# Patient Record
Sex: MALE | Race: BLACK OR AFRICAN AMERICAN | Employment: UNEMPLOYED | URBAN - METROPOLITAN AREA
[De-identification: names, ages, dates, MRNs, and addresses within clinical notes are randomized per-mention and may not be internally consistent; named-entity substitution may affect disease eponyms.]

---

## 2018-07-08 ENCOUNTER — HOSPITAL ENCOUNTER (EMERGENCY)
Age: 38
Discharge: HOME | End: 2018-07-08
Attending: INTERNAL MEDICINE
Payer: SUBSIDIZED

## 2018-07-08 ENCOUNTER — APPOINTMENT (OUTPATIENT)
Dept: CT IMAGING | Age: 38
End: 2018-07-08
Attending: INTERNAL MEDICINE
Payer: SUBSIDIZED

## 2018-07-08 VITALS
HEART RATE: 90 BPM | OXYGEN SATURATION: 97 % | HEIGHT: 75 IN | WEIGHT: 200 LBS | RESPIRATION RATE: 18 BRPM | SYSTOLIC BLOOD PRESSURE: 105 MMHG | DIASTOLIC BLOOD PRESSURE: 86 MMHG | TEMPERATURE: 97.5 F | BODY MASS INDEX: 24.87 KG/M2

## 2018-07-08 DIAGNOSIS — N12 PYELONEPHRITIS: Primary | ICD-10-CM

## 2018-07-08 LAB
ALBUMIN SERPL-MCNC: 3.1 G/DL (ref 3.5–5)
ALBUMIN/GLOB SERPL: 0.7 {RATIO} (ref 1.1–2.2)
ALP SERPL-CCNC: 112 U/L (ref 45–117)
ALT SERPL-CCNC: 34 U/L (ref 12–78)
AMPHET UR QL SCN: NEGATIVE
ANION GAP SERPL CALC-SCNC: 11 MMOL/L (ref 5–15)
APPEARANCE UR: CLEAR
AST SERPL-CCNC: 25 U/L (ref 15–37)
BACTERIA URNS QL MICRO: NEGATIVE /HPF
BARBITURATES UR QL SCN: NEGATIVE
BASOPHILS # BLD: 0 K/UL (ref 0–0.1)
BASOPHILS NFR BLD: 0 % (ref 0–1)
BENZODIAZ UR QL: NEGATIVE
BILIRUB SERPL-MCNC: 2.2 MG/DL (ref 0.2–1)
BILIRUB UR QL: NEGATIVE
BUN SERPL-MCNC: 10 MG/DL (ref 6–20)
BUN/CREAT SERPL: 8 (ref 12–20)
CALCIUM SERPL-MCNC: 8.8 MG/DL (ref 8.5–10.1)
CANNABINOIDS UR QL SCN: NEGATIVE
CHLORIDE SERPL-SCNC: 110 MMOL/L (ref 97–108)
CO2 SERPL-SCNC: 26 MMOL/L (ref 21–32)
COCAINE UR QL SCN: NEGATIVE
COLOR UR: ABNORMAL
CREAT SERPL-MCNC: 1.27 MG/DL (ref 0.7–1.3)
DIFFERENTIAL METHOD BLD: ABNORMAL
DRUG SCRN COMMENT,DRGCM: NORMAL
EOSINOPHIL # BLD: 0 K/UL (ref 0–0.4)
EOSINOPHIL NFR BLD: 0 % (ref 0–7)
EPITH CASTS URNS QL MICRO: NORMAL /LPF
ERYTHROCYTE [DISTWIDTH] IN BLOOD BY AUTOMATED COUNT: 14.8 % (ref 11.5–14.5)
GLOBULIN SER CALC-MCNC: 4.7 G/DL (ref 2–4)
GLUCOSE SERPL-MCNC: 112 MG/DL (ref 65–100)
GLUCOSE UR STRIP.AUTO-MCNC: NEGATIVE MG/DL
HCT VFR BLD AUTO: 49.8 % (ref 36.6–50.3)
HGB BLD-MCNC: 16 G/DL (ref 12.1–17)
HGB UR QL STRIP: ABNORMAL
IMM GRANULOCYTES # BLD: 0.1 K/UL (ref 0–0.04)
IMM GRANULOCYTES NFR BLD AUTO: 0 % (ref 0–0.5)
KETONES UR QL STRIP.AUTO: NEGATIVE MG/DL
LACTATE SERPL-SCNC: 2.1 MMOL/L (ref 0.4–2)
LEUKOCYTE ESTERASE UR QL STRIP.AUTO: ABNORMAL
LIPASE SERPL-CCNC: 58 U/L (ref 73–393)
LYMPHOCYTES # BLD: 3.4 K/UL (ref 0.8–3.5)
LYMPHOCYTES NFR BLD: 27 % (ref 12–49)
MCH RBC QN AUTO: 30.7 PG (ref 26–34)
MCHC RBC AUTO-ENTMCNC: 32.1 G/DL (ref 30–36.5)
MCV RBC AUTO: 95.4 FL (ref 80–99)
METHADONE UR QL: NEGATIVE
MONOCYTES # BLD: 1.3 K/UL (ref 0–1)
MONOCYTES NFR BLD: 10 % (ref 5–13)
NEUTS SEG # BLD: 7.9 K/UL (ref 1.8–8)
NEUTS SEG NFR BLD: 62 % (ref 32–75)
NITRITE UR QL STRIP.AUTO: NEGATIVE
NRBC # BLD: 0 K/UL (ref 0–0.01)
NRBC BLD-RTO: 0 PER 100 WBC
OPIATES UR QL: NEGATIVE
PCP UR QL: NEGATIVE
PH UR STRIP: 6.5 [PH] (ref 5–8)
PLATELET # BLD AUTO: 266 K/UL (ref 150–400)
PMV BLD AUTO: 10.5 FL (ref 8.9–12.9)
POTASSIUM SERPL-SCNC: 3.5 MMOL/L (ref 3.5–5.1)
PROT SERPL-MCNC: 7.8 G/DL (ref 6.4–8.2)
PROT UR STRIP-MCNC: NEGATIVE MG/DL
RBC # BLD AUTO: 5.22 M/UL (ref 4.1–5.7)
RBC #/AREA URNS HPF: NORMAL /HPF (ref 0–5)
SODIUM SERPL-SCNC: 147 MMOL/L (ref 136–145)
SP GR UR REFRACTOMETRY: 1.01 (ref 1–1.03)
UROBILINOGEN UR QL STRIP.AUTO: 2 EU/DL (ref 0.2–1)
WBC # BLD AUTO: 12.7 K/UL (ref 4.1–11.1)
WBC URNS QL MICRO: NORMAL /HPF (ref 0–4)

## 2018-07-08 PROCEDURE — 83690 ASSAY OF LIPASE: CPT | Performed by: INTERNAL MEDICINE

## 2018-07-08 PROCEDURE — 80307 DRUG TEST PRSMV CHEM ANLYZR: CPT | Performed by: INTERNAL MEDICINE

## 2018-07-08 PROCEDURE — 36415 COLL VENOUS BLD VENIPUNCTURE: CPT | Performed by: INTERNAL MEDICINE

## 2018-07-08 PROCEDURE — 83605 ASSAY OF LACTIC ACID: CPT | Performed by: INTERNAL MEDICINE

## 2018-07-08 PROCEDURE — 81001 URINALYSIS AUTO W/SCOPE: CPT | Performed by: INTERNAL MEDICINE

## 2018-07-08 PROCEDURE — 74011250637 HC RX REV CODE- 250/637: Performed by: INTERNAL MEDICINE

## 2018-07-08 PROCEDURE — 99284 EMERGENCY DEPT VISIT MOD MDM: CPT

## 2018-07-08 PROCEDURE — 74176 CT ABD & PELVIS W/O CONTRAST: CPT

## 2018-07-08 PROCEDURE — 80053 COMPREHEN METABOLIC PANEL: CPT | Performed by: INTERNAL MEDICINE

## 2018-07-08 PROCEDURE — 85025 COMPLETE CBC W/AUTO DIFF WBC: CPT | Performed by: INTERNAL MEDICINE

## 2018-07-08 RX ORDER — ACETAMINOPHEN 500 MG
1000 TABLET ORAL ONCE
Status: COMPLETED | OUTPATIENT
Start: 2018-07-08 | End: 2018-07-08

## 2018-07-08 RX ORDER — ONDANSETRON 4 MG/1
4 TABLET, ORALLY DISINTEGRATING ORAL
Status: COMPLETED | OUTPATIENT
Start: 2018-07-08 | End: 2018-07-08

## 2018-07-08 RX ORDER — CIPROFLOXACIN 500 MG/1
500 TABLET ORAL
Qty: 14 TAB | Refills: 0 | Status: SHIPPED | OUTPATIENT
Start: 2018-07-08 | End: 2018-07-08

## 2018-07-08 RX ORDER — FAMOTIDINE 20 MG/1
20 TABLET, FILM COATED ORAL
Status: COMPLETED | OUTPATIENT
Start: 2018-07-08 | End: 2018-07-08

## 2018-07-08 RX ORDER — CIPROFLOXACIN 500 MG/1
500 TABLET ORAL
Status: COMPLETED | OUTPATIENT
Start: 2018-07-08 | End: 2018-07-08

## 2018-07-08 RX ORDER — TRAMADOL HYDROCHLORIDE 50 MG/1
50 TABLET ORAL
Status: DISCONTINUED | OUTPATIENT
Start: 2018-07-08 | End: 2018-07-08 | Stop reason: HOSPADM

## 2018-07-08 RX ORDER — TRAMADOL HYDROCHLORIDE 50 MG/1
50 TABLET ORAL
Qty: 10 TAB | Refills: 0 | Status: SHIPPED | OUTPATIENT
Start: 2018-07-08 | End: 2018-07-19

## 2018-07-08 RX ADMIN — CIPROFLOXACIN HYDROCHLORIDE 500 MG: 500 TABLET, FILM COATED ORAL at 18:36

## 2018-07-08 RX ADMIN — ACETAMINOPHEN 1000 MG: 500 TABLET, FILM COATED ORAL at 17:54

## 2018-07-08 RX ADMIN — TRAMADOL HYDROCHLORIDE 50 MG: 50 TABLET, FILM COATED ORAL at 18:36

## 2018-07-08 RX ADMIN — FAMOTIDINE 20 MG: 20 TABLET ORAL at 17:54

## 2018-07-08 RX ADMIN — ONDANSETRON 4 MG: 4 TABLET, ORALLY DISINTEGRATING ORAL at 17:54

## 2018-07-08 NOTE — ED NOTES
Discharge instructions were given to the patient by Abby Mendez RN. The patient left the Emergency Department ambulatory, alert and oriented and in no acute distress with 2 prescription(s). The patient was encouraged to call or return to the ED for worsening symptoms or problems and was encouraged to schedule a follow up appointment for continuing care. Patient leaving ED accompanied by self. The patient verbalized understanding of discharge instructions and prescriptions, all questions were answered. The patient has no further concerns at this time. Patient declined wheelchair transfer upon ED discharge.

## 2018-07-08 NOTE — ED PROVIDER NOTES
EMERGENCY DEPARTMENT HISTORY AND PHYSICAL EXAM      Date: 7/8/2018  Patient Name: Ben Arceo    History of Presenting Illness     Chief Complaint   Patient presents with    Abdominal Pain     x 1 week       History Provided By: Patient    HPI: Ben Arceo, 44 y.o. male with PMHx significant for s/p unspecified abdominal surgery 3-4 years ago, presents ambulatory to the ED with cc of intermittent 8/10 mid abdominal pain for two weeks. Pt states he had surgery to his abdomen, but doesn't know what for. He has not taken anything for the pain. There are no other complaints, changes, or physical findings at this time. PCP: None    Past History     Past Medical History:  History reviewed. No pertinent past medical history. Past Surgical History:  History reviewed. No pertinent surgical history. Family History:  History reviewed. No pertinent family history. Social History:  Social History   Substance Use Topics    Smoking status: Current Every Day Smoker     Packs/day: 0.50    Smokeless tobacco: None    Alcohol use Yes       Allergies:  No Known Allergies      Review of Systems   Review of Systems   Constitutional: Negative for chills and fever. HENT: Negative for congestion, rhinorrhea, sneezing and sore throat. Eyes: Negative for redness and visual disturbance. Respiratory: Negative for shortness of breath. Cardiovascular: Negative for chest pain and leg swelling. Gastrointestinal: Positive for abdominal pain. Negative for nausea and vomiting. Genitourinary: Negative for difficulty urinating and frequency. Musculoskeletal: Negative for back pain, myalgias and neck stiffness. Skin: Negative for rash. Neurological: Negative for dizziness, syncope, weakness and headaches. Hematological: Negative for adenopathy. All other systems reviewed and are negative. Physical Exam   Physical Exam   Constitutional: He is oriented to person, place, and time.  He appears well-developed and well-nourished. HENT:   Head: Normocephalic and atraumatic. Mouth/Throat: Oropharynx is clear and moist and mucous membranes are normal.   Eyes: EOM are normal.   Neck: Normal range of motion and full passive range of motion without pain. Neck supple. Cardiovascular: Normal rate, regular rhythm, normal heart sounds, intact distal pulses and normal pulses. No murmur heard. Pulmonary/Chest: Effort normal and breath sounds normal. No respiratory distress. He exhibits no tenderness. Abdominal: Soft. Normal appearance and bowel sounds are normal. There is tenderness in the epigastric area. There is guarding (mild). There is no rebound. Neurological: He is alert and oriented to person, place, and time. He has normal strength. Skin: Skin is warm, dry and intact. No rash noted. No erythema. Psychiatric: He has a normal mood and affect. His speech is normal and behavior is normal. Judgment and thought content normal.   Nursing note and vitals reviewed. Diagnostic Study Results     Labs -     Recent Results (from the past 12 hour(s))   METABOLIC PANEL, COMPREHENSIVE    Collection Time: 07/08/18  5:35 PM   Result Value Ref Range    Sodium 147 (H) 136 - 145 mmol/L    Potassium 3.5 3.5 - 5.1 mmol/L    Chloride 110 (H) 97 - 108 mmol/L    CO2 26 21 - 32 mmol/L    Anion gap 11 5 - 15 mmol/L    Glucose 112 (H) 65 - 100 mg/dL    BUN 10 6 - 20 MG/DL    Creatinine 1.27 0.70 - 1.30 MG/DL    BUN/Creatinine ratio 8 (L) 12 - 20      GFR est AA >60 >60 ml/min/1.73m2    GFR est non-AA >60 >60 ml/min/1.73m2    Calcium 8.8 8.5 - 10.1 MG/DL    Bilirubin, total 2.2 (H) 0.2 - 1.0 MG/DL    ALT (SGPT) 34 12 - 78 U/L    AST (SGOT) 25 15 - 37 U/L    Alk.  phosphatase 112 45 - 117 U/L    Protein, total 7.8 6.4 - 8.2 g/dL    Albumin 3.1 (L) 3.5 - 5.0 g/dL    Globulin 4.7 (H) 2.0 - 4.0 g/dL    A-G Ratio 0.7 (L) 1.1 - 2.2     LIPASE    Collection Time: 07/08/18  5:35 PM   Result Value Ref Range    Lipase 58 (L) 73 - 393 U/L   LACTIC ACID    Collection Time: 07/08/18  5:35 PM   Result Value Ref Range    Lactic acid 2.1 (HH) 0.4 - 2.0 MMOL/L   CBC WITH AUTOMATED DIFF    Collection Time: 07/08/18  5:35 PM   Result Value Ref Range    WBC 12.7 (H) 4.1 - 11.1 K/uL    RBC 5.22 4.10 - 5.70 M/uL    HGB 16.0 12.1 - 17.0 g/dL    HCT 49.8 36.6 - 50.3 %    MCV 95.4 80.0 - 99.0 FL    MCH 30.7 26.0 - 34.0 PG    MCHC 32.1 30.0 - 36.5 g/dL    RDW 14.8 (H) 11.5 - 14.5 %    PLATELET 090 040 - 137 K/uL    MPV 10.5 8.9 - 12.9 FL    NRBC 0.0 0  WBC    ABSOLUTE NRBC 0.00 0.00 - 0.01 K/uL    NEUTROPHILS 62 32 - 75 %    LYMPHOCYTES 27 12 - 49 %    MONOCYTES 10 5 - 13 %    EOSINOPHILS 0 0 - 7 %    BASOPHILS 0 0 - 1 %    IMMATURE GRANULOCYTES 0 0.0 - 0.5 %    ABS. NEUTROPHILS 7.9 1.8 - 8.0 K/UL    ABS. LYMPHOCYTES 3.4 0.8 - 3.5 K/UL    ABS. MONOCYTES 1.3 (H) 0.0 - 1.0 K/UL    ABS. EOSINOPHILS 0.0 0.0 - 0.4 K/UL    ABS. BASOPHILS 0.0 0.0 - 0.1 K/UL    ABS. IMM.  GRANS. 0.1 (H) 0.00 - 0.04 K/UL    DF AUTOMATED     URINALYSIS W/ RFLX MICROSCOPIC    Collection Time: 07/08/18  5:56 PM   Result Value Ref Range    Color YELLOW/STRAW      Appearance CLEAR CLEAR      Specific gravity 1.010 1.003 - 1.030      pH (UA) 6.5 5.0 - 8.0      Protein NEGATIVE  NEG mg/dL    Glucose NEGATIVE  NEG mg/dL    Ketone NEGATIVE  NEG mg/dL    Bilirubin NEGATIVE  NEG      Blood TRACE (A) NEG      Urobilinogen 2.0 (H) 0.2 - 1.0 EU/dL    Nitrites NEGATIVE  NEG      Leukocyte Esterase SMALL (A) NEG     DRUG SCREEN, URINE    Collection Time: 07/08/18  5:56 PM   Result Value Ref Range    AMPHETAMINES NEGATIVE  NEG      BARBITURATES NEGATIVE  NEG      BENZODIAZEPINES NEGATIVE  NEG      COCAINE NEGATIVE  NEG      METHADONE NEGATIVE  NEG      OPIATES NEGATIVE  NEG      PCP(PHENCYCLIDINE) NEGATIVE  NEG      THC (TH-CANNABINOL) NEGATIVE  NEG      Drug screen comment (NOTE)    URINE MICROSCOPIC ONLY    Collection Time: 07/08/18  5:56 PM   Result Value Ref Range    WBC 0-4 0 - 4 /hpf RBC 0-5 0 - 5 /hpf    Epithelial cells FEW FEW /lpf    Bacteria NEGATIVE  NEG /hpf       Radiologic Studies -     CT Results  (Last 48 hours)               07/08/18 1748  CT ABD PELV WO CONT Final result    Impression:  IMPRESSION:   Right perirenal stranding, etiology unclear. This could be secondary to   infection or obstruction. Simple right pleural effusion with compressive atelectasis   Nonspecific shotty retroperitoneal nodes   No evidence for small bowel obstruction       Narrative:  EXAM:  CT ABD PELV WO CONT       INDICATION: Abdominal pain; r/o SBO       COMPARISON: None       CONTRAST:  None. TECHNIQUE:    Thin axial images were obtained through the abdomen and pelvis. Coronal and   sagittal reconstructions were generated. Oral contrast was not administered. CT   dose reduction was achieved through use of a standardized protocol tailored for   this examination and automatic exposure control for dose modulation. The absence of intravenous contrast material reduces the sensitivity for   evaluation of the solid parenchymal organs of the abdomen. FINDINGS:    LUNG BASES: Simple right pleural effusion with density measurement of 4.6 HU. Minimal right basilar compressive atelectasis. INCIDENTALLY IMAGED HEART AND MEDIASTINUM: Unremarkable. LIVER: No mass or biliary dilatation. GALLBLADDER: Unremarkable. SPLEEN: No mass. Normal size. PANCREAS: No mass or ductal dilatation. ADRENALS: Unremarkable. KIDNEYS/URETERS: No mass, calculus, or hydronephrosis. Right perirenal   stranding. STOMACH: Unremarkable. SMALL BOWEL: No dilatation or wall thickening. COLON: No dilatation or wall thickening. APPENDIX: Unremarkable. Axial image 74   PERITONEUM: No ascites or pneumoperitoneum. RETROPERITONEUM: No lymphadenopathy or aortic aneurysm. Extensive shotty   retroperitoneal adenopathy (coronal image 59).    REPRODUCTIVE ORGANS: Small prostate   URINARY BLADDER: No mass or calculus. BONES: No destructive bone lesion. ADDITIONAL COMMENTS: N/A                 Medical Decision Making   I am the first provider for this patient. I reviewed the vital signs, available nursing notes, past medical history, past surgical history, family history and social history. Vital Signs-Reviewed the patient's vital signs. Patient Vitals for the past 12 hrs:   Temp Pulse Resp BP SpO2   07/08/18 1737 - 90 - 105/86 97 %   07/08/18 1714 97.5 °F (36.4 °C) 92 18 (!) 122/102 98 %       Pulse Oximetry Analysis - 98% on room air    Cardiac Monitor:   Rate: 92 bpm  Rhythm: Normal Sinus Rhythm 122/102 (109)     Records Reviewed: Nursing Notes and Old Medical Records    Provider Notes (Medical Decision Making):   DDx: gastritis, SBO, pancreatitis, colitis    ED Course:   Initial assessment performed. The patients presenting problems have been discussed, and they are in agreement with the care plan formulated and outlined with them. I have encouraged them to ask questions as they arise throughout their visit. Disposition:  DISCHARGE NOTE  6:43 PM  The patient has been re-evaluated and is ready for discharge. Reviewed available results with patient. Counseled patient on diagnosis and care plan. Patient has expressed understanding, and all questions have been answered. Patient agrees with plan and agrees to follow up as recommended, or return to the ED if their symptoms worsen. Discharge instructions have been provided and explained to the patient, along with reasons to return to the ED. PLAN:  1. Current Discharge Medication List      START taking these medications    Details   ciprofloxacin HCl (CIPRO) 500 mg tablet Take 1 Tab by mouth now for 1 dose. Qty: 14 Tab, Refills: 0      traMADol (ULTRAM) 50 mg tablet Take 1 Tab by mouth every eight (8) hours as needed. Max Daily Amount: 150 mg.  Qty: 10 Tab, Refills: 0    Associated Diagnoses: Pyelonephritis           2.    Follow-up Information Follow up With Details Comments Contact Info    None In 2 days If symptoms worsen None (395) Patient stated that they have no PCP          Return to ED if worse     Diagnosis     Clinical Impression:   1. Pyelonephritis        Attestations: This note is prepared by Russell Rahman, acting as Scribe for Kaitlin Jenkins MD.    Kaitlin Jenkins MD: The scribe's documentation has been prepared under my direction and personally reviewed by me in its entirety. I confirm that the note above accurately reflects all work, treatment, procedures, and medical decision making performed by me.

## 2018-07-08 NOTE — ED NOTES
Patient complains of intermittent throbbing abdominal pain, nausea x 2 weeks. Patient denies vomiting, diarrhea, body aches. Patient states he had abdominal surgery 2015. Patient unsure of what kind of surgery was done. Patient has not taken any medications for his pain today. Emergency Department Nursing Plan of Care       The Nursing Plan of Care is developed from the Nursing assessment and Emergency Department Attending provider initial evaluation. The plan of care may be reviewed in the ED Provider note.     The Plan of Care was developed with the following considerations:   Patient / Family readiness to learn indicated by:verbalized understanding  Persons(s) to be included in education: patient  Barriers to Learning/Limitations:No    Signed     Lisbet Degroot    7/8/2018   5:32 PM

## 2018-07-08 NOTE — DISCHARGE INSTRUCTIONS
Kidney Infection: Care Instructions  Your Care Instructions    A kidney infection (pyelonephritis) is a type of urinary tract infection, or UTI. Most UTIs are bladder infections. Kidney infections tend to make people much sicker than bladder infections do. A kidney infection is also more serious because it can cause lasting damage if it is not treated quickly. Follow-up care is a key part of your treatment and safety. Be sure to make and go to all appointments, and call your doctor if you are having problems. It's also a good idea to know your test results and keep a list of the medicines you take. How can you care for yourself at home? · Take your antibiotics as directed. Do not stop taking them just because you feel better. You need to take the full course of antibiotics. · Drink plenty of water, enough so that your urine is light yellow or clear like water. This may help wash out bacteria that are causing the infection. If you have kidney, heart, or liver disease and have to limit fluids, talk with your doctor before you increase the amount of fluids you drink. · Urinate often. Try to empty your bladder each time. · To relieve pain, take a hot shower or lay a heating pad (set on low) over your lower belly. Never go to sleep with a heating pad in place. Put a thin cloth between the heating pad and your skin. To help prevent kidney infections  · Drink plenty of water each day. This helps you urinate often, which clears bacteria from your system. If you have kidney, heart, or liver disease and have to limit fluids, talk with your doctor before you increase the amount of fluids you drink. · Urinate when you have the urge. Do not hold your urine for a long time. Urinate before you go to sleep. · If you have symptoms of a bladder infection, such as burning when you urinate or having to urinate often, call your doctor so you can treat the problem before it gets worse.  If you do not treat a bladder infection quickly, it can spread to the kidney. · Men should keep the tip of the penis clean. If you are a woman, keep these ideas in mind:  · Urinate right after you have sex. · Change sanitary pads often. Avoid douches, feminine hygiene sprays, and other feminine hygiene products that have deodorants. · After going to the bathroom, wipe from front to back. When should you call for help? Call your doctor now or seek immediate medical care if:  ? · You have symptoms that a kidney infection is getting worse. These may include:  ¨ Pain or burning when you urinate. ¨ A frequent need to urinate without being able to pass much urine. ¨ Pain in the flank, which is just below the rib cage and above the waist on either side of the back. ¨ Blood in the urine. ¨ A fever. ? · You are vomiting or nauseated. ? Watch closely for changes in your health, and be sure to contact your doctor if:  ? · You do not get better as expected. Where can you learn more? Go to http://eleanor-walter.info/. Enter N897 in the search box to learn more about \"Kidney Infection: Care Instructions. \"  Current as of: May 12, 2017  Content Version: 11.4  © 3449-7597 [employer]. Care instructions adapted under license by [employer] (which disclaims liability or warranty for this information). If you have questions about a medical condition or this instruction, always ask your healthcare professional. [place] [address] any warranty or liability for your use of this information.

## 2018-07-17 ENCOUNTER — APPOINTMENT (OUTPATIENT)
Dept: GENERAL RADIOLOGY | Age: 38
DRG: 308 | End: 2018-07-17
Attending: NURSE PRACTITIONER
Payer: SUBSIDIZED

## 2018-07-17 ENCOUNTER — HOSPITAL ENCOUNTER (INPATIENT)
Age: 38
LOS: 2 days | Discharge: ANOTHER HOSPITAL | DRG: 308 | End: 2018-07-19
Attending: EMERGENCY MEDICINE | Admitting: HOSPITALIST
Payer: SUBSIDIZED

## 2018-07-17 ENCOUNTER — APPOINTMENT (OUTPATIENT)
Dept: NUCLEAR MEDICINE | Age: 38
DRG: 308 | End: 2018-07-17
Attending: INTERNAL MEDICINE
Payer: SUBSIDIZED

## 2018-07-17 ENCOUNTER — APPOINTMENT (OUTPATIENT)
Dept: CT IMAGING | Age: 38
DRG: 308 | End: 2018-07-17
Attending: NURSE PRACTITIONER
Payer: SUBSIDIZED

## 2018-07-17 DIAGNOSIS — R77.8 ELEVATED TROPONIN I LEVEL: ICD-10-CM

## 2018-07-17 DIAGNOSIS — I48.91 ATRIAL FIBRILLATION WITH RVR (HCC): Primary | ICD-10-CM

## 2018-07-17 DIAGNOSIS — I51.7 CARDIOMEGALY: ICD-10-CM

## 2018-07-17 DIAGNOSIS — J90 PLEURAL EFFUSION: ICD-10-CM

## 2018-07-17 LAB
ALBUMIN SERPL-MCNC: 2.9 G/DL (ref 3.5–5)
ALBUMIN/GLOB SERPL: 0.6 {RATIO} (ref 1.1–2.2)
ALP SERPL-CCNC: 168 U/L (ref 45–117)
ALT SERPL-CCNC: 35 U/L (ref 12–78)
AMPHET UR QL SCN: NEGATIVE
ANION GAP SERPL CALC-SCNC: 8 MMOL/L (ref 5–15)
APPEARANCE UR: CLEAR
AST SERPL-CCNC: 33 U/L (ref 15–37)
BACTERIA URNS QL MICRO: NEGATIVE /HPF
BARBITURATES UR QL SCN: NEGATIVE
BASOPHILS # BLD: 0 K/UL (ref 0–0.1)
BASOPHILS NFR BLD: 0 % (ref 0–1)
BENZODIAZ UR QL: NEGATIVE
BILIRUB SERPL-MCNC: 1.6 MG/DL (ref 0.2–1)
BILIRUB UR QL: NEGATIVE
BNP SERPL-MCNC: 2393 PG/ML (ref 0–125)
BUN SERPL-MCNC: 11 MG/DL (ref 6–20)
BUN/CREAT SERPL: 10 (ref 12–20)
CALCIUM SERPL-MCNC: 8.8 MG/DL (ref 8.5–10.1)
CANNABINOIDS UR QL SCN: NEGATIVE
CHLORIDE SERPL-SCNC: 106 MMOL/L (ref 97–108)
CK MB CFR SERPL CALC: 1.8 % (ref 0–2.5)
CK MB SERPL-MCNC: 1.6 NG/ML (ref 5–25)
CK SERPL-CCNC: 91 U/L (ref 39–308)
CO2 SERPL-SCNC: 21 MMOL/L (ref 21–32)
COCAINE UR QL SCN: NEGATIVE
COLOR UR: NORMAL
CREAT SERPL-MCNC: 1.14 MG/DL (ref 0.7–1.3)
CRP SERPL-MCNC: 0.96 MG/DL (ref 0–0.6)
D DIMER PPP FEU-MCNC: 4.4 MG/L FEU (ref 0–0.65)
DIFFERENTIAL METHOD BLD: ABNORMAL
DRUG SCRN COMMENT,DRGCM: NORMAL
EOSINOPHIL # BLD: 0.1 K/UL (ref 0–0.4)
EOSINOPHIL NFR BLD: 1 % (ref 0–7)
EPITH CASTS URNS QL MICRO: NORMAL /LPF
ERYTHROCYTE [DISTWIDTH] IN BLOOD BY AUTOMATED COUNT: 15.3 % (ref 11.5–14.5)
ERYTHROCYTE [SEDIMENTATION RATE] IN BLOOD: 14 MM/HR (ref 0–15)
GLOBULIN SER CALC-MCNC: 5.2 G/DL (ref 2–4)
GLUCOSE SERPL-MCNC: 98 MG/DL (ref 65–100)
GLUCOSE UR STRIP.AUTO-MCNC: NEGATIVE MG/DL
HCT VFR BLD AUTO: 48.4 % (ref 36.6–50.3)
HGB BLD-MCNC: 16 G/DL (ref 12.1–17)
HGB UR QL STRIP: NEGATIVE
HIV 1+2 AB+HIV1 P24 AG SERPL QL IA: NONREACTIVE
HIV12 RESULT COMMENT, HHIVC: NORMAL
IMM GRANULOCYTES # BLD: 0 K/UL (ref 0–0.04)
IMM GRANULOCYTES NFR BLD AUTO: 0 % (ref 0–0.5)
KETONES UR QL STRIP.AUTO: NEGATIVE MG/DL
LEUKOCYTE ESTERASE UR QL STRIP.AUTO: NEGATIVE
LYMPHOCYTES # BLD: 4.8 K/UL (ref 0.8–3.5)
LYMPHOCYTES NFR BLD: 54 % (ref 12–49)
MAGNESIUM SERPL-MCNC: 1.9 MG/DL (ref 1.6–2.4)
MCH RBC QN AUTO: 30.8 PG (ref 26–34)
MCHC RBC AUTO-ENTMCNC: 33.1 G/DL (ref 30–36.5)
MCV RBC AUTO: 93.3 FL (ref 80–99)
METHADONE UR QL: NEGATIVE
MONOCYTES # BLD: 0.7 K/UL (ref 0–1)
MONOCYTES NFR BLD: 8 % (ref 5–13)
NEUTS SEG # BLD: 3.3 K/UL (ref 1.8–8)
NEUTS SEG NFR BLD: 37 % (ref 32–75)
NITRITE UR QL STRIP.AUTO: NEGATIVE
NRBC # BLD: 0 K/UL (ref 0–0.01)
NRBC BLD-RTO: 0 PER 100 WBC
OPIATES UR QL: NEGATIVE
PCP UR QL: NEGATIVE
PH UR STRIP: 6 [PH] (ref 5–8)
PLATELET # BLD AUTO: 301 K/UL (ref 150–400)
PMV BLD AUTO: 10.3 FL (ref 8.9–12.9)
POTASSIUM SERPL-SCNC: 4.1 MMOL/L (ref 3.5–5.1)
PROT SERPL-MCNC: 8.1 G/DL (ref 6.4–8.2)
PROT UR STRIP-MCNC: NEGATIVE MG/DL
RBC # BLD AUTO: 5.19 M/UL (ref 4.1–5.7)
RBC #/AREA URNS HPF: NORMAL /HPF (ref 0–5)
SODIUM SERPL-SCNC: 135 MMOL/L (ref 136–145)
SP GR UR REFRACTOMETRY: 1 (ref 1–1.03)
TROPONIN I BLD-MCNC: 0.13 NG/ML (ref 0–0.08)
UA: UC IF INDICATED,UAUC: NORMAL
UROBILINOGEN UR QL STRIP.AUTO: 0.2 EU/DL (ref 0.2–1)
WBC # BLD AUTO: 8.9 K/UL (ref 4.1–11.1)
WBC URNS QL MICRO: NORMAL /HPF (ref 0–4)

## 2018-07-17 PROCEDURE — 99285 EMERGENCY DEPT VISIT HI MDM: CPT

## 2018-07-17 PROCEDURE — 74011250637 HC RX REV CODE- 250/637: Performed by: INTERNAL MEDICINE

## 2018-07-17 PROCEDURE — 96361 HYDRATE IV INFUSION ADD-ON: CPT

## 2018-07-17 PROCEDURE — 74011000258 HC RX REV CODE- 258: Performed by: HOSPITALIST

## 2018-07-17 PROCEDURE — 83880 ASSAY OF NATRIURETIC PEPTIDE: CPT | Performed by: NURSE PRACTITIONER

## 2018-07-17 PROCEDURE — 93005 ELECTROCARDIOGRAM TRACING: CPT

## 2018-07-17 PROCEDURE — 85652 RBC SED RATE AUTOMATED: CPT | Performed by: HOSPITALIST

## 2018-07-17 PROCEDURE — 65660000001 HC RM ICU INTERMED STEPDOWN

## 2018-07-17 PROCEDURE — 71045 X-RAY EXAM CHEST 1 VIEW: CPT

## 2018-07-17 PROCEDURE — 83655 ASSAY OF LEAD: CPT | Performed by: INTERNAL MEDICINE

## 2018-07-17 PROCEDURE — 86658 ENTEROVIRUS ANTIBODY: CPT | Performed by: INTERNAL MEDICINE

## 2018-07-17 PROCEDURE — 83735 ASSAY OF MAGNESIUM: CPT | Performed by: NURSE PRACTITIONER

## 2018-07-17 PROCEDURE — 36415 COLL VENOUS BLD VENIPUNCTURE: CPT | Performed by: HOSPITALIST

## 2018-07-17 PROCEDURE — 93306 TTE W/DOPPLER COMPLETE: CPT

## 2018-07-17 PROCEDURE — 85379 FIBRIN DEGRADATION QUANT: CPT | Performed by: NURSE PRACTITIONER

## 2018-07-17 PROCEDURE — 81001 URINALYSIS AUTO W/SCOPE: CPT | Performed by: HOSPITALIST

## 2018-07-17 PROCEDURE — 87040 BLOOD CULTURE FOR BACTERIA: CPT | Performed by: HOSPITALIST

## 2018-07-17 PROCEDURE — 87389 HIV-1 AG W/HIV-1&-2 AB AG IA: CPT | Performed by: HOSPITALIST

## 2018-07-17 PROCEDURE — 85025 COMPLETE CBC W/AUTO DIFF WBC: CPT | Performed by: NURSE PRACTITIONER

## 2018-07-17 PROCEDURE — 96374 THER/PROPH/DIAG INJ IV PUSH: CPT

## 2018-07-17 PROCEDURE — 84145 PROCALCITONIN (PCT): CPT | Performed by: HOSPITALIST

## 2018-07-17 PROCEDURE — 84484 ASSAY OF TROPONIN QUANT: CPT

## 2018-07-17 PROCEDURE — 74011250636 HC RX REV CODE- 250/636: Performed by: HOSPITALIST

## 2018-07-17 PROCEDURE — 74011250636 HC RX REV CODE- 250/636: Performed by: EMERGENCY MEDICINE

## 2018-07-17 PROCEDURE — 74011000250 HC RX REV CODE- 250: Performed by: EMERGENCY MEDICINE

## 2018-07-17 PROCEDURE — 87449 NOS EACH ORGANISM AG IA: CPT | Performed by: HOSPITALIST

## 2018-07-17 PROCEDURE — 74011000250 HC RX REV CODE- 250: Performed by: INTERNAL MEDICINE

## 2018-07-17 PROCEDURE — 71275 CT ANGIOGRAPHY CHEST: CPT

## 2018-07-17 PROCEDURE — 74011636320 HC RX REV CODE- 636/320: Performed by: EMERGENCY MEDICINE

## 2018-07-17 PROCEDURE — 77010033678 HC OXYGEN DAILY

## 2018-07-17 PROCEDURE — 80053 COMPREHEN METABOLIC PANEL: CPT | Performed by: NURSE PRACTITIONER

## 2018-07-17 PROCEDURE — 80307 DRUG TEST PRSMV CHEM ANLYZR: CPT | Performed by: HOSPITALIST

## 2018-07-17 PROCEDURE — 86140 C-REACTIVE PROTEIN: CPT | Performed by: HOSPITALIST

## 2018-07-17 PROCEDURE — 82550 ASSAY OF CK (CPK): CPT | Performed by: INTERNAL MEDICINE

## 2018-07-17 PROCEDURE — 74011000258 HC RX REV CODE- 258: Performed by: INTERNAL MEDICINE

## 2018-07-17 PROCEDURE — 74011250636 HC RX REV CODE- 250/636: Performed by: INTERNAL MEDICINE

## 2018-07-17 RX ORDER — MAGNESIUM SULFATE 1 G/100ML
1 INJECTION INTRAVENOUS ONCE
Status: ACTIVE | OUTPATIENT
Start: 2018-07-17 | End: 2018-07-18

## 2018-07-17 RX ORDER — SODIUM CHLORIDE 0.9 % (FLUSH) 0.9 %
5-10 SYRINGE (ML) INJECTION AS NEEDED
Status: DISCONTINUED | OUTPATIENT
Start: 2018-07-17 | End: 2018-07-17 | Stop reason: SDUPTHER

## 2018-07-17 RX ORDER — LORAZEPAM 2 MG/ML
1 INJECTION INTRAMUSCULAR
Status: DISCONTINUED | OUTPATIENT
Start: 2018-07-17 | End: 2018-07-19 | Stop reason: HOSPADM

## 2018-07-17 RX ORDER — CARVEDILOL 3.12 MG/1
6.25 TABLET ORAL 2 TIMES DAILY
Status: DISCONTINUED | OUTPATIENT
Start: 2018-07-18 | End: 2018-07-19 | Stop reason: HOSPADM

## 2018-07-17 RX ORDER — SODIUM CHLORIDE 0.9 % (FLUSH) 0.9 %
5-10 SYRINGE (ML) INJECTION EVERY 8 HOURS
Status: DISCONTINUED | OUTPATIENT
Start: 2018-07-17 | End: 2018-07-19 | Stop reason: HOSPADM

## 2018-07-17 RX ORDER — SODIUM CHLORIDE 0.9 % (FLUSH) 0.9 %
5-10 SYRINGE (ML) INJECTION
Status: COMPLETED | OUTPATIENT
Start: 2018-07-17 | End: 2018-07-17

## 2018-07-17 RX ORDER — SODIUM CHLORIDE 0.9 % (FLUSH) 0.9 %
5-10 SYRINGE (ML) INJECTION EVERY 8 HOURS
Status: DISCONTINUED | OUTPATIENT
Start: 2018-07-17 | End: 2018-07-17 | Stop reason: SDUPTHER

## 2018-07-17 RX ORDER — ENOXAPARIN SODIUM 100 MG/ML
40 INJECTION SUBCUTANEOUS EVERY 24 HOURS
Status: DISCONTINUED | OUTPATIENT
Start: 2018-07-17 | End: 2018-07-19 | Stop reason: HOSPADM

## 2018-07-17 RX ORDER — DILTIAZEM HYDROCHLORIDE 5 MG/ML
10 INJECTION INTRAVENOUS
Status: COMPLETED | OUTPATIENT
Start: 2018-07-17 | End: 2018-07-17

## 2018-07-17 RX ORDER — SODIUM CHLORIDE 0.9 % (FLUSH) 0.9 %
5-10 SYRINGE (ML) INJECTION AS NEEDED
Status: DISCONTINUED | OUTPATIENT
Start: 2018-07-17 | End: 2018-07-19 | Stop reason: HOSPADM

## 2018-07-17 RX ORDER — CARVEDILOL 3.12 MG/1
3.12 TABLET ORAL 2 TIMES DAILY
Status: DISCONTINUED | OUTPATIENT
Start: 2018-07-17 | End: 2018-07-17

## 2018-07-17 RX ORDER — CIPROFLOXACIN 500 MG/1
500 TABLET ORAL 2 TIMES DAILY
COMMUNITY
Start: 2018-07-09 | End: 2018-07-19

## 2018-07-17 RX ADMIN — Medication 10 ML: at 23:39

## 2018-07-17 RX ADMIN — Medication 10 ML: at 19:13

## 2018-07-17 RX ADMIN — AZITHROMYCIN 500 MG: 500 INJECTION, POWDER, LYOPHILIZED, FOR SOLUTION INTRAVENOUS at 20:12

## 2018-07-17 RX ADMIN — Medication 10 ML: at 10:33

## 2018-07-17 RX ADMIN — Medication 10 ML: at 19:14

## 2018-07-17 RX ADMIN — CARVEDILOL 3.12 MG: 3.12 TABLET, FILM COATED ORAL at 19:10

## 2018-07-17 RX ADMIN — SODIUM CHLORIDE 250 ML: 900 INJECTION, SOLUTION INTRAVENOUS at 22:10

## 2018-07-17 RX ADMIN — DILTIAZEM HYDROCHLORIDE 15 MG/HR: 5 INJECTION INTRAVENOUS at 15:50

## 2018-07-17 RX ADMIN — SODIUM CHLORIDE 1000 ML: 900 INJECTION, SOLUTION INTRAVENOUS at 09:33

## 2018-07-17 RX ADMIN — DILTIAZEM HYDROCHLORIDE 10 MG: 5 INJECTION INTRAVENOUS at 09:33

## 2018-07-17 RX ADMIN — ENOXAPARIN SODIUM 40 MG: 40 INJECTION, SOLUTION INTRAVENOUS; SUBCUTANEOUS at 19:15

## 2018-07-17 RX ADMIN — IOPAMIDOL 100 ML: 755 INJECTION, SOLUTION INTRAVENOUS at 10:31

## 2018-07-17 RX ADMIN — Medication 10 ML: at 19:15

## 2018-07-17 RX ADMIN — CEFTRIAXONE SODIUM 2 G: 2 INJECTION, POWDER, FOR SOLUTION INTRAMUSCULAR; INTRAVENOUS at 19:33

## 2018-07-17 RX ADMIN — LORAZEPAM 1 MG: 2 INJECTION INTRAMUSCULAR; INTRAVENOUS at 23:39

## 2018-07-17 NOTE — H&P
Hospitalist Admission Note    NAME: Tamela Cruz   :  1980   MRN:  [id number]     Date/Time:  2018 4:33 PM    Patient PCP: None  ______________________________________________________________________  Given the patient's current clinical presentation, I have a high level of concern for decompensation if discharged from the emergency department. Complex decision making was performed, which includes reviewing the patient's available past medical records, laboratory results, and x-ray films. My assessment of this patient's clinical condition and my plan of care is as follows. Assessment / Plan:    Rapid a fib with RVR  New diagnosis of systolic heart failure  -PCU amdit  -initiated on cardizem gtt  -[id number] score is 1, will defer anticoagulation to cardiology  -echo, stress test    Right sided pleural effusion with infiltrates  No s/s of sepsis, denies cough  -will do infx work up, will check ESR, CRP, HIV, procalcitonin  -blood cx, urine legionella and strep ag  -empiric ceftriaxone and azithro  -case disccused with Dr. Calhoun  on phone  -viral panel ordered by cardiology    Tobacco dependence  -counseled to quit  -nicotine patch offered      Code Status: full  Surrogate Decision Maker: sister    DVT Prophylaxis: lovenox  GI Prophylaxis: not indicated    Baseline: independent      Subjective:   CHIEF COMPLAINT: shortness of breath    HISTORY OF PRESENT ILLNESS:     Tamela Cruz is a 43 y.o. male with no known medical history presented to ER with c/o shortness of breath that woke him up last night from sleep. In ER he was found to be in rapid a fib with RVR. Was given him one dose of IV cardizem with no improvement. Pt was seen a week ago for possible pyelo In ER and discharged home on ciprofloxacin. At that time he was c/o abd pain and UA was not impressive, CT bad/pelvis showed renal stranding and Lymphadenopathy. He claims completing abx course.  Admits to poor appetite and unintentional weight loss. Today in ER CTA chest neg for PE but shows right sided pleural effusion and middle, lower lobe infiltrates. Bedside echo in ER shows cardiomyopathy and low EF. He is afebrile, no leukocytosis. Denies sick contacts, recent travels, denies illicit drug abuse and admits to safe sexual practices. We were asked to admit for work up and evaluation of the above problems. History reviewed. No pertinent past medical history. History reviewed. No pertinent surgical history. Social History   Substance Use Topics    Smoking status: Current Every Day Smoker     Packs/day: 0.50    Smokeless tobacco: Not on file    Alcohol use Yes      Comment: \"on weekends\"      family history + HTN, DM and CAD  No Known Allergies     Prior to Admission medications    Medication Sig Start Date End Date Taking? Authorizing Provider   ciprofloxacin HCl (CIPRO) 500 mg tablet Take 500 mg by mouth two (2) times a day. 7/9/18  Yes Bert Rodriguez MD   traMADol (ULTRAM) 50 mg tablet Take 1 Tab by mouth every eight (8) hours as needed. Max Daily Amount: 150 mg. 7/8/18  Yes Jarett Ledbetter MD       REVIEW OF SYSTEMS:     I am not able to complete the review of systems because:    The patient is intubated and sedated    The patient has altered mental status due to his acute medical problems    The patient has baseline aphasia from prior stroke(s)    The patient has baseline dementia and is not reliable historian    The patient is in acute medical distress and unable to provide information           Total of 12 systems reviewed as follows:       POSITIVE= underlined text  Negative = text not underlined  General:  fever, chills, sweats, generalized weakness, weight loss/gain,      loss of appetite   Eyes:    blurred vision, eye pain, loss of vision, double vision  ENT:    rhinorrhea, pharyngitis   Respiratory:   cough, sputum production, SOB, FLOWERS, wheezing, pleuritic pain   Cardiology:   chest pain, palpitations, orthopnea, PND, edema, syncope   Gastrointestinal:  abdominal pain , N/V, diarrhea, dysphagia, constipation, bleeding   Genitourinary:  frequency, urgency, dysuria, hematuria, incontinence   Muskuloskeletal :  arthralgia, myalgia, back pain  Hematology:  easy bruising, nose or gum bleeding, lymphadenopathy   Dermatological: rash, ulceration, pruritis, color change / jaundice  Endocrine:   hot flashes or polydipsia   Neurological:  headache, dizziness, confusion, focal weakness, paresthesia,     Speech difficulties, memory loss, gait difficulty  Psychological: Feelings of anxiety, depression, agitation    Objective:   VITALS:    Visit Vitals    /79    Pulse (!) 130    Temp 97.5 °F (36.4 °C)    Resp 23    Ht 6' 3\" (1.905 m)    Wt 99 kg (218 lb 3.2 oz)    SpO2 95%    BMI 27.27 kg/m2       PHYSICAL EXAM:    General:    Alert, cooperative, no distress, appears stated age. HEENT: Atraumatic, anicteric sclerae, pink conjunctivae     No oral ulcers, mucosa moist, throat clear, dentition fair  Neck:  Supple, symmetrical,  thyroid: non tender  Lungs:   Right sided crackles  Chest wall:  No tenderness  No Accessory muscle use. Heart:   Regular  rhythm,  No  murmur   No edema  Abdomen:   Soft, non-tender. Not distended. Bowel sounds normal  Extremities: No cyanosis. No clubbing,      Skin turgor normal, Capillary refill normal, Radial dial pulse 2+  Skin:     Not pale. Not Jaundiced  No rashes   Psych:  Good insight. Not depressed. Not anxious or agitated. Neurologic: EOMs intact. No facial asymmetry. No aphasia or slurred speech. Symmetrical strength, Sensation grossly intact.  Alert and oriented X 4.     _______________________________________________________________________  Care Plan discussed with:    Comments   Patient x    Family      RN x    Care Manager                    Consultant:  dede Torres and Tanya Gonzalez _______________________________________________________________________  Expected  Disposition:   Home with Family x   HH/PT/OT/RN    SNF/LTC    ALIZE    ________________________________________________________________________  TOTAL TIME:  72 Minutes    Critical Care Provided     Minutes non procedure based      Comments    x Reviewed previous records   >50% of visit spent in counseling and coordination of care x Discussion with patient and/or family and questions answered       ________________________________________________________________________  Signed: Nola Rivera MD    Procedures: see electronic medical records for all procedures/Xrays and details which were not copied into this note but were reviewed prior to creation of Plan. LAB DATA REVIEWED:    Recent Results (from the past 24 hour(s))   EKG, 12 LEAD, INITIAL    Collection Time: 07/17/18  9:21 AM   Result Value Ref Range    Ventricular Rate 173 BPM    Atrial Rate 192 BPM    QRS Duration 90 ms    Q-T Interval 256 ms    QTC Calculation (Bezet) 434 ms    Calculated R Axis -70 degrees    Calculated T Axis 17 degrees    Diagnosis       Atrial fibrillation with rapid ventricular response with premature   ventricular or aberrantly conducted complexes  Left anterior fascicular block  Possible Anterior infarct , age undetermined  Abnormal ECG  No previous ECGs available     CBC WITH AUTOMATED DIFF    Collection Time: 07/17/18  9:30 AM   Result Value Ref Range    WBC 8.9 4.1 - 11.1 K/uL    RBC 5.19 4. 10 - 5.70 M/uL    HGB 16.0 12.1 - 17.0 g/dL    HCT 48.4 36.6 - 50.3 %    MCV 93.3 80.0 - 99.0 FL    MCH 30.8 26.0 - 34.0 PG    MCHC 33.1 30.0 - 36.5 g/dL    RDW 15.3 (H) 11.5 - 14.5 %    PLATELET 582 378 - 596 K/uL    MPV 10.3 8.9 - 12.9 FL    NRBC 0.0 0  WBC    ABSOLUTE NRBC 0.00 0.00 - 0.01 K/uL    NEUTROPHILS 37 32 - 75 %    LYMPHOCYTES 54 (H) 12 - 49 %    MONOCYTES 8 5 - 13 %    EOSINOPHILS 1 0 - 7 %    BASOPHILS 0 0 - 1 %    IMMATURE GRANULOCYTES 0 0.0 - 0.5 %    ABS. NEUTROPHILS 3.3 1.8 - 8.0 K/UL    ABS. LYMPHOCYTES 4.8 (H) 0.8 - 3.5 K/UL    ABS. MONOCYTES 0.7 0.0 - 1.0 K/UL    ABS. EOSINOPHILS 0.1 0.0 - 0.4 K/UL    ABS. BASOPHILS 0.0 0.0 - 0.1 K/UL    ABS. IMM. GRANS. 0.0 0.00 - 0.04 K/UL    DF AUTOMATED     METABOLIC PANEL, COMPREHENSIVE    Collection Time: 07/17/18  9:30 AM   Result Value Ref Range    Sodium 135 (L) 136 - 145 mmol/L    Potassium 4.1 3.5 - 5.1 mmol/L    Chloride 106 97 - 108 mmol/L    CO2 21 21 - 32 mmol/L    Anion gap 8 5 - 15 mmol/L    Glucose 98 65 - 100 mg/dL    BUN 11 6 - 20 MG/DL    Creatinine 1.14 0.70 - 1.30 MG/DL    BUN/Creatinine ratio 10 (L) 12 - 20      GFR est AA >60 >60 ml/min/1.73m2    GFR est non-AA >60 >60 ml/min/1.73m2    Calcium 8.8 8.5 - 10.1 MG/DL    Bilirubin, total 1.6 (H) 0.2 - 1.0 MG/DL    ALT (SGPT) 35 12 - 78 U/L    AST (SGOT) 33 15 - 37 U/L    Alk.  phosphatase 168 (H) 45 - 117 U/L    Protein, total 8.1 6.4 - 8.2 g/dL    Albumin 2.9 (L) 3.5 - 5.0 g/dL    Globulin 5.2 (H) 2.0 - 4.0 g/dL    A-G Ratio 0.6 (L) 1.1 - 2.2     D DIMER    Collection Time: 07/17/18  9:30 AM   Result Value Ref Range    D-dimer 4.40 (H) 0.00 - 0.65 mg/L FEU   MAGNESIUM    Collection Time: 07/17/18  9:30 AM   Result Value Ref Range    Magnesium 1.9 1.6 - 2.4 mg/dL   NT-PRO BNP    Collection Time: 07/17/18  9:31 AM   Result Value Ref Range    NT pro-BNP 2393 (H) 0 - 125 PG/ML   POC TROPONIN-I    Collection Time: 07/17/18  9:33 AM   Result Value Ref Range    Troponin-I (POC) 0.13 (H) 0.00 - 0.08 ng/mL   URINALYSIS W/ REFLEX CULTURE    Collection Time: 07/17/18  2:14 PM   Result Value Ref Range    Color YELLOW/STRAW      Appearance CLEAR CLEAR      Specific gravity 1.005 1.003 - 1.030      pH (UA) 6.0 5.0 - 8.0      Protein NEGATIVE  NEG mg/dL    Glucose NEGATIVE  NEG mg/dL    Ketone NEGATIVE  NEG mg/dL    Bilirubin NEGATIVE  NEG      Blood NEGATIVE  NEG      Urobilinogen 0.2 0.2 - 1.0 EU/dL    Nitrites NEGATIVE  NEG      Leukocyte Esterase NEGATIVE  NEG      WBC 0-4 0 - 4 /hpf    RBC 0-5 0 - 5 /hpf    Epithelial cells FEW FEW /lpf    Bacteria NEGATIVE  NEG /hpf    UA:UC IF INDICATED CULTURE NOT INDICATED BY UA RESULT CNI     DRUG SCREEN, URINE    Collection Time: 07/17/18  2:14 PM   Result Value Ref Range    AMPHETAMINES NEGATIVE  NEG      BARBITURATES NEGATIVE  NEG      BENZODIAZEPINES NEGATIVE  NEG      COCAINE NEGATIVE  NEG      METHADONE NEGATIVE  NEG      OPIATES NEGATIVE  NEG      PCP(PHENCYCLIDINE) NEGATIVE  NEG      THC (TH-CANNABINOL) NEGATIVE  NEG      Drug screen comment (NOTE)

## 2018-07-17 NOTE — PROGRESS NOTES
Pharmacy Medication Reconciliation     Recommendations/Findings:       -Clarified PTA med list with Rx query and patient interview. PTA medication list was corrected to the following:     Prior to Admission Medications   Prescriptions Last Dose Informant Patient Reported? Taking?   ciprofloxacin HCl (CIPRO) 500 mg tablet 7/17/2018 at Unknown time  Yes Yes   Sig: Take 500 mg by mouth two (2) times a day. Prescribed on 7/7/18 x 7 days  Completed 7/17/18   traMADol (ULTRAM) 50 mg tablet 7/17/2018 at Unknown time  No Yes   Sig: Take 1 Tab by mouth every eight (8) hours as needed. Max Daily Amount: 150 mg.       Facility-Administered Medications: None          Thank you,  Bette Chang, [facility]

## 2018-07-17 NOTE — IP AVS SNAPSHOT
Summary of Care Report The Summary of Care report has been created to help improve care coordination. Users with access to [employer] or [address] (Web-based application) may access additional patient information including the Discharge Summary. If you are not currently a [address] user and need more information, please call the number listed below in the [place] [address] section and ask to be connected with Medical Records. Facility Information Name Address Phone [facility] [address] [place] [id number] [phone] Patient Information Patient Name Sex  Ton Dickinson ([id number]) Male 1980 Discharge Information Admitting Provider Service Area Unit Arabella Leija MD / [address] / [phone] Discharge Provider Discharge Date/Time Discharge Disposition Destination (none) 2018 (Pending) [facility] (none) Patient Language Language ENGLISH [13] Hospital Problems as of 2018  Never Reviewed Class Noted - Resolved Last Modified POA Active Problems * (Principal)Rapid atrial fibrillation ([address]  2018 - Present 2018 by Arabella Leija MD Unknown Entered by Arabella Leija MD  
  
You are allergic to the following No active allergies Current Discharge Medication List  
  
START taking these medications Dose & Instructions Dispensing Information Comments  
 azithromycin 500 mg 500 mg, ADDaptor 1 Device IVPB Dose:  500 mg  
500 mg by IntraVENous route every twenty-four (24) hours. Quantity:  1 Dose Refills:  0  
   
 carvedilol 6.25 mg tablet Commonly known as:  Chau Liter Dose:  6.25 mg Take 1 Tab by mouth two (2) times a day. Quantity:  60 Tab Refills:  0  
   
 cefTRIAXone 2 gram 2 g, ADDaptor 1 Device IVPB  Dose:  2 g  
 2 g by IntraVENous route every twenty-four (24) hours. Quantity:  1 Dose Refills:  0  
   
 dilTIAZem (CARDIZEM) infusion Dose:  0-15 mg/hr 0-15 mg/hr by IntraVENous route TITRATE. Quantity:  1 Each Refills:  0 STOP taking these medications Comments CIPRO 500 mg tablet Generic drug:  ciprofloxacin HCl  
   
   
 traMADol 50 mg tablet Commonly known as:  ULTRAM  
   
   
  
  
  
  
Follow-up Information Follow up With Details Comments Contact Info None   None (395) Patient stated that they have no PCP Discharge Instructions Atrial Fibrillation: Care Instructions Your Care Instructions Atrial fibrillation is an irregular and often fast heartbeat. Treating this condition is important for several reasons. It can cause blood clots, which can travel from your heart to your brain and cause a stroke. If you have a fast heartbeat, you may feel lightheaded, dizzy, and weak. An irregular heartbeat can also increase your risk for heart failure. Atrial fibrillation is often the result of another heart condition, such as high blood pressure or coronary artery disease. Making changes to improve your heart condition will help you stay healthy and active. Follow-up care is a key part of your treatment and safety. Be sure to make and go to all appointments, and call your doctor if you are having problems. It's also a good idea to know your test results and keep a list of the medicines you take. How can you care for yourself at home? Medicines 
  · Take your medicines exactly as prescribed. Call your doctor if you think you are having a problem with your medicine. You will get more details on the specific medicines your doctor prescribes.  
  · If your doctor has given you a blood thinner to prevent a stroke, be sure you get instructions about how to take your medicine safely. Blood thinners can cause serious bleeding problems.   · Do not take any vitamins, over-the-counter drugs, or herbal products without talking to your doctor first.  
 Lifestyle changes 
  · Do not smoke. Smoking can increase your chance of a stroke and heart attack. If you need help quitting, talk to your doctor about stop-smoking programs and medicines. These can increase your chances of quitting for good.  
  · Eat a heart-healthy diet.  
  · Stay at a healthy weight. Lose weight if you need to.  
  · Limit alcohol to 2 drinks a day for men and 1 drink a day for women. Too much alcohol can cause health problems.  
  · Avoid colds and flu. Get a pneumococcal vaccine shot. If you have had one before, ask your doctor whether you need another dose. Get a flu shot every year. If you must be around people with colds or flu, wash your hands often. Activity 
  · If your doctor recommends it, get more exercise. Walking is a good choice. Bit by bit, increase the amount you walk every day. Try for at least 30 minutes on most days of the week. You also may want to swim, bike, or do other activities. Your doctor may suggest that you join a cardiac rehabilitation program so that you can have help increasing your physical activity safely.  
  · Start light exercise if your doctor says it is okay. Even a small amount will help you get stronger, have more energy, and manage stress. Walking is an easy way to get exercise. Start out by walking a little more than you did in the hospital. Gradually increase the amount you walk.  
  · When you exercise, watch for signs that your heart is working too hard. You are pushing too hard if you cannot talk while you are exercising. If you become short of breath or dizzy or have chest pain, sit down and rest immediately.  
  · Check your pulse regularly. Place two fingers on the artery at the palm side of your wrist, in line with your thumb. If your heartbeat seems uneven or fast, talk to your doctor. When should you call for help? Call 911 anytime you think you may need emergency care. For example, call if: 
  · You have symptoms of a heart attack. These may include: ¨ Chest pain or pressure, or a strange feeling in the chest. 
¨ Sweating. ¨ Shortness of breath. ¨ Nausea or vomiting. ¨ Pain, pressure, or a strange feeling in the back, neck, jaw, or upper belly or in one or both shoulders or arms. ¨ Lightheadedness or sudden weakness. ¨ A fast or irregular heartbeat. After you call 911, the  may tell you to chew 1 adult-strength or 2 to 4 low-dose aspirin. Wait for an ambulance. Do not try to drive yourself.  
  · You have symptoms of a stroke. These may include: 
¨ Sudden numbness, tingling, weakness, or loss of movement in your face, arm, or leg, especially on only one side of your body. ¨ Sudden vision changes. ¨ Sudden trouble speaking. ¨ Sudden confusion or trouble understanding simple statements. ¨ Sudden problems with walking or balance. ¨ A sudden, severe headache that is different from past headaches.  
  · You passed out (lost consciousness).  
 Call your doctor now or seek immediate medical care if: 
  · You have new or increased shortness of breath.  
  · You feel dizzy or lightheaded, or you feel like you may faint.  
  · Your heart rate becomes irregular.  
  · You can feel your heart flutter in your chest or skip heartbeats. Tell your doctor if these symptoms are new or worse.  
 Watch closely for changes in your health, and be sure to contact your doctor if you have any problems. Where can you learn more? Go to http://eleanor-walter.info/. Enter U020 in the search box to learn more about \"Atrial Fibrillation: Care Instructions. \" Current as of: December 6, 2017 Content Version: 11.7 © 9965-7443 [employer], [employer].  Care instructions adapted under license by [employer] (which disclaims liability or warranty for this information). If you have questions about a medical condition or this instruction, always ask your healthcare professional. [place] [address] any warranty or liability for your use of this information. Chart Review Routing History No Routing History on File

## 2018-07-17 NOTE — ED NOTES
Emergency Department Nursing Plan of Care       The Nursing Plan of Care is developed from the Nursing assessment and Emergency Department Attending provider initial evaluation. The plan of care may be reviewed in the ED Provider note. The Plan of Care was developed with the following considerations:   Patient / Family readiness to learn indicated by:verbalized understanding  Persons(s) to be included in education: patient  Barriers to Learning/Limitations:No    Signed     Patsy Lopez    7/17/2018   9:18 AM    See nursing assessment    Patient is alert and oriented x 4 and in no acute distress at this time. Respirations are at a regular rate, depth, and pattern. Patient updated on plan of care and has no questions or concerns at this time.

## 2018-07-17 NOTE — PROGRESS NOTES
46: CM went to speak with patient for initial assessment. Patient on the way for stress test. CM to follow-up after test.    Kelton Landaverde [facility] CM.    1624: Patient back from stress test.  Pharmacy in room with patient. CM to follow-up. Odette Dunn [facility]    Late entry 7/17/2018:    Reason for Admission: Rapid Atrial fibrilation  RRAT Score: 4  Plan for utilizing home health: Not at this time   Likelihood of Readmission: High  Transition of Care Plan:  CM reviewed patient chart.  42 y/o male. Self pay, not employed. Patient admitted 7/17/2018 [facility]. Patient verified demographics. Patient state he is currently living with his sister and also emergency contact. Mirna Buck [phone]. Patient uses same phone number. Patient state he works part-time [employer]. Patient does not have a PCP. Permission given to schedule follow-up for him. Patient state [email or web address] on [address] [facility] works best for him. Patient will need a work note on discharged. Odette Dunn [facility] SHALINI.

## 2018-07-17 NOTE — PROGRESS NOTES
1500 TRANSFER - IN REPORT:    Verbal report received from Jordyn Champion RN (name) on Carolina Alejandre  being received from ER(unit) for routine progression of care      Report consisted of patients Situation, Background, Assessment and   Recommendations(SBAR). Information from the following report(s) SBAR, ED Summary, MAR, Recent Results and Cardiac Rhythm Atrial fib with RVR rate in 160s was reviewed with the receiving nurse. Opportunity for questions and clarification was provided. Assessment completed upon patients arrival to unit and care assumed. Immediately started on Diltiazem infusion at 15 mg/hr. 1555 Transported to [place] for scan. This nurse accompanied patient per order to scan    1640 Returned from nuclear. Consumed cardiac diet without issues  Labs drawn and second PIV inserted and saline locked. Seen by Dr. Edna Ruiz. Noted to have some ventricular ectopy. Bedside and Verbal shift change report given to Raghavendra Panchal RN (oncoming nurse) by me (offgoing nurse). Report included the following information SBAR, Kardex, Intake/Output, MAR, Recent Results and Cardiac Rhythm atrial fibrillation with rapid ventricular response.

## 2018-07-17 NOTE — PROGRESS NOTES
Verbal Bedside shift change report given to me (oncoming nurse) by Elida Barrow (offgoing nurse). Report included the following information SBAR, Kardex, ED Summary, Intake/Output, MAR, Recent Results, Med Rec Status and Cardiac Rhythm A fib.pt is on Cardizem drip infusing @15 mg/hr. Continue to monitor closely. 2030 assisted up to [facility] had medium soft BM, back to bed. 2130 assisted up to [facility] per request, had small loose BM . Pt stated he feels hot,abd pain described like nauseous . Pt is tachypnenic R/R 40's . Assisted back to bed. BP noted 89/64 ,cardiac rhythm remain A Fib with 's . decreased Cardizem drip to 5 mg/hr. Dr Howard Underwood notified. order received for  ML over 4 HRS also  advised to contact tele hospitalist for anxiety and abd discomfort. 2215 BP 85/66 decreased Cardizem drip to 2.5 mg/hr  2218 Dr Tang [place] notified via tiger text ,new order received. Continue to monitor closely. 2339 ativan 1 mg IV given for anxiety . 0000 pt is resting quietly. no further abdominal discomfort voiced. Continue to monitor closely. 0715  Verbal Bedside\"} shift change report given to Elida Barrow (oncoming nurse) by [employer] nurse). Report included the following information SBAR, Kardex, ED Summary, Intake/Output, MAR, Recent Results, Med Rec Status and Cardiac Rhythm A Fib.

## 2018-07-17 NOTE — PROGRESS NOTES
Cardiology:    I will report the perfusion imaging based on today's images alone. If negative, at this heart rate they can be entertained as stress images. Based on EKG and clinical presentation this is most likely a nonischemic cardiomyopathy. Therefore if he shows uniform perfusion we can stop at that and not perform LexiScan challenge later.     Dorian Anderson MD

## 2018-07-17 NOTE — IP AVS SNAPSHOT
Inder Felipe 
 
 
 [address] A[address] Kwong Patient: Ney Kaiser MRN: [id number] DYR:[id number] About your hospitalization You were admitted on:  July 17, 2018 You last received care in the:  [address] You were discharged on:  July 19, 2018 Why you were hospitalized Your primary diagnosis was:  Rapid Atrial Fibrillation (Hcc) Follow-up Information Follow up With Details Comments Contact Info None   None ([phone]) Patient stated that they have no PCP Discharge Orders None A check mariano indicates which time of day the medication should be taken. My Medications START taking these medications Instructions Each Dose to Equal  
 Morning Noon Evening Bedtime  
 azithromycin 500 mg 500 mg, ADDaptor 1 Device IVPB Your last dose was: Your next dose is:    
   
   
 500 mg by IntraVENous route every twenty-four (24) hours. 500 mg  
    
   
   
   
  
 carvedilol 6.25 mg tablet Commonly known as:  Carolyn Kendrick Your last dose was: Your next dose is: Take 1 Tab by mouth two (2) times a day. 6.25 mg  
    
   
   
   
  
 cefTRIAXone 2 gram 2 g, ADDaptor 1 Device IVPB Your last dose was: Your next dose is:    
   
   
 2 g by IntraVENous route every twenty-four (24) hours. 2 g  
    
   
   
   
  
 dilTIAZem (CARDIZEM) infusion Your last dose was: Your next dose is:    
   
   
 0-15 mg/hr by IntraVENous route TITRATE. 0-15 mg/hr STOP taking these medications CIPRO 500 mg tablet Generic drug:  ciprofloxacin HCl  
   
  
 traMADol 50 mg tablet Commonly known as:  ULTRAM  
   
  
  
  
Where to Get Your Medications Information on where to get these meds will be given to you by the nurse or doctor. ! Ask your nurse or doctor about these medications azithromycin 500 mg 500 mg, ADDaptor 1 Device IVPB  
 carvedilol 6.25 mg tablet  
 cefTRIAXone 2 gram 2 g, ADDaptor 1 Device IVPB  
 dilTIAZem (CARDIZEM) infusion Discharge Instructions Atrial Fibrillation: Care Instructions Your Care Instructions Atrial fibrillation is an irregular and often fast heartbeat. Treating this condition is important for several reasons. It can cause blood clots, which can travel from your heart to your brain and cause a stroke. If you have a fast heartbeat, you may feel lightheaded, dizzy, and weak. An irregular heartbeat can also increase your risk for heart failure. Atrial fibrillation is often the result of another heart condition, such as high blood pressure or coronary artery disease. Making changes to improve your heart condition will help you stay healthy and active. Follow-up care is a key part of your treatment and safety. Be sure to make and go to all appointments, and call your doctor if you are having problems. It's also a good idea to know your test results and keep a list of the medicines you take. How can you care for yourself at home? Medicines 
  · Take your medicines exactly as prescribed. Call your doctor if you think you are having a problem with your medicine. You will get more details on the specific medicines your doctor prescribes.  
  · If your doctor has given you a blood thinner to prevent a stroke, be sure you get instructions about how to take your medicine safely. Blood thinners can cause serious bleeding problems.  
  · Do not take any vitamins, over-the-counter drugs, or herbal products without talking to your doctor first.  
 Lifestyle changes 
  · Do not smoke. Smoking can increase your chance of a stroke and heart attack. If you need help quitting, talk to your doctor about stop-smoking programs and medicines. These can increase your chances of quitting for good.  
  · Eat a heart-healthy diet.   · Stay at a healthy weight. Lose weight if you need to.  
  · Limit alcohol to 2 drinks a day for men and 1 drink a day for women. Too much alcohol can cause health problems.  
  · Avoid colds and flu. Get a pneumococcal vaccine shot. If you have had one before, ask your doctor whether you need another dose. Get a flu shot every year. If you must be around people with colds or flu, wash your hands often. Activity 
  · If your doctor recommends it, get more exercise. Walking is a good choice. Bit by bit, increase the amount you walk every day. Try for at least 30 minutes on most days of the week. You also may want to swim, bike, or do other activities. Your doctor may suggest that you join a cardiac rehabilitation program so that you can have help increasing your physical activity safely.  
  · Start light exercise if your doctor says it is okay. Even a small amount will help you get stronger, have more energy, and manage stress. Walking is an easy way to get exercise. Start out by walking a little more than you did in the hospital. Gradually increase the amount you walk.  
  · When you exercise, watch for signs that your heart is working too hard. You are pushing too hard if you cannot talk while you are exercising. If you become short of breath or dizzy or have chest pain, sit down and rest immediately.  
  · Check your pulse regularly. Place two fingers on the artery at the palm side of your wrist, in line with your thumb. If your heartbeat seems uneven or fast, talk to your doctor. When should you call for help? Call 911 anytime you think you may need emergency care. For example, call if: 
  · You have symptoms of a heart attack. These may include: ¨ Chest pain or pressure, or a strange feeling in the chest. 
¨ Sweating. ¨ Shortness of breath. ¨ Nausea or vomiting. ¨ Pain, pressure, or a strange feeling in the back, neck, jaw, or upper belly or in one or both shoulders or arms. ¨ Lightheadedness or sudden weakness. ¨ A fast or irregular heartbeat. After you call 911, the  may tell you to chew 1 adult-strength or 2 to 4 low-dose aspirin. Wait for an ambulance. Do not try to drive yourself.  
  · You have symptoms of a stroke. These may include: 
¨ Sudden numbness, tingling, weakness, or loss of movement in your face, arm, or leg, especially on only one side of your body. ¨ Sudden vision changes. ¨ Sudden trouble speaking. ¨ Sudden confusion or trouble understanding simple statements. ¨ Sudden problems with walking or balance. ¨ A sudden, severe headache that is different from past headaches.  
  · You passed out (lost consciousness).  
 Call your doctor now or seek immediate medical care if: 
  · You have new or increased shortness of breath.  
  · You feel dizzy or lightheaded, or you feel like you may faint.  
  · Your heart rate becomes irregular.  
  · You can feel your heart flutter in your chest or skip heartbeats. Tell your doctor if these symptoms are new or worse.  
 Watch closely for changes in your health, and be sure to contact your doctor if you have any problems. Where can you learn more? Go to http://eleanor-walter.info/. Enter U020 in the search box to learn more about \"Atrial Fibrillation: Care Instructions. \" Current as of: December 6, 2017 Content Version: 11.7 © 8544-1919 [employer]. Care instructions adapted under license by [employer] (which disclaims liability or warranty for this information). If you have questions about a medical condition or this instruction, always ask your healthcare professional. [place] [address] any warranty or liability for your use of this information. [employer] Announcement We are excited to announce that we are making your provider's discharge notes available to you in [employer].   You will see these notes when they are completed and signed by the physician that discharged you from your recent hospital stay. If you have any questions or concerns about any information you see in OpenZine, please call the Health Information Department where you were seen or reach out to your Primary Care Provider for more information about your plan of care. Introducing [facility]! [facility] introduces OpenZine patient portal. Now you can access parts of your medical record, email your doctor's office, and request medication refills online. 1. In your internet browser, go to [email or web address] 2. Click on the First Time User? Click Here link in the Sign In box. You will see the New Member Sign Up page. 3. Enter your OpenZine Access Code exactly as it appears below. You will not need to use this code after youve completed the sign-up process. If you do not sign up before the expiration date, you must request a new code. · OpenZine Access Code: [email or web address] Expires: 10/6/2018  5:16 PM 
 
4. Enter the last four digits of your Social Security Number (xxxx) and Date of Birth (mm/dd/yyyy) as indicated and click Submit. You will be taken to the next sign-up page. 5. Create a OpenZine ID. This will be your OpenZine login ID and cannot be changed, so think of one that is secure and easy to remember. 6. Create a OpenZine password. You can change your password at any time. 7. Enter your Password Reset Question and Answer. This can be used at a later time if you forget your password. 8. Enter your e-mail address. You will receive e-mail notification when new information is available in [address] 9. Click Sign Up. You can now view and download portions of your medical record. 10. Click the Download Summary menu link to download a portable copy of your medical information.  
 
If you have questions, please visit the Frequently Asked Questions section of the [employer]. Remember, MyChart is NOT to be used for urgent needs. For medical emergencies, dial 911. Now available from your iPhone and Android! Introducing Julio Hamlin As a [facility] patient, I wanted to make you aware of our electronic visit tool called Julio Hamlin. [employer] 24/7 allows you to connect within minutes with a medical provider 24 hours a day, seven days a week via a mobile device or tablet or logging into a secure website from your computer. You can access Julio Carlosfin from anywhere in the [place]. A virtual visit might be right for you when you have a simple condition and feel like you just dont want to get out of bed, or cant get away from work for an appointment, when your regular [facility] provider is not available (evenings, weekends or holidays), or when youre out of town and need minor care. Electronic visits cost only $49 and if the [employer] 24/7 provider determines a prescription is needed to treat your condition, one can be electronically transmitted to a nearby pharmacy*. Please take a moment to enroll today if you have not already done so. The enrollment process is free and takes just a few minutes. To enroll, please download the [employer] 24/Affibody kamini to your tablet or phone, or visit [email or web address] to enroll on your computer. And, as an [address] [place] [address] patient with a [employer] account, the results of your visits will be scanned into your electronic medical record and your primary care provider will be able to view the scanned results. We urge you to continue to see your regular [facility] provider for your ongoing medical care. And while your primary care provider may not be the one available when you seek a Julio Hamlin virtual visit, the peace of mind you get from getting a real diagnosis real time can be priceless. For more information on Julio Hamlin, view our Frequently Asked Questions (FAQs) at [email or web address]. Sincerely, 
 
Mackenzie Brannon MD 
Chief Medical Officer Ling Jaffe *:  certain medications cannot be prescribed via Julio Hamlin Unresulted Labs-Please follow up with your PCP about these lab tests Order Current Status COXSACKIE A AB In process COXSACKIE GROUP B VIRUS AB In process HEAVY METALS PROFILE, UR In process LEGIONELLA PNEUMOPHILA AG, URINE In process NM CARDIAC SPECT W STRS/REST SNGL In process CULTURE, BLOOD Preliminary result Providers Seen During Your Hospitalization Provider Specialty Primary office phone Josey Joyce MD Emergency Medicine [phone] Jhonny Crawford MD Internal Medicine [phone] Manuela Higgins MD Hospitalist [phone] Your Primary Care Physician (PCP) Primary Care Physician Office Phone Office Fax NONE ** None ** ** None ** You are allergic to the following No active allergies Recent Documentation Height Weight BMI Smoking Status 1.905 m 99 kg 27.27 kg/m2 Current Every Day Smoker Emergency Contacts Name Discharge Info Relation Home Work Mobile Kathryn Deleon DISCHARGE CAREGIVER [3] Other Relative [6] [phone] Patient Belongings The following personal items are in your possession at time of discharge: 
     Visual Aid: None                  Other Valuables: Other (comment) (Dental work and a ring ) Please provide this summary of care documentation to your next provider. Signatures-by signing, you are acknowledging that this After Visit Summary has been reviewed with you and you have received a copy. Patient Signature:  ____________________________________________________________  Date:  ____________________________________________________________  
  
Ian Moreno    
    
 Provider Signature:  ____________________________________________________________ Date:  ____________________________________________________________

## 2018-07-17 NOTE — PROGRESS NOTES
Spoke with Dr Den Albright, resting stress images only today will eval and order further second portion of stress when pt more stable.

## 2018-07-17 NOTE — ED NOTES
TRANSFER - OUT REPORT:    Verbal report given to [facility] (name) on Anyi Mcconnell  being transferred to PCU (unit) for routine progression of care       Report consisted of patients Situation, Background, Assessment and   Recommendations(SBAR). Information from the following report(s) SBAR, ED Summary, Procedure Summary, MAR, Recent Results and Cardiac Rhythm Afib was reviewed with the receiving nurse. Lines:   Peripheral IV 07/17/18 Right Antecubital (Active)   Site Assessment Clean, dry, & intact 7/17/2018  9:36 AM   Phlebitis Assessment 0 7/17/2018  9:36 AM   Infiltration Assessment 0 7/17/2018  9:36 AM   Dressing Status Clean, dry, & intact 7/17/2018  9:36 AM   Dressing Type Transparent 7/17/2018  9:36 AM   Hub Color/Line Status Green;Flushed;Patent 7/17/2018  9:36 AM   Action Taken Blood drawn 7/17/2018  9:36 AM        Opportunity for questions and clarification was provided.       Patient transported with:   Monitor  Registered Nurse

## 2018-07-17 NOTE — CONSULTS
Cardiology consultation:    I was asked by Maxine BATISTA to assess Mr. Lou Prior in the [facility] ED. He is a 43year old male. He came in because of 2 weeks of progressive dyspnea, first with effort and now at rest for about 48 hours. He has sharp peristernal discomfort which may be pleuritic in nature. He dose not have any prior history of serious illness, other than initiation of antibiotic treatment for pyelonephritis about a week ago. He does have a family history of heart failure. Other than recent treatment for upper UTI, he has no records in our system. History shows any form for several years. 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 He will attempt to give us if possible he was not noticing significant dyspnea prior to a few days ago. He admits to alcohol consumption in a spree pattern. He is a little bit vague about maximum amounts. I did not specifically question about intimate  preference or practice. It is reported in his last emergency room note that he had abdominal surgery approximately 4 years ago but he is uncertain what it was for. Evaluation on July 8 for abdominal pain showed epigastric tenderness but CT suggested a perirenal abnormality with a right pleural effusion. He was ultimately treated for pyelonephritis. No cultures were obtained. On examination, Pulse is rapid and irregular at 130-140 after IV diltiazem. BP is 107/79. He is mildly hypothermic. Oxygen sat is 97%. He is comfortable supine and doesn't report extra pillows. Abdomen is nontender today. Bowel sounds are normal.  There is no edema. Peripheral pulses are intact. There is no cyanosis or clubbing. He has fine rales at both lung bases without audible wheezing. Heart sounds are rapid and irregular with an intermittent pronounced S3 gallop mostly audible above the left breast.  The heart is clearly enlarged.   Jugular veins fill in the supine position without pulsation. At the time of examination despite ongoing rapid pulse he does not seem to be in visible distress. His answers to many questions are decidedly vague, although he is very articulate and expresses himself well. During discussion of potential etiologies for congestive cardiomyopathy I mentioned viral disorders. He quickly developed a panicked look and when I told him the viruses I was referring to were coxsackie or adenovirus with possible respiratory transmission he actually expressed relief. Chest X ray shows bilateral lung congestion, massive cardiomegaly, and a probable right pleural effusion:          12 lead EKG shows atrial fibrillation with very rapid ventricular response, low voltage QRS throughout, and no ischemic ST changes at rates up to 180/min. Initial labs show normal hemogram with possible plasma volume contraction, D dimer of 4.40, NT pro BNP of 2393, Cr of 1.14, and normal electrolytes with borderline low sodium. Albumin is 2.9, globulins are elevated, and alk phios is elevated. Troponin is 0.13. Toxicology/drug screen is negative    CTA shows dilated RV and probably LV, very congested pulmonary vessels, infiltrate ion the right, atelectasis on both sides, and possible small pleural effusion. Bedside echo shows severe LV dilatation and hypokinesis, with estimated EF about 10%. PA pressure is estimated at 70-75mm. There is no sign of intracavitary thrombus. It is uncertain what made him feel bad enough to seek help now, but this cannot be a rapid onset problem.     Impression: Congestive cardiomyopathy presenting as NYHA class IV heart failure    Atrial fibrillation with rapid ventricular response    Complex ventricular ectopy is apparent on the monitor    Spree drinking might or might not be an etiology    The right pleural effusion may be too small to sample    We may need to rule out underlying chronic infection    We will consider heavy metal exposure    Plan:  Heavy metal screen    [place] Coreg at low-dose    Replete magnesium to high normal    IV diltiazem drip to control heart rate if possible    I would be reluctant to introduce digoxin as an initial gambit if avoidable    He needs admission to PCU    He will most likely eventually need an ICD    A more exact history about potential exposures of risk should be developed    Consider a CD4 count    Thank you for this consultation, I will follow closely with you    Kelby Felty MD [facility]

## 2018-07-17 NOTE — ED PROVIDER NOTES
EMERGENCY DEPARTMENT HISTORY AND PHYSICAL EXAM      Date: 7/17/2018  Patient Name: Norman Monzon    History of Presenting Illness     Chief Complaint   Patient presents with    Irregular Heart Beat         History Provided By: Patient    Additional History (Context): Norman Monzon is a 43 y.o. male with No significant past medical history who presents with SOB x 2 weeks. Sx worsened 2 days ago. Initially occurring intermittent  with exertion but overnight became constant occurring with rest. He feels like he cannot take a deep breath. Reports sharp chest discomfort in sternal region. Denies diaphoresis, N/V, fatigue, dizziness. No cough, wheezes, fever, chills. Treated 1 week ago for pyelonephritis, states sx are improving. Otherwise PMH unremarkable. Reports family hx of CHF. PCP: None    Current Facility-Administered Medications   Medication Dose Route Frequency Provider Last Rate Last Dose    sodium chloride (NS) flush 5-10 mL  5-10 mL IntraVENous Q8H Maxine Hernandez, MARY        sodium chloride (NS) flush 5-10 mL  5-10 mL IntraVENous PRN Maxine Hernandez, MARY         Current Outpatient Prescriptions   Medication Sig Dispense Refill    ciprofloxacin HCl (CIPRO) 500 mg tablet Take 500 mg by mouth two (2) times a day.  traMADol (ULTRAM) 50 mg tablet Take 1 Tab by mouth every eight (8) hours as needed. Max Daily Amount: 150 mg. 10 Tab 0       Past History     Past Medical History:  History reviewed. No pertinent past medical history. Past Surgical History:  History reviewed. No pertinent surgical history. Family History:  History reviewed. No pertinent family history.     Social History:  Social History   Substance Use Topics    Smoking status: Current Every Day Smoker     Packs/day: 0.50    Smokeless tobacco: None    Alcohol use Yes      Comment: \"on weekends\"       Allergies:  No Known Allergies      Review of Systems   Review of Systems   Constitutional: Negative for chills, diaphoresis, fatigue, fever and unexpected weight change. HENT: Negative for ear pain and rhinorrhea. Respiratory: Positive for chest tightness and shortness of breath. Negative for cough and wheezing. Gastrointestinal: Negative for abdominal pain, constipation, diarrhea, nausea and vomiting. Genitourinary: Negative for dysuria, frequency and hematuria. Musculoskeletal: Negative for arthralgias and joint swelling. Skin: Negative for color change and pallor. Neurological: Negative for dizziness, weakness, numbness and headaches. All other systems reviewed and are negative. Physical Exam     Vitals:    07/17/18 1100 07/17/18 1200 07/17/18 1230 07/17/18 1300   BP: 107/78 107/79 108/80 104/83   Pulse: (!) 133 (!) 142 (!) 134 (!) 141   Resp: 30 29 (!) 32 22   Temp:       SpO2: 94% 98% 96% 99%   Weight:       Height:         Physical Exam   Constitutional: He is oriented to person, place, and time. He appears well-developed and well-nourished. No distress. HENT:   Head: Normocephalic and atraumatic. Mouth/Throat: Oropharynx is clear and moist.   Eyes: Conjunctivae and EOM are normal. Pupils are equal, round, and reactive to light. Neck: Normal range of motion. Neck supple. Cardiovascular: Normal heart sounds and normal pulses. An irregularly irregular rhythm present. Tachycardia present. Pulmonary/Chest: Effort normal and breath sounds normal. No accessory muscle usage. Tachypnea noted. He has no wheezes. Abdominal: Soft. Bowel sounds are normal. He exhibits no distension. There is no tenderness. Musculoskeletal: Normal range of motion. Lymphadenopathy:     He has no cervical adenopathy. Neurological: He is alert and oriented to person, place, and time. Skin: Skin is warm and dry. He is not diaphoretic. Vitals reviewed.         Diagnostic Study Results     Labs -     Recent Results (from the past 12 hour(s))   EKG, 12 LEAD, INITIAL    Collection Time: 07/17/18  9:21 AM   Result Value Ref Range    Ventricular Rate 173 BPM    Atrial Rate 192 BPM    QRS Duration 90 ms    Q-T Interval 256 ms    QTC Calculation (Bezet) 434 ms    Calculated R Axis -70 degrees    Calculated T Axis 17 degrees    Diagnosis       Atrial fibrillation with rapid ventricular response with premature   ventricular or aberrantly conducted complexes  Left anterior fascicular block  Possible Anterior infarct , age undetermined  Abnormal ECG  No previous ECGs available     CBC WITH AUTOMATED DIFF    Collection Time: 07/17/18  9:30 AM   Result Value Ref Range    WBC 8.9 4.1 - 11.1 K/uL    RBC 5.19 4. 10 - 5.70 M/uL    HGB 16.0 12.1 - 17.0 g/dL    HCT 48.4 36.6 - 50.3 %    MCV 93.3 80.0 - 99.0 FL    MCH 30.8 26.0 - 34.0 PG    MCHC 33.1 30.0 - 36.5 g/dL    RDW 15.3 (H) 11.5 - 14.5 %    PLATELET 141 437 - 105 K/uL    MPV 10.3 8.9 - 12.9 FL    NRBC 0.0 0  WBC    ABSOLUTE NRBC 0.00 0.00 - 0.01 K/uL    NEUTROPHILS 37 32 - 75 %    LYMPHOCYTES 54 (H) 12 - 49 %    MONOCYTES 8 5 - 13 %    EOSINOPHILS 1 0 - 7 %    BASOPHILS 0 0 - 1 %    IMMATURE GRANULOCYTES 0 0.0 - 0.5 %    ABS. NEUTROPHILS 3.3 1.8 - 8.0 K/UL    ABS. LYMPHOCYTES 4.8 (H) 0.8 - 3.5 K/UL    ABS. MONOCYTES 0.7 0.0 - 1.0 K/UL    ABS. EOSINOPHILS 0.1 0.0 - 0.4 K/UL    ABS. BASOPHILS 0.0 0.0 - 0.1 K/UL    ABS. IMM. GRANS. 0.0 0.00 - 0.04 K/UL    DF AUTOMATED     METABOLIC PANEL, COMPREHENSIVE    Collection Time: 07/17/18  9:30 AM   Result Value Ref Range    Sodium 135 (L) 136 - 145 mmol/L    Potassium 4.1 3.5 - 5.1 mmol/L    Chloride 106 97 - 108 mmol/L    CO2 21 21 - 32 mmol/L    Anion gap 8 5 - 15 mmol/L    Glucose 98 65 - 100 mg/dL    BUN 11 6 - 20 MG/DL    Creatinine 1.14 0.70 - 1.30 MG/DL    BUN/Creatinine ratio 10 (L) 12 - 20      GFR est AA >60 >60 ml/min/1.73m2    GFR est non-AA >60 >60 ml/min/1.73m2    Calcium 8.8 8.5 - 10.1 MG/DL    Bilirubin, total 1.6 (H) 0.2 - 1.0 MG/DL    ALT (SGPT) 35 12 - 78 U/L    AST (SGOT) 33 15 - 37 U/L    Alk.  phosphatase 168 (H) 45 - 117 U/L Protein, total 8.1 6.4 - 8.2 g/dL    Albumin 2.9 (L) 3.5 - 5.0 g/dL    Globulin 5.2 (H) 2.0 - 4.0 g/dL    A-G Ratio 0.6 (L) 1.1 - 2.2     D DIMER    Collection Time: 07/17/18  9:30 AM   Result Value Ref Range    D-dimer 4.40 (H) 0.00 - 0.65 mg/L FEU   MAGNESIUM    Collection Time: 07/17/18  9:30 AM   Result Value Ref Range    Magnesium 1.9 1.6 - 2.4 mg/dL   NT-PRO BNP    Collection Time: 07/17/18  9:31 AM   Result Value Ref Range    NT pro-BNP 2393 (H) 0 - 125 PG/ML   POC TROPONIN-I    Collection Time: 07/17/18  9:33 AM   Result Value Ref Range    Troponin-I (POC) 0.13 (H) 0.00 - 0.08 ng/mL       Radiologic Studies -   CTA CHEST W OR W WO CONT   Final Result      XR CHEST PORT   Final Result        CT Results  (Last 48 hours)               07/17/18 1031  CTA CHEST W OR W WO CONT Final result    Impression:  IMPRESSION: Moderate right pleural effusion with right middle and right lower   lobe infiltrates. Left lower lobe atelectasis. No evidence of pulmonary   embolism. Narrative:  EXAM:  CTA CHEST W OR W WO CONT       INDICATION:   Irregular heartbeat, pleural effusion seen on chest radiograph       COMPARISON: Chest radiograph performed earlier the same day. TECHNIQUE:    Precontrast  images were obtained to localize the volume for acquisition. Multislice helical CT arteriography was performed from the diaphragm to the   thoracic inlet during uneventful rapid bolus of 100 cc Isovue-370. Lung and soft   tissue windows were generated. Coronal and sagittal images were generated and   3D post processing consisting of coronal maximum intensity images was performed. CT dose reduction was achieved through use of a standardized protocol tailored   for this examination and automatic exposure control for dose modulation. FINDINGS:   CHEST:   THYROID: No nodule. MEDIASTINUM: No mass or lymphadenopathy. GRAYSON: No mass or lymphadenopathy. THORACIC AORTA: No aneurysm. Not well opacified. MAIN PULMONARY ARTERY: There is no evidence of pulmonary embolism. TRACHEA/BRONCHI: Patent. ESOPHAGUS: No wall thickening or dilatation. HEART: Dilatation of the left atrium and left ventricle is noted. PLEURA: There is a moderate right pleural effusion   LUNGS: Right middle and right lower lobe infiltrate is noted. Atelectasis is   seen in the left lower lobe. INCIDENTALLY IMAGED UPPER ABDOMEN: No focal abnormality. BONES: No destructive bone lesion. CXR Results  (Last 48 hours)               07/17/18 0949  XR CHEST PORT Final result    Impression:  Impression: Moderate right pleural effusion with underlying consolidation. Cardiomegaly. Narrative: Indication: Shortness of breath and chest pain       Comparison: None       Portable exam of the chest obtained at 938 demonstrates cardiomegaly. There is a   moderate right pleural effusion with underlying consolidation. The left lung is   clear. The osseous structures are unremarkable. Medical Decision Making   I am the first provider for this patient. I reviewed the vital signs, available nursing notes, past medical history, past surgical history, family history and social history. Vital Signs-Reviewed the patient's vital signs. Pulse Oximetry Analysis - 100 % on RA    Cardiac Monitor:  Rate: 173  Rhythm:Afib     EKG: Interpreted by the EP Dr. Maria Del Rosario Moses    Time Interpreted: 9:21   Rate: 173   Rhythm: Afib RVR, LAFB   Interpretation: Non ischemic   Comparison: none for comparison    Records Reviewed: Nursing Notes and Old Medical Records    ED Course:   9:32 AM  EKG show Afib RVR. Discussed EKG with Dr. Maria Del Rosario Moses and pt. Diltiazem 10 mg ordered. Obtain labs, xray. NS fluids. Pt denies hx of caffeine or drug use. Cardiology consulted     10:00 AM  , remains in Afib. No chest discomfort, still feels SOB but better. D Dimer 4.41, CXR shows cardiomegaly and R pleural effusion.  CTA of chest ordered; r/o PE. Still awaiting cardiology consult. Discussed findings with pt and advised he will be admitted pending tests. 11:55  Discussed case with Dr. Nitin Landaverde. He will order ECHO and evaluate pt.     13:18  Discussed case with Dr. Maria D Snyder and she will admit. Spoke with Dr. Nitin Landaverde, will start on PO coreg and recommends admission. Disposition:  Admit to PCU       Provider Notes (Medical Decision Making):   DDX: PE, PNA, MI, Afib, SVT, Electrolyte Imbalance, CHF    Procedures:  Procedures      Critical Care Time  CRITICAL CARE NOTE :    1:18 PM    IMPENDING DETERIORATION -Cardiovascular    ASSOCIATED RISK FACTORS - Hypotension, Hypoxia, Dysrhythmia, Metabolic changes and Vascular Compromise    MANAGEMENT- Bedside Assessment and Transfer    INTERPRETATION -  Xrays, CT Scan, ECG and Blood Pressure    INTERVENTIONS - diltiazem    CASE REVIEW - Hospitalist and cardiology    TREATMENT RESPONSE -Unchanged     PERFORMED BY - Mid-Level Maxine Hernandez NP and Garo Sauceda MD    NOTES :   I have spent 80 minutes of critical care time involved in lab review, consultations with specialist, family decision- making, bedside attention and documentation. During this entire length of time I was immediately available to the patient . Maxine Hernandez NP    For Hospitalized Patients:    1. Hospitalization Decision Time:  The decision to hospitalize the patient was made by Dr. Maria D Snyder at 13:18 on 7/17/2018\      Diagnosis     Clinical Impression:   1. Atrial fibrillation with RVR ([address]    2. Pleural effusion    3. Cardiomegaly    4. Elevated troponin I level      7:04 AM  I have personally seen and examined the patient, reviewed the JOSE's note and agree with findings and plan. Kuldeep Hatfield MD     The patient presents with:  Chest Pain and Progressive shortness of breath for 2 weeks     My exam shows:  Tachycardic and Irregularly Irregular    Impression/Plan:  Rate Control, Cardiology consult, and admission    I have reviewed and agree with the MLP's findings, including all diagnostic interpretations, and plans as written. I was present during the key portions of separately billed procedures.    Chase Crain MD

## 2018-07-17 NOTE — PROGRESS NOTES
Cardiology:    Perfusion imaging was completed at a high resting heart rate because of the atrial fibrillation. This makes the data roughly equivalent to stress imaging. Study shows a severe dilated cardiomyopathy with uniform tracer uptake. This is suggesting that the cardiomyopathy is nonischemic in nature. Given the overall context it is very likely associated with acute myocarditis secondary to either systemic infection or inflammation.     Shireen Coffey MD

## 2018-07-18 LAB
ANION GAP SERPL CALC-SCNC: 8 MMOL/L (ref 5–15)
ATRIAL RATE: 192 BPM
BASOPHILS # BLD: 0 K/UL (ref 0–0.1)
BASOPHILS NFR BLD: 0 % (ref 0–1)
BUN SERPL-MCNC: 10 MG/DL (ref 6–20)
BUN/CREAT SERPL: 9 (ref 12–20)
CALCIUM SERPL-MCNC: 8.4 MG/DL (ref 8.5–10.1)
CALCULATED R AXIS, ECG10: -70 DEGREES
CALCULATED T AXIS, ECG11: 17 DEGREES
CHLORIDE SERPL-SCNC: 106 MMOL/L (ref 97–108)
CO2 SERPL-SCNC: 24 MMOL/L (ref 21–32)
CREAT SERPL-MCNC: 1.13 MG/DL (ref 0.7–1.3)
DIAGNOSIS, 93000: NORMAL
DIFFERENTIAL METHOD BLD: ABNORMAL
EOSINOPHIL # BLD: 0 K/UL (ref 0–0.4)
EOSINOPHIL NFR BLD: 0 % (ref 0–7)
ERYTHROCYTE [DISTWIDTH] IN BLOOD BY AUTOMATED COUNT: 15.3 % (ref 11.5–14.5)
GLUCOSE SERPL-MCNC: 101 MG/DL (ref 65–100)
HCT VFR BLD AUTO: 46.7 % (ref 36.6–50.3)
HGB BLD-MCNC: 15 G/DL (ref 12.1–17)
IMM GRANULOCYTES # BLD: 0 K/UL (ref 0–0.04)
IMM GRANULOCYTES NFR BLD AUTO: 0 % (ref 0–0.5)
LYMPHOCYTES # BLD: 3 K/UL (ref 0.8–3.5)
LYMPHOCYTES NFR BLD: 34 % (ref 12–49)
MCH RBC QN AUTO: 30.5 PG (ref 26–34)
MCHC RBC AUTO-ENTMCNC: 32.1 G/DL (ref 30–36.5)
MCV RBC AUTO: 95.1 FL (ref 80–99)
MONOCYTES # BLD: 0.6 K/UL (ref 0–1)
MONOCYTES NFR BLD: 7 % (ref 5–13)
NEUTS SEG # BLD: 5.1 K/UL (ref 1.8–8)
NEUTS SEG NFR BLD: 59 % (ref 32–75)
NRBC # BLD: 0 K/UL (ref 0–0.01)
NRBC BLD-RTO: 0 PER 100 WBC
PLATELET # BLD AUTO: 255 K/UL (ref 150–400)
PMV BLD AUTO: 10.7 FL (ref 8.9–12.9)
POTASSIUM SERPL-SCNC: 4.8 MMOL/L (ref 3.5–5.1)
Q-T INTERVAL, ECG07: 256 MS
QRS DURATION, ECG06: 90 MS
QTC CALCULATION (BEZET), ECG08: 434 MS
RBC # BLD AUTO: 4.91 M/UL (ref 4.1–5.7)
SODIUM SERPL-SCNC: 138 MMOL/L (ref 136–145)
TSH SERPL DL<=0.05 MIU/L-ACNC: 0.8 UIU/ML (ref 0.36–3.74)
VENTRICULAR RATE, ECG03: 173 BPM
WBC # BLD AUTO: 8.8 K/UL (ref 4.1–11.1)

## 2018-07-18 PROCEDURE — 74011250636 HC RX REV CODE- 250/636: Performed by: INTERNAL MEDICINE

## 2018-07-18 PROCEDURE — 36415 COLL VENOUS BLD VENIPUNCTURE: CPT | Performed by: HOSPITALIST

## 2018-07-18 PROCEDURE — 87899 AGENT NOS ASSAY W/OPTIC: CPT | Performed by: HOSPITALIST

## 2018-07-18 PROCEDURE — 85025 COMPLETE CBC W/AUTO DIFF WBC: CPT | Performed by: HOSPITALIST

## 2018-07-18 PROCEDURE — 80048 BASIC METABOLIC PNL TOTAL CA: CPT | Performed by: HOSPITALIST

## 2018-07-18 PROCEDURE — 77010033678 HC OXYGEN DAILY

## 2018-07-18 PROCEDURE — 74011000258 HC RX REV CODE- 258: Performed by: INTERNAL MEDICINE

## 2018-07-18 PROCEDURE — 74011250636 HC RX REV CODE- 250/636: Performed by: HOSPITALIST

## 2018-07-18 PROCEDURE — 65660000001 HC RM ICU INTERMED STEPDOWN

## 2018-07-18 PROCEDURE — 74011000258 HC RX REV CODE- 258: Performed by: HOSPITALIST

## 2018-07-18 PROCEDURE — 74011250637 HC RX REV CODE- 250/637: Performed by: INTERNAL MEDICINE

## 2018-07-18 PROCEDURE — 84443 ASSAY THYROID STIM HORMONE: CPT | Performed by: HOSPITALIST

## 2018-07-18 PROCEDURE — 74011000250 HC RX REV CODE- 250: Performed by: INTERNAL MEDICINE

## 2018-07-18 RX ORDER — CARVEDILOL 6.25 MG/1
6.25 TABLET ORAL 2 TIMES DAILY
Qty: 60 TAB | Refills: 0 | Status: SHIPPED
Start: 2018-07-18 | End: 2018-07-26

## 2018-07-18 RX ORDER — DIGOXIN 0.25 MG/ML
0.12 INJECTION INTRAMUSCULAR; INTRAVENOUS
Status: COMPLETED | OUTPATIENT
Start: 2018-07-19 | End: 2018-07-19

## 2018-07-18 RX ADMIN — DILTIAZEM HYDROCHLORIDE 2.5 MG/HR: 5 INJECTION INTRAVENOUS at 00:52

## 2018-07-18 RX ADMIN — Medication 10 ML: at 06:25

## 2018-07-18 RX ADMIN — ENOXAPARIN SODIUM 40 MG: 40 INJECTION, SOLUTION INTRAVENOUS; SUBCUTANEOUS at 21:01

## 2018-07-18 RX ADMIN — Medication 10 ML: at 21:19

## 2018-07-18 RX ADMIN — Medication 10 ML: at 21:18

## 2018-07-18 RX ADMIN — Medication 10 ML: at 23:29

## 2018-07-18 RX ADMIN — DILTIAZEM HYDROCHLORIDE 10 MG/HR: 5 INJECTION INTRAVENOUS at 22:47

## 2018-07-18 RX ADMIN — AZITHROMYCIN 500 MG: 500 INJECTION, POWDER, LYOPHILIZED, FOR SOLUTION INTRAVENOUS at 21:42

## 2018-07-18 RX ADMIN — CARVEDILOL 6.25 MG: 3.12 TABLET, FILM COATED ORAL at 09:00

## 2018-07-18 RX ADMIN — CARVEDILOL 6.25 MG: 3.12 TABLET, FILM COATED ORAL at 20:57

## 2018-07-18 RX ADMIN — CEFTRIAXONE SODIUM 2 G: 2 INJECTION, POWDER, FOR SOLUTION INTRAMUSCULAR; INTRAVENOUS at 19:00

## 2018-07-18 RX ADMIN — LORAZEPAM 1 MG: 2 INJECTION INTRAMUSCULAR; INTRAVENOUS at 21:19

## 2018-07-18 NOTE — PROGRESS NOTES
Cardiology:    Mr. Vickie Ordaz had some hypotension during the night which responded to a small fluid bolus. He remains comfortable lying flat although he does have JVD. Lungs remain reasonably clear. He has almost no edema. There has not been any chest pain. He has no subjective awareness of his arrhythmias. Ventricular ectopy has just about disappeared, he continues in atrial fibrillation with rapid ventricular response. On examination, pulse is irregular at about 110/min. He has nonpulsating JVD as noted. There is no edema, there is no sign of DVT. Abdomen is nontender without obvious hepatic enlargement. Spleen is not palpable. Most of his viral titers are still pending. Creatinine is stable at 1.13, electrolytes are normal.  C-reactive protein is only minimally elevated at 0.96, sedimentation rate is normal.  CK and MB were normal in a single reading. I had a long educational conversation with patient and immediate family. I explained that he has a severe myocarditis and that ischemic heart disease seems to be effectively excluded for the moment. I also explained that the best explanation for the abrupt recent onset of symptoms with a myopathy that could not be new would be that the atrial fibrillation was the new change. I also explained that it would not be practical to try to convert to normal rhythm although it would probably improve cardiac function. We have no idea of the duration of the atrial fibrillation in the absence of atrial thrombus on a transthoracic echo is not meaningful in terms of safety of conversion. I further explained that although we can provide everything he needs at this point in [facility] he will ultimately need a tertiary care transfer for consideration of device therapy. Additionally, he will benefit from attention of the advanced heart failure service because of heart failure complicated by hypotension.   It will be easier to make long-term decisions once we have a better idea of etiology. I discussed his case with Dr. Sanjeev Bro by text, and she agrees to consult on the patient if transfer can be expedited to the hospitalist service in [address]  He will also need electrophysiology evaluation for future reference and eventual device therapy. If his viral serologies suggest an acute myocarditis Dr. Rosmery Kenney would have the best information about any particular directed therapy. He is young enough so that he could easily become someone who should be listed for possible future transplantation as well. Mr. Ondina Villagomez is a bit emotionally fragile and attached to present caregivers. I promised I would remain involved with his care and have discussed the whole case with dominic Gregorio regarding continuity of care in [address].    No new change was made in therapy at this point, the transfer process was initiated.     Brenda Davis MD [address] [facility]

## 2018-07-18 NOTE — PROGRESS NOTES
Spiritual Care Assessment/Progress Note  [facility]      NAME: Jackson Calderon      MRN: [id number]  AGE: 43 y.o.  SEX: male  Zoroastrianism Affiliation: No Yarsanism   Language: English     7/18/2018     Total Time (in minutes): 34     Spiritual Assessment begun in [place] [address] PROGRESSIVE CARE through conversation with:         [x]Patient        [] Family    [] Friend(s)        Reason for Consult: Initial/Spiritual assessment, patient floor     Spiritual beliefs: (Please include comment if needed)     [] Identifies with a vishnu tradition:         [] Supported by a vishnu community:            [] Claims no spiritual orientation:           [] Seeking spiritual identity:                [x] Adheres to an individual form of spirituality:           [] Not able to assess:                           Identified resources for coping:      [x] Prayer                               [] Music                  [] Guided Imagery     [x] Family/friends                 [] Pet visits     [] Devotional reading                         [] Unknown     [] Other:                                               Interventions offered during this visit: (See comments for more details)    Patient Interventions: Affirmation of emotions/emotional suffering, Affirmation of vishnu, Catharsis/review of pertinent events in supportive environment, Coping skills reviewed/reinforced, Iconic (affirming the presence of God/Higher Power), Initial/Spiritual assessment, patient floor, Prayer (assurance of), Normalization of emotional/spiritual concerns, Zoroastrianism beliefs/image of God discussed           Plan of Care:     [] Support spiritual and/or cultural needs    [] Support AMD and/or advance care planning process      [] Support grieving process   [] Coordinate Rites and/or Rituals    [] Coordination with community clergy   [] No spiritual needs identified at this time   [] Detailed Plan of Care below (See Comments)  [] Make referral to Music Therapy  [] Make referral to Pet Therapy     [] Make referral to Addiction services  [] Make referral to [facility]  [] Make referral to Spiritual Care Partner  [] No future visits requested        [x] Follow up visits as needed     Comments: Initial visit with patient on PCU per request of physician, Dr. Howard Underwood. Introduced self to patient and role of chaplains in the hospital. Provided empathetic listening and processing of emotions as patient shared events leading to his hospitalizations and new diagnosis. Patient is well supported by his family locally, which includes his sister and nephew, and a large number of aunts, uncles, and cousins that are scattered throughout the country. Also spent time discussing the death of the patient's mother in August, whom the patient cared for, and how he has healed from this loss. Patient's connection to God is of great importance to him and one of his strongest coping resources and he often mentioned prayer as a means of comfort. Continued to talk about how the patient is attempting to process the information about his new illness and how this will impact his life immediately and in the long term. Acknowledged the difficulty of these changes and offered words of encouragement and comfort. Advised of patient availability and assured of ongoing support as needed and desired. Chaplain Ricardo Patton M.Div.    Paging Service [phone])

## 2018-07-18 NOTE — CDMP QUERY
There is noted documentation of: New diagnosis of  systolic HF for this patient, could you further specify the acuity:     => Acute Systolic HF  => Other explanation of clinical findings  => Clinically Undetermined (no explanation for clinical findings)    The medical record reflects the following clinical findings, treatment, and risk factors. Risk Factors:  AF with RVR    Clinical Indicators:  New diagnosis of systolic heart failure; [id number] score is 1, will defer anticoagulation to cardiology; SOB; BNP 2393;7/17 echo: The ventricle was mildly to moderately dilated. Systolic function was markedly reduced by visual assessment. Ejection fraction was estimated in the range of 10 % to 20 %    Treatment: Cardiology consult; echo, stress test, coreg    Please clarify and document your clinical opinion in the progress notes and discharge summary including the definitive and/or presumptive diagnosis, (suspected or probable), related to the above clinical findings. Please include clinical findings supporting your diagnosis.     Thank you,  Ad Dent, MSN, [address] [place] [address]

## 2018-07-18 NOTE — PROGRESS NOTES
Documented on 7/18/18:    Spiritual Care Partner Volunteer visited patient in ED on 7/17/18. Documented by:  Keerthi Adams M.Div.    Paging Service [phone])

## 2018-07-18 NOTE — DISCHARGE SUMMARY
Hospitalist Discharge Summary     Patient ID:  Cornelia Sharma  [id number]  14 y.o.  1980    PCP on record: None    Admit date: 7/17/2018  Discharge date and time: 7/18/2018      DISCHARGE DIAGNOSIS:    Rapid a fib with RVR  Systolic heart failure, new diagnosis  Chronicity unknown suspect acute  Right sided pleural effusion with infiltrates  Tobacco dependence      CONSULTATIONS:  IP CONSULT TO CARDIOLOGY    Excerpted HPI from H&P of Leonidas Ayala MD:  Cornelia Sharma is a 43 y.o. male with no known medical history presented to ER with c/o shortness of breath that woke him up last night from sleep. In ER he was found to be in rapid a fib with RVR. Was given him one dose of IV cardizem with no improvement. Pt was seen a week ago for possible pyelo In ER and discharged home on ciprofloxacin. At that time he was c/o abd pain and UA was not impressive, CT bad/pelvis showed renal stranding and Lymphadenopathy. He claims completing abx course. Admits to poor appetite and unintentional weight loss. Today in ER CTA chest neg for PE but shows right sided pleural effusion and middle, lower lobe infiltrates. Bedside echo in ER shows cardiomyopathy and low EF. He is afebrile, no leukocytosis. Denies sick contacts, recent travels, denies illicit drug abuse and admits to safe sexual practices.     We were asked to admit for work up and evaluation of the above problems. ______________________________________________________________________  DISCHARGE SUMMARY/HOSPITAL COURSE:  for full details see H&P, daily progress notes, labs, consult notes. Pt has new onset rapid a fib and systolic HF. Case is discussed with Dr. Davie Aguilar at [facility] by Dr. Nitin Landaverde and she will evaluate patient as consult. Case discussed with Dr. Jacqueline Robertson, on call hospitalist at [facility] and he has very kindly accepted patient.     Hosiptal course is as follow    Rapid a fib with RVR: POA  Systolic heart failure, new diagnosis: POA  Chronicity unknown suspect acute  -on cardizem gtt for rate control, hr RANGE 120-140'S  -was hypotensive last night, received 250 cc bolus NS  -[id number] score is 1, will defer anticoagulation to cardiology  -echo 7/17 shows Ejection fraction was estimated in the range of 10 % to 20 %, challenged determination in presence of what appeared to be rapid atrial fibrillation. There was severe diffuse hypokinesis with slight paradoxical septal motion. Wall thickness was mildly increased. There was mild asymmetric hypertrophy of  the septum. The changes were consistent with eccentric hypertrophy. Doppler parameters were consistent with restrictive physiology, indicative of decreased left ventricular diastolic compliance and/or increased left  atrial pressure  -stress test suggestive of cardiomyopathy of non ischemic nature  -candidate for advance heart failure eval and AICD, will defer management to cardiology      Right sided pleural effusion with infiltrates  No s/s of sepsis, denies cough  -repeat CXR  - infx work up, will check ESR 17, CRP elevated 0.90, HIV neg  -blood cx, urine legionella and strep ag-pending  -empiric ceftriaxone and azithro day 2  /-case disccused with Dr. Elli Cedeno on phone 7/17  -viral panel ordered by cardiology      Tobacco dependence  -counseled to quit  -nicotine patch offered          Code Status: full  Surrogate Decision Maker: sister      DVT Prophylaxis: lovenox  GI Prophylaxis: not indicated      Baseline: independent  Overweight   Body mass index is 27.27 kg/(m^2). _______________________________________________________________________  Patient seen and examined by me on discharge day.   Pertinent Findings:  As per progress note  _______________________________________________________________________  DISCHARGE MEDICATIONS:   Current Discharge Medication List      START taking these medications    Details   azithromycin 500 mg 500 mg, ADDaptor 1 Device IVPB 500 mg by IntraVENous route every twenty-four (24) hours. Qty: 1 Dose, Refills: 0      carvedilol (COREG) 6.25 mg tablet Take 1 Tab by mouth two (2) times a day. Qty: 60 Tab, Refills: 0      cefTRIAXone 2 gram 2 g, ADDaptor 1 Device IVPB 2 g by IntraVENous route every twenty-four (24) hours. Qty: 1 Dose, Refills: 0      dilTIAZem (CARDIZEM) infusion 0-15 mg/hr by IntraVENous route TITRATE. Qty: 1 Each, Refills: 0         STOP taking these medications       ciprofloxacin HCl (CIPRO) 500 mg tablet Comments:   Reason for Stopping:         traMADol (ULTRAM) 50 mg tablet Comments:   Reason for Stopping:               My Recommended Diet, Activity, Wound Care, and follow-up labs are listed in the patient's Discharge Insturctions which I have personally completed and reviewed.     ______________________________________________________________________    Risk of deterioration: High    Condition at Discharge:  Stable  ______________________________________________________________________    Disposition  [facility]  ______________________________________________________________________    Care Plan discussed with:   Patient, Family, RN, Care Manager, Consultant    ______________________________________________________________________    Code Status: Full Code  ______________________________________________________________________      Follow up with:   PCP : None  Follow-up Information     Follow up With Details Comments Contact Info    None   None ([phone]) Patient stated that they have no PCP                Total time in minutes spent coordinating this discharge (includes going over instructions, follow-up, prescriptions, and preparing report for sign off to her PCP) :  35 minutes    Signed:  Paris Justin MD

## 2018-07-18 NOTE — PROGRESS NOTES
CM inform [employer] to visit patient and interview for possible Medicaid and SS screening. CM on behalf of patient filled out application for SNAP. CM inform patient possibility of SNAP department asking for sister information including financial.  Patient currently staying with sister. CM to mail application for patient. Patient works part-time [employer]. patient new onset Cardiac diagnoses. 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 Patient transferring to [facility] [address].    Patient needs PCP and Cardiac follow-up. Patient had concern for dietary restrictions healthy food choices once's discharged. [facility] and [facility] application given to patient. Odette Dunn [facility] CM. Care Management Interventions  PCP Verified by CM: Yes (patient will need a PCP)  Palliative Care Criteria Met (RRAT>21 & CHF Dx)?: No  Transition of Care Consult (CM Consult):  Other (transfer to [facility])  MyChart Signup: No  Discharge Durable Medical Equipment: No  Health Maintenance Reviewed: Yes  Current Support Network: Relative's Home (sister ruby)  Confirm Follow Up Transport: Self  Plan discussed with Pt/Family/Caregiver: Yes  Freedom of Choice Offered: Yes  Discharge Location  Discharge Placement: Transferred to higher level of care

## 2018-07-18 NOTE — PROGRESS NOTES
Cardiology:    Mr. Omid Andrews states that he feels a little bit more comfortable. Heart rate is down to barely over 100 with normal blood pressure and without any episodes of hypotension. He is comfortable supine. After 1 g magnesium repletion he still has ventricular ectopy with occasional asymptomatic you any more for pairs. If complexity increases overnight we can give more magnesium but probably no more than 1 more gram with a new measurement in the morning. He tolerated the introduction of Coreg so I increased it to 6.25 mg twice daily as of the morning. Infectious disease workup is in progress.     Moreno Manjarrez MD

## 2018-07-18 NOTE — PROGRESS NOTES
IDR rounds   Rounds with MD, team and patient at bedside. Issues with SOB with and without activities. Patient new onset of Afib-   HF- chest heaviness- Continue to monitor HR and BP. Adjusting medications. Continue work-up. 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 Will need to clarify with cardiology limitation - and time. To determine if he meets criteria for Disability. Will provide resources he can apply for through DSS. Referral to [facility] -Recommend [facility] Application especially if he will need multiple specialist.     On-going assessment for needs.  Ms. Constanza Miller to see patient for post-acute care needs    Lillie NORRIS RN   [phone]

## 2018-07-18 NOTE — PROGRESS NOTES
Hospitalist Progress Note    NAME: Ben Arceo   :  1980   MRN:  [id number]       Assessment / Plan:      Rapid a fib with RVR: POA  Systolic heart failure, new diagnosis: POA  Chronicity unknown suspect acute  -on cardizem gtt for rate control  -was hypotensive last night, received 250 cc bolus NS  -[id number] score is 1, will defer anticoagulation to cardiology  -echo  shows Ejection fraction was estimated in the range of 10 % to 20 %, challenged determination in presence of what appeared to be rapid atrial fibrillation. There was severe diffuse hypokinesis with slight paradoxical septal motion. Wall  thickness was mildly increased. There was mild asymmetric hypertrophy of  the septum. The changes were consistent with eccentric hypertrophy. Doppler parameters were consistent with restrictive physiology, indicative  of decreased left ventricular diastolic compliance and/or increased left  atrial pressure  -stress test suggestive of cardiomyopathy of non ischemic nature  -candidate for advance heart failure eval and AICD, will defer management to cardiology     Right sided pleural effusion with infiltrates  No s/s of sepsis, denies cough  -repeat CXR  - infx work up, will check ESR 17, CRP elevated 0.90, HIV neg  -blood cx, urine legionella and strep ag-pending  -empiric ceftriaxone and azithro day 2  /-case disccused with Dr. Walter Rothman on phone   -viral panel ordered by cardiology     Tobacco dependence  -counseled to quit  -nicotine patch offered        Code Status: full  Surrogate Decision Maker: sister     DVT Prophylaxis: lovenox  GI Prophylaxis: not indicated     Baseline: independent  Overweight   Body mass index is 27.27 kg/(m^2). Subjective:     Chief Complaint / Reason for Physician Visit  \"i am doing OK\". Discussed with RN events overnight.      Review of Systems:  Symptom Y/N Comments  Symptom Y/N Comments   Fever/Chills n   Chest Pain n    Poor Appetite n   Edema n Cough n   Abdominal Pain n    Sputum n   Joint Pain n    SOB/FLOWERS y   Pruritis/Rash n    Nausea/vomit n   Tolerating PT/OT y    Diarrhea n   Tolerating Diet y    Constipation    Other       Could NOT obtain due to:      Objective:     VITALS:   Last 24hrs VS reviewed since prior progress note.  Most recent are:  Patient Vitals for the past 24 hrs:   Temp Pulse Resp BP SpO2   07/18/18 1137 97.9 °F (36.6 °C) - - - -   07/18/18 0852 - - - - 98 %   07/18/18 0748 98.6 °F (37 °C) (!) 110 (!) 34 100/84 97 %   07/18/18 0645 - (!) 114 19 105/81 97 %   07/18/18 0630 - (!) 119 (!) 34 101/85 98 %   07/18/18 0615 - (!) 111 22 (!) 80/42 94 %   07/18/18 0600 - (!) 122 (!) 33 (!) 117/100 98 %   07/18/18 0545 - (!) 109 21 (!) 107/92 95 %   07/18/18 0530 - (!) 112 28 96/80 96 %   07/18/18 0515 - (!) 119 29 106/86 96 %   07/18/18 0445 - (!) 123 30 123/89 95 %   07/18/18 0430 - (!) 129 28 (!) 123/103 98 %   07/18/18 0415 - (!) 124 23 111/89 98 %   07/18/18 0400 97.6 °F (36.4 °C) (!) 114 28 110/76 99 %   07/18/18 0345 - (!) 111 23 97/73 100 %   07/18/18 0330 - (!) 112 25 111/76 99 %   07/18/18 0315 - (!) 116 27 99/78 99 %   07/18/18 0300 - (!) 107 28 102/78 99 %   07/18/18 0245 - (!) 106 22 97/79 99 %   07/18/18 0230 - (!) 115 (!) 31 98/83 99 %   07/18/18 0215 - (!) 115 21 99/83 98 %   07/18/18 0145 - (!) 105 23 91/74 98 %   07/18/18 0130 - (!) 111 25 95/77 98 %   07/18/18 0115 - (!) 105 17 97/55 92 %   07/18/18 0100 - (!) 109 24 104/80 97 %   07/18/18 0045 - (!) 102 27 101/78 -   07/18/18 0030 - 100 27 109/85 94 %   07/18/18 0015 - (!) 105 29 96/75 97 %   07/18/18 0000 97.8 °F (36.6 °C) (!) 107 (!) 38 95/68 94 %   07/17/18 2345 - (!) 102 20 102/74 100 %   07/17/18 2330 - (!) 108 (!) 38 115/80 98 %   07/17/18 2315 - 100 (!) 31 99/73 100 %   07/17/18 2300 - 100 24 112/74 98 %   07/17/18 2245 - (!) 101 (!) 44 95/67 95 %   07/17/18 2230 - 92 22 93/79 98 %   07/17/18 2215 - 94 16 (!) 85/66 99 %   07/17/18 2200 - (!) 109 (!) 50 (!) 77/52 97 %   07/17/18 2153 - (!) 120 (!) 47 (!) 89/64 95 %   07/17/18 2145 - (!) 123 (!) 35 - 97 %   07/17/18 2130 - (!) 125 (!) 31 107/76 100 %   07/17/18 2100 - (!) 146 (!) 34 101/85 -   07/17/18 2030 - (!) 109 27 (!) 112/91 97 %   07/17/18 2000 97.5 °F (36.4 °C) (!) 111 28 123/85 99 %   07/17/18 1930 - (!) 116 (!) 39 110/68 99 %   07/17/18 1927 - (!) 109 (!) 35 (!) 111/92 98 %   07/17/18 1900 - (!) 120 (!) 39 - -   07/17/18 1830 - (!) 119 22 (!) 121/23 (!) 89 %   07/17/18 1800 - (!) 113 29 - 98 %   07/17/18 1730 - (!) 129 27 (!) 121/98 (!) 75 %   07/17/18 1720 - (!) 124 21 (!) 126/94 -   07/17/18 1710 - (!) 132 29 110/86 -   07/17/18 1704 - - - - 97 %   07/17/18 1700 - (!) 114 26 (!) 113/92 98 %   07/17/18 1650 - (!) 118 27 105/79 97 %   07/17/18 1640 - (!) 123 30 106/79 98 %   07/17/18 1634 - - - (!) 118/94 97 %   07/17/18 1550 - (!) 130 19 102/87 100 %   07/17/18 1530 - (!) 136 19 110/89 97 %   07/17/18 1500 - (!) 147 27 - 99 %   07/17/18 1400 - (!) 130 23 102/79 95 %   07/17/18 1330 - (!) 144 (!) 32 110/82 96 %   07/17/18 1300 - (!) 141 22 104/83 99 %   07/17/18 1230 - (!) 134 (!) 32 108/80 96 %   07/17/18 1200 - (!) 142 29 107/79 98 %       Intake/Output Summary (Last 24 hours) at 07/18/18 1140  Last data filed at 07/18/18 0749   Gross per 24 hour   Intake                0 ml   Output              175 ml   Net             -175 ml        PHYSICAL EXAM:  General: WD, WN. Alert, cooperative, no acute distress    EENT:  EOMI. Anicteric sclerae. MMM  Resp:  Right sided crackles  CV:  Regular  rhythm,  No edema  GI:  Soft, Non distended, Non tender.  +Bowel sounds  Neurologic:  Alert and oriented X 3, normal speech,   Psych:   Good insight. Not anxious nor agitated  Skin:  No rashes.   No jaundice    Reviewed most current lab test results and cultures  YES  Reviewed most current radiology test results   YES  Review and summation of old records today    NO  Reviewed patient's current orders and MAR    YES  PMH/SH reviewed - no change compared to H&P  ________________________________________________________________________  Care Plan discussed with:    Comments   Patient x    Family  x sister   RN x    Care Manager x    Consultant  x Dr. Roddy Koyanagi team rounds were held today with , nursing, pharmacist and clinical coordinator. Patient's plan of care was discussed; medications were reviewed and discharge planning was addressed. ________________________________________________________________________  Total NON critical care TIME: 35  Minutes    Total CRITICAL CARE TIME Spent:   Minutes non procedure based      Comments   >50% of visit spent in counseling and coordination of care     ________________________________________________________________________  Marychuy Willoughby MD     Procedures: see electronic medical records for all procedures/Xrays and details which were not copied into this note but were reviewed prior to creation of Plan. LABS:  I reviewed today's most current labs and imaging studies.   Pertinent labs include:  Recent Labs      07/18/18 0418 07/17/18 0930   WBC  8.8  8.9   HGB  15.0  16.0   HCT  46.7  48.4   PLT  255  301     Recent Labs      07/18/18 0418 07/17/18 0930   NA  138  135*   K  4.8  4.1   CL  106  106   CO2  24  21   GLU  101*  98   BUN  10  11   CREA  1.13  1.14   CA  8.4*  8.8   MG   --   1.9   ALB   --   2.9*   TBILI   --   1.6*   SGOT   --   33   ALT   --   35       Signed: Marychuy Willoughby MD

## 2018-07-19 ENCOUNTER — APPOINTMENT (OUTPATIENT)
Dept: GENERAL RADIOLOGY | Age: 38
DRG: 286 | End: 2018-07-19
Attending: FAMILY MEDICINE
Payer: SUBSIDIZED

## 2018-07-19 ENCOUNTER — HOSPITAL ENCOUNTER (INPATIENT)
Age: 38
LOS: 7 days | Discharge: HOME | DRG: 286 | End: 2018-07-26
Attending: FAMILY MEDICINE | Admitting: FAMILY MEDICINE
Payer: SUBSIDIZED

## 2018-07-19 VITALS
HEART RATE: 138 BPM | HEIGHT: 75 IN | DIASTOLIC BLOOD PRESSURE: 90 MMHG | TEMPERATURE: 97.7 F | BODY MASS INDEX: 27.13 KG/M2 | WEIGHT: 218.2 LBS | RESPIRATION RATE: 22 BRPM | OXYGEN SATURATION: 98 % | SYSTOLIC BLOOD PRESSURE: 135 MMHG

## 2018-07-19 DIAGNOSIS — I48.91 RAPID ATRIAL FIBRILLATION (HCC): ICD-10-CM

## 2018-07-19 DIAGNOSIS — F10.10 ALCOHOL ABUSE: ICD-10-CM

## 2018-07-19 DIAGNOSIS — I50.21 ACUTE SYSTOLIC HEART FAILURE (HCC): ICD-10-CM

## 2018-07-19 DIAGNOSIS — J90 PLEURAL EFFUSION, RIGHT: ICD-10-CM

## 2018-07-19 DIAGNOSIS — I48.91 ATRIAL FIBRILLATION WITH RVR (HCC): ICD-10-CM

## 2018-07-19 DIAGNOSIS — B33.22: ICD-10-CM

## 2018-07-19 LAB
ANION GAP SERPL CALC-SCNC: 11 MMOL/L (ref 5–15)
CHLORIDE SERPL-SCNC: 106 MMOL/L (ref 97–108)
CO2 SERPL-SCNC: 21 MMOL/L (ref 21–32)
FLUID CULTURE, SPNG2: NORMAL
MAGNESIUM SERPL-MCNC: 1.8 MG/DL (ref 1.6–2.4)
ORGANISM ID, SPNG3: NORMAL
PLEASE NOTE, SPNG4: NORMAL
POTASSIUM SERPL-SCNC: 3.8 MMOL/L (ref 3.5–5.1)
PROCALCITONIN SERPL-MCNC: 0.05 NG/ML (ref 0–0.08)
S PNEUM AG SPEC QL LA: NEGATIVE
SODIUM SERPL-SCNC: 138 MMOL/L (ref 136–145)
SPECIMEN SOURCE: NORMAL
SPECIMEN, SPNG1: NORMAL

## 2018-07-19 PROCEDURE — 74011000250 HC RX REV CODE- 250: Performed by: INTERNAL MEDICINE

## 2018-07-19 PROCEDURE — 36415 COLL VENOUS BLD VENIPUNCTURE: CPT | Performed by: INTERNAL MEDICINE

## 2018-07-19 PROCEDURE — 80051 ELECTROLYTE PANEL: CPT | Performed by: INTERNAL MEDICINE

## 2018-07-19 PROCEDURE — 77010033678 HC OXYGEN DAILY

## 2018-07-19 PROCEDURE — 74011250636 HC RX REV CODE- 250/636: Performed by: FAMILY MEDICINE

## 2018-07-19 PROCEDURE — 74011000250 HC RX REV CODE- 250

## 2018-07-19 PROCEDURE — 74011000258 HC RX REV CODE- 258: Performed by: FAMILY MEDICINE

## 2018-07-19 PROCEDURE — 74011250636 HC RX REV CODE- 250/636: Performed by: INTERNAL MEDICINE

## 2018-07-19 PROCEDURE — 74011000258 HC RX REV CODE- 258: Performed by: HOSPITALIST

## 2018-07-19 PROCEDURE — 74011250636 HC RX REV CODE- 250/636: Performed by: HOSPITALIST

## 2018-07-19 PROCEDURE — 83735 ASSAY OF MAGNESIUM: CPT | Performed by: INTERNAL MEDICINE

## 2018-07-19 PROCEDURE — 74011000250 HC RX REV CODE- 250: Performed by: FAMILY MEDICINE

## 2018-07-19 PROCEDURE — 71045 X-RAY EXAM CHEST 1 VIEW: CPT

## 2018-07-19 PROCEDURE — 65610000003 HC RM ICU SURGICAL

## 2018-07-19 PROCEDURE — 74011000258 HC RX REV CODE- 258: Performed by: INTERNAL MEDICINE

## 2018-07-19 RX ORDER — SODIUM CHLORIDE 0.9 % (FLUSH) 0.9 %
5-10 SYRINGE (ML) INJECTION AS NEEDED
Status: DISCONTINUED | OUTPATIENT
Start: 2018-07-19 | End: 2018-07-26 | Stop reason: HOSPADM

## 2018-07-19 RX ORDER — ENOXAPARIN SODIUM 100 MG/ML
40 INJECTION SUBCUTANEOUS EVERY 24 HOURS
Status: DISCONTINUED | OUTPATIENT
Start: 2018-07-20 | End: 2018-07-20

## 2018-07-19 RX ORDER — MAGNESIUM SULFATE 1 G/100ML
1 INJECTION INTRAVENOUS ONCE
Status: COMPLETED | OUTPATIENT
Start: 2018-07-19 | End: 2018-07-19

## 2018-07-19 RX ORDER — DIGOXIN 0.25 MG/ML
125 INJECTION INTRAMUSCULAR; INTRAVENOUS ONCE
Status: COMPLETED | OUTPATIENT
Start: 2018-07-19 | End: 2018-07-19

## 2018-07-19 RX ORDER — SODIUM CHLORIDE 9 MG/ML
INJECTION INTRAMUSCULAR; INTRAVENOUS; SUBCUTANEOUS
Status: COMPLETED
Start: 2018-07-19 | End: 2018-07-19

## 2018-07-19 RX ORDER — SODIUM CHLORIDE 0.9 % (FLUSH) 0.9 %
5-10 SYRINGE (ML) INJECTION EVERY 8 HOURS
Status: DISCONTINUED | OUTPATIENT
Start: 2018-07-19 | End: 2018-07-26 | Stop reason: HOSPADM

## 2018-07-19 RX ADMIN — Medication 10 ML: at 15:24

## 2018-07-19 RX ADMIN — Medication 10 ML: at 17:14

## 2018-07-19 RX ADMIN — Medication 10 ML: at 06:29

## 2018-07-19 RX ADMIN — DIGOXIN 125 MCG: 250 INJECTION, SOLUTION INTRAMUSCULAR; INTRAVENOUS; PARENTERAL at 17:45

## 2018-07-19 RX ADMIN — SODIUM CHLORIDE 100 ML: 900 INJECTION, SOLUTION INTRAVENOUS at 11:09

## 2018-07-19 RX ADMIN — CEFTRIAXONE SODIUM 2 G: 2 INJECTION, POWDER, FOR SOLUTION INTRAMUSCULAR; INTRAVENOUS at 18:13

## 2018-07-19 RX ADMIN — MAGNESIUM SULFATE HEPTAHYDRATE 1 G: 1 INJECTION, SOLUTION INTRAVENOUS at 15:24

## 2018-07-19 RX ADMIN — AZITHROMYCIN MONOHYDRATE 500 MG: 500 INJECTION, POWDER, LYOPHILIZED, FOR SOLUTION INTRAVENOUS at 22:01

## 2018-07-19 RX ADMIN — DIGOXIN 125 MCG: 250 INJECTION, SOLUTION INTRAMUSCULAR; INTRAVENOUS; PARENTERAL at 11:00

## 2018-07-19 RX ADMIN — ENOXAPARIN SODIUM 40 MG: 40 INJECTION, SOLUTION INTRAVENOUS; SUBCUTANEOUS at 15:24

## 2018-07-19 RX ADMIN — DILTIAZEM HYDROCHLORIDE 2.5 MG/HR: 5 INJECTION INTRAVENOUS at 21:52

## 2018-07-19 RX ADMIN — Medication 10 ML: at 22:00

## 2018-07-19 RX ADMIN — SODIUM CHLORIDE 10 ML: 9 INJECTION INTRAMUSCULAR; INTRAVENOUS; SUBCUTANEOUS at 17:14

## 2018-07-19 NOTE — PROGRESS NOTES
Cardiology:    Overnight events noted. Lactate was slightly elevated on admission, current WBC/diff is normal. No growth on blood cultures. He is insisting on standing for bowel and blader and he has orthostatic hypotension severe enough to cause transient abdominal pain and faintness. He is refusing medications today, believing that the above symptoms are medication side effects. I will come to the floor as soon as possible to talk with him, but I am in clinic this morning. He will probably need an ionotropic drip as well as early EP evaluation for possible invasive/device measures for rate control. I'm worried about his refusal of Coreg because of satisfactory arrhythmia control with it. (Frequent complex ventricular ectopy before up titration).     Catalino Salgado MD [facility]

## 2018-07-19 NOTE — IP AVS SNAPSHOT
[address] 
[phone] Patient: Kristie Rinaldi MRN: [id number] JGI:[id number] A check mariano indicates which time of day the medication should be taken. My Medications START taking these medications Instructions Each Dose to Equal  
 Morning Noon Evening Bedtime  
 amiodarone 400 mg tablet Commonly known as:  Trell Pacheco Your last dose was: Your next dose is: Take 400 mg twice a day till 8/3/2018 and then take  400 mg once a day till 8/17/2018 and then obtain 200 mg tablets from your cardiologist to continue 200 mg daily after that.  
     
   
   
   
  
 enoxaparin 100 mg/mL Commonly known as:  LOVENOX Your last dose was: Your next dose is:    
   
   
 100 mg by SubCUTAneous route every twelve (12) hours. Injections are needed till INR is therapeutic  
 100 mg  
    
   
   
   
  
 magnesium oxide 400 mg tablet Commonly known as:  MAG-OX Start taking on:  7/27/2018 Your last dose was: Your next dose is: Take 1 Tab by mouth daily. 400 mg  
    
   
   
   
  
 potassium chloride SR 20 mEq tablet Commonly known as:  K-TAB Start taking on:  7/27/2018 Your last dose was: Your next dose is: Take 1 Tab by mouth daily. 20 mEq  
    
   
   
   
  
 sacubitril-valsartan 24-26 mg tablet Commonly known as:  ENTRESTO Your last dose was: Your next dose is: Take 1 Tab by mouth every twelve (12) hours. 1 Tab  
    
   
   
   
  
 spironolactone 25 mg tablet Commonly known as:  ALDACTONE Start taking on:  7/27/2018 Your last dose was: Your next dose is: Take 1 Tab by mouth daily. 25 mg  
    
   
   
   
  
 warfarin 5 mg tablet Commonly known as:  COUMADIN Your last dose was: Your next dose is: Take 5 mg tablets till next INR check (likely on 7/30/2018) and then dose will be adjusted. CHANGE how you take these medications Instructions Each Dose to Equal  
 Morning Noon Evening Bedtime  
 carvedilol 3.125 mg tablet Commonly known as:  Mane Rodrigueziss What changed:   
- medication strength 
- how much to take - when to take this Your last dose was: Your next dose is: Take 1 Tab by mouth two (2) times daily (with meals). 3.125 mg  
    
   
   
   
  
  
STOP taking these medications   
 azithromycin 500 mg 500 mg, ADDaptor 1 Device IVPB cefTRIAXone 2 gram 2 g, ADDaptor 1 Device IVPB  
   
  
 dilTIAZem (CARDIZEM) infusion Where to Get Your Medications Information on where to get these meds will be given to you by the nurse or doctor. ! Ask your nurse or doctor about these medications  
  amiodarone 400 mg tablet  
 carvedilol 3.125 mg tablet  
 enoxaparin 100 mg/mL  
 magnesium oxide 400 mg tablet  
 potassium chloride SR 20 mEq tablet  
 sacubitril-valsartan 24-26 mg tablet  
 spironolactone 25 mg tablet  
 warfarin 5 mg tablet

## 2018-07-19 NOTE — DISCHARGE INSTRUCTIONS
Atrial Fibrillation: Care Instructions  Your Care Instructions    Atrial fibrillation is an irregular and often fast heartbeat. Treating this condition is important for several reasons. It can cause blood clots, which can travel from your heart to your brain and cause a stroke. If you have a fast heartbeat, you may feel lightheaded, dizzy, and weak. An irregular heartbeat can also increase your risk for heart failure. Atrial fibrillation is often the result of another heart condition, such as high blood pressure or coronary artery disease. Making changes to improve your heart condition will help you stay healthy and active. Follow-up care is a key part of your treatment and safety. Be sure to make and go to all appointments, and call your doctor if you are having problems. It's also a good idea to know your test results and keep a list of the medicines you take. How can you care for yourself at home? Medicines    · Take your medicines exactly as prescribed. Call your doctor if you think you are having a problem with your medicine. You will get more details on the specific medicines your doctor prescribes.     · If your doctor has given you a blood thinner to prevent a stroke, be sure you get instructions about how to take your medicine safely. Blood thinners can cause serious bleeding problems.     · Do not take any vitamins, over-the-counter drugs, or herbal products without talking to your doctor first.    Lifestyle changes    · Do not smoke. Smoking can increase your chance of a stroke and heart attack. If you need help quitting, talk to your doctor about stop-smoking programs and medicines. These can increase your chances of quitting for good.     · Eat a heart-healthy diet.     · Stay at a healthy weight. Lose weight if you need to.     · Limit alcohol to 2 drinks a day for men and 1 drink a day for women. Too much alcohol can cause health problems.     · Avoid colds and flu.  Get a pneumococcal vaccine shot. If you have had one before, ask your doctor whether you need another dose. Get a flu shot every year. If you must be around people with colds or flu, wash your hands often. Activity    · If your doctor recommends it, get more exercise. Walking is a good choice. Bit by bit, increase the amount you walk every day. Try for at least 30 minutes on most days of the week. You also may want to swim, bike, or do other activities. Your doctor may suggest that you join a cardiac rehabilitation program so that you can have help increasing your physical activity safely.     · Start light exercise if your doctor says it is okay. Even a small amount will help you get stronger, have more energy, and manage stress. Walking is an easy way to get exercise. Start out by walking a little more than you did in the hospital. Gradually increase the amount you walk.     · When you exercise, watch for signs that your heart is working too hard. You are pushing too hard if you cannot talk while you are exercising. If you become short of breath or dizzy or have chest pain, sit down and rest immediately.     · Check your pulse regularly. Place two fingers on the artery at the palm side of your wrist, in line with your thumb. If your heartbeat seems uneven or fast, talk to your doctor. When should you call for help? Call 911 anytime you think you may need emergency care. For example, call if:    · You have symptoms of a heart attack. These may include:  ¨ Chest pain or pressure, or a strange feeling in the chest.  ¨ Sweating. ¨ Shortness of breath. ¨ Nausea or vomiting. ¨ Pain, pressure, or a strange feeling in the back, neck, jaw, or upper belly or in one or both shoulders or arms. ¨ Lightheadedness or sudden weakness. ¨ A fast or irregular heartbeat. After you call 911, the  may tell you to chew 1 adult-strength or 2 to 4 low-dose aspirin. Wait for an ambulance.  Do not try to drive yourself.     · You have symptoms of a stroke. These may include:  ¨ Sudden numbness, tingling, weakness, or loss of movement in your face, arm, or leg, especially on only one side of your body. ¨ Sudden vision changes. ¨ Sudden trouble speaking. ¨ Sudden confusion or trouble understanding simple statements. ¨ Sudden problems with walking or balance. ¨ A sudden, severe headache that is different from past headaches.     · You passed out (lost consciousness).    Call your doctor now or seek immediate medical care if:    · You have new or increased shortness of breath.     · You feel dizzy or lightheaded, or you feel like you may faint.     · Your heart rate becomes irregular.     · You can feel your heart flutter in your chest or skip heartbeats. Tell your doctor if these symptoms are new or worse.    Watch closely for changes in your health, and be sure to contact your doctor if you have any problems. Where can you learn more? Go to http://eleanor-walter.info/. Enter U020 in the search box to learn more about \"Atrial Fibrillation: Care Instructions. \"  Current as of: December 6, 2017  Content Version: 11.7  © 6136-4662 [employer]. Care instructions adapted under license by [employer] (which disclaims liability or warranty for this information). If you have questions about a medical condition or this instruction, always ask your healthcare professional. [place] [address] any warranty or liability for your use of this information.

## 2018-07-19 NOTE — PROGRESS NOTES
IDR: Patient waiting on bed at [facility]. Odette Dunn [facility] CM.

## 2018-07-19 NOTE — IP AVS SNAPSHOT
2700 [facility] [address] 
[phone] Patient: Anyi Mcconnell MRN: [id number] COLT:9/51/[id number] About your hospitalization You were admitted on:  July 19, 2018 You last received care in the:  [address] [place] [address] CV SERVICES UNIT You were discharged on:  July 26, 2018 Why you were hospitalized Your primary diagnosis was:  Atrial Fibrillation With Rvr (Hcc) Your diagnoses also included:  Acute Systolic Heart Failure (Hcc), Pleural Effusion, Right Follow-up Information Follow up With Details Comments Contact Info [facility]9 [facility] On 8/9/2018 12:30 pm with Chauncy Prader, NP  200 [facility] [address] [place] [address] 
[phone] 60 Alexandra [address]uggan, [address] On 7/31/2018 Hospital f/u new patient appointment (financial screening) Tuesday, 7/31/18 @ 2:00 p.m. Physician appoitment 2:15 p.m. Patient needs to provide photo ID, one month proof of income and discharge papers. [address] [place] [address] 
[phone] 2712 [facility] On 7/28/2018 one time skilled nursing visit 7007 [facility] [address] 
[phone] Oley Landau, MD In 1 day inr check 4601 [facility] [address] 
[phone] Oley Landau, MD On 7/27/2018 @ 9:00am for INR check Dalila Marte 93 110 [address] 
[phone] None   None (395) Patient stated that they have no PCP Your Scheduled Appointments Friday July 27, 2018  9:00 AM EDT New Patient with Oley Landau, MD  
[facility]) [address]  
[phone] Tuesday July 31, 2018  2:00 PM EDT FINANCIAL SCREENING with [facility] [address] [place] ([facility] [address] [place] [address] [address] [phone] Tuesday July 31, 2018  2:15 PM EDT Office Visit with MEG Watters 1503 [address] ([facility]) [phone]  
[phone] Thursday August 09, 2018 12:30 PM EDT Follow Up with YOUSUF Easley 1229 [facility] ([facility]) [address]  
[phone] Thursday August 30, 2018  8:00 AM EDT  
ESTABLISHED PATIENT with Betsy Blizzard, MD  
[facility] ([facility]) [address]  [address] [facility] 57  
[phone] Discharge Orders None A check mariano indicates which time of day the medication should be taken. My Medications START taking these medications Instructions Each Dose to Equal  
 Morning Noon Evening Bedtime  
 amiodarone 400 mg tablet Commonly known as:  Colleen Boyle Your last dose was: Your next dose is: Take 400 mg twice a day till 8/3/2018 and then take  400 mg once a day till 8/17/2018 and then obtain 200 mg tablets from your cardiologist to continue 200 mg daily after that.  
     
   
   
   
  
 enoxaparin 100 mg/mL Commonly known as:  LOVENOX Your last dose was: Your next dose is:    
   
   
 100 mg by SubCUTAneous route every twelve (12) hours. Injections are needed till INR is therapeutic  
 100 mg  
    
   
   
   
  
 magnesium oxide 400 mg tablet Commonly known as:  MAG-OX Start taking on:  7/27/2018 Your last dose was: Your next dose is: Take 1 Tab by mouth daily. 400 mg  
    
   
   
   
  
 potassium chloride SR 20 mEq tablet Commonly known as:  K-TAB Start taking on:  7/27/2018 Your last dose was: Your next dose is: Take 1 Tab by mouth daily. 20 mEq  
    
   
   
   
  
 sacubitril-valsartan 24-26 mg tablet Commonly known as:  ENTRESTO Your last dose was: Your next dose is: Take 1 Tab by mouth every twelve (12) hours. 1 Tab  
    
   
   
   
  
 spironolactone 25 mg tablet Commonly known as:  ALDACTONE Start taking on:  7/27/2018 Your last dose was: Your next dose is: Take 1 Tab by mouth daily. 25 mg  
    
   
   
   
  
 warfarin 5 mg tablet Commonly known as:  COUMADIN Your last dose was: Your next dose is: Take 5 mg tablets till next INR check (likely on 7/30/2018) and then dose will be adjusted. CHANGE how you take these medications Instructions Each Dose to Equal  
 Morning Noon Evening Bedtime  
 carvedilol 3.125 mg tablet Commonly known as:  Rosauraeasagar Goldberg What changed:   
- medication strength 
- how much to take - when to take this Your last dose was: Your next dose is: Take 1 Tab by mouth two (2) times daily (with meals). 3.125 mg  
    
   
   
   
  
  
STOP taking these medications   
 azithromycin 500 mg 500 mg, ADDaptor 1 Device IVPB cefTRIAXone 2 gram 2 g, ADDaptor 1 Device IVPB  
   
  
 dilTIAZem (CARDIZEM) infusion Where to Get Your Medications Information on where to get these meds will be given to you by the nurse or doctor. ! Ask your nurse or doctor about these medications  
  amiodarone 400 mg tablet  
 carvedilol 3.125 mg tablet  
 enoxaparin 100 mg/mL  
 magnesium oxide 400 mg tablet  
 potassium chloride SR 20 mEq tablet  
 sacubitril-valsartan 24-26 mg tablet  
 spironolactone 25 mg tablet  
 warfarin 5 mg tablet Discharge Instructions Future Appointments Date Time Provider Dougie Moore 8/9/2018 12:30 PM YOUSUF Mcdermott [facility]  
8/30/2018 8:00 AM Janie Mustafa  [address] Discharge Instructions PATIENT ID: Dora Ovalle MRN: [id number] YOB: 1980 DATE OF ADMISSION: 7/19/2018  8:50 PM   
DATE OF DISCHARGE: 7/26/2018 PRIMARY CARE PROVIDER: None ATTENDING PHYSICIAN: Katie Spears MD 
DISCHARGING PROVIDER: Katie Spears MD   
To contact this individual call [phone] and ask the  to page. If unavailable ask to be transferred the Adult Hospitalist Department. DISCHARGE DIAGNOSES acute systolic heart failure CONSULTATIONS: IP CONSULT TO CARDIOLOGY PROCEDURES/SURGERIES:cardiac cath,cardioversion PENDING TEST RESULTS:  
At the time of discharge the following test results are still pending: EVAN,SPEP,UPEP 
 
FOLLOW UP APPOINTMENTS:  
Follow-up Information Follow up With Details Comments Contact Info [facility] On 8/9/2018 12:30 pm with Jeanine Mackay NP  200 [facility] [address] [facility] [address] [phone] 60 [address] Olga Lidia, [address] On 7/31/2018 Hospital f/u new patient appointment (financial screening) Tuesday, 7/31/18 @ 2:00 p.m. Physician appoitment 2:15 p.m. Patient needs to provide photo ID, one month proof of income and discharge papers. [address] 
[phone] [address] [place] [address] On 7/28/2018 one time skilled nursing visit 5147 [facility] [address] [phone] Tanner Reeder MD In 1 day inr check 4601 [facility] [address] 
[phone] ADDITIONAL CARE RECOMMENDATIONS: follow up with cardiology,PCP and advanced heart failure team 
 
DIET: Cardiac Diet ACTIVITY: Activity as tolerated WOUND CARE: na 
 
EQUIPMENT needed: na 
 
 
  
 SNF/Inpatient Rehab/LTAC Independent/assisted living Hospice Other:  
 
 
 
Signed: Laurette Lesch, MD 
7/26/2018 
4:33 PM 
 
 
  
  
  
[employer] Announcement We are excited to announce that we are making your provider's discharge notes available to you in [employer]. You will see these notes when they are completed and signed by the physician that discharged you from your recent hospital stay. If you have any questions or concerns about any information you see in [employer], please call the Health Information Department where you were seen or reach out to your Primary Care Provider for more information about your plan of care. Introducing [facility]! [employer] introduces [employer] patient portal. Now you can access parts of your medical record, email your doctor's office, and request medication refills online. 1. In your internet browser, go to [email or web address]://[email or web address] [email or web address] POI/[employer]store 2. Click on the First Time User? Click Here link in the Sign In box. You will see the New Member Sign Up page. 3. Enter your [employer] Access Code exactly as it appears below. You will not need to use this code after youve completed the sign-up process. If you do not sign up before the expiration date, you must request a new code. · [employer] Access Code: [email or web address] Expires: 10/6/2018  5:16 PM 
 
4. Enter the last four digits of your Social Security Number (xxxx) and Date of Birth (mm/dd/yyyy) as indicated and click Submit.  You will be taken to the next sign-up page. 5. Create a MemberTender.comt ID. This will be your [employer] login ID and cannot be changed, so think of one that is secure and easy to remember. 6. Create a MemberTender.comt password. You can change your password at any time. 7. Enter your Password Reset Question and Answer. This can be used at a later time if you forget your password. 8. Enter your e-mail address. You will receive e-mail notification when new information is available in [address] 9. Click Sign Up. You can now view and download portions of your medical record. 10. Click the Download Summary menu link to download a portable copy of your medical information. If you have questions, please visit the Frequently Asked Questions section of the [employer] website. Remember, [employer] is NOT to be used for urgent needs. For medical emergencies, dial 911. Now available from your iPhone and Android! Introducing Julio Hamlin As a [employer] patient, I wanted to make you aware of our electronic visit tool called Julio Hamlin. [employer] 24/7 allows you to connect within minutes with a medical provider 24 hours a day, seven days a week via a mobile device or tablet or logging into a secure website from your computer. You can access Julio Hamlin from anywhere in the [place]. A virtual visit might be right for you when you have a simple condition and feel like you just dont want to get out of bed, or cant get away from work for an appointment, when your regular [employer] provider is not available (evenings, weekends or holidays), or when youre out of town and need minor care. Electronic visits cost only $49 and if the [employer] 24/7 provider determines a prescription is needed to treat your condition, one can be electronically transmitted to a nearby pharmacy*. Please take a moment to enroll today if you have not already done so.   The enrollment process is free and takes just a few minutes. To enroll, please download the [employer] 24/7 kamini to your tablet or phone, or visit [email or web address] to enroll on your computer. And, as an [address] [place] [address] patient with a ftopia account, the results of your visits will be scanned into your electronic medical record and your primary care provider will be able to view the scanned results. We urge you to continue to see your regular [employer] provider for your ongoing medical care. And while your primary care provider may not be the one available when you seek a Hubkick virtual visit, the peace of mind you get from getting a real diagnosis real time can be priceless. For more information on [employer], view our Frequently Asked Questions (FAQs) at [email or web address]. Sincerely, 
 
Ju Hanks MD 
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 In process PROTEIN ELECTROPHORESIS, URINE RANDOM In process CULTURE, BODY FLUID W GRAM STAIN Preliminary result IMMUNOELECTROPHORESIS [facility].) Preliminary result Providers Seen During Your Hospitalization Provider Specialty Primary office phone Ninoska Green MD Hospitalist [phone] Court Vasquez MD Internal Medicine [phone] Mikael Cee MD Internal Medicine [phone] Bimal Maxwell MD Hospitalist [phone] Your Primary Care Physician (PCP) Primary Care Physician Office Phone Office Fax NONE ** None ** ** None ** You are allergic to the following No active allergies Recent Documentation Weight BMI Smoking Status 99 kg 27.28 kg/m2 Current Every Day Smoker Emergency Contacts Name Discharge Info Relation Home Work Mobile Kathryn Deleon DISCHARGE CAREGIVER [3] Other Relative [6] [phone] Patient Belongings The following personal items are in your possession at time of discharge: 
  Dental Appliances: None  Visual Aid: None      Home Medications: None   Jewelry: With patient  Clothing: At bedside    Other Valuables:  (dental work-24K bridge and a ring) Discharge Instructions Attachments/References HEART FAILURE: AVOIDING TRIGGERS (ENGLISH) Patient Handouts Avoiding Triggers With Heart Failure: Care Instructions Your Care Instructions Triggers are anything that make your heart failure flare up. A flare-up is also called \"sudden heart failure\" or \"acute heart failure. \" When you have a flare-up, fluid builds up in your lungs, and you have problems breathing. You might need to go to the hospital. By watching for changes in your condition and avoiding triggers, you can prevent heart failure flare-ups. Follow-up care is a key part of your treatment and safety. Be sure to make and go to all appointments, and call your doctor if you are having problems. It's also a good idea to know your test results and keep a list of the medicines you take. How can you care for yourself at home? Watch for changes in your weight and condition · Weigh yourself without clothing at the same time each day. Record your weight. Call your doctor if you have sudden weight gain, such as more than 2 to 3 pounds in a day or 5 pounds in a week. (Your doctor may suggest a different range of weight gain.) A sudden weight gain may mean that your heart failure is getting worse. · Keep a daily record of your symptoms. Write down any changes in how you feel, such as new shortness of breath, cough, or problems eating.  Also record if your ankles are more swollen than usual and if you feel more tired than usual. Note anything that you ate or did that could have triggered these changes. Limit sodium Sodium causes your body to hold on to extra water. This may cause your heart failure symptoms to get worse. People get most of their sodium from processed foods. Fast food and restaurant meals also tend to be very high in sodium. · Your doctor may suggest that you limit sodium to 2,000 milligrams (mg) a day or less. That is less than 1 teaspoon of salt a day, including all the salt you eat in cooking or in packaged foods. · Read food labels on cans and food packages. They tell you how much sodium you get in one serving. Check the serving size. If you eat more than one serving, you are getting more sodium. · Be aware that sodium can come in forms other than salt, including monosodium glutamate (MSG), sodium citrate, and sodium bicarbonate (baking soda). MSG is often added to Asian food. You can sometimes ask for food without MSG or salt. · Slowly reducing salt will help you adjust to the taste. Take the salt shaker off the table. · Flavor your food with garlic, lemon juice, onion, vinegar, herbs, and spices instead of salt. Do not use soy sauce, steak sauce, onion salt, garlic salt, mustard, or ketchup on your food, unless it is labeled \"low-sodium\" or \"low-salt. \" 
· Make your own salad dressings, sauces, and ketchup without adding salt. · Use fresh or frozen ingredients, instead of canned ones, whenever you can. Choose low-sodium canned goods. · Eat less processed food and food from restaurants, including fast food. Exercise as directed Moderate, regular exercise is very good for your heart. It improves your blood flow and helps control your weight. But too much exercise can stress your heart and cause a heart failure flare-up. · Check with your doctor before you start an exercise program. 
· Walking is an easy way to get exercise. Start out slowly. Gradually increase the length and pace of your walk.  Swimming, riding a bike, and using a treadmill are also good forms of exercise. · When you exercise, watch for signs that your heart is working too hard. You are pushing yourself too hard if you cannot talk while you are exercising. If you become short of breath or dizzy or have chest pain, stop, sit down, and rest. 
· Do not exercise when you do not feel well. Take medicines correctly · Take your medicines exactly as prescribed. Call your doctor if you think you are having a problem with your medicine. · Make a list of all the medicines you take. Include those prescribed to you by other doctors and any over-the-counter medicines, vitamins, or supplements you take. Take this list with you when you go to any doctor. · Take your medicines at the same time every day. It may help you to post a list of all the medicines you take every day and what time of day you take them. · Make taking your medicine as simple as you can. Plan times to take your medicines when you are doing other things, such as eating a meal or getting ready for bed. This will make it easier to remember to take your medicines. · Get organized. Use helpful tools, such as daily or weekly pill containers. When should you call for help? Call 911 if you have symptoms of sudden heart failure such as: 
  · You have severe trouble breathing.  
  · You cough up pink, foamy mucus.  
  · You have a new irregular or rapid heartbeat.  
 Call your doctor now or seek immediate medical care if: 
  · You have new or increased shortness of breath.  
  · You are dizzy or lightheaded, or you feel like you may faint.  
  · You have sudden weight gain, such as more than 2 to 3 pounds in a day or 5 pounds in a week.  (Your doctor may suggest a different range of weight gain.)  
  · You have increased swelling in your legs, ankles, or feet.  
  · You are suddenly so tired or weak that you cannot do your usual activities.  
 Watch closely for changes in your health, and be sure to contact your doctor if you develop new symptoms. Where can you learn more? Go to http://eleanor-walter.info/. Enter Z835 in the search box to learn more about \"Avoiding Triggers With Heart Failure: Care Instructions. \" Current as of: December 6, 2017 Content Version: 11.7 © 5874-1956 [employer]. Care instructions adapted under license by [employer] (which disclaims liability or warranty for this information). If you have questions about a medical condition or this instruction, always ask your healthcare professional. [address] any warranty or liability for your use of this information. Please provide this summary of care documentation to your next provider. Signatures-by signing, you are acknowledging that this After Visit Summary has been reviewed with you and you have received a copy. Patient Signature:  ____________________________________________________________ Date:  ____________________________________________________________  
  
[facility] Provider Signature:  ____________________________________________________________ Date:  ____________________________________________________________

## 2018-07-19 NOTE — PROGRESS NOTES
7837 Paged Dr. Maninder Prince regarding patient's decreased BP and increased HR.     2309 Spoke with Dr. Maninder Prince. Updated on patient situation and condition. Discussed patient's complaint of abdominal pain which patient thinks is related \"to that pill I got earlier. \" Patient was referring to [address]. Order obtained to hold diltiazem gtt at present and administer digoxin 0.125 mg x 1 dose. Per Dr. Maninder Prince- will tolerate HR 110s-120s.    2327 Diltiazem gtt stopped due to decreased SBP.    2353 Patient refused digoxin dose despite education on reason for administration. BP 98/83, HR 96 at present. Will continue to monitor.    0302 Restarted diltiazem gtt at 5 mg/hr    0350 Stopped diltiazem gtt due to decreased BP.    0440 Restarted diltiazem gtt at 5 mg/hr. 3300 Decreased diltiazem gtt at 2.5 mg/hr    0630 Stopped diltiazem gtt due to decreased SBP.    0730 Handoff given to [facility]. HR off diltiazem gtt has ranged from upper 90s to 120s. HR never sustained above 120s. Patient did spike to 140s-150s with activity but patient was able to recover.

## 2018-07-19 NOTE — PROGRESS NOTES
Follow-up visit with patient on PCU. Patient expressed some mild concerns about why his transfer to [facility] has not occurred yet but also shared his trust that it will happen in due time and that things will work out as they are supposed to. Provided safe space for patient to share feelings and fears about events from the previous evening and his relief following follow-up visit and explanation from Dr. Martinez. Spent time [employer] of companionship and presence as well as offering words of reassurance. Concluded visit with reminders of  availability at each hospital and assurance of ongoing support as needed and desired. Chaplain Chun Velázquez M.Div.    Paging Service [phone])

## 2018-07-19 NOTE — PROGRESS NOTES
Bedside and Verbal shift change report given to Chantal Botello (oncoming nurse) by Edith Goldberg (offgoing nurse). Report included the following information SBAR, Kardex, MAR, Accordion, Recent Results and Cardiac Rhythm afib with rates into the 100s-120s. Patient awaiting bed placement at [facility]. Had symptomatic hypotensive episode last night ( diaphoresis, abdominal pain, tachycardia, anxiety). Patient apparently suspected the carvedilol for causing his pain. Diltiazem drip was stopped, digoxin was ordered. Patient refused digoxin. The diltiazem drip is currently off.    8:37 AM  Patient in no acute distress. Interested in breakfast; denies pain. Spoke briefly with patient about the hypotensive episode last night. Patient does not want to take scheduled carvedilol this morning. Patient care tech paged Dr. Diego Jones (left a message requesting call-back). 9:10 AM  Patient sitting up for breakfast.  Heart rate in the 110s-120s with spikes into the low 140s. No acute distress. Still awaiting call-back from cardiology. Bed still pending at [facility].  Will evaluate blood pressure and place patient back on diltiazem drip if appropriate. 9:50 AM  Spoke with Dr. Juan Carlos Palencia. Will come and speak with the patient. With his current blood pressure 80/60, I don't feel comfortable restarting the diltiazem drip. Dr. Juan Carlos Palencia states that the IV digoxin would be a good option for the patient, if we can get him to take it. 10:02 AM  Spoke with the patient. Would like to wait to see the doctor before taking any medication. Blood pressure 93/68. Told patient that we can wait half an hour, then give the digoxin if the doctor has not arrived. Assisted the patient to lay back down in the bed; encouraged to decrease stimulation (limit phone calls). Patient amenable, but wants Dr. Juan Carlos Palencia notified before digoxin is given. 10:34 AM spoke with Dr. Juan Carlos Palencia.   Patient HR has come down slightly with spikes into the high 130s. Will give the dose of IV digoxin (verified the dose of 125mcg). Current blood pressure 97/67. Will give a 100cc bolus per Dr. Dyana Pappas. 1100 Digoxin 125mg given IV (diluted in 10mL NS; administered over 5 minutes). 1109 100cc fluid bolus started as ordered. 11:58 AM  Noticed that the administration for the digoxin is not showing up on the [facility]. Spoke with pharmacy, who walked me through changing the administration from refused at midnight to given at 1100.    1227 bp 120/81    1335 Dr. Dyana Pappas at bedside. Patient rate largely unchanged since 1st dose of digoxin. Dr. Dyana Pappas will order a 1g run of Magnesium. We will also plan to give the patient another dose of digoxin IV spaced out 6 hours from the previous administration. 4:16 PM  Spoke with Rosa Jane at transfer center [phone]. Still no bed available for this patient; would like to know if we want to try another facility. If not, patient will remain on the bed wait list. Will page Dr. Dyana Pappas and see what he says. 5:21 PM  Assisted patient to bedside commode. Patient to call when finished. Patient states will try the carvedilol again after dinner. 1730 Patient back in bed. Had one episode of loose stool. HR spiked above 170 during activity; then came back down to high 130s. [id number] Patient had a run of V-tach. Monitor [employer] contacted Dr. Dyana Pappas by my request.     6207 Transfer request upgraded to critical care by Dr. Dyana Pappas. Lillian Lubin (monitor tech) is contacting transfer center. Labs also ordered. [address] [facility] [address] drawn as ordered. Patient now states that he would like to take the carvedilol later (after his sister visits). Dr. Boswell [facility] updated about the patient's situation. 7:19 PM  Transfer center called; bed assignment [address] [facility] [address] at [facility]. [facility] should arrive around 2000. Discharge summary is to be sent with patient; Transfer center will call back in about 15minutes for nurse report. 1933 EMS arrived.   Still awaiting call-back for transfer out report. Patient is aware that he has been assigned a bed. Sister at bedside. 1945 TRANSFER - OUT REPORT:    Verbal report given to Amarjit Rios RN(name) on Penny Doyle  being transferred to CVICU(unit) for urgent transfer       Report consisted of patients Situation, Background, Assessment and   Recommendations(SBAR). Information from the following report(s) SBAR, Kardex, MAR, Accordion, Recent Results and Cardiac Rhythm atrial fibrillation with rates into the 140s. was reviewed with the receiving nurse. Discussed patient's trepidations about side effects with medications (particularly the carvedilol); and explained that patient indicated that he would be amenable to taking another dose. Lines:   Peripheral IV 07/17/18 Left Antecubital (Active)   Site Assessment Clean, dry, & intact 7/19/2018  5:00 PM   Phlebitis Assessment 0 7/19/2018  5:00 PM   Infiltration Assessment 0 7/19/2018  5:00 PM   Dressing Status Clean, dry, & intact 7/19/2018  5:00 PM   Dressing Type Tape;Transparent 7/19/2018  5:00 PM   Hub Color/Line Status Pink;Patent; Infusing 7/19/2018  5:00 PM       Peripheral IV 07/18/18 Left Hand (Active)   Site Assessment Clean, dry, & intact 7/19/2018  5:00 PM   Phlebitis Assessment 0 7/19/2018  5:00 PM   Infiltration Assessment 0 7/19/2018  5:00 PM   Dressing Status Clean, dry, & intact 7/19/2018  5:00 PM   Dressing Type Tape;Transparent 7/19/2018  5:00 PM   Hub Color/Line Status Blue;Flushed;Patent;Capped 7/19/2018  5:00 PM   Alcohol Cap Used Yes 7/19/2018  5:00 PM        Opportunity for questions and clarification was provided.       Patient transported with:   Monitor  O2 @ 2 liters (for comfort)

## 2018-07-19 NOTE — PROGRESS NOTES
Dr Omar Roberts at [facility] asked for EP consult when he arrives to [facility]  I will see him then

## 2018-07-19 NOTE — PROGRESS NOTES
Problem: Falls - Risk of  Goal: *Absence of Falls  Document Ayaan Fall Risk and appropriate interventions in the flowsheet.    Outcome: Progressing Towards Goal  Fall Risk Interventions:            Medication Interventions: Patient to call before getting OOB, Evaluate medications/consider consulting pharmacy, Teach patient to arise slowly

## 2018-07-19 NOTE — PROGRESS NOTES
Hospitalist Progress Note    NAME: Alcides Reyez   :  1980   MRN:  [id number]   Room Number:  LTB9/34  @ [facility] Summary: 43 y.o. male whom presented on 2018 with      Assessment / Plan:  Update-  Still await Bed at [facility]. Rapid AFIB with RVR: POA  Systolic heart failure, new diagnosis: POA  Chronicity unknown, suspect acute  on cardizem gtt for rate control  [id number] score is 1, will defer anticoagulation to cardiology  ECHO-  shows Ejection fraction 10 % to 20 %,There was severe diffuse hypokinesis with slight paradoxical septal motion. There was mild asymmetric hypertrophy of the septum. The changes were consistent with eccentric hypertrophy. Doppler parameters were consistent with restrictive physiology, indicative  of decreased left ventricular diastolic compliance and/or increased left atrial pressure  -stress test suggestive of cardiomyopathy of non ischemic nature  -candidate for advance heart failure eval and AICD, will defer management to cardiology      Right sided pleural effusion with infiltrates  No s/s of sepsis, denies cough  -repeat CXR  - infx work up, will check ESR 17, CRP elevated 0.90, HIV neg  -blood cx-ngtd, urine legionella and strep ag-pending  -empiric ceftriaxone and azithro day 3  -case disccused with Dr. Richie Parry via phone  by Dr Bety Heard  -viral panel ordered by cardiology      Tobacco dependence  Patient was counseled extensively on the need to abstain from tobacco, its addictive tendencies, its deleterious effects on the lungs as well as its financial sequelae  -nicotine patch offered     Overweight  Body mass index is 27.27 kg/(m^2). Code Status: full  Surrogate Decision Maker: sister      DVT Prophylaxis: lovenox  GI Prophylaxis: not indicated      Baseline: independent     Subjective:     Chief Complaint / Reason for Physician Visit  \"when will I be transferred? \". Discussed with RN events overnight. Review of Systems:  Symptom Y/N Comments  Symptom Y/N Comments   Fever/Chills n   Chest Pain n    Poor Appetite n   Edema n    Cough n   Abdominal Pain n    Sputum n   Joint Pain n    SOB/FLOWERS y   Pruritis/Rash n    Nausea/vomit n   Tolerating PT/OT     Diarrhea n   Tolerating Diet y    Constipation n   Other       Could NOT obtain due to:      Objective:     VITALS:   Last 24hrs VS reviewed since prior progress note.  Most recent are:  Patient Vitals for the past 24 hrs:   Temp Pulse Resp BP SpO2   07/19/18 1330 - (!) 130 - 102/74 96 %   07/19/18 1300 - (!) 127 - 103/82 98 %   07/19/18 1230 - (!) 136 22 99/81 99 %   07/19/18 1227 97.9 °F (36.6 °C) (!) 140 20 120/81 97 %   07/19/18 1200 - (!) 122 - (!) 89/73 95 %   07/19/18 1100 - (!) 126 26 98/83 96 %   07/19/18 1030 - (!) 126 29 97/76 96 %   07/19/18 1000 - (!) 130 (!) 37 93/68 96 %   07/19/18 0949 - (!) 122 24 (!) 84/60 -   07/19/18 0930 - (!) 128 28 100/74 94 %   07/19/18 0913 - (!) 136 - - -   07/19/18 0912 - (!) 141 - - -   07/19/18 0911 - (!) 135 - - -   07/19/18 0910 - (!) 134 - - -   07/19/18 0909 - (!) 132 - - -   07/19/18 0908 - (!) 135 - - -   07/19/18 0907 - (!) 137 - - -   07/19/18 0906 - (!) 135 - - -   07/19/18 0905 - (!) 139 - - -   07/19/18 0904 - (!) 134 - - -   07/19/18 0903 - (!) 129 - - -   07/19/18 0902 - (!) 138 - - -   07/19/18 0901 - (!) 127 - - -   07/19/18 0900 - (!) 124 (!) 35 107/76 96 %   07/19/18 0830 - (!) 123 29 92/78 94 %   07/19/18 0800 97.9 °F (36.6 °C) (!) 123 28 103/83 95 %   07/19/18 0630 - (!) 111 25 92/67 94 %   07/19/18 0600 - (!) 116 24 108/77 94 %   07/19/18 0533 - (!) 124 26 94/69 94 %   07/19/18 0529 - (!) 115 - 94/69 -   07/19/18 0518 - (!) 141 (!) 32 102/64 (!) 87 %   07/19/18 0500 - (!) 119 30 95/69 96 %   07/19/18 0430 - (!) 113 26 (!) 140/103 96 %   07/19/18 0415 - (!) 113 25 106/72 98 %   07/19/18 0352 97.9 °F (36.6 °C) (!) 120 24 103/78 96 %   07/19/18 0330 - (!) 115 25 95/68 95 %   07/18/18 2353 97.6 °F (36.4 °C) 96 (!) 32 98/83 99 %   07/18/18 2343 - (!) 103 (!) 43 97/63 98 %   07/18/18 2330 - (!) 107 22 92/72 98 %   07/18/18 2300 - (!) 130 (!) 32 121/82 100 %   07/18/18 2257 - (!) 133 (!) 43 121/82 94 %   07/18/18 2256 - (!) 128 (!) 57 93/68 97 %   07/18/18 2243 - (!) 126 (!) 40 93/80 98 %   07/18/18 2233 - (!) 134 (!) 37 (!) 81/68 94 %   07/18/18 2034 - (!) 110 22 114/83 97 %   07/18/18 2000 99.7 °F (37.6 °C) (!) 109 (!) 37 (!) 125/100 99 %   07/18/18 1930 - (!) 127 (!) 34 - 95 %   07/18/18 1923 - (!) 130 27 109/87 (!) 87 %   07/18/18 1921 - (!) 134 28 (!) 66/43 (!) 89 %   07/18/18 1900 - (!) 123 (!) 34 104/81 -   07/18/18 1830 - (!) 117 25 109/87 98 %   07/18/18 1815 - (!) 103 26 114/86 95 %   07/18/18 1800 - (!) 119 27 100/86 100 %   07/18/18 1730 - (!) 123 27 107/59 100 %   07/18/18 1700 - - - (!) 112/96 -   07/18/18 1630 - (!) 127 28 119/86 98 %   07/18/18 1620 98.4 °F (36.9 °C) (!) 121 19 114/63 -   07/18/18 1600 - (!) 130 (!) 40 112/85 96 %   07/18/18 1530 - (!) 128 22 99/83 95 %   07/18/18 1500 - (!) 121 (!) 32 107/81 97 %   07/18/18 1430 - (!) 127 (!) 35 (!) 110/92 99 %       Intake/Output Summary (Last 24 hours) at 07/19/18 1407  Last data filed at 07/19/18 1230   Gross per 24 hour   Intake           530.42 ml   Output              925 ml   Net          -394.58 ml        PHYSICAL EXAM:  General: WD, WN. Alert, cooperative, no acute distress    EENT:  EOMI. Anicteric sclerae. MMM  Resp:  CTA bilaterally, no wheezing or rales. No accessory muscle use  CV:  IRRegular  rhythm,  No edema  GI:  Soft, Non distended, Non tender.  +Bowel sounds  Neurologic:  Alert and oriented X 3, normal speech,   Psych:   Good insight. Not anxious nor agitated  Skin:  No rashes.   No jaundice    Reviewed most current lab test results and cultures  YES  Reviewed most current radiology test results   YES  Review and summation of old records today    NO  Reviewed patient's current orders and MAR    YES  PMH/SH reviewed - no change compared to H&P  ________________________________________________________________________  Care Plan discussed with:    Comments   Patient x    Family      RN x    Care Manager x    Consultant  x cardio                     Multidiciplinary team rounds were held today with , nursing, pharmacist and clinical coordinator. Patient's plan of care was discussed; medications were reviewed and discharge planning was addressed. ________________________________________________________________________  Total NON critical care TIME:  35   Minutes    Total CRITICAL CARE TIME Spent:   Minutes non procedure based      Comments   >50% of visit spent in counseling and coordination of care     ________________________________________________________________________  Hakeem Berkowitz MD     Procedures: see electronic medical records for all procedures/Xrays and details which were not copied into this note but were reviewed prior to creation of Plan. LABS:  I reviewed today's most current labs and imaging studies.   Pertinent labs include:  Recent Labs      07/18/18 0418 07/17/18   0930   WBC  8.8  8.9   HGB  15.0  16.0   HCT  46.7  48.4   PLT  255  301     Recent Labs      07/18/18 0418  07/17/18   0930   NA  138  135*   K  4.8  4.1   CL  106  106   CO2  24  21   GLU  101*  98   BUN  10  11   CREA  1.13  1.14   CA  8.4*  8.8   MG   --   1.9   ALB   --   2.9*   TBILI   --   1.6*   SGOT   --   33   ALT   --   35       Signed: Hakeem Berkowitz MD

## 2018-07-19 NOTE — PROGRESS NOTES
Cardiology:    I spoke with the intensivist in [facility] who willingly accepted the patient but then could not provide a bed. We conference called with the transfer center and they suggested the alternative of [facility] but I expressed a concern about the availability of the advanced heart failure consultants in [facility], further postulating that this patient may require an LVAD early in his course based on present performance. He also will need electrophysiology consultation for possible earlier than usual device therapy. The transfer center just called me back to inform that they have designated an ICU bed in [facility] that will become available within the next 2 hours. They will engage [place] [facility] for transport.     Evie Womack MD

## 2018-07-19 NOTE — PROGRESS NOTES
Cardiology:    The patient just had a burst of 6 beats of uniform ventricular tachycardia. This occurred after he received a gram of magnesium but we note that he has been refusing diltiazem. Electrolytes were normal yesterday but he has received 2 fluid boluses since that time so will be helpful to recheck potassium. There is no telemetry bed available at this time in [address] [place] and it may be appropriate to upgrade the transfer request to a critical care bed given the current circumstances.     Malika Patel MD

## 2018-07-20 LAB
ABO + RH BLD: NORMAL
ALBUMIN SERPL-MCNC: 2.6 G/DL (ref 3.5–5)
ALBUMIN/GLOB SERPL: 0.5 {RATIO} (ref 1.1–2.2)
ALP SERPL-CCNC: 147 U/L (ref 45–117)
ALT SERPL-CCNC: 26 U/L (ref 12–78)
ANION GAP SERPL CALC-SCNC: 8 MMOL/L (ref 5–15)
AST SERPL-CCNC: 21 U/L (ref 15–37)
BASOPHILS # BLD: 0 K/UL (ref 0–0.1)
BASOPHILS NFR BLD: 0 % (ref 0–1)
BILIRUB SERPL-MCNC: 1 MG/DL (ref 0.2–1)
BLOOD GROUP ANTIBODIES SERPL: NORMAL
BNP SERPL-MCNC: 2404 PG/ML (ref 0–125)
BUN SERPL-MCNC: 8 MG/DL (ref 6–20)
BUN/CREAT SERPL: 8 (ref 12–20)
CALCIUM SERPL-MCNC: 8.5 MG/DL (ref 8.5–10.1)
CHLORIDE SERPL-SCNC: 112 MMOL/L (ref 97–108)
CO2 SERPL-SCNC: 22 MMOL/L (ref 21–32)
COXSACKIE A7 IGM, 163291: NEGATIVE TITER
CREAT SERPL-MCNC: 1.03 MG/DL (ref 0.7–1.3)
CV A16 IGG TITR SER IF: NORMAL TITER
CV A16 IGM TITR SER IF: NEGATIVE TITER
CV A24 IGG TITR SER IF: NORMAL TITER
CV A24 IGM TITR SER IF: NEGATIVE TITER
CV A7 IGG TITR SER IF: NORMAL TITER
CV A9 IGG TITR SER IF: NORMAL TITER
CV A9 IGM TITR SER IF: NEGATIVE TITER
CV B1 AB TITR SER CF: ABNORMAL {TITER}
CV B2 AB TITR SER CF: ABNORMAL {TITER}
CV B3 AB TITR SER CF: ABNORMAL {TITER}
CV B4 AB TITR SER CF: ABNORMAL {TITER}
CV B5 AB TITR SER CF: ABNORMAL {TITER}
CV B6 AB TITR SER CF: ABNORMAL {TITER}
DIFFERENTIAL METHOD BLD: ABNORMAL
EOSINOPHIL # BLD: 0.2 K/UL (ref 0–0.4)
EOSINOPHIL NFR BLD: 2 % (ref 0–7)
ERYTHROCYTE [DISTWIDTH] IN BLOOD BY AUTOMATED COUNT: 15.7 % (ref 11.5–14.5)
FERRITIN SERPL-MCNC: 145 NG/ML (ref 26–388)
GLOBULIN SER CALC-MCNC: 4.9 G/DL (ref 2–4)
GLUCOSE SERPL-MCNC: 77 MG/DL (ref 65–100)
HCT VFR BLD AUTO: 48.3 % (ref 36.6–50.3)
HGB BLD-MCNC: 15.5 G/DL (ref 12.1–17)
IMM GRANULOCYTES # BLD: 0 K/UL (ref 0–0.04)
IMM GRANULOCYTES NFR BLD AUTO: 0 % (ref 0–0.5)
INR PPP: 1.4 (ref 0.9–1.1)
IRON SATN MFR SERPL: 15 % (ref 20–50)
IRON SERPL-MCNC: 43 UG/DL (ref 35–150)
L PNEUMO1 AG UR QL IA: NEGATIVE
LYMPHOCYTES # BLD: 5.1 K/UL (ref 0.8–3.5)
LYMPHOCYTES NFR BLD: 47 % (ref 12–49)
MAGNESIUM SERPL-MCNC: 1.8 MG/DL (ref 1.6–2.4)
MCH RBC QN AUTO: 31.1 PG (ref 26–34)
MCHC RBC AUTO-ENTMCNC: 32.1 G/DL (ref 30–36.5)
MCV RBC AUTO: 96.8 FL (ref 80–99)
MONOCYTES # BLD: 0.7 K/UL (ref 0–1)
MONOCYTES NFR BLD: 6 % (ref 5–13)
NEUTS SEG # BLD: 4.9 K/UL (ref 1.8–8)
NEUTS SEG NFR BLD: 45 % (ref 32–75)
NRBC # BLD: 0 K/UL (ref 0–0.01)
NRBC BLD-RTO: 0 PER 100 WBC
PLATELET # BLD AUTO: 285 K/UL (ref 150–400)
PMV BLD AUTO: 10.5 FL (ref 8.9–12.9)
POTASSIUM SERPL-SCNC: 3.8 MMOL/L (ref 3.5–5.1)
PROT SERPL-MCNC: 7.5 G/DL (ref 6.4–8.2)
PROTHROMBIN TIME: 14.5 SEC (ref 9–11.1)
RBC # BLD AUTO: 4.99 M/UL (ref 4.1–5.7)
SODIUM SERPL-SCNC: 142 MMOL/L (ref 136–145)
SPECIMEN EXP DATE BLD: NORMAL
SPECIMEN SOURCE: NORMAL
T3FREE SERPL-MCNC: 1.2 PG/ML (ref 2.2–4)
T4 FREE SERPL-MCNC: 1.4 NG/DL (ref 0.8–1.5)
TIBC SERPL-MCNC: 284 UG/DL (ref 250–450)
WBC # BLD AUTO: 10.8 K/UL (ref 4.1–11.1)

## 2018-07-20 PROCEDURE — 87798 DETECT AGENT NOS DNA AMP: CPT | Performed by: NURSE PRACTITIONER

## 2018-07-20 PROCEDURE — 5A2204Z RESTORATION OF CARDIAC RHYTHM, SINGLE: ICD-10-PCS | Performed by: INTERNAL MEDICINE

## 2018-07-20 PROCEDURE — 99153 MOD SED SAME PHYS/QHP EA: CPT

## 2018-07-20 PROCEDURE — 83880 ASSAY OF NATRIURETIC PEPTIDE: CPT | Performed by: NURSE PRACTITIONER

## 2018-07-20 PROCEDURE — 84439 ASSAY OF FREE THYROXINE: CPT | Performed by: NURSE PRACTITIONER

## 2018-07-20 PROCEDURE — 83735 ASSAY OF MAGNESIUM: CPT | Performed by: FAMILY MEDICINE

## 2018-07-20 PROCEDURE — 99223 1ST HOSP IP/OBS HIGH 75: CPT | Performed by: INTERNAL MEDICINE

## 2018-07-20 PROCEDURE — 83540 ASSAY OF IRON: CPT | Performed by: NURSE PRACTITIONER

## 2018-07-20 PROCEDURE — 65610000003 HC RM ICU SURGICAL

## 2018-07-20 PROCEDURE — 85610 PROTHROMBIN TIME: CPT | Performed by: FAMILY MEDICINE

## 2018-07-20 PROCEDURE — 77030039046 HC PAD DEFIB RADIOTRNSPNT CNMD -B

## 2018-07-20 PROCEDURE — 74011250636 HC RX REV CODE- 250/636: Performed by: NURSE PRACTITIONER

## 2018-07-20 PROCEDURE — 74011250636 HC RX REV CODE- 250/636: Performed by: FAMILY MEDICINE

## 2018-07-20 PROCEDURE — 74011000250 HC RX REV CODE- 250: Performed by: INTERNAL MEDICINE

## 2018-07-20 PROCEDURE — 74011250637 HC RX REV CODE- 250/637: Performed by: NURSE PRACTITIONER

## 2018-07-20 PROCEDURE — 74011250636 HC RX REV CODE- 250/636: Performed by: INTERNAL MEDICINE

## 2018-07-20 PROCEDURE — B246ZZ4 ULTRASONOGRAPHY OF RIGHT AND LEFT HEART, TRANSESOPHAGEAL: ICD-10-PCS | Performed by: INTERNAL MEDICINE

## 2018-07-20 PROCEDURE — 86644 CMV ANTIBODY: CPT | Performed by: NURSE PRACTITIONER

## 2018-07-20 PROCEDURE — 93312 ECHO TRANSESOPHAGEAL: CPT

## 2018-07-20 PROCEDURE — 86900 BLOOD TYPING SEROLOGIC ABO: CPT | Performed by: FAMILY MEDICINE

## 2018-07-20 PROCEDURE — 74011000258 HC RX REV CODE- 258: Performed by: FAMILY MEDICINE

## 2018-07-20 PROCEDURE — 85025 COMPLETE CBC W/AUTO DIFF WBC: CPT | Performed by: FAMILY MEDICINE

## 2018-07-20 PROCEDURE — 84481 FREE ASSAY (FT-3): CPT | Performed by: NURSE PRACTITIONER

## 2018-07-20 PROCEDURE — 74011250637 HC RX REV CODE- 250/637: Performed by: INTERNAL MEDICINE

## 2018-07-20 PROCEDURE — 36415 COLL VENOUS BLD VENIPUNCTURE: CPT | Performed by: FAMILY MEDICINE

## 2018-07-20 PROCEDURE — 82728 ASSAY OF FERRITIN: CPT | Performed by: NURSE PRACTITIONER

## 2018-07-20 PROCEDURE — 80053 COMPREHEN METABOLIC PANEL: CPT | Performed by: FAMILY MEDICINE

## 2018-07-20 PROCEDURE — 93312 ECHO TRANSESOPHAGEAL: CPT | Performed by: INTERNAL MEDICINE

## 2018-07-20 RX ORDER — AMIODARONE HYDROCHLORIDE 200 MG/1
400 TABLET ORAL 2 TIMES DAILY
Status: DISCONTINUED | OUTPATIENT
Start: 2018-07-20 | End: 2018-07-26 | Stop reason: HOSPADM

## 2018-07-20 RX ORDER — LANOLIN ALCOHOL/MO/W.PET/CERES
400 CREAM (GRAM) TOPICAL DAILY
Status: DISCONTINUED | OUTPATIENT
Start: 2018-07-21 | End: 2018-07-26 | Stop reason: HOSPADM

## 2018-07-20 RX ORDER — MIDAZOLAM HYDROCHLORIDE 1 MG/ML
1-10 INJECTION, SOLUTION INTRAMUSCULAR; INTRAVENOUS
Status: DISCONTINUED | OUTPATIENT
Start: 2018-07-20 | End: 2018-07-20 | Stop reason: HOSPADM

## 2018-07-20 RX ORDER — MAGNESIUM SULFATE HEPTAHYDRATE 40 MG/ML
2 INJECTION, SOLUTION INTRAVENOUS ONCE
Status: COMPLETED | OUTPATIENT
Start: 2018-07-20 | End: 2018-07-20

## 2018-07-20 RX ORDER — LISINOPRIL 5 MG/1
5 TABLET ORAL DAILY
Status: DISCONTINUED | OUTPATIENT
Start: 2018-07-21 | End: 2018-07-20

## 2018-07-20 RX ORDER — ENOXAPARIN SODIUM 100 MG/ML
100 INJECTION SUBCUTANEOUS EVERY 12 HOURS
Status: DISCONTINUED | OUTPATIENT
Start: 2018-07-20 | End: 2018-07-21

## 2018-07-20 RX ORDER — FENTANYL CITRATE 50 UG/ML
25-200 INJECTION, SOLUTION INTRAMUSCULAR; INTRAVENOUS
Status: DISCONTINUED | OUTPATIENT
Start: 2018-07-20 | End: 2018-07-20 | Stop reason: HOSPADM

## 2018-07-20 RX ORDER — CARVEDILOL 3.12 MG/1
3.12 TABLET ORAL 2 TIMES DAILY WITH MEALS
Status: DISCONTINUED | OUTPATIENT
Start: 2018-07-20 | End: 2018-07-26 | Stop reason: HOSPADM

## 2018-07-20 RX ORDER — POTASSIUM CHLORIDE 750 MG/1
20 TABLET, FILM COATED, EXTENDED RELEASE ORAL DAILY
Status: DISCONTINUED | OUTPATIENT
Start: 2018-07-20 | End: 2018-07-26 | Stop reason: HOSPADM

## 2018-07-20 RX ORDER — MAGNESIUM SULFATE HEPTAHYDRATE 40 MG/ML
2 INJECTION, SOLUTION INTRAVENOUS ONCE
Status: DISCONTINUED | OUTPATIENT
Start: 2018-07-20 | End: 2018-07-20

## 2018-07-20 RX ADMIN — ENOXAPARIN SODIUM 100 MG: 100 INJECTION SUBCUTANEOUS at 14:00

## 2018-07-20 RX ADMIN — POTASSIUM CHLORIDE 20 MEQ: 750 TABLET, EXTENDED RELEASE ORAL at 14:17

## 2018-07-20 RX ADMIN — CARVEDILOL 3.12 MG: 3.12 TABLET, FILM COATED ORAL at 17:29

## 2018-07-20 RX ADMIN — MIDAZOLAM HYDROCHLORIDE 2 MG: 1 INJECTION, SOLUTION INTRAMUSCULAR; INTRAVENOUS at 12:00

## 2018-07-20 RX ADMIN — Medication 10 ML: at 05:34

## 2018-07-20 RX ADMIN — BENZOCAINE 2 SPRAY: 200 SPRAY DENTAL; ORAL; PERIODONTAL at 11:53

## 2018-07-20 RX ADMIN — FENTANYL CITRATE 50 MCG: 50 INJECTION, SOLUTION INTRAMUSCULAR; INTRAVENOUS at 11:57

## 2018-07-20 RX ADMIN — Medication 10 ML: at 22:35

## 2018-07-20 RX ADMIN — MIDAZOLAM HYDROCHLORIDE 3 MG: 1 INJECTION, SOLUTION INTRAMUSCULAR; INTRAVENOUS at 12:07

## 2018-07-20 RX ADMIN — MIDAZOLAM HYDROCHLORIDE 3 MG: 1 INJECTION, SOLUTION INTRAMUSCULAR; INTRAVENOUS at 11:56

## 2018-07-20 RX ADMIN — Medication 10 ML: at 21:47

## 2018-07-20 RX ADMIN — MIDAZOLAM HYDROCHLORIDE 2 MG: 1 INJECTION, SOLUTION INTRAMUSCULAR; INTRAVENOUS at 12:22

## 2018-07-20 RX ADMIN — FENTANYL CITRATE 50 MCG: 50 INJECTION, SOLUTION INTRAMUSCULAR; INTRAVENOUS at 12:01

## 2018-07-20 RX ADMIN — AZITHROMYCIN MONOHYDRATE 500 MG: 500 INJECTION, POWDER, LYOPHILIZED, FOR SOLUTION INTRAVENOUS at 22:33

## 2018-07-20 RX ADMIN — MAGNESIUM SULFATE HEPTAHYDRATE 2 G: 40 INJECTION, SOLUTION INTRAVENOUS at 11:38

## 2018-07-20 RX ADMIN — FENTANYL CITRATE 50 MCG: 50 INJECTION, SOLUTION INTRAMUSCULAR; INTRAVENOUS at 12:17

## 2018-07-20 RX ADMIN — AMIODARONE HYDROCHLORIDE 400 MG: 200 TABLET ORAL at 13:59

## 2018-07-20 RX ADMIN — MIDAZOLAM HYDROCHLORIDE 2 MG: 1 INJECTION, SOLUTION INTRAMUSCULAR; INTRAVENOUS at 12:05

## 2018-07-20 RX ADMIN — CEFTRIAXONE SODIUM 2 G: 2 INJECTION, POWDER, FOR SOLUTION INTRAMUSCULAR; INTRAVENOUS at 19:16

## 2018-07-20 RX ADMIN — AMIODARONE HYDROCHLORIDE 400 MG: 200 TABLET ORAL at 21:46

## 2018-07-20 RX ADMIN — Medication 10 ML: at 14:30

## 2018-07-20 NOTE — PROGRESS NOTES
Hospitalist Progress Note  Yoly Stacy MD  Office: [phone]  Cell:       Date of Service:  2018  NAME:  Tamela Cruz  :  1980  MRN:  [id number]      Admission Summary:   43 y.o.  male with past medical history of remote perforated viscus, sepsis, and bacteremia, s/p exploratory laparotomy, repair of ruptured viscus presented as direct admission/ transfer from [facility]) to [facility] CVICU with newly diagnosed rapid atrial fibrillation (afib), acute systolic CHF, right sided pleural effusion with infiltrates. Interval history / Subjective:     Patient does not voice any concern. Assessment & Plan:     1. S/p Atrial Fibrillation. Patient just has been cardioverted. On Amiodarone PO. Can be transfer out of unit  2. S/p Cardioversion  3. Acute Systolic CHF EF. 68-30%. Already in coreg. Continue current management  4. Tabacco abuse. Need to stop  5. Dilated Cardiomyopathy. Multifactorial in this patient. Alcohol, viral.        Code status: full  DVT prophylaxis: heparin    Care Plan discussed with: Patient/Family and Nurse  Disposition: TBD     Hospital Problems  Date Reviewed: 2018          Codes Class Noted POA    Acute systolic heart failure (HCC) ICD-10-CM: I50.21  ICD-9-CM: 428.21  2018 Unknown        Pleural effusion, right ICD-10-CM: J90  ICD-9-CM: 511.9  2018 Unknown        * (Principal)Atrial fibrillation with RVR (HCC) ICD-10-CM: I48.91  ICD-9-CM: 427.31  2018 Unknown                Review of Systems:   Pertinent items are noted in HPI. Vital Signs:    Last 24hrs VS reviewed since prior progress note.  Most recent are:  Visit Vitals    /68 (BP 1 Location: Right arm, BP Patient Position: At rest)    Pulse 99    Temp 98.3 °F (36.8 °C)    Resp 21    Wt 103.5 kg (228 lb 2.8 oz)    SpO2 96%    BMI 28.52 kg/m2         Intake/Output Summary (Last 24 hours) at 07/20/18 1422  Last data filed at 07/20/18 1334   Gross per 24 hour   Intake            302.5 ml   Output             2800 ml   Net          -2497.5 ml        Physical Examination:             Constitutional:  No acute distress, cooperative, pleasant    ENT:  Oral mucous moist, oropharynx benign. Neck supple,    Resp:  CTA bilaterally. No wheezing/rhonchi/rales. No accessory muscle use   CV:  Regular rhythm, normal rate, no murmurs, gallops, rubs    GI:  Soft, non distended, non tender. normoactive bowel sounds, no hepatosplenomegaly     Musculoskeletal:  No edema, warm, 2+ pulses throughout    Neurologic:  Moves all extremities. AAOx3, CN II-XII reviewed            Data Review:    Review and/or order of clinical lab test      Labs:     Recent Labs      07/20/18 0239 07/18/18   0418   WBC  10.8  8.8   HGB  15.5  15.0   HCT  48.3  46.7   PLT  285  255     Recent Labs      07/20/18   0239  07/19/18   1811  07/18/18   0418   NA  142  138  138   K  3.8  3.8  4.8   CL  112*  106  106   CO2  22  21  24   BUN  8   --   10   CREA  1.03   --   1.13   GLU  77   --   101*   CA  8.5   --   8.4*   MG  1.8  1.8   --      Recent Labs      07/20/18 0239   SGOT  21   ALT  26   AP  147*   TBILI  1.0   TP  7.5   ALB  2.6*   GLOB  4.9*     Recent Labs      07/20/18 0239   INR  1.4*   PTP  14.5*      No results for input(s): FE, TIBC, PSAT, FERR in the last 72 hours. No results found for: FOL, RBCF   No results for input(s): PH, PCO2, PO2 in the last 72 hours. No results for input(s): CPK, CKNDX, TROIQ in the last 72 hours.     No lab exists for component: CPKMB  No results found for: CHOL, CHOLX, CHLST, CHOLV, HDL, LDL, LDLC, DLDLP, TGLX, TRIGL, TRIGP, CHHD, CHHDX  No results found for: [facility]  Lab Results   Component Value Date/Time    Color YELLOW/STRAW 07/17/2018 02:14 PM    Appearance CLEAR 07/17/2018 02:14 PM    Specific gravity 1.005 07/17/2018 02:14 PM    Specific gravity 1.010 07/08/2018 05:56 PM    pH (UA) 6.0 07/17/2018 02:14 PM    Protein NEGATIVE  07/17/2018 02:14 PM    Glucose NEGATIVE  07/17/2018 02:14 PM    Ketone NEGATIVE  07/17/2018 02:14 PM    Bilirubin NEGATIVE  07/17/2018 02:14 PM    Urobilinogen 0.2 07/17/2018 02:14 PM    Nitrites NEGATIVE  07/17/2018 02:14 PM    Leukocyte Esterase NEGATIVE  07/17/2018 02:14 PM    Epithelial cells FEW 07/17/2018 02:14 PM    Bacteria NEGATIVE  07/17/2018 02:14 PM    WBC 0-4 07/17/2018 02:14 PM    RBC 0-5 07/17/2018 02:14 PM         Medications Reviewed:     Current Facility-Administered Medications   Medication Dose Route Frequency    [START ON 7/21/2018] magnesium oxide (MAG-OX) tablet 400 mg  400 mg Oral DAILY    potassium chloride SR (KLOR-CON 10) tablet 20 mEq  20 mEq Oral DAILY    butamben-tetracaine-benzocaine (CETACAINE) 2 %-2 %-14 % (200 mg/sec) topical spray 1 Spray  1 Spray Topical QID PRN    benzocaine (HURRICANE) 20 % spray   Mucous Membrane PRN    enoxaparin (LOVENOX) injection 100 mg  100 mg SubCUTAneous Q12H    carvedilol (COREG) tablet 3.125 mg  3.125 mg Oral BID WITH MEALS    amiodarone (CORDARONE) tablet 400 mg  400 mg Oral BID    sodium chloride (NS) flush 5-10 mL  5-10 mL IntraVENous Q8H    sodium chloride (NS) flush 5-10 mL  5-10 mL IntraVENous PRN    azithromycin (ZITHROMAX) 500 mg in 0.9% sodium chloride (MBP/ADV) 250 mL  500 mg IntraVENous Q24H    cefTRIAXone (ROCEPHIN) 2 g in 0.9% sodium chloride (MBP/ADV) 50 mL  2 g IntraVENous Q24H     ______________________________________________________________________  EXPECTED LENGTH OF STAY: 3d 12h  ACTUAL LENGTH OF STAY:          1                 Trey Boswell MD

## 2018-07-20 NOTE — PROGRESS NOTES
Cardiac Cath Lab Procedure Area Arrival Note:    Rizwan Fung arrived to Cardiac Cath Lab, Procedure Area. Patient identifiers verified with NAME and DATE OF BIRTH. Procedure verified with patient. Consent forms verified. Allergies verified. Patient informed of procedure and plan of care. Questions answered with review. Patient voiced understanding of procedure and plan of care. Patient on cardiac monitor, non-invasive blood pressure, SPO2 monitor. On  or O2 @ 2 lpm via NC.  IV of Mag/sulfate on pump at 50 ml/hr. Patient status doing well without problems. Patient is A&Ox 4. Patient reports no pain. Patient medicated during procedure with orders obtained and verified by Dr. Indira Morrison. Refer to patients Cardiac Cath Lab PROCEDURE REPORT for vital signs, assessment, status, and response during procedure, printed at end of case. Printed report on chart or scanned into chart.

## 2018-07-20 NOTE — PROGRESS NOTES
8894: Bedside and Verbal shift change report given to [facility], RN by Brianna Burgos RN. Report included the following information SBAR, Kardex, Intake/Output, MAR, Recent Results and Cardiac Rhythm Afib. Plan for cardioversion and ROBERTO today w/ Dr. Tho Vega. 1020: Quique Lane NP on unit, updated on frequent PVCs and recent Mg and K labs. Orders received for IV Mg, PO K, and start PO MgOx tomorrow. 1100: Sister updated on plan and procedure. 1122: Patient down to cath lab for scheduled cardioversion and ROBERTO.    1242: Patient back up from cath lab. Patient now in NSR.    1333: Plan for cardiac MRI at [facility] on 7/24.    1945: Bedside and Verbal shift change report given to Bishop Benton, PAPO by [facility], RN. Report included the following information SBAR, Kardex, Procedure Summary, Intake/Output, MAR, Recent Results and Cardiac Rhythm NSR.

## 2018-07-20 NOTE — PROGRESS NOTES
Spiritual Care Assessment/Progress Note  [facility]      NAME: Ginette Reed      MRN: [id number]  AGE: 43 y.o.  SEX: male  Gnosticist Affiliation: No Jewish   Language: English     7/20/2018           Spiritual Assessment begun in [facility] [facility] INTNS[facility] CARE through conversation with:         [x]Patient        [] Family    [] Friend(s)        Reason for Consult: Initial/Spiritual assessment, critical care     Spiritual beliefs: (Please include comment if needed)     [] Identifies with a vishnu tradition:         [] Supported by a vishnu community:            [] Claims no spiritual orientation:           [] Seeking spiritual identity:                [x] Adheres to an individual form of spirituality:           [] Not able to assess:                           Identified resources for coping:      [x] Prayer                               [] Music                  [] Guided Imagery     [x] Family/friends                 [] Pet visits     [] Devotional reading                         [] Unknown     [] Other:                                               Interventions offered during this visit: (See comments for more details)    Patient Interventions: Affirmation of emotions/emotional suffering, Catharsis/review of pertinent events in supportive environment, Iconic (affirming the presence of God/Higher Power), Initial/Spiritual assessment, Critical care, Normalization of emotional/spiritual concerns, Prayer (actual), Prayer (assurance of)           Plan of Care:     [x] Support spiritual and/or cultural needs    [] Support AMD and/or advance care planning process      [] Support grieving process   [] Coordinate Rites and/or Rituals    [] Coordination with community clergy   [] No spiritual needs identified at this time   [] Detailed Plan of Care below (See Comments)  [] Make referral to Music Therapy  [] Make referral to Pet Therapy     [] Make referral to Addiction services  [] Make referral to [facility]  [] Make referral to Spiritual Care Partner  [] No future visits requested        [x] Follow up visits as needed     Comments: Visited Mr Sebas Carter in [facility] for initial spiritual assessment. Mr Sebas Carter was lying quietly in bed, eating lunch; he appeared in good spirits. Provided active listening as he shared that he was feeling better because he had received good news this morning; stated that he had had a procedure and that it had gone really well. He shared that he had been very frightened prior to the procedure but that it had gone fine. Mr Sebas Carter stated that he was not a member of any particular Buddhist but often attended Buddhist with his sister; stated that he was [place] and very spiritual. He shared that he appreciated having prayers on his behalf; had prayer for patient and family as requested. Reassured him of continued prayers on his behalf and ongoing  availability for support. : Rev. Richie Reyez.  Avtar Flores; [facility], to contact [id number] call: [phone]

## 2018-07-20 NOTE — PROGRESS NOTES
CM reviewed chart from [facility] CM staff - met with patient and verified address and phone numbers on file - he will need assistance with PCP with free clinic upon discharge and CM will send referral to CM specialist - patient is agreeable to an [facility] visit for CHF - will need order from MD for [facility] for CHF if agreeable - noted a referral has been made for patient to apply for food stamps - patient may need assistance with medications upon discharge depending upon amount and cost- he is agreeable to applying for disability and [employer] made aware. Patient usually works part time for [employer] and patient states he may need to consider applying for disability as it will be a while before he will be able to return to work. CM will continue to follow for disposition needs at this time. MYRNA Ash      Reason for Admission:   Rod cardioversion transfer from [facility]                   RRAT Score:          8           Plan for utilizing home health:       Would benefit from [facility] visit                    Likelihood of Readmission:  moderate                         Transition of Care Plan:           Home with [facility] visit, cardiology follow-up and free clinic follow-up for establishment of PCP

## 2018-07-20 NOTE — H&P
History & Physical    Date of admission: 7/19/2018    Patient name: Brandon López  MRN: [id number]  YOB: 1980  Age: 43 y.o. Primary care provider:  None     Source of Information: patient and electronic medical records                                Chief complaint:  Patient does not provide    History of present illness  Brandon López is a 43 y.o.  male with past medical history of remote perforated viscus, sepsis, and bacteremia, s/p exploratory laparotomy, repair of ruptured viscus presented as direct admission/ transfer from [facility] to [address] [place] [address] CVICU with newly diagnosed rapid atrial fibrillation (afib), acute systolic CHF, right sided pleural effusion with infiltrates. Patient initially presented to [facility] ED on 7/17/2018 with chief complaint of shortness of breath (SOB). Symptom onset reportedly began ~ 3 weeks ago, remained progressive, worsened, aggravated with exertion and then present at rest, moderate severity, without specific alleviating factors, except for rest.  Workup included a 2d echocardiogram showing severely reduced EF ~ 10% -20%. Nuclear medicine cardiac stress test was performed. 12 lead EKG showed Afib with RVR. Patient has since been started on Diltiazem titrated IV infusion. CTA chest showed right pleural effusion and infiltrates. Patient was started empirically on Ceftriaxone and Azithromycin. Patient was considered as a possible candidate for advanced heart failure evaluation and possible AICD. Patient was accepted for transfer to [facility] ICU.   On current evaluation, patient has no active complaints of syncope, loss of consciousness, headache, neck pain, visual disturbance, numbness, paresthesias, focal weakness, chest pain, SOB, palpitations, abdominal pain, nausea, vomiting, diarrhea, melena, dysuria, hematuria, calf pain, increased leg swelling/ edema, fever, chills, or rash. PAST MEDICAL HISTORY:    remote perforated viscus, sepsis, and bacteremia-  Hospitalized at [address] [facility]:   s/p exploratory laparotomy, repair of ruptured viscus    HOME MEDICATIONS:  NONE    MEDICATIONS (ON TRANSFER FROM [facility]):  Prior to Admission medications    Medication Sig Start Date End Date Taking? Authorizing Provider   azithromycin 500 mg 500 mg, ADDaptor 1 Device IVPB 500 mg by IntraVENous route every twenty-four (24) hours. 7/18/18   Marychuy Willoughby MD   carvedilol (COREG) 6.25 mg tablet Take 1 Tab by mouth two (2) times a day. 7/18/18   Marychuy Willoughby MD   cefTRIAXone 2 gram 2 g, ADDaptor 1 Device IVPB 2 g by IntraVENous route every twenty-four (24) hours. 7/18/18   Marychuy Willoughby MD   dilTIAZem (CARDIZEM) infusion 0-15 mg/hr by IntraVENous route TITRATE. 7/18/18   Marychuy Willuoghby MD     ALLERGIES:  NO KNOWN DRUG ALLERGIES     Social history  Patient resides  X  Independently                Ambulates  X  Independently                 Alcohol history   x  None           Smoking history          x  Current smoker     History   Smoking Status    Current Every Day Smoker    Packs/day: 0.50   Smokeless Tobacco    Not on file     Illegal drugs:    Denies  Sexual partner:   Male        Code status  x  Full code       Review of systems  Pertinent positives as noted in HPI. All other systems were reviewed and were negative    Physical Examination   Visit Vitals    Pulse 140    Temp 98.6 °F (37 °C)    Resp 20         Blood pressure           112/80        Oxygen saturation       97% on room           General:  Patient in no acute respiratory distress   Head:  Normocephalic, without obvious abnormality, atraumatic   Eyes:  Conjunctivae/corneas clear. PERRL, EOMs intact   E/N/M/T: Nares normal. Septum midline.  No nasal drainage or sinus tenderness  Tongue midline/non-edematous  Clear oropharynx   Neck: Normal appearance and movements, symmetrical, trachea midline  No palpable adenopathy  No thyroid enlargement, tenderness or nodules  No carotid bruit   No JVD   Lungs:   Symmetrical chest expansion and respiratory effort  Decreased breath sounds on right > left  clear to auscultation on left with fine basilar rales on right   Chest wall:  No tenderness or deformity   Heart:  Tachycardic  Irregular rhythm  Sounds normal; no murmur, click, rub or gallop   Abdomen:   Soft, mild tenderness on palpation of old healed midline surgical scar  Otherwise no tenderness on palpation  No rebound, guarding, or rigidity  Non-distended  Bowel sounds normal  No masses or hepatosplenomegaly  No hernias present   Back: No CVA tenderness   Extremities: Extremities normal, atraumatic  No cyanosis   Trace bilateral leg non-pitting edema     Vascular/  Pulses: 2+ radial/ 1+ DP bilateral pulses   Skin: No rashes or ulcers  Hard callous on plantar surface of both feet  Warm/ dry   Musculo-      skeletal: Gait not tested  Normal symmetry, ROM, strength and tone  No calf tenderness   Neuro: GCS 15. Moves all extremities x 4. No slurred speech. No facial droop. Sensation grossly intact.      Psych: Alert, oriented x 3  Appears slightly anxious but he is very cooperative           Data Review    24 Hour Results:  Recent Results (from the past 24 hour(s))   ELECTROLYTES    Collection Time: 07/19/18  6:11 PM   Result Value Ref Range    Sodium 138 136 - 145 mmol/L    Potassium 3.8 3.5 - 5.1 mmol/L    Chloride 106 97 - 108 mmol/L    CO2 21 21 - 32 mmol/L    Anion gap 11 5 - 15 mmol/L   MAGNESIUM    Collection Time: 07/19/18  6:11 PM   Result Value Ref Range    Magnesium 1.8 1.6 - 2.4 mg/dL     Recent Labs      07/18/18   0418  07/17/18   0930   WBC  8.8  8.9   HGB  15.0  16.0   HCT  46.7  48.4   PLT  255  301     Recent Labs      07/19/18   1811  07/18/18   0418  07/17/18   0930   NA  138  138  135*   K  3.8  4.8  4.1   CL  106  106  106   CO2  21  24  21   GLU --   101*  98   BUN   --   10  11   CREA   --   1.13  1.14   CA   --   8.4*  8.8   MG  1.8   --   1.9   ALB   --    --   2.9*   TBILI   --    --   1.6*   SGOT   --    --   33   ALT   --    --   35       Imaging  CTA CHEST W OR W WO CONT                           7/17/2018     INDICATION:   Irregular heartbeat, pleural effusion seen on chest radiograph     COMPARISON: Chest radiograph performed earlier the same day.     TECHNIQUE:   Precontrast  images were obtained to localize the volume for acquisition. Multislice helical CT arteriography was performed from the diaphragm to the  thoracic inlet during uneventful rapid bolus of 100 cc Isovue-370. Lung and soft  tissue windows were generated. Coronal and sagittal images were generated and  3D post processing consisting of coronal maximum intensity images was performed. CT dose reduction was achieved through use of a standardized protocol tailored  for this examination and automatic exposure control for dose modulation.       FINDINGS:  CHEST:  THYROID: No nodule. MEDIASTINUM: No mass or lymphadenopathy. GRAYSON: No mass or lymphadenopathy. THORACIC AORTA: No aneurysm. Not well opacified. MAIN PULMONARY ARTERY: There is no evidence of pulmonary embolism. TRACHEA/BRONCHI: Patent. ESOPHAGUS: No wall thickening or dilatation. HEART: Dilatation of the left atrium and left ventricle is noted. PLEURA: There is a moderate right pleural effusion  LUNGS: Right middle and right lower lobe infiltrate is noted. Atelectasis is  seen in the left lower lobe. INCIDENTALLY IMAGED UPPER ABDOMEN: No focal abnormality. BONES: No destructive bone lesion.        IMPRESSION: Moderate right pleural effusion with right middle and right lower  lobe infiltrates. Left lower lobe atelectasis. No evidence of pulmonary  embolism.               Assessment and Plan   1. Atrial fibrillation with RVR. Admitted as direct transfer from [facility] to [facility] CVICU.     Diltiazem was off during transport. Restart Diltiazem titrated IV infusion stat. Continue telemetry monitoring. Consult cardiologist/ EP on call. 2.   Acute systolic heart failure. Place on strict Is/Os/ and daily weights. Consult with Advanced heart failure service. Consider for possible LVAD. 3.  Moderate right pleural effusion with right middle and right lower lobe infiltrates. Left lower lobe atelectasis. Ordered repeat chest xray portable to reassess. Will continue empiric IV antibiotics for now. 4.   Bilateral leg edema. Mild. Plan as above. Keep legs elevated at rest.    5.   Tobacco abuse. Encourage smoking cessation. Order Nicotine patch. 6.   Mild abdominal pain- chronic overlying previous abdominal surgical incision. No acute GI exam findings. 7. VTE prophylaxis. On Lovenox.          Signed by: Ana Elaine MD    July 19, 2018 at 9:59 PM

## 2018-07-20 NOTE — CONSULTS
I have seen patient this am  Plan ROBERTO and cardioversion  I have talked to Ramana Jacobsen his nurse

## 2018-07-20 NOTE — PROCEDURES
Cardiac Procedure Note   Patient: Ginette Reed  MRN: [id number]  SSN: [id number]   YOB: 1980 Age: 43 y.o.   Sex: male    Date of Procedure: 7/20/2018   Pre-procedure Diagnosis: AFIB RVR, CHF  Post-procedure Diagnosis: same  Procedure: ROBERTO and Cardioversion  :  Dr. Margarita Foster MD    Assistant(s):  None  Anesthesia: Moderate Sedation   Estimated Blood Loss: none  Specimens Removed: None  Findings: no CELINA thrombus  LVEF 15%  MR moderate TR mild  ANNAMARIE  200 J sync to NSR    Amiodarone and lovenox for now  (azithromycin so no tikosyn  May need cath so use lovenox for now)  Stop cardizem  Start coreg and lisinopril first but can change later (not sure of insurance med coverage ability)  Complications: None   Implants:  None  Signed by:  Margarita Foster MD  7/20/2018  12:40 PM

## 2018-07-20 NOTE — PROGRESS NOTES
[facility] - Telephone report on pt received from Genesis Sorto, [address] from [facility]  2034 - TRANSFER - IN REPORT:    Telephone report received from Lara(name) on [place] Del[facility]  being received from [facility]) for urgent transfer      Report consisted of patients Situation, Background, Assessment and   Recommendations(SBAR). Information from the following report(s) SBAR, Kardex, Intake/Output, MAR, Recent Results, Cardiac Rhythm A-fib and Alarm Parameters  was reviewed with the receiving nurse. Opportunity for questions and clarification was provided. Assessment completed upon patients arrival to unit and care assumed. 2121 - Dr. Tinoco Held at bedside to assess pt. Updated on pt condition. Opportunity for questions and concerns provided. New orders received. Radiology at bedside to perform CXR. 2141 - Dr. Luis Hurtado informed pt has arrived to [facility] CVICU. 2145 - Dr. Usama Bearden informed pt has arrived to [facility] CVICU.  2152 - Cardizem gtt started at 2.5mg/hr. Pt Afib 130s-140s. Will continue to monitor. 0700 - Dr. Luis Hurtado at bedside. Updated on pt condition. Spoke with pt at length in regards to pt new onset a-fib. New orders received. MD states he will be performing ROBERTO and cardioversion later today. Pt aware and agrees with plan. Pt signed consent for cardioversion and ROBERTO.  0730 - Bedside and Verbal shift change report given to Javier Amaro RN (oncoming nurse) by Ruth Norris RN (offgoing nurse). Report included the following information SBAR, Kardex, Intake/Output, MAR, Recent Results, Cardiac Rhythm A-fib and Alarm Parameters .

## 2018-07-20 NOTE — PROGRESS NOTES
Problem: Falls - Risk of  Goal: *Absence of Falls  Document Ayaan Fall Risk and appropriate interventions in the flowsheet.    Outcome: Progressing Towards Goal  Fall Risk Interventions:            Medication Interventions: Evaluate medications/consider consulting pharmacy                  Problem: Afib Pathway: Day 1  Goal: Activity/Safety  Outcome: Progressing Towards Goal  Pt on bedrest  Goal: Consults, if ordered  Outcome: Progressing Towards Goal  Pt seeing cardiology - YING and heart failure - Fabiola in AM  Goal: Medications  Outcome: Progressing Towards Goal  Pt on cardizem gtt  Goal: Respiratory  Outcome: Progressing Towards Goal  Pt O2 sat >94% on room air    Problem: Heart Failure: Day 1  Goal: Consults, if ordered  Outcome: Progressing Towards Goal  Pt seeing heart failure - Elviav in AM

## 2018-07-20 NOTE — CONSULTS
[facility] Consult Note      DOS:   7/20/2018  NAME:  Elsa Campuzano   MRN:   [id number]     REFERRING PROVIDER:  Dr. Cassi Gregory: None  PRIMARY CARDIOLOGIST: Dr. Brody Torres      Chief Complaint: No chief complaint on file. HPI: 43y.o. year old male with no significant past medication history except for an exploratory laparotomy for a ruptured viscus- organ unknown who presented to [facility] with complaints of SOB. In the ED he was found to be in Afib with RVR, failed cardizem conversion, CTA was negative for PE but did show a pleural effusion. He was admitted and seen by Dr. Brody Torres who initiated the myocarditis work up and contacted Dr Usama Bearden for assistance in managing this patient's heart failure. He was transferred to [facility] last evening, seen by Dr. Luis Hurtado who plans a ROBERTO and cardioversion today. He endorses some anorexia and unintentional weight loss, he has been working but denies exposure to toxins. Impression / Plan:   Heart Failure Status: NYHA Class III    Acute HFrEF, NICM, EF 10-20%  Heavy metal screen pending  Viral serologies pending   Order Cardiac MRI  Ok to start Coreg post cardioversion  Monitor renal function- if ok will start low dose ARB- consider Entresto  No indication for diuretics at this time  Trend PBNP      Afib w/ RVR  ROBERTO and Cardioversion with Dr. Liang Mejia lovenox for now       History:  No past medical history on file. No past surgical history on file. Social History     Social History    Marital status: SINGLE     Spouse name: N/A    Number of children: N/A    Years of education: N/A     Occupational History    Not on file.      Social History Main Topics    Smoking status: Current Every Day Smoker     Packs/day: 0.50    Smokeless tobacco: Not on file    Alcohol use Yes      Comment: \"on weekends\"    Drug use: No    Sexual activity: Yes     Other Topics Concern    Not on file     Social History Narrative     No family history on file.    Current Medications:   Current Facility-Administered Medications   Medication Dose Route Frequency Provider Last Rate Last Dose    [START ON 7/21/2018] magnesium oxide (MAG-OX) tablet 400 mg  400 mg Oral DAILY Adilene Dockery NP        potassium chloride SR (KLOR-CON 10) tablet 20 mEq  20 mEq Oral DAILY Adilene Dockery NP        butamben-tetracaine-benzocaine (CETACAINE) 2 %-2 %-14 % (200 mg/sec) topical spray 1 Spray  1 Spray Topical QID PRN Ari Farah MD        benzocaine (HURRICANE) 20 % spray   Mucous Membrane PRN Ari Farah MD   2 [employer] at 07/20/18 1153    enoxaparin (LOVENOX) injection 100 mg  100 mg SubCUTAneous Q12H Ari Farah MD        carvedilol (COREG) tablet 3.125 mg  3.125 mg Oral BID WITH MEALS Ari Farah MD        amiodarone (CORDARONE) tablet 400 mg  400 mg Oral BID Ari Farah MD        sodium chloride (NS) flush 5-10 mL  5-10 mL IntraVENous Q8H Parisa Smith MD   10 mL at 07/20/18 0534    sodium chloride (NS) flush 5-10 mL  5-10 mL IntraVENous PRN Parisa Smith MD        azithromycin (ZITHROMAX) 500 mg in 0.9% sodium chloride (MBP/ADV) 250 mL  500 mg IntraVENous Q24H Parisa Smith  mL/hr at 07/19/18 2201 500 mg at 07/19/18 2201    cefTRIAXone (ROCEPHIN) 2 g in 0.9% sodium chloride (MBP/ADV) 50 mL  2 g IntraVENous Q24H Parisa Smith MD           Allergies: No Known Allergies    ROS:    Review of Systems   Constitutional: Positive for malaise/fatigue and weight loss. Negative for diaphoresis. HENT: Negative. Respiratory: Positive for shortness of breath. Negative for cough. Cardiovascular: Positive for palpitations. Negative for chest pain, leg swelling and PND. Gastrointestinal: Negative. Genitourinary: Negative. Musculoskeletal: Negative. Skin: Negative. Neurological: Negative. Physical Exam:   Physical Exam   Constitutional: He is oriented to person, place, and time. He appears well-developed and well-nourished.  No distress. HENT:   Head: Normocephalic. Eyes: Pupils are equal, round, and reactive to light. Neck: Normal range of motion. Neck supple. No JVD present. Cardiovascular: Normal heart sounds and intact distal pulses. AFib on tele   Pulmonary/Chest: Effort normal and breath sounds normal.   Abdominal: Soft. Bowel sounds are normal. He exhibits no distension. Musculoskeletal: He exhibits no edema. Neurological: He is alert and oriented to person, place, and time. Skin: Skin is warm and dry. Nursing note and vitals reviewed. Vitals:   Visit Vitals    /68 (BP 1 Location: Right arm, BP Patient Position: At rest)    Pulse 99    Temp 98.3 °F (36.8 °C)    Resp 21    Wt 228 lb 2.8 oz (103.5 kg)    SpO2 96%    BMI 28.52 kg/m2         Temp (24hrs), Av.3 °F (36.8 °C), Min:97.7 °F (36.5 °C), Max:98.6 °F (37 °C)      Admission Weight: Last Weight   Weight: 228 lb 2.8 oz (103.5 kg) Weight: 228 lb 2.8 oz (103.5 kg)     Intake / Output / Drain:  Last 24 hrs.:   Intake/Output Summary (Last 24 hours) at 18 1302  Last data filed at 18 1036   Gross per 24 hour   Intake            302.5 ml   Output             2450 ml   Net          -2147.5 ml     Oxygen Therapy:  Oxygen Therapy  O2 Sat (%): 96 % (18 1249)  Pulse via Oximetry: 93 beats per minute (18 1226)  O2 Device: (P) Room air (18 1242)  O2 Flow Rate (L/min): 4 l/min (18 1226)      Recent Labs:   Labs Latest Ref Rng & Units 2018   WBC 4.1 - 11.1 K/uL 10.8 - 8.8 8.9 12. 7(H)   RBC 4.10 - 5.70 M/uL 4.99 - 4.91 5.19 5.22   Hemoglobin 12.1 - 17.0 g/dL 15.5 - 15.0 16.0 16.0   Hematocrit 36.6 - 50.3 % 48.3 - 46.7 48.4 49.8   MCV 80.0 - 99.0 FL 96.8 - 95.1 93.3 95.4   Platelets 331 - 236 K/uL 285 - 255 301 266   Lymphocytes 12 - 49 % 47 - 34 54(H) 27   Monocytes 5 - 13 % 6 - 7 8 10   Eosinophils 0 - 7 % 2 - 0 1 0   Basophils 0 - 1 % 0 - 0 0 0   Albumin 3.5 - 5.0 g/dL 2. 6(L) - - 2.9(L) 3. 1(L)   Calcium 8.5 - 10.1 MG/DL 8.5 - 8.4(L) 8.8 8.8   SGOT 15 - 37 U/L 21 - - 33 25   Glucose 65 - 100 mg/dL 77 - 101(H) 98 112(H)   BUN 6 - 20 MG/DL 8 - 10 11 10   Creatinine 0.70 - 1.30 MG/DL 1.03 - 1.13 1.14 1.27   Sodium 136 - 145 mmol/L 142 138 138 135(L) 147(H)   Potassium 3.5 - 5.1 mmol/L 3.8 3.8 4.8 4.1 3.5   TSH 0.36 - 3.74 uIU/mL - - 0.80 - -     EKG:   EKG Results     None        Echocardiogram:   7/17/18  Left ventricle: The ventricle was mildly to moderately dilated. Systolic  function was markedly reduced by visual assessment. Ejection fraction was  estimated in the range of 10 % to 20 %, challenged determination in  presence of what appeared to be rapid atrial fibrillation. There was    Nuclear Studies:   7/17/18- Pending read    Cardiac Catheterizations: None    Radiology (CXR, CT scans):    CXR Results  (Last 48 hours)               07/19/18 2122  XR CHEST PORT Final result    Impression:  IMPRESSION:   Improved aeration right lung base as described above       Narrative:  INDICATION: Right pleural effusion       COMPARISON: 7/17/2018       A single frontal view was obtained. The time of this study is 2118 hours. There   is less opacification at the right hemithorax base. Some pleural effusion and   minimal consolidation persists. Left lung is clear. Cardiomegaly again noted. .                        CT Results (most recent):    Results from Hospital Encounter encounter on 07/17/18   CTA CHEST W OR W WO CONT   Narrative EXAM:  CTA CHEST W OR W WO CONT    INDICATION:   Irregular heartbeat, pleural effusion seen on chest radiograph    COMPARISON: Chest radiograph performed earlier the same day. TECHNIQUE:   Precontrast  images were obtained to localize the volume for acquisition. Multislice helical CT arteriography was performed from the diaphragm to the  thoracic inlet during uneventful rapid bolus of 100 cc Isovue-370. Lung and soft  tissue windows were generated.   Coronal and sagittal images were generated and  3D post processing consisting of coronal maximum intensity images was performed. CT dose reduction was achieved through use of a standardized protocol tailored  for this examination and automatic exposure control for dose modulation. FINDINGS:  CHEST:  THYROID: No nodule. MEDIASTINUM: No mass or lymphadenopathy. GRAYSON: No mass or lymphadenopathy. THORACIC AORTA: No aneurysm. Not well opacified. MAIN PULMONARY ARTERY: There is no evidence of pulmonary embolism. TRACHEA/BRONCHI: Patent. ESOPHAGUS: No wall thickening or dilatation. HEART: Dilatation of the left atrium and left ventricle is noted. PLEURA: There is a moderate right pleural effusion  LUNGS: Right middle and right lower lobe infiltrate is noted. Atelectasis is  seen in the left lower lobe. INCIDENTALLY IMAGED UPPER ABDOMEN: No focal abnormality. BONES: No destructive bone lesion. Impression IMPRESSION: Moderate right pleural effusion with right middle and right lower  lobe infiltrates. Left lower lobe atelectasis. No evidence of pulmonary  embolism. Lucrecia Lopez, MARY  [address], [address] [place] [address], [address] [place] [address]  Office [phone]  Fax [phone]  24 hour VAD/HF Pager: [phone]         Patient seen and examined. Data and note reviewed. I have discussed and agree with the plans as noted. 43year old male with new onset systolic heart failure, AF with RVR transferred from [facility] for further evaluation of myocarditis. His viral studies are pending. He admits to daily significant EtOH use. Plan for ROBERTO with possible CV by Dr. Martha Gan. Recommend cardiac MRI to further assess for myocarditis. Counseling patient on complete abstinence of ETOH. Thank you for allowing us to participate in your patient's care. Keturah Dwyer MD, [facility] [place], [place], [facility]  Chief of Cardiology, [address] [place] [address] Director  Advanced Heart Failure [address]  200 [facility], [address] [place] [address], [address] [place] [address]  Office [phone]  Fax [phone]

## 2018-07-20 NOTE — NURSE NAVIGATOR
Chart reviewed by Heart Failure Nurse Navigator. Heart Failure database completed. EF:  10/20%     ACEi/ARB: per [facility], will monitor renal function and consider low dose ARB    BB: per [facility], can start Coreg post cardioversion    Aldosterone Antagonist:     CRT: Dr. Cornelious Felty consulted (plan for ROBERTO/cardioversion today)     NYHA Functional Class III      Heart Failure Teach Back in Patient Education. Heart Failure Avoiding Triggers on Discharge Instructions. Cardiologist: Dr. Adrian Jerry [facility]); Dr. Devika Larson [facility]) consulted. Post discharge follow up phone call to be made within 48-72 hours of discharge. Awaiting [facility] consult note.

## 2018-07-20 NOTE — PROGRESS NOTES
Cardiac Electrophysiology Hospital Consult Note     Subjective:      Anyi Mcconnell is a 43 y.o. patient with no significant past medical history (other than pyelonephritis --treated 1 week ago with ciprofloxacin) who presented to [facility] emergency room on 7/17/18 for chest pain and SOB. Symptoms of SOB began 3 weeks ago and have gotten worse. Symptoms are progressing from SOB with exertion to symptoms at rest. He also developed chest pain with inspiration which caused him to seek medical attention. He was evaluated by the hospitalist team and Dr. Lynda Jordan from cardiology. EKG showed Afib with . TSH was 0.80, D dimer was 4.41, and CTA of the chest was negative for PE but showed right pleural effusion and inflitrates. Pro BNP was 2393 and ECHO EF 10-15%. He was given Cardizem 10 mg bolus and started on a drip. Since admission he has experienced intermittent hypotension responsive to fluid boluses. A resting stress test study suggests non-ischemic cardiomyopathy. Viral panels were ordered by cardilogy. Empiric antibiotics (ceftriaxone and azithromycin) were started and an infectious work up was performed ESR 17, CRP elevated 0.90, HIV neg blood cx, urine legionella and strep ag-pending. He is a possible candidate for LVAD and bridge to transplant however he currently having issues with new onset Afib with RVR. So he was transferred to [facility] for EP consult for rhythm/rate control and consideration of ICD. The Advance heart failure team is also consulted for heart failure directed therapy and consideration of LVAD workup. ECHO 7/17/18  Left ventricle: The ventricle was mildly to moderately dilated. Systolic function was markedly reduced by visual assessment. Ejection fraction was estimated in the range of 10 % to 20 %, challenged determination inpresence of what appeared to be rapid atrial fibrillation. There was severe diffuse hypokinesis with slight paradoxical septal motion.  Wall thickness was mildly increased. There was mild asymmetric hypertrophy of the septum. The changes were consistent with eccentric hypertrophy. Doppler parameters were consistent with restrictive physiology, indicative of decreased left ventricular diastolic compliance and/or increased leftatrial pressure. Right ventricle: The ventricle was dilated. Systolic function was markedly reduced. Wall thickness was normal.Left atrium: The atrium was mildly to moderately dilated. Right atrium: The atrium was moderately dilated. Mitral valve: The annulus was dilated. There was severe regurgitation. Aortic valve: Annular dilatation was noted. Ticuspid valve: There was moderate to severe regurgitation by colordoppler. Pulmonary artery systolic pressure: 40 mmHg. There was mild pulmonary hypertension which appeared to be an underestimation of severitywhen compared to color doppler interrogation. Pulmonic valve: There was dilatation of the right ventricular outflow tract. There was mild regurgitation. Pulmonary arteries: The artery was dilated. Inferior vena cava, hepatic veins: The inferior vena cava was dilated. Pericardium: A trivial pericardial effusion was identified posterior LV and along the right atrial free wall. CTA Chest 7/17/18: Moderate right pleural effusion with right middle and right lower  lobe infiltrates. Left lower lobe atelectasis. No evidence of pulmonary  embolism. Portable Chest XRAY 7/19/18: Thereis less opacification at the right hemithorax base. Some pleural effusion and minimal consolidation persists. Left lung is clear. Cardiomegaly again noted. Reji Reasoner No past medical history     No past surgical history     Family Hx- Mother had CHF    Social History     Social History    Marital status: SINGLE     Spouse name: N/A    Number of children: N/A    Years of education: N/A     Occupational History    Not on file.      Social History Main Topics    Smoking status: Current Every Day Smoker     Packs/day: 0.50    Smokeless tobacco: Not on file    Alcohol use Yes      Comment: \"on weekends\"    Drug use: No    Sexual activity: Yes     Other Topics Concern    Not on file     Social History Narrative     Current Facility-Administered Medications   Medication Dose Route Frequency    sodium chloride (NS) flush 5-10 mL  5-10 mL IntraVENous Q8H    sodium chloride (NS) flush 5-10 mL  5-10 mL IntraVENous PRN    azithromycin (ZITHROMAX) 500 mg in 0.9% sodium chloride (MBP/ADV) 250 mL  500 mg IntraVENous Q24H    cefTRIAXone (ROCEPHIN) 2 g in 0.9% sodium chloride (MBP/ADV) 50 mL  2 g IntraVENous Q24H    dilTIAZem (CARDIZEM) 125 mg in dextrose 5% 125 mL infusion  0-15 mg/hr IntraVENous TITRATE    enoxaparin (LOVENOX) injection 40 mg  40 mg SubCUTAneous Q24H     No Known Allergies     Review of Systems:   Constitutional: Negative for fever, chills, weight loss, + malaise/fatigue. HEENT: Negative for nosebleeds, vision changes. Respiratory: Negative for cough, hemoptysis  Cardiovascular: Negative for chest pain, + palpitations, no orthopnea, claudication, leg swelling, syncope, and PND. Gastrointestinal: Negative for nausea, vomiting, diarrhea, blood in stool and melena. Genitourinary: Negative for dysuria, and hematuria. Musculoskeletal: Negative for myalgias, arthralgia. Skin: Negative for rash. Heme: Does not bleed or bruise easily. Neurological: Negative for speech change and focal weakness     Objective:     Visit Vitals    /86    Pulse (!) 112    Temp 98.6 °F (37 °C)    Resp 27    Wt 228 lb 2.8 oz (103.5 kg)    SpO2 94%    BMI 28.52 kg/m2      Physical Exam:   Constitutional: well-developed and well-nourished. No respiratory distress. Head: Normocephalic and atraumatic. Eyes: Pupils are equal, round  ENT: hearing normal  Neck: supple. No JVD present. Cardiovascular: Normal rate, regular rhythm. Exam reveals no gallop and no friction rub.    Pulmonary/Chest: + SOB Effort normal and breath sounds normal. No wheezes. Abdominal: Soft, no tenderness. Musculoskeletal: no edema. Neurological: alert,oriented. Skin: Skin is warm and dry  Psychiatric: normal mood and affect. Behavior is normal. Judgment and thought content normal.      Telemetry monitor shows Afib with -112       Assessment/Plan:   Atrial Fibrillation with RVR  Acute Systolic HF  Dilated Cardiomyopathy  Pleural effusion    Recommend ROBERTO and cardioversion to NSR. Recommend starting him on Coreg 3.125 mg BID and Amiodarone 400 mg BID.   start the patient on lovenox BID for now. Addendum from EP attending:   I have seen, examined patient, and discussed with nurse practitioner, registered nurse, reviewed, updated note and agree with the assessment and plan    I have talked to him this am.    Vital signs as above  Exam shows irregular rhythm and no rub  Crackles and decreased breath sounds at bases  Assessment and Plan:  1. ROBERTO and cardioversion  2. Amiodarone to maintain NSR. Tikosyn cannot be used due to concurrent use of azithromycin  3. lovenox anticoagulation for now in case he will get cardiac cath later next week. Coumadin PO if he cannot afford NOAC  4. Coreg for now  5. Hold ACEI in case Rodrigo Saldivar is preferred  6. If he fails to maintain NSR, consider ablation     Thank you for involving me in this patient's care and please call with further concerns or questions. Hyacinth De La Cruz M.D.   Electrophysiology/Cardiology  [facility] [place]  [address] , [address]  [address]  [phone]

## 2018-07-20 NOTE — PROCEDURES
CARDIOVERSION  Procedure date: 7/20/2018  PROCEDURE: Electrical synchronized cardioversion of atrial fibrillation. BRIEF HISTORY: This is a 43 y.o. man with the history of persistent atrial fibrillation with rapid ventricular rate despite rate control medication. He has severe dilated cardiomyopathy and ROBERTO does not show thrombus. The risks and benefits have been discussed with him and he agreed to proceed. PROCEDURE IN DETAIL:   The patient is now in the supine position and the pads were applied in the anterior posterior direction after the ROBERTO. Thereafter the conscious sedation was maintained and 200 joule biphasic shock was delivered in anterior posterior direction and converted to sinus rhythm . The patient was then arousable. COMPLICATIONS:  None.     Specimen removed: NONE  ASSESSMENT AND PLAN:  The patient will be continued with CHF management  lovenox for stroke prevention until all invasive procedures are completed

## 2018-07-21 ENCOUNTER — APPOINTMENT (OUTPATIENT)
Dept: GENERAL RADIOLOGY | Age: 38
DRG: 286 | End: 2018-07-21
Attending: FAMILY MEDICINE
Payer: SUBSIDIZED

## 2018-07-21 LAB
ALBUMIN SERPL-MCNC: 2.6 G/DL (ref 3.5–5)
ALBUMIN/GLOB SERPL: 0.5 {RATIO} (ref 1.1–2.2)
ALP SERPL-CCNC: 132 U/L (ref 45–117)
ALT SERPL-CCNC: 23 U/L (ref 12–78)
ANION GAP SERPL CALC-SCNC: 6 MMOL/L (ref 5–15)
AST SERPL-CCNC: 22 U/L (ref 15–37)
BILIRUB SERPL-MCNC: 1 MG/DL (ref 0.2–1)
BNP SERPL-MCNC: 2747 PG/ML (ref 0–125)
BUN SERPL-MCNC: 8 MG/DL (ref 6–20)
BUN/CREAT SERPL: 7 (ref 12–20)
CALCIUM SERPL-MCNC: 8.6 MG/DL (ref 8.5–10.1)
CHLORIDE SERPL-SCNC: 111 MMOL/L (ref 97–108)
CMV IGG SERPL IA-ACNC: >10 U/ML (ref 0–0.59)
CMV IGM SERPL IA-ACNC: <30 AU/ML (ref 0–29.9)
CO2 SERPL-SCNC: 22 MMOL/L (ref 21–32)
CREAT SERPL-MCNC: 1.07 MG/DL (ref 0.7–1.3)
GLOBULIN SER CALC-MCNC: 4.8 G/DL (ref 2–4)
GLUCOSE SERPL-MCNC: 90 MG/DL (ref 65–100)
POTASSIUM SERPL-SCNC: 4.2 MMOL/L (ref 3.5–5.1)
PROT SERPL-MCNC: 7.4 G/DL (ref 6.4–8.2)
SODIUM SERPL-SCNC: 139 MMOL/L (ref 136–145)

## 2018-07-21 PROCEDURE — 36415 COLL VENOUS BLD VENIPUNCTURE: CPT | Performed by: NURSE PRACTITIONER

## 2018-07-21 PROCEDURE — 65660000000 HC RM CCU STEPDOWN

## 2018-07-21 PROCEDURE — 83880 ASSAY OF NATRIURETIC PEPTIDE: CPT | Performed by: NURSE PRACTITIONER

## 2018-07-21 PROCEDURE — 74011250636 HC RX REV CODE- 250/636: Performed by: INTERNAL MEDICINE

## 2018-07-21 PROCEDURE — 71045 X-RAY EXAM CHEST 1 VIEW: CPT

## 2018-07-21 PROCEDURE — 74011000258 HC RX REV CODE- 258: Performed by: FAMILY MEDICINE

## 2018-07-21 PROCEDURE — 80053 COMPREHEN METABOLIC PANEL: CPT | Performed by: NURSE PRACTITIONER

## 2018-07-21 PROCEDURE — 74011250636 HC RX REV CODE- 250/636: Performed by: FAMILY MEDICINE

## 2018-07-21 PROCEDURE — 74011250637 HC RX REV CODE- 250/637: Performed by: NURSE PRACTITIONER

## 2018-07-21 PROCEDURE — 74011250637 HC RX REV CODE- 250/637: Performed by: INTERNAL MEDICINE

## 2018-07-21 RX ORDER — ENOXAPARIN SODIUM 150 MG/ML
105 INJECTION SUBCUTANEOUS EVERY 12 HOURS
Status: DISCONTINUED | OUTPATIENT
Start: 2018-07-21 | End: 2018-07-23

## 2018-07-21 RX ORDER — LOSARTAN POTASSIUM 25 MG/1
25 TABLET ORAL DAILY
Status: DISCONTINUED | OUTPATIENT
Start: 2018-07-21 | End: 2018-07-25

## 2018-07-21 RX ADMIN — AZITHROMYCIN MONOHYDRATE 500 MG: 500 INJECTION, POWDER, LYOPHILIZED, FOR SOLUTION INTRAVENOUS at 21:35

## 2018-07-21 RX ADMIN — CARVEDILOL 3.12 MG: 3.12 TABLET, FILM COATED ORAL at 17:30

## 2018-07-21 RX ADMIN — POTASSIUM CHLORIDE 20 MEQ: 750 TABLET, EXTENDED RELEASE ORAL at 08:39

## 2018-07-21 RX ADMIN — AMIODARONE HYDROCHLORIDE 400 MG: 200 TABLET ORAL at 08:39

## 2018-07-21 RX ADMIN — Medication 10 ML: at 06:33

## 2018-07-21 RX ADMIN — ENOXAPARIN SODIUM 105 MG: 120 INJECTION SUBCUTANEOUS at 13:15

## 2018-07-21 RX ADMIN — ENOXAPARIN SODIUM 100 MG: 100 INJECTION SUBCUTANEOUS at 00:50

## 2018-07-21 RX ADMIN — AMIODARONE HYDROCHLORIDE 400 MG: 200 TABLET ORAL at 20:22

## 2018-07-21 RX ADMIN — Medication 10 ML: at 21:33

## 2018-07-21 RX ADMIN — Medication 400 MG: at 08:39

## 2018-07-21 RX ADMIN — LOSARTAN POTASSIUM 25 MG: 25 TABLET, FILM COATED ORAL at 09:46

## 2018-07-21 RX ADMIN — CARVEDILOL 3.12 MG: 3.12 TABLET, FILM COATED ORAL at 09:46

## 2018-07-21 RX ADMIN — CEFTRIAXONE SODIUM 2 G: 2 INJECTION, POWDER, FOR SOLUTION INTRAMUSCULAR; INTRAVENOUS at 18:14

## 2018-07-21 NOTE — PROGRESS NOTES
Problem: Afib Pathway: Day 1  Goal: *Stable cardiac rhythm  Outcome: Resolved/Met Date Met: 07/20/18  Cardioversion to NSR/ ST  Goal: *Describes available resources and support systems  Outcome: Progressing Towards Goal  Discussed options with

## 2018-07-21 NOTE — PROGRESS NOTES
0730: Bedside and Verbal shift change report given to Clif López RN (oncoming nurse) by Christy Urrutia (offgoing nurse). Report included the following information SBAR, Kardex, Intake/Output, MAR, Recent Results and Alarm Parameters . 1900: TRANSFER - OUT REPORT:    Verbal report given to Yenny Perez RN(name) on Truddie Prior  being transferred to [facility](unit) for routine progression of care       Report consisted of patients Situation, Background, Assessment and   Recommendations(SBAR). Information from the following report(s) SBAR, Kardex, Intake/Output, MAR, Recent Results and Cardiac Rhythm SR was reviewed with the receiving nurse. Lines:   Peripheral IV 07/17/18 Left Antecubital (Active)   Site Assessment Clean, dry, & intact 7/21/2018  8:42 AM   Phlebitis Assessment 0 7/21/2018  8:42 AM   Infiltration Assessment 0 7/21/2018  8:42 AM   Dressing Status Clean, dry, & intact 7/21/2018  8:42 AM   Dressing Type Transparent 7/21/2018  8:42 AM   Hub Color/Line Status Pink 7/21/2018  8:42 AM   Action Taken Open ports on tubing capped 7/20/2018 11:00 PM   Alcohol Cap Used Yes 7/21/2018  8:42 AM        Opportunity for questions and clarification was provided.       Patient transported with:   Monitor  Patients medications from home  Registered Nurse  Tech

## 2018-07-21 NOTE — PROGRESS NOTES
Problem: Falls - Risk of  Goal: *Absence of Falls  Document Ayaan Fall Risk and appropriate interventions in the flowsheet. Outcome: Progressing Towards Goal  Fall Risk Interventions:  Mobility Interventions: Assess mobility with egress test, Patient to call before getting OOB         Medication Interventions: Evaluate medications/consider consulting pharmacy, Patient to call before getting OOB    Elimination Interventions: Call light in reach, Patient to call for help with toileting needs, Urinal in reach             Problem: Afib Pathway: Day 2  Goal: Medications  Outcome: Progressing Towards Goal  SQ Lovenox   Goal: Treatments/Interventions/Procedures  Outcome: Progressing Towards Goal  Cardioversion by Dr. Sara Sharma successful in converting pt rhythm from a-fib to nsr    Problem: Patient Education: Go to Patient Education Activity  Goal: Patient/Family Education  Outcome: Progressing Towards Goal  Pt education in regards to fluid restrictions, dietary changes    Problem: Heart Failure: Day 2  Goal: Nutrition/Diet  Outcome: Progressing Towards Goal  Cardiac diet, NA restriction    Problem: Pressure Injury - Risk of  Goal: *Prevention of pressure injury  Document Clayton Scale and appropriate interventions in the flowsheet. Outcome: Progressing Towards Goal  Pressure Injury Interventions:             Activity Interventions: Increase time out of bed, Pressure redistribution bed/mattress(bed type)    Mobility Interventions: Pressure redistribution bed/mattress (bed type)    Nutrition Interventions: Document food/fluid/supplement intake, Discuss nutritional consult with provider, Offer support with meals,snacks and hydration    Friction and Shear Interventions: Apply protective barrier, creams and emollients, Lift sheet

## 2018-07-21 NOTE — PROGRESS NOTES
[facility]  -Progress Note     Clara Estrada [phone]   7/21/2018      Torie Solis M.D. , F.A.C.C.   --------PCP:-None   -----Subjective:   . Brie Kaiser is a 43 y.o. male   Comfortable   No complaints  Denies chest pain, heart palpitations , increasing edema, pre-syncope or shortness of breath at rest   No problems overnight, rhythm and hemodynamics stable -see vitals below    Remains in NSR overnight with periods of sinus tach  3 weeks SOB PTA  Patient Vitals for the past 12 hrs:   Temp Pulse Resp BP SpO2   07/21/18 1400 - (!) 107 29 101/47 96 %   07/21/18 1200 98.7 °F (37.1 °C) (!) 107 28 101/79 96 %   07/21/18 1000 - (!) 108 27 106/80 90 %   07/21/18 0842 98.6 °F (37 °C) (!) 107 23 109/78 99 %   07/21/18 0700 - 100 24 - 95 %   07/21/18 0600 - 94 23 102/75 93 %   07/21/18 0500 - 98 26 - 93 %        Discussion/Plans/Recs  1. AFib, now s/p CV remains in NSR  --amiodarone  --lovenox, change to [address] once cath decision made  -[facility] note seems to favor, so we will arrange for Monday  --having MRI Tuesday    2. Systolic CHF new onset-etiology unclear  --improving on medical therapy  -beta blocker & ARB  -not requiring diuretics  --aldactone/ Entresto in future, fu per [facility] and Dr Laura Nazario    3. Treated for pneumonia-on a.bx -not clear, may have been the CHF with infiltrates  No fever or much of WBC, got Cipro on 7-8 for possible pyleo and Ultram with 2 weeks fo abdominal pain PTA               Cardiac Studies/Hx:  No specialty comments available. No past medical history on file. ROS-pertinents  negative except as above  The pertinent portions of the medical history,physician and nursing notes, meds,vitals , labs and Ins/Outs,are reviewed in the electronic record.     Results for orders placed or performed during the hospital encounter of 07/17/18   EKG, 12 LEAD, INITIAL   Result Value Ref Range    Ventricular Rate 173 BPM    Atrial Rate 192 BPM    QRS Duration 90 ms    Q-T Interval 256 ms    QTC Calculation (Bezet) 434 ms    Calculated R Axis -70 degrees    Calculated T Axis 17 degrees    Diagnosis       Atrial fibrillation with rapid ventricular response with premature   ventricular or aberrantly conducted complexes  Left anterior fascicular block  Possible Anterior infarct , age undetermined  Abnormal ECG  No previous ECGs available  Confirmed by Rhianna Valerio MD, Werner Villarreal ([id number]) on 7/18/2018 8:24:03 PM        Vitals:    07/21/18 0842 07/21/18 1000 07/21/18 1200 07/21/18 1400   BP: 109/78 106/80 101/79 101/47   BP 1 Location: Left arm  Left arm    BP Patient Position: At rest  At rest    Pulse: (!) 107 (!) 108 (!) 107 (!) 107   Resp: 23 27 28 29   Temp: 98.6 °F (37 °C)  98.7 °F (37.1 °C)    SpO2: 99% 90% 96% 96%   Weight:           Objective:    Physical Exam:   Patient Vitals for the past 12 hrs:   Temp Pulse Resp BP SpO2   07/21/18 1400 - (!) 107 29 101/47 96 %   07/21/18 1200 98.7 °F (37.1 °C) (!) 107 28 101/79 96 %   07/21/18 1000 - (!) 108 27 106/80 90 %   07/21/18 0842 98.6 °F (37 °C) (!) 107 23 109/78 99 %   07/21/18 0700 - 100 24 - 95 %   07/21/18 0600 - 94 23 102/75 93 %   07/21/18 0500 - 98 26 - 93 %      General:  alert, cooperative, no distress, appears stated age   ENT, Neck:  no jvd   Chest Wall: inspection normal - no chest wall deformities or tenderness, respiratory effort normal   Lung: clear to auscultation bilaterally   Heart:  normal rate, regular rhythm, normal S1, S2, no murmurs, rubs, clicks or gallops   Abdomen: nondistended   Extremities: extremities normal, atraumatic, no cyanosis or edema     Last 24hr Input/Output:    Intake/Output Summary (Last 24 hours) at 07/21/18 1614  Last data filed at 07/21/18 0842   Gross per 24 hour   Intake           374.83 ml   Output             2225 ml   Net         -1850.17 ml        Data Review:   Recent Results (from the past 24 hour(s))   NT-PRO BNP    Collection Time: 07/21/18  3:38 AM   Result Value Ref Range    NT pro-BNP 2747 (H) 0 - 221 PG/ML   METABOLIC PANEL, COMPREHENSIVE    Collection Time: 07/21/18  8:32 AM   Result Value Ref Range    Sodium 139 136 - 145 mmol/L    Potassium 4.2 3.5 - 5.1 mmol/L    Chloride 111 (H) 97 - 108 mmol/L    CO2 22 21 - 32 mmol/L    Anion gap 6 5 - 15 mmol/L    Glucose 90 65 - 100 mg/dL    BUN 8 6 - 20 MG/DL    Creatinine 1.07 0.70 - 1.30 MG/DL    BUN/Creatinine ratio 7 (L) 12 - 20      GFR est AA >60 >60 ml/min/1.73m2    GFR est non-AA >60 >60 ml/min/1.73m2    Calcium 8.6 8.5 - 10.1 MG/DL    Bilirubin, total 1.0 0.2 - 1.0 MG/DL    ALT (SGPT) 23 12 - 78 U/L    AST (SGOT) 22 15 - 37 U/L    Alk.  phosphatase 132 (H) 45 - 117 U/L    Protein, total 7.4 6.4 - 8.2 g/dL    Albumin 2.6 (L) 3.5 - 5.0 g/dL    Globulin 4.8 (H) 2.0 - 4.0 g/dL    A-G Ratio 0.5 (L) 1.1 - 2.2        Ainsley Sultana MD 7/21/2018

## 2018-07-21 NOTE — PROGRESS NOTES
2000: report received from [facility], pt up in chair talking on the phone. No gtts to verify. 2259:Bedside and Verbal shift change report given to [employer] (oncoming nurse) by Jonah Parada RN (offgoing nurse). Report included the following information SBAR, Kardex, Procedure Summary, Intake/Output, MAR, Accordion, Recent Results, Med Rec Status and Cardiac Rhythm Sinus Tach.

## 2018-07-21 NOTE — PROGRESS NOTES
Hospitalist Progress Note  Girish Arnold MD  Office: [phone]  Cell:       Date of Service:  2018  NAME:  Ginette Reed  :  1980  MRN:  [id number]      Admission Summary:   43 y.o.  male with past medical history of remote perforated viscus, sepsis, and bacteremia, s/p exploratory laparotomy, repair of ruptured viscus presented as direct admission/ transfer from [facility] to [facility] CVICU with newly diagnosed rapid atrial fibrillation (afib), acute systolic CHF, right sided pleural effusion with infiltrates.       Interval history / Subjective:     Feels better than yesterday     Assessment & Plan:     1. Atrial Fibrillation. Back in atrial fribrillation  2. S/p Cardioversion  3. Acute Systolic CHF EF. 22-55%. Already in coreg. Continue current management  4. Tabacco abuse. Need to stop  5. Dilated Cardiomyopathy. Multifactorial in this patient. Alcohol, viral.       May benefit from a myocardial biopsy. 6. Right lower lobe Pneumonia and Pleural Effusion      On abx. Pleural tap with further study of pleural fluid        Patient with not symptoms prior to be admitted    Code status: full  DVT prophylaxis: Heparin    Care Plan discussed with: Patient/Family and Nurse  Disposition: TBD     Hospital Problems  Date Reviewed: 2018          Codes Class Noted POA    Acute systolic heart failure ([facility] [address] ICD-10-CM: I50.21  ICD-9-CM: 428.21  2018 Unknown        Pleural effusion, right ICD-10-CM: J90  ICD-9-CM: 511.9  2018 Unknown        * (Principal)Atrial fibrillation with RVR (HCC) ICD-10-CM: I48.91  ICD-9-CM: 427.31  2018 Unknown                Review of Systems:   Pertinent items are noted in HPI. Vital Signs:    Last 24hrs VS reviewed since prior progress note.  Most recent are:  Visit Vitals    /79 (BP 1 Location: Left arm, BP Patient Position: At rest)    Pulse (!) 107    Temp 98.7 °F (37.1 °C)    Resp 28    Wt 104.8 kg (231 lb 0.7 oz)    SpO2 96%    BMI 28.88 kg/m2         Intake/Output Summary (Last 24 hours) at 07/21/18 1241  Last data filed at 07/21/18 2293   Gross per 24 hour   Intake           374.83 ml   Output             2850 ml   Net         -2475.17 ml        Physical Examination:             Constitutional:  No acute distress, cooperative, pleasant    ENT:  Oral mucous moist, oropharynx benign. Neck supple,    Resp:  CTA bilaterally. No wheezing/rhonchi/rales. No accessory muscle use   CV:  Regular rhythm, normal rate, no murmurs, gallops, rubs    GI:  Soft, non distended, non tender. normoactive bowel sounds, no hepatosplenomegaly     Musculoskeletal:  No edema, warm, 2+ pulses throughout    Neurologic:  Moves all extremities. AAOx3, CN II-XII reviewed            Data Review:    Review and/or order of clinical lab test      Labs:     Recent Labs      07/20/18   0239   WBC  10.8   HGB  15.5   HCT  48.3   PLT  285     Recent Labs      07/21/18   0832  07/20/18   0239  07/19/18   1811   NA  139  142  138   K  4.2  3.8  3.8   CL  111*  112*  106   CO2  22  22  21   BUN  8  8   --    CREA  1.07  1.03   --    GLU  90  77   --    CA  8.6  8.5   --    MG   --   1.8  1.8     Recent Labs      07/21/18   0832  07/20/18   0239   SGOT  22  21   ALT  23  26   AP  132*  147*   TBILI  1.0  1.0   TP  7.4  7.5   ALB  2.6*  2.6*   GLOB  4.8*  4.9*     Recent Labs      07/20/18   0239   INR  1.4*   PTP  14.5*      Recent Labs      07/20/18   1350   TIBC  284   PSAT  15*   FERR  145      No results found for: FOL, RBCF   No results for input(s): PH, PCO2, PO2 in the last 72 hours. No results for input(s): CPK, CKNDX, TROIQ in the last 72 hours.     No lab exists for component: CPKMB  No results found for: CHOL, CHOLX, CHLST, CHOLV, HDL, LDL, LDLC, DLDLP, TGLX, TRIGL, TRIGP, CHHD, CHHDX  No results found for: [facility]  Lab Results   Component Value Date/Time    Color YELLOW/STRAW 07/17/2018 02:14 PM    Appearance CLEAR 07/17/2018 02:14 PM    Specific gravity 1.005 07/17/2018 02:14 PM    Specific gravity 1.010 07/08/2018 05:56 PM    pH (UA) 6.0 07/17/2018 02:14 PM    Protein NEGATIVE  07/17/2018 02:14 PM    Glucose NEGATIVE  07/17/2018 02:14 PM    Ketone NEGATIVE  07/17/2018 02:14 PM    Bilirubin NEGATIVE  07/17/2018 02:14 PM    Urobilinogen 0.2 07/17/2018 02:14 PM    Nitrites NEGATIVE  07/17/2018 02:14 PM    Leukocyte Esterase NEGATIVE  07/17/2018 02:14 PM    Epithelial cells FEW 07/17/2018 02:14 PM    Bacteria NEGATIVE  07/17/2018 02:14 PM    WBC 0-4 07/17/2018 02:14 PM    RBC 0-5 07/17/2018 02:14 PM         Medications Reviewed:     Current Facility-Administered Medications   Medication Dose Route Frequency    losartan (COZAAR) tablet 25 mg  25 mg Oral DAILY    enoxaparin (LOVENOX) injection 105 mg  105 mg SubCUTAneous Q12H    magnesium oxide (MAG-OX) tablet 400 mg  400 mg Oral DAILY    potassium chloride SR (KLOR-CON 10) tablet 20 mEq  20 mEq Oral DAILY    butamben-tetracaine-benzocaine (CETACAINE) 2 %-2 %-14 % (200 mg/sec) topical spray 1 Spray  1 Spray Topical QID PRN    benzocaine (HURRICANE) 20 % spray   Mucous Membrane PRN    carvedilol (COREG) tablet 3.125 mg  3.125 mg Oral BID WITH MEALS    amiodarone (CORDARONE) tablet 400 mg  400 mg Oral BID    sodium chloride (NS) flush 5-10 mL  5-10 mL IntraVENous Q8H    sodium chloride (NS) flush 5-10 mL  5-10 mL IntraVENous PRN    azithromycin (ZITHROMAX) 500 mg in 0.9% sodium chloride (MBP/ADV) 250 mL  500 mg IntraVENous Q24H    cefTRIAXone (ROCEPHIN) 2 g in 0.9% sodium chloride (MBP/ADV) 50 mL  2 g IntraVENous Q24H     ______________________________________________________________________  EXPECTED LENGTH OF STAY: 3d 12h  ACTUAL LENGTH OF STAY:          2                 Wilfrido Plunkett MD

## 2018-07-21 NOTE — PROGRESS NOTES
2300 - Bedside and Verbal shift change report given to Henny Tucker RN (oncoming nurse) by Jeet Romero RN (offgoing nurse). Report included the following information SBAR, Kardex, Intake/Output, MAR, Recent Results, Cardiac Rhythm Sinus Rhythm and Alarm Parameters . 0700 - Pt CHG bath performed. 221 Zachery Bautista, NP at bedside. Updated on pt condition. New orders recevied - transfer orders obtained. 0730 - Bedside and Verbal shift change report given to Eboni Yadav RN (oncoming nurse) by Alvina Pascal RN (offgoing nurse). Report included the following information SBAR, Kardex, Intake/Output, MAR, Recent Results, Cardiac Rhythm Sinus RHythm and Alarm Parameters .

## 2018-07-21 NOTE — PROGRESS NOTES
Lovenox Monitoring  Indication: AFIB  Recent Labs      07/21/18   0832  07/20/18   0239   HGB   --   15.5   PLT   --   285   INR   --   1.4*   CREA  1.07  1.03     Current Weight: 104.8 kg  Est. CrCl = >30 ml/min  Current Dose: 100 mg subcutaneously every 12 hours. Plan: Change to 105 mg subcutaneously every 12 hours per [address] [place] [address] P&T Committee Protocol with respect to weight. Pharmacy will continue to monitor patient daily and will make dosage adjustments based upon changing renal function.

## 2018-07-21 NOTE — PROGRESS NOTES
1915:TRANSFER - IN REPORT:    Verbal report received from [facility] RN(name) on Ney Going  being received from CVICU(unit) for routine progression of care      Report consisted of patients Situation, Background, Assessment and   Recommendations(SBAR). Information from the following report(s) SBAR, Kardex, Procedure Summary, Intake/Output, MAR, Recent Results, Med Rec Status, Cardiac Rhythm NSR, Sinus tach and Alarm Parameters  was reviewed with the receiving nurse. Opportunity for questions and clarification was provided. Assessment completed upon patients arrival to unit and care assumed. 1930: Bedside shift change report given to Jing Boyd (oncoming nurse) by Boni Munguia (offgoing nurse). Report included the following information SBAR, Kardex, Intake/Output, MAR, Accordion, Recent Results, Med Rec Status and Cardiac Rhythm NSR, sinus tach.

## 2018-07-22 LAB
ALBUMIN SERPL-MCNC: 2.4 G/DL (ref 3.5–5)
ALBUMIN/GLOB SERPL: 0.5 {RATIO} (ref 1.1–2.2)
ALP SERPL-CCNC: 123 U/L (ref 45–117)
ALT SERPL-CCNC: 21 U/L (ref 12–78)
ANION GAP SERPL CALC-SCNC: 7 MMOL/L (ref 5–15)
AST SERPL-CCNC: 24 U/L (ref 15–37)
BASOPHILS # BLD: 0 K/UL (ref 0–0.1)
BASOPHILS NFR BLD: 0 % (ref 0–1)
BILIRUB SERPL-MCNC: 0.7 MG/DL (ref 0.2–1)
BUN SERPL-MCNC: 9 MG/DL (ref 6–20)
BUN/CREAT SERPL: 8 (ref 12–20)
CALCIUM SERPL-MCNC: 8.5 MG/DL (ref 8.5–10.1)
CHLORIDE SERPL-SCNC: 111 MMOL/L (ref 97–108)
CO2 SERPL-SCNC: 23 MMOL/L (ref 21–32)
CREAT SERPL-MCNC: 1.16 MG/DL (ref 0.7–1.3)
DIFFERENTIAL METHOD BLD: ABNORMAL
EOSINOPHIL # BLD: 0.1 K/UL (ref 0–0.4)
EOSINOPHIL NFR BLD: 1 % (ref 0–7)
ERYTHROCYTE [DISTWIDTH] IN BLOOD BY AUTOMATED COUNT: 15.7 % (ref 11.5–14.5)
GLOBULIN SER CALC-MCNC: 4.6 G/DL (ref 2–4)
GLUCOSE SERPL-MCNC: 86 MG/DL (ref 65–100)
HCT VFR BLD AUTO: 50.5 % (ref 36.6–50.3)
HGB BLD-MCNC: 15.7 G/DL (ref 12.1–17)
IMM GRANULOCYTES # BLD: 0 K/UL (ref 0–0.04)
IMM GRANULOCYTES NFR BLD AUTO: 0 % (ref 0–0.5)
LYMPHOCYTES # BLD: 3.8 K/UL (ref 0.8–3.5)
LYMPHOCYTES NFR BLD: 48 % (ref 12–49)
MCH RBC QN AUTO: 30.8 PG (ref 26–34)
MCHC RBC AUTO-ENTMCNC: 31.1 G/DL (ref 30–36.5)
MCV RBC AUTO: 99 FL (ref 80–99)
MONOCYTES # BLD: 0.5 K/UL (ref 0–1)
MONOCYTES NFR BLD: 6 % (ref 5–13)
NEUTS SEG # BLD: 3.5 K/UL (ref 1.8–8)
NEUTS SEG NFR BLD: 44 % (ref 32–75)
NRBC # BLD: 0 K/UL (ref 0–0.01)
NRBC BLD-RTO: 0 PER 100 WBC
PLATELET # BLD AUTO: 269 K/UL (ref 150–400)
PMV BLD AUTO: 10.2 FL (ref 8.9–12.9)
POTASSIUM SERPL-SCNC: 4.2 MMOL/L (ref 3.5–5.1)
PROT SERPL-MCNC: 7 G/DL (ref 6.4–8.2)
RBC # BLD AUTO: 5.1 M/UL (ref 4.1–5.7)
SODIUM SERPL-SCNC: 141 MMOL/L (ref 136–145)
WBC # BLD AUTO: 7.9 K/UL (ref 4.1–11.1)

## 2018-07-22 PROCEDURE — 74011250636 HC RX REV CODE- 250/636: Performed by: INTERNAL MEDICINE

## 2018-07-22 PROCEDURE — 74011250637 HC RX REV CODE- 250/637: Performed by: INTERNAL MEDICINE

## 2018-07-22 PROCEDURE — 0W993ZX DRAINAGE OF RIGHT PLEURAL CAVITY, PERCUTANEOUS APPROACH, DIAGNOSTIC: ICD-10-PCS | Performed by: RADIOLOGY

## 2018-07-22 PROCEDURE — 89050 BODY FLUID CELL COUNT: CPT | Performed by: INTERNAL MEDICINE

## 2018-07-22 PROCEDURE — 94760 N-INVAS EAR/PLS OXIMETRY 1: CPT

## 2018-07-22 PROCEDURE — 83986 ASSAY PH BODY FLUID NOS: CPT | Performed by: INTERNAL MEDICINE

## 2018-07-22 PROCEDURE — 87205 SMEAR GRAM STAIN: CPT | Performed by: INTERNAL MEDICINE

## 2018-07-22 PROCEDURE — 82945 GLUCOSE OTHER FLUID: CPT | Performed by: INTERNAL MEDICINE

## 2018-07-22 PROCEDURE — 84157 ASSAY OF PROTEIN OTHER: CPT | Performed by: HOSPITALIST

## 2018-07-22 PROCEDURE — 74011250637 HC RX REV CODE- 250/637: Performed by: NURSE PRACTITIONER

## 2018-07-22 PROCEDURE — 65660000000 HC RM CCU STEPDOWN

## 2018-07-22 PROCEDURE — 80053 COMPREHEN METABOLIC PANEL: CPT | Performed by: NURSE PRACTITIONER

## 2018-07-22 PROCEDURE — 85025 COMPLETE CBC W/AUTO DIFF WBC: CPT | Performed by: NURSE PRACTITIONER

## 2018-07-22 PROCEDURE — 36415 COLL VENOUS BLD VENIPUNCTURE: CPT | Performed by: NURSE PRACTITIONER

## 2018-07-22 RX ADMIN — AMIODARONE HYDROCHLORIDE 400 MG: 200 TABLET ORAL at 08:23

## 2018-07-22 RX ADMIN — Medication 10 ML: at 16:49

## 2018-07-22 RX ADMIN — Medication 10 ML: at 21:14

## 2018-07-22 RX ADMIN — POTASSIUM CHLORIDE 20 MEQ: 750 TABLET, EXTENDED RELEASE ORAL at 08:23

## 2018-07-22 RX ADMIN — Medication 10 ML: at 07:15

## 2018-07-22 RX ADMIN — AMIODARONE HYDROCHLORIDE 400 MG: 200 TABLET ORAL at 21:14

## 2018-07-22 RX ADMIN — ENOXAPARIN SODIUM 105 MG: 120 INJECTION SUBCUTANEOUS at 12:43

## 2018-07-22 RX ADMIN — Medication 400 MG: at 08:24

## 2018-07-22 RX ADMIN — LOSARTAN POTASSIUM 25 MG: 25 TABLET, FILM COATED ORAL at 08:24

## 2018-07-22 RX ADMIN — CARVEDILOL 3.12 MG: 3.12 TABLET, FILM COATED ORAL at 16:49

## 2018-07-22 RX ADMIN — CARVEDILOL 3.12 MG: 3.12 TABLET, FILM COATED ORAL at 08:24

## 2018-07-22 RX ADMIN — ENOXAPARIN SODIUM 105 MG: 120 INJECTION SUBCUTANEOUS at 01:47

## 2018-07-22 NOTE — PROGRESS NOTES
Hospitalist Progress Note  Pedrito Sauceda MD  Office: [phone]  Cell:       Date of Service:  2018  NAME:  Den Martinez  :  1980  MRN:  [id number]      Admission Summary:   43 y.o.  male with past medical history of remote perforated viscus, sepsis, and bacteremia, s/p exploratory laparotomy, repair of ruptured viscus presented as direct admission/ transfer from [facility]) to [address] [place] [address] CVICU with newly diagnosed rapid atrial fibrillation (afib), acute systolic CHF, right sided pleural effusion with infiltrates. Interval history / Subjective:     Refer feeling better than yestwerday     Assessment & Plan:     1. Atrial Fibrillation now in NSR  2. Acute systolic. 3. Dilated CMP  4.  tabacco abuse  5. Pneumonia  6. Pleural effusion. Mr. Gretta Kerns  Is a 43years old admitted for New onset A, Fib and cardiomyopathy, A. FIB is under rate control with amiodarone, his CMP need further diagnostic study at the same time patient has a Pleural Effusion and Pneumonia, the tempo of the pneumonia does not fit the normal patter as such I requested and HIV and also will tap the pleural effusion. Code status: full  DVT prophylaxis: Lovenox    Care Plan discussed with: Patient/Family and Nurse  Disposition: TBD     Hospital Problems  Date Reviewed: 2018          Codes Class Noted POA    Acute systolic heart failure ([facility] [address] ICD-10-CM: I50.21  ICD-9-CM: 428.21  2018 Unknown        Pleural effusion, right ICD-10-CM: J90  ICD-9-CM: 511.9  2018 Unknown        * (Principal)Atrial fibrillation with RVR (HCC) ICD-10-CM: I48.91  ICD-9-CM: 427.31  2018 Unknown                Review of Systems:   Pertinent items are noted in HPI. Vital Signs:    Last 24hrs VS reviewed since prior progress note.  Most recent are:  Visit Vitals    BP (!) 130/92 (BP 1 Location: Right arm, BP Patient Position: At rest;Head of bed elevated (Comment degrees))    Pulse 95    Temp 98 °F (36.7 °C)    Resp 18    Wt 104.3 kg (229 lb 15 oz)    SpO2 98%    BMI 28.74 kg/m2         Intake/Output Summary (Last 24 hours) at 07/22/18 1112  Last data filed at 07/22/18 0805   Gross per 24 hour   Intake              960 ml   Output             1501 ml   Net             -541 ml        Physical Examination:             Constitutional:  No acute distress, cooperative, pleasant    ENT:  Oral mucous moist, oropharynx benign. Neck supple,    Resp:  CTA bilaterally. No wheezing/rhonchi/rales. No accessory muscle use   CV:  Regular rhythm, normal rate, no murmurs, gallops, rubs    GI:  Soft, non distended, non tender. normoactive bowel sounds, no hepatosplenomegaly     Musculoskeletal:  No edema, warm, 2+ pulses throughout    Neurologic:  Moves all extremities. AAOx3, CN II-XII reviewed            Data Review:    Review and/or order of clinical lab test      Labs:     Recent Labs      07/22/18 0343 07/20/18   0239   WBC  7.9  10.8   HGB  15.7  15.5   HCT  50.5*  48.3   PLT  269  285     Recent Labs      07/22/18   0343  07/21/18   0832  07/20/18   0239  07/19/18   1811   NA  141  139  142  138   K  4.2  4.2  3.8  3.8   CL  111*  111*  112*  106   CO2  23  22  22  21   BUN  9  8  8   --    CREA  1.16  1.07  1.03   --    GLU  86  90  77   --    CA  8.5  8.6  8.5   --    MG   --    --   1.8  1.8     Recent Labs      07/22/18   0343  07/21/18   0832  07/20/18   0239   SGOT  24  22  21   ALT  21  23  26   AP  123*  132*  147*   TBILI  0.7  1.0  1.0   TP  7.0  7.4  7.5   ALB  2.4*  2.6*  2.6*   GLOB  4.6*  4.8*  4.9*     Recent Labs      07/20/18   0239   INR  1.4*   PTP  14.5*      Recent Labs      07/20/18   1350   TIBC  284   PSAT  15*   FERR  145      No results found for: FOL, RBCF   No results for input(s): PH, PCO2, PO2 in the last 72 hours.   No results for input(s): CPK, CKNDX, TROIQ in the last 72 hours.     No lab exists for component: CPKMB  No results found for: CHOL, CHOLX, CHLST, CHOLV, HDL, LDL, LDLC, DLDLP, TGLX, TRIGL, TRIGP, CHHD, CHHDX  No results found for: [facility]  Lab Results   Component Value Date/Time    Color YELLOW/STRAW 07/17/2018 02:14 PM    Appearance CLEAR 07/17/2018 02:14 PM    Specific gravity 1.005 07/17/2018 02:14 PM    Specific gravity 1.010 07/08/2018 05:56 PM    pH (UA) 6.0 07/17/2018 02:14 PM    Protein NEGATIVE  07/17/2018 02:14 PM    Glucose NEGATIVE  07/17/2018 02:14 PM    Ketone NEGATIVE  07/17/2018 02:14 PM    Bilirubin NEGATIVE  07/17/2018 02:14 PM    Urobilinogen 0.2 07/17/2018 02:14 PM    Nitrites NEGATIVE  07/17/2018 02:14 PM    Leukocyte Esterase NEGATIVE  07/17/2018 02:14 PM    Epithelial cells FEW 07/17/2018 02:14 PM    Bacteria NEGATIVE  07/17/2018 02:14 PM    WBC 0-4 07/17/2018 02:14 PM    RBC 0-5 07/17/2018 02:14 PM         Medications Reviewed:     Current Facility-Administered Medications   Medication Dose Route Frequency    losartan (COZAAR) tablet 25 mg  25 mg Oral DAILY    enoxaparin (LOVENOX) injection 105 mg  105 mg SubCUTAneous Q12H    magnesium oxide (MAG-OX) tablet 400 mg  400 mg Oral DAILY    potassium chloride SR (KLOR-CON 10) tablet 20 mEq  20 mEq Oral DAILY    butamben-tetracaine-benzocaine (CETACAINE) 2 %-2 %-14 % (200 mg/sec) topical spray 1 Spray  1 Spray Topical QID PRN    benzocaine (HURRICANE) 20 % spray   Mucous Membrane PRN    carvedilol (COREG) tablet 3.125 mg  3.125 mg Oral BID WITH MEALS    amiodarone (CORDARONE) tablet 400 mg  400 mg Oral BID    sodium chloride (NS) flush 5-10 mL  5-10 mL IntraVENous Q8H    sodium chloride (NS) flush 5-10 mL  5-10 mL IntraVENous PRN     ______________________________________________________________________  EXPECTED LENGTH OF STAY: 3d 12h  ACTUAL LENGTH OF STAY:          3                 Nalini Felton MD

## 2018-07-22 NOTE — PROGRESS NOTES
0730:Bedside shift change report given to Adolfo Campbell 81 (oncoming nurse) by Loren Guardado RN (offgoing nurse). Report included the following information SBAR, Kardex, Procedure Summary, Intake/Output, MAR, Recent Results, Med Rec Status and Cardiac Rhythm NSR, sinus tach. 1930: Bedside shift change report given to Jing Aviles 90 (oncoming nurse) by Adolfo Campbell 82 (offgoing nurse). Report included the following information SBAR, Kardex, Procedure Summary, Intake/Output, MAR, Recent Results, Med Rec Status and Cardiac Rhythm NSR, sinus tach. Problem: Falls - Risk of  Goal: *Absence of Falls  Document Ayaan Fall Risk and appropriate interventions in the flowsheet. Outcome: Progressing Towards Goal  Fall Risk Interventions:  Patient remains free of falls   Mobility Interventions: Patient to call before getting OOB     Medication Interventions: Evaluate medications/consider consulting pharmacy, Patient to call before getting OOB    Elimination Interventions: Toilet paper/wipes in reach, Call light in reach       Problem: Pressure Injury - Risk of  Goal: *Prevention of pressure injury  Document Clayton Scale and appropriate interventions in the flowsheet.    Outcome: Progressing Towards Goal  Pressure Injury Interventions:  Turns self, no pressure ulcers noted         Activity Interventions: Increase time out of bed, Pressure redistribution bed/mattress(bed type)    Mobility Interventions: Pressure redistribution bed/mattress (bed type)    Nutrition Interventions: Document food/fluid/supplement intake, Discuss nutritional consult with provider    Friction and Shear Interventions: Minimize layers

## 2018-07-22 NOTE — PROGRESS NOTES
Problem: Falls - Risk of  Goal: *Absence of Falls  Document Ayaan Fall Risk and appropriate interventions in the flowsheet. Outcome: Progressing Towards Goal  Fall Risk Interventions:  Mobility Interventions: Patient to call before getting OOB         Medication Interventions: Patient to call before getting OOB    Elimination Interventions:  Toileting schedule/hourly rounds

## 2018-07-22 NOTE — ROUTINE PROCESS
Bedside and Verbal shift change report given to Rui Singh RN (oncoming nurse) by Estrella Stoddard RN (offgoing nurse). Report given with SBAR, Kardex, MAR and Recent Results.

## 2018-07-22 NOTE — ROUTINE PROCESS
Bedside and Verbal shift change report given to Adolfo Schmidt (oncoming nurse) by Roman Talbert RN (offgoing nurse). Report given with SBAR, Kardex, MAR and Recent Results.

## 2018-07-22 NOTE — PROGRESS NOTES
[facility] Progress Note      DOS:   7/22/2018  NAME:  Ben Arceo   MRN:   [id number]     REFERRING PROVIDER:  Dr. Nicole Guerrero: None  PRIMARY CARDIOLOGIST: Dr. Merlin Ramirez      Chief Complaint: SOB    HPI: 43y.o. year old male with no significant past medication history except for an exploratory laparotomy for a ruptured viscus- organ unknown who presented to [facility] with complaints of SOB. In the ED he was found to be in Afib with RVR, failed cardizem conversion, CTA was negative for PE but did show a pleural effusion. He was admitted and seen by Dr. Merlin Ramirez who initiated the myocarditis work up and contacted Dr Titi Cole for assistance in managing this patient's heart failure. He was transferred to [facility] [place] last evening, seen by Dr. Mike Stone who plans a ROBERTO and cardioversion today. He endorses some anorexia and unintentional weight loss, he has been working but denies exposure to toxins. 24Hr Events  Transferred to stepdown  No acute issues  Auto diuresing     Impression / Plan:   Heart Failure Status: NYHA Class III    Acute HFrEF, NICM, EF 10-20%  Heavy metal screen pending  Coxsackie positive  CMV IgG positive,  IgM negative  EBV pending    Cardiac MRI scheduled for Tuesday 7/24 at [facility]  Cont Coreg 3.125 BID  Cont losartan 25mg daily today - watch renal function  If possible will start Entresto as OP  No indication for diuretics at this time  Trend PBNP- up slightly today  Consider LHC and RHC- hopefully Monday     Afib w/ RVR  S/p Cardioversion  Remains in Sinus  Cont Lovenox for now     All other care per primary team     History:  No past medical history on file. Past Surgical History:   Procedure Laterality Date    CARDIOVERSION, ELECTIVE;EXTERN  7/20/2018          Social History     Social History    Marital status: SINGLE     Spouse name: N/A    Number of children: N/A    Years of education: N/A     Occupational History    Not on file.      Social History Main Topics  Smoking status: Current Every Day Smoker     Packs/day: 0.50    Smokeless tobacco: Not on file    Alcohol use Yes      Comment: \"on weekends\"    Drug use: No    Sexual activity: Yes     Other Topics Concern    Not on file     Social History Narrative     No family history on file. Current Medications:   Current Facility-Administered Medications   Medication Dose Route Frequency Provider Last Rate Last Dose    losartan (COZAAR) tablet 25 mg  25 mg Oral DAILY Danielle Barbosa NP   25 mg at 07/21/18 0946    enoxaparin (LOVENOX) injection 105 mg  105 mg SubCUTAneous Q12H Elaine Servin MD   105 mg at 07/22/18 0147    magnesium oxide (MAG-OX) tablet 400 mg  400 mg Oral DAILY Danielle Barbosa NP   400 mg at 07/21/18 0839    potassium chloride SR (KLOR-CON 10) tablet 20 mEq  20 mEq Oral DAILY Danielle Barbosa NP   20 mEq at 07/21/18 0839    butamben-tetracaine-benzocaine (CETACAINE) 2 %-2 %-14 % (200 mg/sec) topical spray 1 Spray  1 Spray Topical QID PRN Elaine Servin MD        benzocaine (HURRICANE) 20 % spray   Mucous Membrane PRN Elaine Servin MD   2 [place] at 07/20/18 1153    carvedilol (COREG) tablet 3.125 mg  3.125 mg Oral BID WITH MEALS Elaine Servin MD   3.125 mg at 07/21/18 1730    amiodarone (CORDARONE) tablet 400 mg  400 mg Oral BID Elaine Servin MD   400 mg at 07/21/18 2022    sodium chloride (NS) flush 5-10 mL  5-10 mL IntraVENous Q8H Holger Mason MD   10 mL at 07/22/18 0715    sodium chloride (NS) flush 5-10 mL  5-10 mL IntraVENous PRN Holger Mason MD   10 mL at 07/20/18 2235       Allergies: No Known Allergies    ROS:    Constitutional: negative, feels well today   HENT: Negative. Respiratory: Positive for FLOWERS,  Negative for cough. Cardiovascular:  Negative for chest pain, leg swelling and PND. Gastrointestinal: Negative. Genitourinary: peeing a lot    Musculoskeletal: Negative. Skin: Negative. Neurological: Negative.         Physical Exam:   Constitutional: NAD, sitting up in bed, eating breakfast   HENT:   Head: Normocephalic. Eyes: Pupils are equal, round, and reactive to light. Neck: Normal range of motion. Neck supple. No JVD present. Cardiovascular: Normal heart sounds and intact distal pulses. Tachycardic but regular rhythm. Pulmonary/Chest: Effort normal and breath sounds normal.   Abdominal: Soft. Bowel sounds are normal. He exhibits no distension. Musculoskeletal: He exhibits no edema. Neurological: He is alert and oriented to person, place, and time. Skin: Skin is warm and dry. Nursing note and vitals reviewed. Vitals:   Visit Vitals    BP (!) 130/92 (BP 1 Location: Right arm, BP Patient Position: At rest;Head of bed elevated (Comment degrees))    Pulse 95    Temp 98 °F (36.7 °C)    Resp 18    Wt 229 lb 15 oz (104.3 kg)    SpO2 98%    BMI 28.74 kg/m2         Temp (24hrs), Av.3 °F (36.8 °C), Min:97.8 °F (36.6 °C), Max:98.7 °F (37.1 °C)      Admission Weight: Last Weight   Weight: 228 lb 2.8 oz (103.5 kg) Weight: 229 lb 15 oz (104.3 kg)     Intake / Output / Drain:  Last 24 hrs.:     Intake/Output Summary (Last 24 hours) at 18 0807  Last data filed at 18 0715   Gross per 24 hour   Intake              720 ml   Output             1751 ml   Net            -1031 ml     Oxygen Therapy:  Oxygen Therapy  O2 Sat (%): 98 % (18 0700)  Pulse via Oximetry: 93 beats per minute (18 1226)  O2 Device: Room air (18 0805)  O2 Flow Rate (L/min): 4 l/min (18 1226)      Recent Labs:   Labs Latest Ref Rng & Units 2018   WBC 4.1 - 11.1 K/uL 7.9 - 10.8 - 8.8 8.9 12. 7(H)   RBC 4.10 - 5.70 M/uL 5.10 - 4.99 - 4.91 5.19 5.22   Hemoglobin 12.1 - 17.0 g/dL 15.7 - 15.5 - 15.0 16.0 16.0   Hematocrit 36.6 - 50.3 % 50. 5(H) - 48.3 - 46.7 48.4 49.8   MCV 80.0 - 99.0 FL 99.0 - 96.8 - 95.1 93.3 95.4   Platelets 789 - 897 K/uL 269 - 285 - 255 301 266   Lymphocytes 12 - 49 % 48 - 47 - 34 54(H) 27   Monocytes 5 - 13 % 6 - 6 - 7 8 10   Eosinophils 0 - 7 % 1 - 2 - 0 1 0   Basophils 0 - 1 % 0 - 0 - 0 0 0   Albumin 3.5 - 5.0 g/dL 2. 4(L) 2. 6(L) 2. 6(L) - - 2. 9(L) 3. 1(L)   Calcium 8.5 - 10.1 MG/DL 8.5 8.6 8.5 - 8.4(L) 8.8 8.8   SGOT 15 - 37 U/L 24 22 21 - - 33 25   Glucose 65 - 100 mg/dL 86 90 77 - 101(H) 98 112(H)   BUN 6 - 20 MG/DL 9 8 8 - 10 11 10   Creatinine 0.70 - 1.30 MG/DL 1.16 1.07 1.03 - 1.13 1.14 1.27   Sodium 136 - 145 mmol/L 141 139 142 138 138 135(L) 147(H)   Potassium 3.5 - 5.1 mmol/L 4.2 4.2 3.8 3.8 4.8 4.1 3.5   TSH 0.36 - 3.74 uIU/mL - - - - 0.80 - -     EKG:   EKG Results     Procedure 720 Value Units Date/Time    SCANNED CARDIAC RHYTHM STRIP [[id number]] Collected:  07/22/18 0319    Order Status:  Completed Updated:  07/22/18 0701    SCANNED CARDIAC RHYTHM STRIP [[id number]] Collected:  07/22/18 0252    Order Status:  Completed Updated:  07/22/18 0656        Echocardiogram:   7/17/18  Left ventricle: The ventricle was mildly to moderately dilated. Systolic  function was markedly reduced by visual assessment. Ejection fraction was  estimated in the range of 10 % to 20 %, challenged determination in  presence of what appeared to be rapid atrial fibrillation. There was    Cardiac MRI scheduled 7/24    Nuclear Studies:   7/17/18- Pending read    Cardiac Catheterizations: Scheduled 7/23    Radiology (CXR, CT scans):    CXR Results  (Last 48 hours)               07/21/18 0838  XR CHEST PORT Final result    Impression:  IMPRESSION: Unchanged right basilar consolidation and small right pleural   effusion. Narrative:  EXAM:  XR CHEST PORT       INDICATION:  Dyspnea with exertion       COMPARISON:  7/19/2018       FINDINGS: A portable AP radiograph of the chest was obtained at 0835 hours. There is unchanged patchy right basilar consolidation and small right pleural   effusion. There is no pneumothorax or left pleural effusion. The left lung is   clear.   Cardiac, mediastinal and hilar contours are stable. CT Results (most recent):    Results from Hospital Encounter encounter on 07/17/18   CTA CHEST W OR W WO CONT   Narrative EXAM:  CTA CHEST W OR W WO CONT    INDICATION:   Irregular heartbeat, pleural effusion seen on chest radiograph    COMPARISON: Chest radiograph performed earlier the same day. TECHNIQUE:   Precontrast  images were obtained to localize the volume for acquisition. Multislice helical CT arteriography was performed from the diaphragm to the  thoracic inlet during uneventful rapid bolus of 100 cc Isovue-370. Lung and soft  tissue windows were generated. Coronal and sagittal images were generated and  3D post processing consisting of coronal maximum intensity images was performed. CT dose reduction was achieved through use of a standardized protocol tailored  for this examination and automatic exposure control for dose modulation. FINDINGS:  CHEST:  THYROID: No nodule. MEDIASTINUM: No mass or lymphadenopathy. GRAYSON: No mass or lymphadenopathy. THORACIC AORTA: No aneurysm. Not well opacified. MAIN PULMONARY ARTERY: There is no evidence of pulmonary embolism. TRACHEA/BRONCHI: Patent. ESOPHAGUS: No wall thickening or dilatation. HEART: Dilatation of the left atrium and left ventricle is noted. PLEURA: There is a moderate right pleural effusion  LUNGS: Right middle and right lower lobe infiltrate is noted. Atelectasis is  seen in the left lower lobe. INCIDENTALLY IMAGED UPPER ABDOMEN: No focal abnormality. BONES: No destructive bone lesion. Impression IMPRESSION: Moderate right pleural effusion with right middle and right lower  lobe infiltrates. Left lower lobe atelectasis. No evidence of pulmonary  embolism.           Rick Vincent NP  [address], [address] [place] [address], [address] [place] [address]  Office [phone]  Fax [phone]  24 hour VAD/HF Pager: [phone]

## 2018-07-22 NOTE — PROGRESS NOTES
1930: Bedside and Verbal shift change report given to [address] [place] [address] [address], RN (oncoming nurse) by Gadiel Farley RN (offgoing nurse). Report included the following information SBAR, Kardex, ED Summary, Procedure Summary, Intake/Output, MAR, Recent Results and Cardiac Rhythm Sinus Tach. 2330: Bedside and Verbal shift change report given to PAPO Patel (oncoming nurse) by [address], RN (offgoing nurse). Report included the following information SBAR, Kardex, ED Summary, Procedure Summary, Intake/Output, MAR, Recent Results and Cardiac Rhythm Sinus Tach.

## 2018-07-23 ENCOUNTER — APPOINTMENT (OUTPATIENT)
Dept: ULTRASOUND IMAGING | Age: 38
DRG: 286 | End: 2018-07-23
Attending: INTERNAL MEDICINE
Payer: SUBSIDIZED

## 2018-07-23 ENCOUNTER — APPOINTMENT (OUTPATIENT)
Dept: GENERAL RADIOLOGY | Age: 38
DRG: 286 | End: 2018-07-23
Attending: RADIOLOGY
Payer: SUBSIDIZED

## 2018-07-23 LAB
ALBUMIN SERPL-MCNC: 2.6 G/DL (ref 3.5–5)
ALBUMIN/GLOB SERPL: 0.5 {RATIO} (ref 1.1–2.2)
ALP SERPL-CCNC: 126 U/L (ref 45–117)
ALT SERPL-CCNC: 23 U/L (ref 12–78)
ANION GAP SERPL CALC-SCNC: 11 MMOL/L (ref 5–15)
APPEARANCE FLD: ABNORMAL
ARSENIC 24H UR-MCNC: NORMAL UG/L (ref 0–50)
AST SERPL-CCNC: 24 U/L (ref 15–37)
BACTERIA SPEC CULT: NORMAL
BASOPHILS # BLD: 0 K/UL (ref 0–0.1)
BASOPHILS NFR BLD: 1 % (ref 0–1)
BILIRUB SERPL-MCNC: 0.8 MG/DL (ref 0.2–1)
BNP SERPL-MCNC: 2629 PG/ML (ref 0–125)
BODY FLD TYPE: NORMAL
BUN SERPL-MCNC: 8 MG/DL (ref 6–20)
BUN/CREAT SERPL: 7 (ref 12–20)
CALCIUM SERPL-MCNC: 8.7 MG/DL (ref 8.5–10.1)
CHLORIDE SERPL-SCNC: 110 MMOL/L (ref 97–108)
CO2 SERPL-SCNC: 19 MMOL/L (ref 21–32)
COLLECT DURATION TIME UR: NORMAL HR
COLOR FLD: YELLOW
CREAT SERPL-MCNC: 1.1 MG/DL (ref 0.7–1.3)
CREAT UR-MCNC: 0.69 G/L (ref 0.3–3)
DIFFERENTIAL METHOD BLD: ABNORMAL
EOSINOPHIL # BLD: 0.1 K/UL (ref 0–0.4)
EOSINOPHIL NFR BLD: 1 % (ref 0–7)
ERYTHROCYTE [DISTWIDTH] IN BLOOD BY AUTOMATED COUNT: 15.7 % (ref 11.5–14.5)
GLOBULIN SER CALC-MCNC: 4.8 G/DL (ref 2–4)
GLUCOSE FLD-MCNC: 94 MG/DL
GLUCOSE SERPL-MCNC: 97 MG/DL (ref 65–100)
HCT VFR BLD AUTO: 47.3 % (ref 36.6–50.3)
HGB BLD-MCNC: 15 G/DL (ref 12.1–17)
IMM GRANULOCYTES # BLD: 0 K/UL (ref 0–0.04)
IMM GRANULOCYTES NFR BLD AUTO: 0 % (ref 0–0.5)
INORG ARSENIC UR-MCNC: NORMAL UG/L (ref 0–19)
LDH SERPL L TO P-CCNC: 342 U/L (ref 85–241)
LEAD 24H UR-MCNC: 1 UG/L (ref 0–49)
LEAD 24H UR-MRATE: 0 UG/24 HR (ref 0–80)
LEAD/CREAT UR: 1 UG/G CREAT (ref 0–49)
LYMPHOCYTES # BLD: 5.2 K/UL (ref 0.8–3.5)
LYMPHOCYTES NFR BLD: 64 % (ref 12–49)
LYMPHOCYTES NFR FLD: 70 %
MCH RBC QN AUTO: 31.2 PG (ref 26–34)
MCHC RBC AUTO-ENTMCNC: 31.7 G/DL (ref 30–36.5)
MCV RBC AUTO: 98.3 FL (ref 80–99)
MERCURY 24H UR-MCNC: NORMAL UG/L (ref 0–19)
MESOTHL CELL NFR FLD: 2 %
MONOCYTES # BLD: 0.4 K/UL (ref 0–1)
MONOCYTES NFR BLD: 5 % (ref 5–13)
MONOS+MACROS NFR FLD: 17 %
NEUTROPHILS NFR FLD: 11 %
NEUTS SEG # BLD: 2.3 K/UL (ref 1.8–8)
NEUTS SEG NFR BLD: 29 % (ref 32–75)
NRBC # BLD: 0 K/UL (ref 0–0.01)
NRBC BLD-RTO: 0 PER 100 WBC
NUC CELL # FLD: 2642 /CU MM
PH FLD: 7 [PH]
PLATELET # BLD AUTO: 297 K/UL (ref 150–400)
PMV BLD AUTO: 9.9 FL (ref 8.9–12.9)
POTASSIUM SERPL-SCNC: 4.2 MMOL/L (ref 3.5–5.1)
PROT FLD-MCNC: 2.3 G/DL
PROT SERPL-MCNC: 7.4 G/DL (ref 6.4–8.2)
RBC # BLD AUTO: 4.81 M/UL (ref 4.1–5.7)
RBC # FLD: >100 /CU MM
SERVICE CMNT-IMP: NORMAL
SODIUM SERPL-SCNC: 140 MMOL/L (ref 136–145)
SPECIMEN SOURCE FLD: ABNORMAL
SPECIMEN SOURCE FLD: NORMAL
SPECIMEN SOURCE FLD: NORMAL
SPECIMEN VOL ?TM UR: NORMAL ML
WBC # BLD AUTO: 8.1 K/UL (ref 4.1–11.1)

## 2018-07-23 PROCEDURE — C1751 CATH, INF, PER/CENT/MIDLINE: HCPCS

## 2018-07-23 PROCEDURE — 83615 LACTATE (LD) (LDH) ENZYME: CPT | Performed by: HOSPITALIST

## 2018-07-23 PROCEDURE — C1894 INTRO/SHEATH, NON-LASER: HCPCS

## 2018-07-23 PROCEDURE — 74011000250 HC RX REV CODE- 250: Performed by: SPECIALIST

## 2018-07-23 PROCEDURE — 94762 N-INVAS EAR/PLS OXIMTRY CONT: CPT

## 2018-07-23 PROCEDURE — B2111ZZ FLUOROSCOPY OF MULTIPLE CORONARY ARTERIES USING LOW OSMOLAR CONTRAST: ICD-10-PCS | Performed by: SPECIALIST

## 2018-07-23 PROCEDURE — 74011636320 HC RX REV CODE- 636/320: Performed by: SPECIALIST

## 2018-07-23 PROCEDURE — 85025 COMPLETE CBC W/AUTO DIFF WBC: CPT | Performed by: NURSE PRACTITIONER

## 2018-07-23 PROCEDURE — 88305 TISSUE EXAM BY PATHOLOGIST: CPT | Performed by: FAMILY MEDICINE

## 2018-07-23 PROCEDURE — 80053 COMPREHEN METABOLIC PANEL: CPT | Performed by: NURSE PRACTITIONER

## 2018-07-23 PROCEDURE — 99153 MOD SED SAME PHYS/QHP EA: CPT

## 2018-07-23 PROCEDURE — 74011250636 HC RX REV CODE- 250/636: Performed by: INTERNAL MEDICINE

## 2018-07-23 PROCEDURE — 74011250636 HC RX REV CODE- 250/636: Performed by: SPECIALIST

## 2018-07-23 PROCEDURE — 4A023N7 MEASUREMENT OF CARDIAC SAMPLING AND PRESSURE, LEFT HEART, PERCUTANEOUS APPROACH: ICD-10-PCS | Performed by: SPECIALIST

## 2018-07-23 PROCEDURE — 77030013744

## 2018-07-23 PROCEDURE — 88112 CYTOPATH CELL ENHANCE TECH: CPT | Performed by: FAMILY MEDICINE

## 2018-07-23 PROCEDURE — 36415 COLL VENOUS BLD VENIPUNCTURE: CPT | Performed by: NURSE PRACTITIONER

## 2018-07-23 PROCEDURE — 77030013406 HC CATH CTRL EDWD -B

## 2018-07-23 PROCEDURE — 65660000000 HC RM CCU STEPDOWN

## 2018-07-23 PROCEDURE — 74011250637 HC RX REV CODE- 250/637: Performed by: INTERNAL MEDICINE

## 2018-07-23 PROCEDURE — 94760 N-INVAS EAR/PLS OXIMETRY 1: CPT

## 2018-07-23 PROCEDURE — 99233 SBSQ HOSP IP/OBS HIGH 50: CPT | Performed by: INTERNAL MEDICINE

## 2018-07-23 PROCEDURE — 74011250637 HC RX REV CODE- 250/637: Performed by: NURSE PRACTITIONER

## 2018-07-23 PROCEDURE — 32555 ASPIRATE PLEURA W/ IMAGING: CPT

## 2018-07-23 PROCEDURE — 77030004533 HC CATH ANGI DX IMP BSC -B

## 2018-07-23 PROCEDURE — 71045 X-RAY EXAM CHEST 1 VIEW: CPT

## 2018-07-23 PROCEDURE — C1729 CATH, DRAINAGE: HCPCS

## 2018-07-23 PROCEDURE — 83880 ASSAY OF NATRIURETIC PEPTIDE: CPT | Performed by: NURSE PRACTITIONER

## 2018-07-23 RX ORDER — SODIUM CHLORIDE 0.9 % (FLUSH) 0.9 %
10 SYRINGE (ML) INJECTION AS NEEDED
Status: DISCONTINUED | OUTPATIENT
Start: 2018-07-23 | End: 2018-07-23

## 2018-07-23 RX ORDER — MIDAZOLAM HYDROCHLORIDE 1 MG/ML
.5-1 INJECTION, SOLUTION INTRAMUSCULAR; INTRAVENOUS
Status: DISCONTINUED | OUTPATIENT
Start: 2018-07-23 | End: 2018-07-23

## 2018-07-23 RX ORDER — PRASUGREL 10 MG/1
10-60 TABLET, FILM COATED ORAL AS NEEDED
Status: DISCONTINUED | OUTPATIENT
Start: 2018-07-23 | End: 2018-07-23

## 2018-07-23 RX ORDER — CLOPIDOGREL 300 MG/1
600 TABLET, FILM COATED ORAL AS NEEDED
Status: DISCONTINUED | OUTPATIENT
Start: 2018-07-23 | End: 2018-07-23

## 2018-07-23 RX ORDER — LIDOCAINE HYDROCHLORIDE 10 MG/ML
10 INJECTION, SOLUTION EPIDURAL; INFILTRATION; INTRACAUDAL; PERINEURAL ONCE
Status: COMPLETED | OUTPATIENT
Start: 2018-07-23 | End: 2018-07-23

## 2018-07-23 RX ORDER — HEPARIN SODIUM 1000 [USP'U]/ML
10000 INJECTION, SOLUTION INTRAVENOUS; SUBCUTANEOUS
Status: DISCONTINUED | OUTPATIENT
Start: 2018-07-23 | End: 2018-07-23

## 2018-07-23 RX ORDER — SODIUM CHLORIDE 9 MG/ML
1.5 INJECTION, SOLUTION INTRAVENOUS CONTINUOUS
Status: DISCONTINUED | OUTPATIENT
Start: 2018-07-23 | End: 2018-07-23

## 2018-07-23 RX ORDER — SODIUM CHLORIDE 0.9 % (FLUSH) 0.9 %
5-10 SYRINGE (ML) INJECTION AS NEEDED
Status: DISCONTINUED | OUTPATIENT
Start: 2018-07-23 | End: 2018-07-23

## 2018-07-23 RX ORDER — ENOXAPARIN SODIUM 100 MG/ML
1 INJECTION SUBCUTANEOUS EVERY 12 HOURS
Status: DISCONTINUED | OUTPATIENT
Start: 2018-07-23 | End: 2018-07-26 | Stop reason: HOSPADM

## 2018-07-23 RX ORDER — FENTANYL CITRATE 50 UG/ML
25-200 INJECTION, SOLUTION INTRAMUSCULAR; INTRAVENOUS
Status: DISCONTINUED | OUTPATIENT
Start: 2018-07-23 | End: 2018-07-23

## 2018-07-23 RX ORDER — ATROPINE SULFATE 0.1 MG/ML
1 INJECTION INTRAVENOUS AS NEEDED
Status: DISCONTINUED | OUTPATIENT
Start: 2018-07-23 | End: 2018-07-23

## 2018-07-23 RX ORDER — LIDOCAINE HYDROCHLORIDE 10 MG/ML
10-30 INJECTION INFILTRATION; PERINEURAL ONCE
Status: COMPLETED | OUTPATIENT
Start: 2018-07-23 | End: 2018-07-23

## 2018-07-23 RX ORDER — SODIUM CHLORIDE 9 MG/ML
3 INJECTION, SOLUTION INTRAVENOUS CONTINUOUS
Status: DISCONTINUED | OUTPATIENT
Start: 2018-07-23 | End: 2018-07-23

## 2018-07-23 RX ORDER — HEPARIN SODIUM 200 [USP'U]/100ML
1000 INJECTION, SOLUTION INTRAVENOUS AS NEEDED
Status: DISCONTINUED | OUTPATIENT
Start: 2018-07-23 | End: 2018-07-23

## 2018-07-23 RX ORDER — SODIUM CHLORIDE 0.9 % (FLUSH) 0.9 %
5-10 SYRINGE (ML) INJECTION EVERY 8 HOURS
Status: DISCONTINUED | OUTPATIENT
Start: 2018-07-23 | End: 2018-07-23

## 2018-07-23 RX ADMIN — Medication 400 MG: at 08:38

## 2018-07-23 RX ADMIN — SODIUM CHLORIDE 3 ML/KG/HR: 900 INJECTION, SOLUTION INTRAVENOUS at 12:49

## 2018-07-23 RX ADMIN — FENTANYL CITRATE 25 MCG: 50 INJECTION, SOLUTION INTRAMUSCULAR; INTRAVENOUS at 13:27

## 2018-07-23 RX ADMIN — HEPARIN SODIUM 2000 UNITS: 200 INJECTION, SOLUTION INTRAVENOUS at 13:29

## 2018-07-23 RX ADMIN — ENOXAPARIN SODIUM 100 MG: 100 INJECTION SUBCUTANEOUS at 16:24

## 2018-07-23 RX ADMIN — CARVEDILOL 3.12 MG: 3.12 TABLET, FILM COATED ORAL at 08:38

## 2018-07-23 RX ADMIN — LOSARTAN POTASSIUM 25 MG: 25 TABLET, FILM COATED ORAL at 08:38

## 2018-07-23 RX ADMIN — MIDAZOLAM HYDROCHLORIDE 1 MG: 1 INJECTION, SOLUTION INTRAMUSCULAR; INTRAVENOUS at 13:20

## 2018-07-23 RX ADMIN — Medication 10 ML: at 16:24

## 2018-07-23 RX ADMIN — Medication 10 ML: at 06:00

## 2018-07-23 RX ADMIN — ENOXAPARIN SODIUM 105 MG: 120 INJECTION SUBCUTANEOUS at 00:30

## 2018-07-23 RX ADMIN — FENTANYL CITRATE 25 MCG: 50 INJECTION, SOLUTION INTRAMUSCULAR; INTRAVENOUS at 13:37

## 2018-07-23 RX ADMIN — FENTANYL CITRATE 25 MCG: 50 INJECTION, SOLUTION INTRAMUSCULAR; INTRAVENOUS at 13:20

## 2018-07-23 RX ADMIN — AMIODARONE HYDROCHLORIDE 400 MG: 200 TABLET ORAL at 08:38

## 2018-07-23 RX ADMIN — IOPAMIDOL 38 ML: 755 INJECTION, SOLUTION INTRAVENOUS at 14:31

## 2018-07-23 RX ADMIN — LIDOCAINE HYDROCHLORIDE 10 ML: 10 INJECTION, SOLUTION INFILTRATION; PERINEURAL at 13:38

## 2018-07-23 RX ADMIN — MIDAZOLAM HYDROCHLORIDE 1 MG: 1 INJECTION, SOLUTION INTRAMUSCULAR; INTRAVENOUS at 13:37

## 2018-07-23 RX ADMIN — AMIODARONE HYDROCHLORIDE 400 MG: 200 TABLET ORAL at 21:09

## 2018-07-23 RX ADMIN — MIDAZOLAM HYDROCHLORIDE 1 MG: 1 INJECTION, SOLUTION INTRAMUSCULAR; INTRAVENOUS at 13:27

## 2018-07-23 RX ADMIN — LIDOCAINE HYDROCHLORIDE 10 ML: 10 INJECTION, SOLUTION EPIDURAL; INFILTRATION; INTRACAUDAL; PERINEURAL at 12:13

## 2018-07-23 RX ADMIN — POTASSIUM CHLORIDE 20 MEQ: 750 TABLET, EXTENDED RELEASE ORAL at 08:38

## 2018-07-23 RX ADMIN — CARVEDILOL 3.12 MG: 3.12 TABLET, FILM COATED ORAL at 16:24

## 2018-07-23 RX ADMIN — Medication 10 ML: at 21:09

## 2018-07-23 NOTE — PROCEDURES
Cardiac Catheterization Procedure Note   Patient: Suly Rodriguez  MRN: [id number]  SSN: [id number]   YOB: 1980 Age: 43 y.o. Sex: male    Date of Procedure: 7/23/2018   Pre-procedure Diagnosis: Congestive Heart Failure  Post-procedure Diagnosis: Congestive Heart Failure  Procedure: Left Heart Cath, Right Heart Cath and coronaries, no lvgram  :  Dr. Sissy Shrestha MD    Assistant(s):  None  Anesthesia: Moderate Sedation   Estimated Blood Loss: Less than 10 mL   Specimens Removed: None  Findings: normal coronaries. lvedp 16, jennifer 6.33/2.74 thermal 5.37/2.32, ra 12 rv 34/6/14, pa 36/20/26, pcwp 18.   Complications: None   Implants:  None  Signed by:  Sissy Shrestha MD  7/23/2018  2:14 PM

## 2018-07-23 NOTE — PROGRESS NOTES
0730:Bedside shift change report given to Adol[address] Campbell 81 (oncoming nurse) by Yuriy Duran RN (offgoing nurse). Report included the following information SBAR, Kardex, Procedure Summary, Intake/Output, MAR, Recent Results, Med Rec Status and Cardiac Rhythm NSR, sinus tach . 1100: Patient left for thoracentesis and after will go to cath lab  TRANSFER - OUT REPORT:    Verbal report given to [facility] RN(name) on Dora Ovalle  being transferred to Cath lab (unit) for routine progression of care       Report consisted of patients Situation, Background, Assessment and   Recommendations(SBAR). Information from the following report(s) SBAR, Kardex, Procedure Summary, Intake/Output, MAR, Recent Results, Med Rec Status and Cardiac Rhythm NSR\ was reviewed with the receiving nurse. Lines:   Peripheral IV 07/17/18 Left Antecubital (Active)   Site Assessment Other (Comment) 7/23/2018 11:55 AM   Phlebitis Assessment 0 7/23/2018  7:53 AM   Infiltration Assessment 0 7/23/2018  7:53 AM   Dressing Status Clean, dry, & intact 7/23/2018  7:53 AM   Dressing Type Transparent;Tape 7/23/2018  7:53 AM   Hub Color/Line Status Pink;Flushed;Patent;Capped 7/23/2018  7:53 AM   Action Taken Open ports on tubing capped 7/23/2018  7:53 AM   Alcohol Cap Used Yes 7/23/2018  7:53 AM        Opportunity for questions and clarification was provided. Patient transported with:   Tech     1500: TRANSFER - IN REPORT:    Verbal report received from Rosa Taylor (name) on Dora Ovalle  being received from Cath Lab(unit) for routine progression of care      Report consisted of patients Situation, Background, Assessment and   Recommendations(SBAR). Information from the following report(s) SBAR, Kardex, Procedure Summary, Intake/Output, MAR, Recent Results, Med Rec Status and Cardiac Rhythm NSR was reviewed with the receiving nurse. Opportunity for questions and clarification was provided.       Assessment completed upon patients arrival to unit and care assumed. 1930: Bedside shift change report given to Jing Boyd (oncoming nurse) by Maida Bradshaw (offgoing nurse). Report included the following information SBAR, Kardex, Procedure Summary, Intake/Output, MAR, Recent Results, Med Rec Status and Cardiac Rhythm NSR, sinus tach . Groin site is dry, clean, no bleeding no hematoma       Problem: Falls - Risk of  Goal: *Absence of Falls  Document Ayaan Fall Risk and appropriate interventions in the flowsheet. Outcome: Progressing Towards Goal  Fall Risk Interventions:  Patient remains free of falls   Mobility Interventions: Patient to call before getting OOB     Medication Interventions: Patient to call before getting OOB, Evaluate medications/consider consulting pharmacy    Elimination Interventions: Call light in reach, Urinal in reach         Problem: Heart Failure: Day 4  Goal: *Oxygen saturation within defined limits  Outcome: Progressing Towards Goal  RA  Goal: *Hemodynamically stable  Outcome: Progressing Towards Goal  VSS  Goal: *Optimal pain control at patient's stated goal  Outcome: Progressing Towards Goal  No complaints pf pain   Goal: *Anxiety reduced or absent  Outcome: Progressing Towards Goal  No s/sx of anxiety   Goal: *Demonstrates progressive activity  Outcome: Progressing Towards Goal  Patient up ad nia     Problem: Pressure Injury - Risk of  Goal: *Prevention of pressure injury  Document Clayton Scale and appropriate interventions in the flowsheet.    Outcome: Progressing Towards Goal  Pressure Injury Interventions:  Turns self, no pressure ulcers noted         Activity Interventions: Pressure redistribution bed/mattress(bed type), Increase time out of bed    Mobility Interventions: Pressure redistribution bed/mattress (bed type)    Nutrition Interventions: Discuss nutritional consult with provider, Document food/fluid/supplement intake    Friction and Shear Interventions: Minimize layers

## 2018-07-23 NOTE — PROGRESS NOTES
Hospitalist Progress Note  Brook Louise MD  Office: [phone]        Date of Service:  2018  NAME:  Pete Spann  :  1980  MRN:  [id number]      Admission Summary:   43 y.o.  male with past medical history of remote perforated viscus, sepsis, and bacteremia, s/p exploratory laparotomy, repair of ruptured viscus presented as direct admission/ transfer from [facility] to [facility] CVICU with newly diagnosed rapid atrial fibrillation (afib), acute systolic CHF, right sided pleural effusion with infiltrates. Interval history / Subjective:   Lying in bed comfortably. He had cardiac cath today. No sob while at rest,has minimal sob when up or moving;no chest pain     Assessment & Plan:      Acute systolic CHF,non ischemic cardiomyopathy,suspect viral myocarditis  --medical mx with coreg and losartan. --Echocardiogram with EF of 10-20%. Cardiac cath with normal coronaries. Cardiac MRI tomorrow. New onset Afib,now rate controlled,NSR S/p cardioversion : continue amiodarone,coreg  Tabacco abuse: counseled on quitting. He is motivated. No craving,he does not need nicotine replacement,. Pneumonia with right sided parapneumonia effusion,continue antibiotics. -S/p US guided thoracentesis s/p removal of 1100 ml clear fluid. Appears exudative. Added LDH and protein. DVT px:lovenox. Care Plan discussed with: Patient/Family and Nurse  Disposition: TBD     Hospital Problems  Date Reviewed: 2018          Codes Class Noted POA    Acute systolic heart failure (HCC) ICD-10-CM: I50.21  ICD-9-CM: 428.21  2018 Unknown        Pleural effusion, right ICD-10-CM: J90  ICD-9-CM: 511.9  2018 Unknown        * (Principal)Atrial fibrillation with RVR (HCC) ICD-10-CM: I48.91  ICD-9-CM: 427.31  2018 Unknown                Review of Systems:   Pertinent items are noted in HPI. Vital Signs:    Last 24hrs VS reviewed since prior progress note. Most recent are:  Visit Vitals    BP (!) 114/95 (BP 1 Location: Right arm, BP Patient Position: At rest)    Pulse (!) 103    Temp 97.6 °F (36.4 °C)    Resp 16    Wt 102.1 kg (225 lb 1.4 oz)    SpO2 98%    BMI 28.13 kg/m2         Intake/Output Summary (Last 24 hours) at 07/23/18 1730  Last data filed at 07/23/18 1545   Gross per 24 hour   Intake             1300 ml   Output             2800 ml   Net            -1500 ml        Physical Examination:             Constitutional:  No acute distress, cooperative, pleasant    ENT:  Oral mucous moist, oropharynx benign. Neck supple,    Resp:  CTA bilaterally. No wheezing/rhonchi/rales. No accessory muscle use   CV:  Regular rhythm, normal rate, no murmurs, gallops, rubs    GI:  Soft, non distended, non tender. normoactive bowel sounds, no hepatosplenomegaly     Musculoskeletal:  No edema, warm, 2+ pulses throughout    Neurologic:  Moves all extremities. AAOx3, CN II-XII reviewed            Data Review:    Review and/or order of clinical lab test      Labs:     Recent Labs      07/23/18   0409 07/22/18   0343   WBC  8.1  7.9   HGB  15.0  15.7   HCT  47.3  50.5*   PLT  297  269     Recent Labs      07/23/18   0409  07/22/18   0343  07/21/18   0832   NA  140  141  139   K  4.2  4.2  4.2   CL  110*  111*  111*   CO2  19*  23  22   BUN  8  9  8   CREA  1.10  1.16  1.07   GLU  97  86  90   CA  8.7  8.5  8.6     Recent Labs      07/23/18   0409  07/22/18   0343  07/21/18   0832   SGOT  24  24  22   ALT  23  21  23   AP  126*  123*  132*   TBILI  0.8  0.7  1.0   TP  7.4  7.0  7.4   ALB  2.6*  2.4*  2.6*   GLOB  4.8*  4.6*  4.8*     No results for input(s): INR, PTP, APTT in the last 72 hours. No lab exists for component: INREXT, INREXT   No results for input(s): FE, TIBC, PSAT, FERR in the last 72 hours.    No results found for: FOL, RBCF   No results for input(s): PH, PCO2, PO2 in the last 72 hours. No results for input(s): CPK, CKNDX, TROIQ in the last 72 hours.     No lab exists for component: CPKMB  No results found for: CHOL, CHOLX, CHLST, CHOLV, HDL, LDL, LDLC, DLDLP, TGLX, TRIGL, TRIGP, CHHD, CHHDX  No results found for: Lauroeda Bamberger  Lab Results   Component Value Date/Time    Color YELLOW/STRAW 07/17/2018 02:14 PM    Appearance CLEAR 07/17/2018 02:14 PM    Specific gravity 1.005 07/17/2018 02:14 PM    Specific gravity 1.010 07/08/2018 05:56 PM    pH (UA) 6.0 07/17/2018 02:14 PM    Protein NEGATIVE  07/17/2018 02:14 PM    Glucose NEGATIVE  07/17/2018 02:14 PM    Ketone NEGATIVE  07/17/2018 02:14 PM    Bilirubin NEGATIVE  07/17/2018 02:14 PM    Urobilinogen 0.2 07/17/2018 02:14 PM    Nitrites NEGATIVE  07/17/2018 02:14 PM    Leukocyte Esterase NEGATIVE  07/17/2018 02:14 PM    Epithelial cells FEW 07/17/2018 02:14 PM    Bacteria NEGATIVE  07/17/2018 02:14 PM    WBC 0-4 07/17/2018 02:14 PM    RBC 0-5 07/17/2018 02:14 PM         Medications Reviewed:     Current Facility-Administered Medications   Medication Dose Route Frequency    enoxaparin (LOVENOX) injection 100 mg  1 mg/kg SubCUTAneous Q12H    losartan (COZAAR) tablet 25 mg  25 mg Oral DAILY    magnesium oxide (MAG-OX) tablet 400 mg  400 mg Oral DAILY    potassium chloride SR (KLOR-CON 10) tablet 20 mEq  20 mEq Oral DAILY    butamben-tetracaine-benzocaine (CETACAINE) 2 %-2 %-14 % (200 mg/sec) topical spray 1 Spray  1 Spray Topical QID PRN    benzocaine (HURRICANE) 20 % spray   Mucous Membrane PRN    carvedilol (COREG) tablet 3.125 mg  3.125 mg Oral BID WITH MEALS    amiodarone (CORDARONE) tablet 400 mg  400 mg Oral BID    sodium chloride (NS) flush 5-10 mL  5-10 mL IntraVENous Q8H    sodium chloride (NS) flush 5-10 mL  5-10 mL IntraVENous PRN     ______________________________________________________________________  EXPECTED LENGTH OF STAY: 3d 12h  ACTUAL LENGTH OF STAY:          4                 [address], [place]

## 2018-07-23 NOTE — PROGRESS NOTES
1930: Bedside and Verbal shift change report given to PAPO Ulloa (oncoming nurse) by George Pal RN (offgoing nurse). Report included the following information SBAR, Kardex, ED Summary, Intake/Output, MAR, Recent Results and Med Rec Status.

## 2018-07-23 NOTE — PROGRESS NOTES
Cardiac Cath Lab Recovery Arrival Note:      Cornelia Sharma arrived to Cardiac Cath Lab, Recovery Area. Staff introduced to patient. Patient identifiers verified with NAME and DATE OF BIRTH. Procedure verified with patient. Consent forms reviewed and signed by patient or authorized representative and verified. Allergies verified. Patient and family oriented to department. Patient and family informed of procedure and plan of care. Questions answered with review. Patient prepped for procedure, per orders from physician, prior to arrival.    Patient on cardiac monitor, non-invasive blood pressure, SPO2 monitor. On RA. Patient is A&Ox 4. Patient reports no c/o's. Patient in stretcher, in low position, with side rails up, call bell within reach, patient instructed to call if assistance as needed. Patient prep in: [address], [place] [address].    Patient family has pager # n/a  Family in: hospital.   Prep by: Raudel Ahuja RN

## 2018-07-23 NOTE — PROGRESS NOTES
Cardiac Cath Lab Procedure Area Arrival Note:    Valeriy Gomez arrived to Cardiac Cath Lab, Procedure Area. Patient identifiers verified with NAME and DATE OF BIRTH. Procedure verified with patient. Consent forms verified. Allergies verified. Patient informed of procedure and plan of care. Questions answered with review. Patient voiced understanding of procedure and plan of care. Patient on cardiac monitor, non-invasive blood pressure, SPO2 monitor. On O2 @ 2 lpm via nasal cannula. IV of normal saline on pump at 10 ml/hr. Patient status doing well without problems. Patient is A&Ox 4. Patient reports no pain. Patient medicated during procedure with orders obtained and verified by Dr. Maria Elena Nieto. Refer to patients Cardiac Cath Lab PROCEDURE REPORT for vital signs, assessment, status, and response during procedure, printed at end of case. Printed report on chart or scanned into chart.

## 2018-07-23 NOTE — PROGRESS NOTES
1155: pt to ED US for R thoracentesis  1203: procedure start  1210: 6 Maltese thoracentesis drain placed in r chest by Dr. Georgina Meza- currently draining  1215: 1100 mls serous fluid drained and drain subsequently removed. Awaiting chest xray  1220: TRANSFER - OUT REPORT:    Verbal report given to [address] [place] [address] on Dora Ovalle  being transferred to Cath Lab for routine progression of care       Report consisted of patients Situation, Background, Assessment and   Recommendations(SBAR). Information from the following report(s) SBAR, Procedure Summary and Intake/Output was reviewed with the receiving nurse. Lines:   Peripheral IV 07/17/18 Left Antecubital (Active)   Site Assessment Other (Comment) 7/23/2018 11:55 AM   Phlebitis Assessment 0 7/23/2018  7:53 AM   Infiltration Assessment 0 7/23/2018  7:53 AM   Dressing Status Clean, dry, & intact 7/23/2018  7:53 AM   Dressing Type Transparent;Tape 7/23/2018  7:53 AM   Hub Color/Line Status Pink;Flushed;Patent;Capped 7/23/2018  7:53 AM   Action Taken Open ports on tubing capped 7/23/2018  7:53 AM   Alcohol Cap Used Yes 7/23/2018  7:53 AM        Opportunity for questions and clarification was provided. Patient transported with:   Registered Nurse    Updated floor [place] with plan of care. Chest xray at bedside.

## 2018-07-23 NOTE — PROGRESS NOTES
TRANSFER - IN REPORT:    Verbal report received from [facility]  being received from procedural area for routine progression of care. Report consisted of patients Situation, Background, Assessment and Recommendations(SBAR). Information from the following report(s) Procedure Summary, MAR and Recent Results was reviewed with the receiving clinician. Opportunity for questions and clarification was provided. Assessment completed upon patients arrival to [address] [place] [address] and care assumed. Cardiac Cath Lab Recovery Arrival Note:    Suly Rodriguez arrived to [facility] recovery area. Patient procedure= LHC. Patient on cardiac monitor, non-invasive blood pressure, SPO2 monitor. On RA . Brad Iraheta Patient status doing well without problems. Patient is A&Ox 3. Patient reports no c/o's. PROCEDURE SITE CHECK:    Procedure site:without any bleeding and hematoma, no pain/discomfort reported at procedure site. No change in patient status. Continue to monitor patient and status.

## 2018-07-23 NOTE — CARDIO/PULMONARY
Cardiac Rehab: Living with Heart Failure Book and CAD procedures booklet given to [facility]. Educated using teach back method. Discussed diagnosis definition and assessed patient understanding. Reviewed importance of daily weight monitoring and Low Sodium diet (1823-2769 mg. daily). Encouraged activity and rest periods within symptom limitations and as ordered by physician. Reviewed Coreg, purpose of medication, potential side effects, and compliance. Discussed importance of reporting signs and symptoms of exacerbation, and when to report them to the doctor, to prevent re-hospitalization. [facility] was encouraged to keep all appointments with doctor. Smoking history assessed. Patient is a non smoker. Discussed the Cardiac Rehab Program, benefits, format, and encouraged enrollment, when eligible. Patient is interested and we will follow up, by phone, once eligible. Kasia Levine RN    [facility] could benefit from further education on the following HF topics: Daily weights and early recognition of signs and symptoms of exacerbation. ** Digital scale left at bedside for [facility].

## 2018-07-23 NOTE — PROGRESS NOTES
Advanced Heart Failure Center Progress Note      DOS:   7/23/2018  NAME:  Jackson Calderon   MRN:   [id number]     REFERRING PROVIDER:  Dr. Yennifer Forrester: None  PRIMARY CARDIOLOGIST: Dr. Akilah Mckeon      Chief Complaint: SOB    HPI: 43y.o. year old male with no significant past medication history except for an exploratory laparotomy for a ruptured viscus- organ unknown who presented to [facility] with complaints of SOB. In the ED he was found to be in Afib with RVR, failed cardizem conversion, CTA was negative for PE but did show a pleural effusion. He was admitted and seen by Dr. Akilah Mckeon who initiated the myocarditis work up and contacted Dr Darrion Day for assistance in managing this patient's heart failure. He was transferred to [facility] last evening, seen by Dr. Thad aVng who plans a ROBERTO and cardioversion today. He endorses some anorexia and unintentional weight loss, he has been working but denies exposure to toxins. 24Hr Events  Remains in NSR   Dyspnea improved     Impression / Plan:   Heart Failure Status: NYHA Class III    Acute HFrEF, NICM, EF 10-20%  Heavy metal screen pending  Coxsackie positive  CMV IgG positive, IgM negative  EBV pending   L/RHC today   Cardiac MRI scheduled for Tuesday 7/[facility]  Cont Coreg 3.125 BID  Cont losartan 25mg daily today - watch renal function  Begin spironolactone post cath if Cr stable   No indication for diuretics at this time  Trend PBNP- up slightly today  Consider LHC and RHC- hopefully Monday     Afib w/ RVR  S/p Cardioversion  Remains in Sinus  Amio BID - mgmt per EP   Note plans for [address]   Cont enoxaparin as inpatient     All other care per primary team     History:  No past medical history on file.   Past Surgical History:   Procedure Laterality Date    CARDIOVERSION, ELECTIVE;EXTERN  7/20/2018          Social History     Social History    Marital status: SINGLE     Spouse name: N/A    Number of children: N/A    Years of education: N/A     Occupational History  Not on file. Social History Main Topics    Smoking status: Current Every Day Smoker     Packs/day: 0.50    Smokeless tobacco: Not on file    Alcohol use Yes      Comment: \"on weekends\"    Drug use: No    Sexual activity: Yes     Other Topics Concern    Not on file     Social History Narrative     No family history on file. Current Medications:   Current Facility-Administered Medications   Medication Dose Route Frequency Provider Last Rate Last Dose    losartan (COZAAR) tablet 25 mg  25 mg Oral DAILY Ilan Burnette, NP   25 mg at 07/23/18 0838    magnesium oxide (MAG-OX) tablet 400 mg  400 mg Oral DAILY Sarkise Candle, NP   400 mg at 07/23/18 1486    potassium chloride SR (KLOR-CON 10) tablet 20 mEq  20 mEq Oral DAILY Ilan Candle, NP   20 mEq at 07/23/18 0838    butamben-tetracaine-benzocaine (CETACAINE) 2 %-2 %-14 % (200 mg/sec) topical spray 1 Spray  1 Spray Topical QID PRN Ivonne Pastor MD        benzocaine (HURRICANE) 20 % spray   Mucous Membrane PRN Ivonne Pastor MD   2 Spray at 07/20/18 1153    carvedilol (COREG) tablet 3.125 mg  3.125 mg Oral BID WITH MEALS Ivonne Pastor MD   3.125 mg at 07/23/18 8159    amiodarone (CORDARONE) tablet 400 mg  400 mg Oral BID Ivonne Pastor MD   400 mg at 07/23/18 4425    sodium chloride (NS) flush 5-10 mL  5-10 mL IntraVENous Q8H Theodore Leventhal, MD   10 mL at 07/23/18 0600    sodium chloride (NS) flush 5-10 mL  5-10 mL IntraVENous PRN Theodore Leventhal, MD   10 mL at 07/20/18 2235       Allergies: No Known Allergies    ROS:    Constitutional: negative, feels well today   HENT: Negative. Respiratory: Positive for FLOWERS,  Negative for cough. Cardiovascular:  Negative for chest pain, leg swelling and PND. Gastrointestinal: Negative. Genitourinary: peeing a lot    Musculoskeletal: Negative. Skin: Negative. Neurological: Negative.         Physical Exam:   Constitutional: NAD, sitting up in bed, eating breakfast   HENT:   Head: Normocephalic. Eyes: Pupils are equal, round, and reactive to light. Neck: Normal range of motion. Neck supple. No JVD present. Cardiovascular: Normal heart sounds and intact distal pulses. Tachycardic but regular rhythm. Pulmonary/Chest: Effort normal and breath sounds normal.   Abdominal: Soft. Bowel sounds are normal. He exhibits no distension. Musculoskeletal: He exhibits no edema. Neurological: He is alert and oriented to person, place, and time. Skin: Skin is warm and dry. Nursing note and vitals reviewed. Vitals:   Visit Vitals    /87 (BP 1 Location: Right arm, BP Patient Position: At rest)    Pulse 99    Temp 97.9 °F (36.6 °C)    Resp 16    Wt 225 lb 1.4 oz (102.1 kg)    SpO2 100%    BMI 28.13 kg/m2         Temp (24hrs), Av.9 °F (36.6 °C), Min:97.6 °F (36.4 °C), Max:98.2 °F (36.8 °C)      Admission Weight: Last Weight   Weight: 228 lb 2.8 oz (103.5 kg) Weight: 225 lb 1.4 oz (102.1 kg)     Intake / Output / Drain:  Last 24 hrs.:     Intake/Output Summary (Last 24 hours) at 18 1143  Last data filed at 18 0753   Gross per 24 hour   Intake             1300 ml   Output             1750 ml   Net             -450 ml     Oxygen Therapy:  Oxygen Therapy  O2 Sat (%): 100 % (18 0758)  Pulse via Oximetry: 93 beats per minute (18 2110)  O2 Device: Room air (18 0758)  O2 Flow Rate (L/min): 4 l/min (18 1226)      Recent Labs:   Labs Latest Ref Rng & Units 2018   WBC 4.1 - 11.1 K/uL 8.1 7.9 - 10.8 - 8.8 8.9   RBC 4.10 - 5.70 M/uL 4.81 5.10 - 4.99 - 4.91 5.19   Hemoglobin 12.1 - 17.0 g/dL 15.0 15.7 - 15.5 - 15.0 16.0   Hematocrit 36.6 - 50.3 % 47.3 50. 5(H) - 48.3 - 46.7 48.4   MCV 80.0 - 99.0 FL 98.3 99.0 - 96.8 - 95.1 93.3   Platelets 474 - 053 K/uL 297 269 - 285 - 255 301   Lymphocytes 12 - 49 % 64(H) 48 - 47 - 34 54(H)   Monocytes 5 - 13 % 5 6 - 6 - 7 8   Eosinophils 0 - 7 % 1 1 - 2 - 0 1   Basophils 0 - 1 % 1 0 - 0 - 0 0   Albumin 3.5 - 5.0 g/dL 2. 6(L) 2. 4(L) 2. 6(L) 2. 6(L) - - 2. 9(L)   Calcium 8.5 - 10.1 MG/DL 8.7 8.5 8.6 8.5 - 8.4(L) 8.8   SGOT 15 - 37 U/L 24 24 22 21 - - 33   Glucose 65 - 100 mg/dL 97 86 90 77 - 101(H) 98   BUN 6 - 20 MG/DL 8 9 8 8 - 10 11   Creatinine 0.70 - 1.30 MG/DL 1.10 1.16 1.07 1.03 - 1.13 1.14   Sodium 136 - 145 mmol/L 140 141 139 142 138 138 135(L)   Potassium 3.5 - 5.1 mmol/L 4.2 4.2 4.2 3.8 3.8 4.8 4.1   TSH 0.36 - 3.74 uIU/mL - - - - - 0.80 -     EKG:   EKG Results     Procedure 720 Value Units Date/Time    SCANNED CARDIAC RHYTHM STRIP [[id number]] Collected:  07/23/18 0756    Order Status:  Completed Updated:  07/23/18 0806    SCANNED CARDIAC RHYTHM STRIP [[id number]] Collected:  07/22/18 0319    Order Status:  Completed Updated:  07/22/18 0701    SCANNED CARDIAC RHYTHM STRIP [[id number]] Collected:  07/22/18 0252    Order Status:  Completed Updated:  07/22/18 0656        Echocardiogram:   7/17/18  Left ventricle: The ventricle was mildly to moderately dilated. Systolic  function was markedly reduced by visual assessment. Ejection fraction was  estimated in the range of 10 % to 20 %, challenged determination in  presence of what appeared to be rapid atrial fibrillation. There was    Cardiac MRI scheduled 7/24    Nuclear Studies:   7/17/18- Pending read    Cardiac Catheterizations: Scheduled 7/23    Radiology (CXR, CT scans):    CXR Results  (Last 48 hours)    None        CT Results (most recent):    Results from Hospital Encounter encounter on 07/17/18   CTA CHEST W OR W WO CONT   Narrative EXAM:  CTA CHEST W OR W WO CONT    INDICATION:   Irregular heartbeat, pleural effusion seen on chest radiograph    COMPARISON: Chest radiograph performed earlier the same day. TECHNIQUE:   Precontrast  images were obtained to localize the volume for acquisition.   Multislice helical CT arteriography was performed from the diaphragm to the  thoracic inlet during uneventful rapid bolus of 100 cc Isovue-370. Lung and soft  tissue windows were generated. Coronal and sagittal images were generated and  3D post processing consisting of coronal maximum intensity images was performed. CT dose reduction was achieved through use of a standardized protocol tailored  for this examination and automatic exposure control for dose modulation. FINDINGS:  CHEST:  THYROID: No nodule. MEDIASTINUM: No mass or lymphadenopathy. GRAYSON: No mass or lymphadenopathy. THORACIC AORTA: No aneurysm. Not well opacified. MAIN PULMONARY ARTERY: There is no evidence of pulmonary embolism. TRACHEA/BRONCHI: Patent. ESOPHAGUS: No wall thickening or dilatation. HEART: Dilatation of the left atrium and left ventricle is noted. PLEURA: There is a moderate right pleural effusion  LUNGS: Right middle and right lower lobe infiltrate is noted. Atelectasis is  seen in the left lower lobe. INCIDENTALLY IMAGED UPPER ABDOMEN: No focal abnormality. BONES: No destructive bone lesion. Impression IMPRESSION: Moderate right pleural effusion with right middle and right lower  lobe infiltrates. Left lower lobe atelectasis. No evidence of pulmonary  embolism. Bautista Cruz, NP  [facility]  [facility], [address] [place] [address]  Office [phone]  Fax [phone]  24 hour VAD/HF Pager: [phone]         Patient seen and examined. Data and note reviewed. I have discussed and agree with the plans as noted. 43year old male with acute presentation of AF with RVR and biventricular failure. He remains in SR s/p CV. Heart cath today. Awaiting cardiac MRI tomorrow. Coxsackie viral studies are positive, concerning for myocarditis. Thank you for allowing us to participate in your patient's care. Keturah Jalloh MD, [facility], [address] [place] [address]  Chief of Cardiology, [address] [facility] [place]  200 [facility], [address] [place] [address], [address] [place] [address]  Office [phone]  Fax [phone]

## 2018-07-23 NOTE — PROGRESS NOTES
Patient is seen  Full note to follow  Hold am dose lovenox  Thoracentesis at 12  I arranged for R and L cardiac cath at 1 15 pm

## 2018-07-23 NOTE — PROGRESS NOTES
Verbal report given to Amanda(name) on NAME  being transferred to CVSU(unit) for routine progression of care       Report consisted of patients Situation, Background, Assessment and   Recommendations(SBAR). Information from the following report(s) Procedure Summary, MAR and Recent Results was reviewed with the receiving nurse. Lines:       Opportunity for questions and clarification was provided.       Patient transported with:   Monitor  Registered Nurse

## 2018-07-23 NOTE — PROGRESS NOTES
Transfer to [address] [place] [address] from Procedure Area    Verbal report given to Kuldip Gibson on Mukund Lisa being transferred to Cardiac Cath Lab  for routine progression of care   Patient is post right and left heart cath procedure. Patient stable upon transfer to . Report consisted of patients Situation, Background, Assessment and   Recommendations(SBAR). Information from the following report(s) Kardex, Procedure Summary, Intake/Output, MAR and Recent Results was reviewed with the receiving nurse. Opportunity for questions and clarification was provided. Patient medicated during procedure with orders obtained and verified by Dr. Cherylene Figures. Refer to patient PROCEDURE REPORT for vital signs, assessment, status, and response during procedure.

## 2018-07-23 NOTE — PROGRESS NOTES
Cardiac Electrophysiology Hospital Progress Note     Subjective:      Ney Kaiser is a 43 y.o. patient who is followed for AF & presumed NICM. He was admitted to [facility] on 07/17/2018 for worsening chest pain & SOB x 3 weeks; began to have SOB with exertion & at rest.  Chest pain with inspiration at that time. Seen by Dr. Laura Nazario. ECG showed AF with RVR (ventricular rate 173). CT chest negative for PE, showed right pleural effusion & infiltrates. NT Pro-BNP 2393 & echo showed LVEF 10-15%. Cardizem bolus & gtt. Intermittent hypotension, responsive to fluid boluses. Stress test suggested NICM. Empiric antibiotics (Ancef & azithromycin). Transferred to [facility] on 07/19/2018 for rhythm/rate control & & consideration of ICD. Advanced heart failure team consulted for heart failure management, consideration of LVAD eval.    He underwent ROBERTO (see results below) & successful synchronized cardioversion (200J x 1) on 07/20/2018. Post procedure, he has received amiodarone & Lovenox; anticipate eventual [place] [facility]. Diltiazem was discontinued, & carvedilol & lisinopril were initiated. Tikosyn not currently an option due to azithromycin. Thoracentesis planned later today, cardiac MRI at [facility] tomorrow. He has maintained SR over the weekend, rate 90s today. BP somewhat labile, with SBP range 100s-150s. Last /87. ROBERTO (07/20/2018): LVEF 15%. ANNAMARIE, no CELINA thrombus. Moderate MR, mild TR. Echo (07/17/2018): LVEF 10-20%, LV mildly to moderately dilated. Rapid AF. Severe diffuse hypokinesis with slight paradoxical septal motion. Mild asymmetric ventricular septal hypertrophy. Decreased LV diastolic compliance. RV dilated, markedly reduced systolic function. Mild to moderate LA dilation, moderate RA dilation. MV annulus dilated, severe MR.  AV annular dilatation. Moderate to severe TR, PASP 40 mmHg. Dilatation of RVOT, mild ND. Pulmonary artery dilated, IVC dilated.   Trivial pericardial effusion posterior LV & along right atrial free wall. CTA chest (07/17/2018): Moderate right pleural effusion with RML & RLL infiltrates. LLL atelectasis. No evidence PE.     Problem List  Date Reviewed: 7/19/2018          Codes Class Noted    Acute systolic heart failure ([address] ICD-10-CM: I50.21  ICD-9-CM: 428.21  7/19/2018        Pleural effusion, right ICD-10-CM: J90  ICD-9-CM: 511.9  7/19/2018        * (Principal)Atrial fibrillation with RVR (HCC) ICD-10-CM: I48.91  ICD-9-CM: 427.31  7/19/2018        Rapid atrial fibrillation (HCC) ICD-10-CM: I48.91  ICD-9-CM: 427.31  7/17/2018              Current Facility-Administered Medications   Medication Dose Route Frequency Provider Last Rate Last Dose    losartan (COZAAR) tablet 25 mg  25 mg Oral DAILY Relda Jose, NP   25 mg at 07/23/18 9100    magnesium oxide (MAG-OX) tablet 400 mg  400 mg Oral DAILY Relda Loving, NP   400 mg at 07/23/18 2563    potassium chloride SR (KLOR-CON 10) tablet 20 mEq  20 mEq Oral DAILY Relda Loving, NP   20 mEq at 07/23/18 0838    butamben-tetracaine-benzocaine (CETACAINE) 2 %-2 %-14 % (200 mg/sec) topical spray 1 Spray  1 Spray Topical QID PRN Hassel Collet, MD        benzocaine (HURRICANE) 20 % spray   Mucous Membrane PRN Hassel Collet, MD   2 [place] at 07/20/18 1153    carvedilol (COREG) tablet 3.125 mg  3.125 mg Oral BID WITH MEALS Hassel Collet, MD   3.125 mg at 07/23/18 5014    amiodarone (CORDARONE) tablet 400 mg  400 mg Oral BID Hassel Collet, MD   400 mg at 07/23/18 3719    sodium chloride (NS) flush 5-10 mL  5-10 mL IntraVENous Q8H Federico Forrester MD   10 mL at 07/23/18 0600    sodium chloride (NS) flush 5-10 mL  5-10 mL IntraVENous PRN Federico Forrester MD   10 mL at 07/20/18 0004     No Known Allergies  No past medical history   Past Surgical History:   Procedure Laterality Date    CARDIOVERSION, ELECTIVE;EXTERN  7/20/2018          No family history of CHF  Social History   Substance Use Topics  Smoking status: Current Every Day Smoker     Packs/day: 0.50    Smokeless tobacco: Not on file    Alcohol use Yes      Comment: \"on weekends\"        Review of Systems:   Constitutional: Negative for fever, chills, weight loss, + malaise/fatigue. HEENT: Negative for nosebleeds, vision changes. Respiratory: Negative for cough, hemoptysis  Cardiovascular: Negative for chest pain, no further palpitations, orthopnea, claudication, leg swelling, syncope, and PND. Gastrointestinal: Negative for nausea, vomiting, diarrhea, blood in stool and melena. Genitourinary: Negative for dysuria, and hematuria. Musculoskeletal: Negative for myalgias, arthralgia. Skin: Negative for rash. Heme: Does not bleed or bruise easily. Neurological: Negative for speech change and focal weakness     Objective:     Visit Vitals    /87 (BP 1 Location: Right arm, BP Patient Position: At rest)    Pulse 99    Temp 97.9 °F (36.6 °C)    Resp 16    Wt 225 lb 1.4 oz (102.1 kg)    SpO2 100%    BMI 28.13 kg/m2      Physical Exam:   Constitutional: well-developed and well-nourished. No respiratory distress. Head: Normocephalic and atraumatic. Eyes: Pupils are equal, round  ENT: hearing normal  Neck: supple. No JVD present. Cardiovascular: Normal rate, regular rhythm. Exam reveals no gallop and no friction rub. No murmur heard. Pulmonary/Chest: Effort normal.  + bilateral rales. Abdominal: Soft, no tenderness. Musculoskeletal: Trace lower extremity edema. Neurological: alert,oriented. Skin: Skin is warm and dry  Psychiatric: normal mood and affect. Behavior is normal. Judgment and thought content normal.        Assessment/Plan:       Mr. Vicky Perdomo has maintained sinus rhythm since synchronized cardioversion on 07/20/2018. Continue loading dose amiodarone (400 mg po bid x 2 weeks, 400 mg po daily x 2 weeks, then 200 mg po daily). If he fails to maintain NSR, will consider ablation.     Lovenox held this morning due to procedure, but plan to eventually transition to [address] [place] [address] for embolic CVA prophylaxis. If patient is unable to afford NOAC, will use warfarin. Thoracentesis at 12 today, RHC/LHC scheduled at 13:15 to further evaluate cardiomyopathy. For now, continue GDMT as previously prescribed. CECILIA Turpin [address] [facility] [place]  07/23/18    Addendum from EP attending:   I have seen, examined patient, and discussed with nurse practitioner, registered nurse, reviewed, updated note and agree with the assessment and plan    I have talked to her this am. She is feeling fine, no concerns  Vital signs are stable  Exam shows regular rhythm and 2/6 systolic LSB murmur  Crackles at bases  Assessment and Plan:  Continue to load with amiodarone  No CAD on cath  I discussed with Dr Antoinette Chaidez. ICD 3 months with LVEF not better  Ablation if AF recurs soon    Thank you for involving me in this patient's care and please call with further concerns or questions. Basilia Jesus M.D.   Electrophysiology/Cardiology  [address] [facility] [place]  [address] [facility]  [address]  [phone]

## 2018-07-24 ENCOUNTER — HOSPITAL ENCOUNTER (OUTPATIENT)
Dept: MRI IMAGING | Age: 38
Discharge: HOME | End: 2018-07-24
Payer: SUBSIDIZED

## 2018-07-24 LAB
ALBUMIN SERPL-MCNC: 2.4 G/DL (ref 3.5–5)
ALBUMIN/GLOB SERPL: 0.5 {RATIO} (ref 1.1–2.2)
ALP SERPL-CCNC: 112 U/L (ref 45–117)
ALT SERPL-CCNC: 20 U/L (ref 12–78)
ANION GAP SERPL CALC-SCNC: 9 MMOL/L (ref 5–15)
AST SERPL-CCNC: 23 U/L (ref 15–37)
BASOPHILS # BLD: 0 K/UL (ref 0–0.1)
BASOPHILS NFR BLD: 1 % (ref 0–1)
BILIRUB SERPL-MCNC: 0.9 MG/DL (ref 0.2–1)
BNP SERPL-MCNC: 2972 PG/ML (ref 0–125)
BUN SERPL-MCNC: 8 MG/DL (ref 6–20)
BUN/CREAT SERPL: 8 (ref 12–20)
CALCIUM SERPL-MCNC: 8.4 MG/DL (ref 8.5–10.1)
CHLORIDE SERPL-SCNC: 110 MMOL/L (ref 97–108)
CO2 SERPL-SCNC: 20 MMOL/L (ref 21–32)
CREAT SERPL-MCNC: 1.01 MG/DL (ref 0.7–1.3)
DIFFERENTIAL METHOD BLD: ABNORMAL
EBV DNA SPEC QL NAA+PROBE: NEGATIVE
EOSINOPHIL # BLD: 0.1 K/UL (ref 0–0.4)
EOSINOPHIL NFR BLD: 1 % (ref 0–7)
ERYTHROCYTE [DISTWIDTH] IN BLOOD BY AUTOMATED COUNT: 15.4 % (ref 11.5–14.5)
GLOBULIN SER CALC-MCNC: 4.5 G/DL (ref 2–4)
GLUCOSE SERPL-MCNC: 69 MG/DL (ref 65–100)
HCT VFR BLD AUTO: 44.2 % (ref 36.6–50.3)
HGB BLD-MCNC: 14.3 G/DL (ref 12.1–17)
IMM GRANULOCYTES # BLD: 0 K/UL (ref 0–0.04)
IMM GRANULOCYTES NFR BLD AUTO: 0 % (ref 0–0.5)
LYMPHOCYTES # BLD: 4.3 K/UL (ref 0.8–3.5)
LYMPHOCYTES NFR BLD: 56 % (ref 12–49)
MCH RBC QN AUTO: 31.4 PG (ref 26–34)
MCHC RBC AUTO-ENTMCNC: 32.4 G/DL (ref 30–36.5)
MCV RBC AUTO: 96.9 FL (ref 80–99)
MONOCYTES # BLD: 0.5 K/UL (ref 0–1)
MONOCYTES NFR BLD: 7 % (ref 5–13)
NEUTS SEG # BLD: 2.8 K/UL (ref 1.8–8)
NEUTS SEG NFR BLD: 36 % (ref 32–75)
NRBC # BLD: 0 K/UL (ref 0–0.01)
NRBC BLD-RTO: 0 PER 100 WBC
PLATELET # BLD AUTO: 279 K/UL (ref 150–400)
PMV BLD AUTO: 10.1 FL (ref 8.9–12.9)
POTASSIUM SERPL-SCNC: 4.2 MMOL/L (ref 3.5–5.1)
PROT SERPL-MCNC: 6.9 G/DL (ref 6.4–8.2)
RBC # BLD AUTO: 4.56 M/UL (ref 4.1–5.7)
SODIUM SERPL-SCNC: 139 MMOL/L (ref 136–145)
SPECIMEN SOURCE: NORMAL
WBC # BLD AUTO: 7.8 K/UL (ref 4.1–11.1)

## 2018-07-24 PROCEDURE — 74011250637 HC RX REV CODE- 250/637: Performed by: INTERNAL MEDICINE

## 2018-07-24 PROCEDURE — 74011250636 HC RX REV CODE- 250/636: Performed by: SPECIALIST

## 2018-07-24 PROCEDURE — 99232 SBSQ HOSP IP/OBS MODERATE 35: CPT | Performed by: INTERNAL MEDICINE

## 2018-07-24 PROCEDURE — 94760 N-INVAS EAR/PLS OXIMETRY 1: CPT

## 2018-07-24 PROCEDURE — 74011250636 HC RX REV CODE- 250/636: Performed by: INTERNAL MEDICINE

## 2018-07-24 PROCEDURE — 65660000000 HC RM CCU STEPDOWN

## 2018-07-24 PROCEDURE — 83880 ASSAY OF NATRIURETIC PEPTIDE: CPT | Performed by: NURSE PRACTITIONER

## 2018-07-24 PROCEDURE — 80053 COMPREHEN METABOLIC PANEL: CPT | Performed by: INTERNAL MEDICINE

## 2018-07-24 PROCEDURE — 85025 COMPLETE CBC W/AUTO DIFF WBC: CPT | Performed by: NURSE PRACTITIONER

## 2018-07-24 PROCEDURE — A9577 INJ MULTIHANCE: HCPCS | Performed by: INTERNAL MEDICINE

## 2018-07-24 PROCEDURE — 36415 COLL VENOUS BLD VENIPUNCTURE: CPT | Performed by: NURSE PRACTITIONER

## 2018-07-24 PROCEDURE — 75561 CARDIAC MRI FOR MORPH W/DYE: CPT

## 2018-07-24 PROCEDURE — 74011250637 HC RX REV CODE- 250/637: Performed by: NURSE PRACTITIONER

## 2018-07-24 RX ORDER — SPIRONOLACTONE 25 MG/1
25 TABLET ORAL DAILY
Status: DISCONTINUED | OUTPATIENT
Start: 2018-07-24 | End: 2018-07-26 | Stop reason: HOSPADM

## 2018-07-24 RX ORDER — SPIRONOLACTONE 25 MG/1
25 TABLET ORAL DAILY
Status: DISCONTINUED | OUTPATIENT
Start: 2018-07-25 | End: 2018-07-24

## 2018-07-24 RX ADMIN — Medication 10 ML: at 05:24

## 2018-07-24 RX ADMIN — Medication 400 MG: at 08:14

## 2018-07-24 RX ADMIN — ENOXAPARIN SODIUM 100 MG: 100 INJECTION SUBCUTANEOUS at 05:23

## 2018-07-24 RX ADMIN — CARVEDILOL 3.12 MG: 3.12 TABLET, FILM COATED ORAL at 17:19

## 2018-07-24 RX ADMIN — POTASSIUM CHLORIDE 20 MEQ: 750 TABLET, EXTENDED RELEASE ORAL at 08:14

## 2018-07-24 RX ADMIN — LOSARTAN POTASSIUM 25 MG: 25 TABLET, FILM COATED ORAL at 08:14

## 2018-07-24 RX ADMIN — SPIRONOLACTONE 25 MG: 25 TABLET ORAL at 12:37

## 2018-07-24 RX ADMIN — Medication 10 ML: at 15:07

## 2018-07-24 RX ADMIN — ENOXAPARIN SODIUM 100 MG: 100 INJECTION SUBCUTANEOUS at 17:19

## 2018-07-24 RX ADMIN — CARVEDILOL 3.12 MG: 3.12 TABLET, FILM COATED ORAL at 08:14

## 2018-07-24 RX ADMIN — AMIODARONE HYDROCHLORIDE 400 MG: 200 TABLET ORAL at 08:14

## 2018-07-24 RX ADMIN — Medication 10 ML: at 21:39

## 2018-07-24 RX ADMIN — AMIODARONE HYDROCHLORIDE 400 MG: 200 TABLET ORAL at 21:39

## 2018-07-24 RX ADMIN — GADOBENATE DIMEGLUMINE 30 ML: 529 INJECTION, SOLUTION INTRAVENOUS at 11:39

## 2018-07-24 NOTE — PROGRESS NOTES
0730:  Bedside shift change report given to Nakul Shipley RN (oncoming nurse) by Kamille Huggins RN (offgoing nurse). Report included the following information SBAR, Kardex, Procedure Summary, Intake/Output, MAR and Recent Results. 0820:  Critical Care Transport at bedside to transport patient for cardiac MRI.      0900:  TRANSFER - OUT REPORT:    Verbal report given to Sade Foster RN(name) on Jackson Calderon  being transferred to [facility]) for ordered procedure       Report consisted of patients Situation, Background, Assessment and   Recommendations(SBAR). Information from the following report(s) SBAR, Kardex, Procedure Summary, Intake/Output, MAR and Recent Results was reviewed with the receiving nurse. Lines:   Peripheral IV 07/17/18 Left Antecubital (Active)   Site Assessment Clean, dry, & intact 7/23/2018  8:55 PM   Phlebitis Assessment 0 7/23/2018  8:55 PM   Infiltration Assessment 0 7/23/2018  8:55 PM   Dressing Status Clean, dry, & intact 7/23/2018  8:55 PM   Dressing Type Transparent;Tape 7/23/2018  8:55 PM   Hub Color/Line Status Pink;Flushed;Capped 7/23/2018  8:55 PM   Action Taken Open ports on tubing capped 7/23/2018  8:55 PM   Alcohol Cap Used Yes 7/23/2018  8:55 PM        Opportunity for questions and clarification was provided. Patient transported with:   Registered Nurse  Tech     [phone]:  Patient arrived back to unit, placed on tele and confirmed with monitor, vitals stable. 1930:  Bedside shift change report given to Harriet Michael RN (oncoming nurse) by Nakul Shipley RN (offgoing nurse). Report included the following information SBAR, Kardex, Procedure Summary, Intake/Output, MAR and Recent Results. Problem: Falls - Risk of  Goal: *Absence of Falls  Document Ayaan Fall Risk and appropriate interventions in the flowsheet.    Outcome: Progressing Towards Goal  Fall Risk Interventions:  Mobility Interventions: Patient to call before getting OOB         Medication Interventions: Patient to call before getting OOB, Teach patient to arise slowly, Evaluate medications/consider consulting pharmacy    Elimination Interventions: Call light in reach, Urinal in reach             Problem: Afib Pathway: Day 3  Goal: Off Pathway (Use only if patient is Off Pathway)  Outcome: Progressing Towards Goal  Patient remains in NSR. Problem: Heart Failure: Day 5  Goal: Off Pathway (Use only if patient is Off Pathway)  Outcome: Progressing Towards Goal  Patient to have cardiac MRI today. Problem: Pressure Injury - Risk of  Goal: *Prevention of pressure injury  Document Clayton Scale and appropriate interventions in the flowsheet. Outcome: Progressing Towards Goal  Pressure Injury Interventions:             Activity Interventions: Pressure redistribution bed/mattress(bed type)    Mobility Interventions: Pressure redistribution bed/mattress (bed type)    Nutrition Interventions: Document food/fluid/supplement intake    Friction and Shear Interventions: Minimize layers

## 2018-07-24 NOTE — PROGRESS NOTES
[facility] Progress Note      DOS:   7/24/2018  NAME:  Alcides Reyez   MRN:   [id number]     REFERRING PROVIDER:  Dr. Laurie Corley: None  PRIMARY CARDIOLOGIST: Dr. Sg Wheatley      Chief Complaint: SOB    HPI: 43y.o. year old male with no significant past medication history except for an exploratory laparotomy for a ruptured viscus- organ unknown who presented to [facility] with complaints of SOB. In the ED he was found to be in Afib with RVR, failed cardizem conversion, CTA was negative for PE but did show a pleural effusion. He was admitted and seen by Dr. Sg Wheatley who initiated the myocarditis work up and contacted Dr Faith Brewster for assistance in managing this patient's heart failure. He was transferred to [facility] last evening, seen by Dr. Trino Her who plans a ROBERTO and cardioversion today. He endorses some anorexia and unintentional weight loss, he has been working but denies exposure to toxins. He underwent L/RHC on 7/23 which showed normal coronary arteries and slightly elevated filling pressures. He is scheduled for cardiac MRI on 7/24. 24Hr Events  S/p L/RHC -> normal coronary arteries, slightly elevated filling pressures  Transferred to [facility] for cardiac MRI     Impression / Plan:   Heart Failure Status: NYHA Class III    Acute HFrEF, NICM, EF 10-20%  Coxsackie panel and CMV IgG positive   Remaining blood work unremarkable  L/RHC showed normal coronary arteries, high-normal filling pressures   Cardiac MRI scheduled for this AM at [address] 3.125 BID and losartan 25mg daily today - watch renal function  Begin spironolactone 25 mg daily   Stable for D/C from HF standpoint  F/u scheduled with [facility] for 8/9/18 at 12:30 pm     Afib w/ RVR  S/p Cardioversion  Remains in Sinus  Amio BID - mgmt per EP   Note plans for [facility]   Cont enoxaparin as inpatient     All other care per primary team     History:  No past medical history on file.   Past Surgical History:   Procedure Laterality Date    CARDIOVERSION, ELECTIVE;EXTERN  7/20/2018          Social History     Social History    Marital status: SINGLE     Spouse name: N/A    Number of children: N/A    Years of education: N/A     Occupational History    Not on file. Social History Main Topics    Smoking status: Current Every Day Smoker     Packs/day: 0.50    Smokeless tobacco: Not on file    Alcohol use Yes      Comment: \"on weekends\"    Drug use: No    Sexual activity: Yes     Other Topics Concern    Not on file     Social History Narrative     No family history on file.     Current Medications:   Current Facility-Administered Medications   Medication Dose Route Frequency Provider Last Rate Last Dose    spironolactone (ALDACTONE) tablet 25 mg  25 mg Oral DAILY Sandro Bryant NP        enoxaparin (LOVENOX) injection 100 mg  1 mg/kg SubCUTAneous Q12H Edith Jones MD   100 mg at 07/24/18 0523    losartan (COZAAR) tablet 25 mg  25 mg Oral DAILY Florin Landon NP   25 mg at 07/24/18 0814    magnesium oxide (MAG-OX) tablet 400 mg  400 mg Oral DAILY Florin Landon NP   400 mg at 07/24/18 0639    potassium chloride SR (KLOR-CON 10) tablet 20 mEq  20 mEq Oral DAILY Florin Landon NP   20 mEq at 07/24/18 0814    butamben-tetracaine-benzocaine (CETACAINE) 2 %-2 %-14 % (200 mg/sec) topical spray 1 Spray  1 Spray Topical QID PRN Brayan Shah MD        benzocaine (HURRICANE) 20 % spray   Mucous Membrane PRN Brayan Shah MD   2 Nettie at 07/20/18 1153    carvedilol (COREG) tablet 3.125 mg  3.125 mg Oral BID WITH MEALS Brayan Shah MD   3.125 mg at 07/24/18 2094    amiodarone (CORDARONE) tablet 400 mg  400 mg Oral BID Brayan Shah MD   400 mg at 07/24/18 6255    sodium chloride (NS) flush 5-10 mL  5-10 mL IntraVENous Q8H Jaci Pedersen MD   10 mL at 07/24/18 0524    sodium chloride (NS) flush 5-10 mL  5-10 mL IntraVENous PRN Jaci Pedersen MD   10 mL at 07/20/18 2235     Facility-Administered Medications Ordered in Other Encounters   Medication Dose Route Frequency Provider Last Rate Last Dose    Gadobenate Dimeglumine (MULTIHANCE) 529 mg/mL (0.1mmol/0.2mL) contrast injection 30 mL  30 mL IntraVENous RAD Clem Sawyer MD           Allergies: No Known Allergies    ROS:    Constitutional: negative, feels well today   HENT: Negative. Respiratory: Positive for FLOWERS,  Negative for cough. Cardiovascular:  Negative for chest pain, leg swelling and PND. Gastrointestinal: Negative. Genitourinary: peeing a lot    Musculoskeletal: Negative. Skin: Negative. Neurological: Negative. Physical Exam:   Constitutional: NAD, sitting up in bed, eating breakfast   HENT:   Head: Normocephalic. Eyes: Pupils are equal, round, and reactive to light. Neck: Normal range of motion. Neck supple. No JVD present. Cardiovascular: Normal heart sounds and intact distal pulses. Tachycardic but regular rhythm. Pulmonary/Chest: Effort normal and breath sounds normal.   Abdominal: Soft. Bowel sounds are normal. He exhibits no distension. Musculoskeletal: He exhibits no edema. Neurological: He is alert and oriented to person, place, and time. Skin: Skin is warm and dry. Nursing note and vitals reviewed.       Vitals:   Visit Vitals    BP (!) 135/96 (BP 1 Location: Right arm, BP Patient Position: At rest)    Pulse 87    Temp 97.9 °F (36.6 °C)    Resp 20    Wt 221 lb 5.5 oz (100.4 kg)    SpO2 97%    BMI 27.67 kg/m2         Temp (24hrs), Av °F (36.7 °C), Min:97.6 °F (36.4 °C), Max:98.3 °F (36.8 °C)      Admission Weight: Last Weight   Weight: 228 lb 2.8 oz (103.5 kg) Weight: 221 lb 5.5 oz (100.4 kg)     Intake / Output / Drain:  Last 24 hrs.:     Intake/Output Summary (Last 24 hours) at 18 1125  Last data filed at 18 0804   Gross per 24 hour   Intake             1420 ml   Output             3165 ml   Net            -1745 ml     Oxygen Therapy:  Oxygen Therapy  O2 Sat (%): 97 % (18 0804)  Pulse via Oximetry: 87 beats per minute (07/23/18 1515)  O2 Device: Room air (07/24/18 0804)  O2 Flow Rate (L/min): 4 l/min (07/20/18 1226)      Recent Labs:   Labs Latest Ref Rng & Units 7/24/2018 7/23/2018 7/22/2018 7/21/2018 7/20/2018 7/19/2018 7/18/2018   WBC 4.1 - 11.1 K/uL 7.8 8.1 7.9 - 10.8 - 8.8   RBC 4.10 - 5.70 M/uL 4.56 4.81 5.10 - 4.99 - 4.91   Hemoglobin 12.1 - 17.0 g/dL 14.3 15.0 15.7 - 15.5 - 15.0   Hematocrit 36.6 - 50.3 % 44.2 47.3 50. 5(H) - 48.3 - 46.7   MCV 80.0 - 99.0 FL 96.9 98.3 99.0 - 96.8 - 95.1   Platelets 913 - 251 K/uL 279 297 269 - 285 - 255   Lymphocytes 12 - 49 % 56(H) 64(H) 48 - 47 - 34   Monocytes 5 - 13 % 7 5 6 - 6 - 7   Eosinophils 0 - 7 % 1 1 1 - 2 - 0   Basophils 0 - 1 % 1 1 0 - 0 - 0   Albumin 3.5 - 5.0 g/dL 2. 4(L) 2. 6(L) 2. 4(L) 2. 6(L) 2. 6(L) - -   Calcium 8.5 - 10.1 MG/DL 8.4(L) 8.7 8.5 8.6 8.5 - 8.4(L)   SGOT 15 - 37 U/L 23 24 24 22 21 - -   Glucose 65 - 100 mg/dL 69 97 86 90 77 - 101(H)   BUN 6 - 20 MG/DL 8 8 9 8 8 - 10   Creatinine 0.70 - 1.30 MG/DL 1.01 1.10 1.16 1.07 1.03 - 1.13   Sodium 136 - 145 mmol/L 139 140 141 139 142 138 138   Potassium 3.5 - 5.1 mmol/L 4.2 4.2 4.2 4.2 3.8 3.8 4.8   TSH 0.36 - 3.74 uIU/mL - - - - - - 0.80   LDH 85 - 241 U/L - 342(H) - - - - -     EKG:   EKG Results     Procedure 720 Value Units Date/Time    SCANNED CARDIAC RHYTHM STRIP [[id number]] Collected:  07/24/18 0553    Order Status:  Completed Updated:  07/24/18 0701    SCANNED CARDIAC RHYTHM STRIP [[id number]] Collected:  07/23/18 0756    Order Status:  Completed Updated:  07/23/18 0806    SCANNED CARDIAC RHYTHM STRIP [[id number]] Collected:  07/22/18 0319    Order Status:  Completed Updated:  07/22/18 0701    SCANNED CARDIAC RHYTHM STRIP [[id number]] Collected:  07/22/18 0252    Order Status:  Completed Updated:  07/22/18 0656        Echocardiogram:   7/17/18  Left ventricle: The ventricle was mildly to moderately dilated. Systolic  function was markedly reduced by visual assessment.  Ejection fraction was  estimated in the range of 10 % to 20 %, challenged determination in  presence of what appeared to be rapid atrial fibrillation. There was    Cardiac MRI scheduled 7/24    Nuclear Studies:   7/17/18- Pending read    Cardiac Catheterizations: Scheduled 7/23    Radiology (CXR, CT scans):    CXR Results  (Last 48 hours)               07/23/18 1228  XR CHEST PORT Final result    Impression:  IMPRESSION: No pneumothorax following right thoracentesis. Narrative:  EXAM:  XR CHEST PORT. INDICATION: Status post right thoracentesis. COMPARISON: 7/21/2018. FINDINGS:    A portable AP radiograph of the chest was obtained at 1220 hours. Lines and tubes: The patient is on a cardiac monitor. Lungs: There is atelectasis or airspace disease at the right lung base. The left   lung is clear. Pleura: Is a right pleural effusion. There is no pneumothorax following   thoracentesis. Mediastinum: The cardiac and mediastinal contours and pulmonary vascularity are   normal.   Bones and soft tissues: The bones and soft tissues are grossly within normal   limits. CT Results (most recent):    Results from Hospital Encounter encounter on 07/17/18   CTA CHEST W OR W WO CONT   Narrative EXAM:  CTA CHEST W OR W WO CONT    INDICATION:   Irregular heartbeat, pleural effusion seen on chest radiograph    COMPARISON: Chest radiograph performed earlier the same day. TECHNIQUE:   Precontrast  images were obtained to localize the volume for acquisition. Multislice helical CT arteriography was performed from the diaphragm to the  thoracic inlet during uneventful rapid bolus of 100 cc Isovue-370. Lung and soft  tissue windows were generated. Coronal and sagittal images were generated and  3D post processing consisting of coronal maximum intensity images was performed.    CT dose reduction was achieved through use of a standardized protocol tailored  for this examination and automatic exposure control for dose modulation. FINDINGS:  CHEST:  THYROID: No nodule. MEDIASTINUM: No mass or lymphadenopathy. GRAYSON: No mass or lymphadenopathy. THORACIC AORTA: No aneurysm. Not well opacified. MAIN PULMONARY ARTERY: There is no evidence of pulmonary embolism. TRACHEA/BRONCHI: Patent. ESOPHAGUS: No wall thickening or dilatation. HEART: Dilatation of the left atrium and left ventricle is noted. PLEURA: There is a moderate right pleural effusion  LUNGS: Right middle and right lower lobe infiltrate is noted. Atelectasis is  seen in the left lower lobe. INCIDENTALLY IMAGED UPPER ABDOMEN: No focal abnormality. BONES: No destructive bone lesion. Impression IMPRESSION: Moderate right pleural effusion with right middle and right lower  lobe infiltrates. Left lower lobe atelectasis. No evidence of pulmonary  embolism. America Halsted, NP  [address], [address] [place] [address]  Office [phone]  Fax [phone]  24 hour VAD/HF Pager: [phone]         Patient seen and examined. Data and note reviewed. I have discussed and agree with the plans as noted. 43year old with a history of acute systolic heart failure and AF with RVR who underwent complete heart cath yesterday which demonstrated no significant CAD and mildly decompensated heart failure. He is scheduled for cardiac MRI today to further assess for viral myocarditis. Thank you for allowing us to participate in your patient's care. Keturah Brewster MD, [facility], [address] [place] [address]  Chief of Cardiology, [address] [place] [address] Director  [address], [address]  Office [phone]  Fax [phone]

## 2018-07-24 NOTE — PROGRESS NOTES
NUTRITION       Nutrition screening referral was triggered based on results obtained during nursing admission assessment. The patient's chart was reviewed and nutrition assessment is not indicated at this time. No weight loss noted and appetite has been excellent. Wt Readings from Last 10 Encounters:   07/24/18 100.4 kg (221 lb 5.5 oz)   07/24/18 100.4 kg (221 lb 5.5 oz)   07/17/18 99 kg (218 lb 3.2 oz)   07/08/18 90.7 kg (200 lb)     Patient Vitals for the past 100 hrs:   % Diet Eaten   07/23/18 1730 100 %   07/22/18 1800 100 %   07/22/18 1146 100 %   07/22/18 0805 100 %     Will add double portions and HS snack. Plan to see patient for rescreen as indicated. Thank you.      Cary Foster RD

## 2018-07-24 NOTE — PROGRESS NOTES
1930: Bedside and Verbal shift change report given to Clyde Dorado RN (oncoming nurse) by Nuris Mathews RN (offgoing nurse). Report included the following information SBAR, Kardex, ED Summary, Procedure Summary, Intake/Output, MAR, Recent Results, Med Rec Status and Cardiac Rhythm Sinus Tach/NSR.     0730: Bedside and Verbal shift change report given to Daniel Ga RN (oncoming nurse) by Clyde Dorado RN (offgoing nurse). Report included the following information SBAR, Kardex, ED Summary, Procedure Summary, Intake/Output, MAR, Recent Results, Med Rec Status and Cardiac Rhythm NSR/Sinus Tach.

## 2018-07-25 ENCOUNTER — APPOINTMENT (OUTPATIENT)
Dept: GENERAL RADIOLOGY | Age: 38
DRG: 286 | End: 2018-07-25
Attending: HOSPITALIST
Payer: SUBSIDIZED

## 2018-07-25 ENCOUNTER — HOME HEALTH ADMISSION (OUTPATIENT)
Dept: HOME HEALTH SERVICES | Facility: HOME HEALTH | Age: 38
End: 2018-07-25

## 2018-07-25 LAB
ALBUMIN SERPL-MCNC: 2.5 G/DL (ref 3.5–5)
ALBUMIN/GLOB SERPL: 0.5 {RATIO} (ref 1.1–2.2)
ALP SERPL-CCNC: 108 U/L (ref 45–117)
ALT SERPL-CCNC: 21 U/L (ref 12–78)
ANION GAP SERPL CALC-SCNC: 7 MMOL/L (ref 5–15)
AST SERPL-CCNC: 24 U/L (ref 15–37)
BASOPHILS # BLD: 0 K/UL (ref 0–0.1)
BASOPHILS NFR BLD: 1 % (ref 0–1)
BILIRUB SERPL-MCNC: 0.9 MG/DL (ref 0.2–1)
BNP SERPL-MCNC: 2507 PG/ML (ref 0–125)
BUN SERPL-MCNC: 9 MG/DL (ref 6–20)
BUN/CREAT SERPL: 8 (ref 12–20)
CALCIUM SERPL-MCNC: 8.6 MG/DL (ref 8.5–10.1)
CHLORIDE SERPL-SCNC: 108 MMOL/L (ref 97–108)
CO2 SERPL-SCNC: 24 MMOL/L (ref 21–32)
CREAT SERPL-MCNC: 1.12 MG/DL (ref 0.7–1.3)
DIFFERENTIAL METHOD BLD: ABNORMAL
EOSINOPHIL # BLD: 0.1 K/UL (ref 0–0.4)
EOSINOPHIL NFR BLD: 1 % (ref 0–7)
ERYTHROCYTE [DISTWIDTH] IN BLOOD BY AUTOMATED COUNT: 15.2 % (ref 11.5–14.5)
ERYTHROCYTE [SEDIMENTATION RATE] IN BLOOD: 18 MM/HR (ref 0–15)
GLOBULIN SER CALC-MCNC: 4.7 G/DL (ref 2–4)
GLUCOSE SERPL-MCNC: 76 MG/DL (ref 65–100)
HCT VFR BLD AUTO: 45.8 % (ref 36.6–50.3)
HGB BLD-MCNC: 14.5 G/DL (ref 12.1–17)
IMM GRANULOCYTES # BLD: 0 K/UL (ref 0–0.04)
IMM GRANULOCYTES NFR BLD AUTO: 0 % (ref 0–0.5)
LYMPHOCYTES # BLD: 3.5 K/UL (ref 0.8–3.5)
LYMPHOCYTES NFR BLD: 54 % (ref 12–49)
MCH RBC QN AUTO: 31.2 PG (ref 26–34)
MCHC RBC AUTO-ENTMCNC: 31.7 G/DL (ref 30–36.5)
MCV RBC AUTO: 98.5 FL (ref 80–99)
MONOCYTES # BLD: 0.4 K/UL (ref 0–1)
MONOCYTES NFR BLD: 7 % (ref 5–13)
NEUTS SEG # BLD: 2.4 K/UL (ref 1.8–8)
NEUTS SEG NFR BLD: 37 % (ref 32–75)
NRBC # BLD: 0 K/UL (ref 0–0.01)
NRBC BLD-RTO: 0 PER 100 WBC
PLATELET # BLD AUTO: 262 K/UL (ref 150–400)
PMV BLD AUTO: 9.7 FL (ref 8.9–12.9)
POTASSIUM SERPL-SCNC: 4.2 MMOL/L (ref 3.5–5.1)
PROT SERPL-MCNC: 7.2 G/DL (ref 6.4–8.2)
RBC # BLD AUTO: 4.65 M/UL (ref 4.1–5.7)
SODIUM SERPL-SCNC: 139 MMOL/L (ref 136–145)
WBC # BLD AUTO: 6.4 K/UL (ref 4.1–11.1)

## 2018-07-25 PROCEDURE — 94761 N-INVAS EAR/PLS OXIMETRY MLT: CPT

## 2018-07-25 PROCEDURE — 86038 ANTINUCLEAR ANTIBODIES: CPT | Performed by: HOSPITALIST

## 2018-07-25 PROCEDURE — 80053 COMPREHEN METABOLIC PANEL: CPT | Performed by: NURSE PRACTITIONER

## 2018-07-25 PROCEDURE — 74011250637 HC RX REV CODE- 250/637: Performed by: HOSPITALIST

## 2018-07-25 PROCEDURE — 84165 PROTEIN E-PHORESIS SERUM: CPT | Performed by: HOSPITALIST

## 2018-07-25 PROCEDURE — 74011250637 HC RX REV CODE- 250/637: Performed by: NURSE PRACTITIONER

## 2018-07-25 PROCEDURE — 85025 COMPLETE CBC W/AUTO DIFF WBC: CPT | Performed by: NURSE PRACTITIONER

## 2018-07-25 PROCEDURE — 84156 ASSAY OF PROTEIN URINE: CPT | Performed by: HOSPITALIST

## 2018-07-25 PROCEDURE — 82784 ASSAY IGA/IGD/IGG/IGM EACH: CPT | Performed by: HOSPITALIST

## 2018-07-25 PROCEDURE — 85652 RBC SED RATE AUTOMATED: CPT | Performed by: HOSPITALIST

## 2018-07-25 PROCEDURE — 74011250637 HC RX REV CODE- 250/637: Performed by: INTERNAL MEDICINE

## 2018-07-25 PROCEDURE — 65660000000 HC RM CCU STEPDOWN

## 2018-07-25 PROCEDURE — 71045 X-RAY EXAM CHEST 1 VIEW: CPT

## 2018-07-25 PROCEDURE — 83880 ASSAY OF NATRIURETIC PEPTIDE: CPT | Performed by: NURSE PRACTITIONER

## 2018-07-25 PROCEDURE — 74011250636 HC RX REV CODE- 250/636: Performed by: SPECIALIST

## 2018-07-25 PROCEDURE — 36415 COLL VENOUS BLD VENIPUNCTURE: CPT | Performed by: NURSE PRACTITIONER

## 2018-07-25 PROCEDURE — 94762 N-INVAS EAR/PLS OXIMTRY CONT: CPT

## 2018-07-25 RX ORDER — WARFARIN 7.5 MG/1
7.5 TABLET ORAL ONCE
Status: COMPLETED | OUTPATIENT
Start: 2018-07-25 | End: 2018-07-25

## 2018-07-25 RX ORDER — ENOXAPARIN SODIUM 100 MG/ML
100 INJECTION SUBCUTANEOUS EVERY 12 HOURS
Qty: 10 SYRINGE | Refills: 0 | Status: SHIPPED | OUTPATIENT
Start: 2018-07-25 | End: 2018-07-26

## 2018-07-25 RX ADMIN — SACUBITRIL AND VALSARTAN 1 TABLET: 24; 26 TABLET, FILM COATED ORAL at 21:54

## 2018-07-25 RX ADMIN — LOSARTAN POTASSIUM 25 MG: 25 TABLET, FILM COATED ORAL at 08:22

## 2018-07-25 RX ADMIN — SPIRONOLACTONE 25 MG: 25 TABLET ORAL at 08:23

## 2018-07-25 RX ADMIN — Medication 400 MG: at 08:22

## 2018-07-25 RX ADMIN — ENOXAPARIN SODIUM 100 MG: 100 INJECTION SUBCUTANEOUS at 18:11

## 2018-07-25 RX ADMIN — ENOXAPARIN SODIUM 100 MG: 100 INJECTION SUBCUTANEOUS at 06:45

## 2018-07-25 RX ADMIN — CARVEDILOL 3.12 MG: 3.12 TABLET, FILM COATED ORAL at 08:22

## 2018-07-25 RX ADMIN — Medication 10 ML: at 21:54

## 2018-07-25 RX ADMIN — AMIODARONE HYDROCHLORIDE 400 MG: 200 TABLET ORAL at 08:22

## 2018-07-25 RX ADMIN — POTASSIUM CHLORIDE 20 MEQ: 750 TABLET, EXTENDED RELEASE ORAL at 08:22

## 2018-07-25 RX ADMIN — Medication 10 ML: at 06:45

## 2018-07-25 RX ADMIN — WARFARIN SODIUM 7.5 MG: 7.5 TABLET ORAL at 16:53

## 2018-07-25 RX ADMIN — AMIODARONE HYDROCHLORIDE 400 MG: 200 TABLET ORAL at 21:53

## 2018-07-25 RX ADMIN — Medication 10 ML: at 14:03

## 2018-07-25 RX ADMIN — CARVEDILOL 3.12 MG: 3.12 TABLET, FILM COATED ORAL at 16:53

## 2018-07-25 NOTE — PROGRESS NOTES
Pharmacist Note - Warfarin Dosing  Consult provided for this 43 y.o.male to manage warfarin for Atrial Fibrillation    INR Goal: 2 - 3    Home regimen/ tablet size: new start    Drugs that may increase INR: Amiodarone  Drugs that may decrease INR: None  Other current anticoagulants/ drugs that may increase bleeding risk: Enoxaparin bridge  Risk factors: Decompensated Heart Failure  Daily INR ordered: YES    Recent Labs      07/25/18   0408  07/24/18   0419  07/23/18   0409   HGB  14.5  14.3  15.0     Date               INR                  Dose  7/20   1.4  --   7/25   ---  7.5 mg                                                                                Assessment/ Plan: Will order warfarin 7.5 mg PO x 1 dose. Pharmacy will continue to monitor daily and adjust therapy as indicated.

## 2018-07-25 NOTE — PROGRESS NOTES
Cardiac Electrophysiology Hospital Progress Note     Subjective:      Mireya Lane is a 43 y.o. patient who is followed for AF & NICMP. He was admitted to [facility] on 07/17/2018 for worsening chest pain & SOB x 3 weeks; began to have SOB with exertion & at rest.  Chest pain with inspiration at that time. Seen by Dr. Janice Kessler. ECG showed AF with RVR (ventricular rate 173). CT chest negative for PE, showed right pleural effusion & infiltrates. NT Pro-BNP 2393 & echo showed LVEF 10-15%. Cardizem bolus & gtt. Intermittent hypotension, responsive to fluid boluses. Stress test suggested NICM. Empiric antibiotics (Ancef & azithromycin). Transferred to [facility] on 07/19/2018 for rhythm/rate control & & consideration of ICD. Advanced heart failure team consulted for heart failure management, consideration of LVAD eval.    He underwent ROBERTO (see results below) & successful synchronized cardioversion (200J x 1) on 07/20/2018. Post procedure, he has received amiodarone & Lovenox; anticipate eventual transition to [facility]. Diltiazem was discontinued, & carvedilol & lisinopril were initiated. Tikosyn not currently an option due to azithromycin. Thoracentesis and cardiac MRI done      He has maintained SR and today he is feeling \"much better\"  No chest pain or dizziness    Cardiac MRI 7/20/2018 LVEF 17%, RVEF 20%, moderate MR    Cardiac cath 7/23/2018: normal cors. Shonna CI 2.74, PCWP 18  PAP 36/20    ROBERTO (07/20/2018): LVEF 15%. ANNAMARIE, no CELINA thrombus. Moderate MR, mild TR. Echo (07/17/2018): LVEF 10-20%, LV mildly to moderately dilated. Rapid AF. Severe diffuse hypokinesis with slight paradoxical septal motion. Mild asymmetric ventricular septal hypertrophy. Decreased LV diastolic compliance. RV dilated, markedly reduced systolic function. Mild to moderate LA dilation, moderate RA dilation. MV annulus dilated, severe MR.  AV annular dilatation. Moderate to severe TR, PASP 40 mmHg. Dilatation of RVOT, mild GA. Pulmonary artery dilated, IVC dilated. Trivial pericardial effusion posterior LV & along right atrial free wall. CTA chest (07/17/2018): Moderate right pleural effusion with RML & RLL infiltrates. LLL atelectasis. No evidence PE.     Problem List  Date Reviewed: 7/19/2018          Codes Class Noted    Acute systolic heart failure ([facility] ICD-10-CM: I50.21  ICD-9-CM: 428.21  7/19/2018        Pleural effusion, right ICD-10-CM: J90  ICD-9-CM: 511.9  7/19/2018        * (Principal)Atrial fibrillation with RVR ([facility] ICD-10-CM: I48.91  ICD-9-CM: 427.31  7/19/2018        Rapid atrial fibrillation ([facility]) ICD-10-CM: I48.91  ICD-9-CM: 427.31  7/17/2018              Current Facility-Administered Medications   Medication Dose Route Frequency Provider Last Rate Last Dose    sacubitril-valsartan (ENTRESTO) 24-26 mg tablet 1 Tab  1 Tab Oral Q12H Helder [place], NP        spironolactone (ALDACTONE) tablet 25 mg  25 mg Oral DAILY Edwin Iraheta, NP   25 mg at 07/25/18 0823    enoxaparin (LOVENOX) injection 100 mg  1 mg/kg SubCUTAneous Q12H Kari Harrington MD   100 mg at 07/25/18 0645    magnesium oxide (MAG-OX) tablet 400 mg  400 mg Oral DAILY Helder [place], NP   400 mg at 07/25/18 5069    potassium chloride SR (KLOR-CON 10) tablet 20 mEq  20 mEq Oral DAILY Helder [place], NP   20 mEq at 07/25/18 8311    butamben-tetracaine-benzocaine (CETACAINE) 2 %-2 %-14 % (200 mg/sec) topical spray 1 Spray  1 Spray Topical QID PRN Gisell Toney MD        benzocaine (HURRICANE) 20 % spray   Mucous Membrane PRN Gisell Toney MD   2 [place] at 07/20/18 1153    carvedilol (COREG) tablet 3.125 mg  3.125 mg Oral BID WITH MEALS Gisell Toney MD   3.125 mg at 07/25/18 8835    amiodarone (CORDARONE) tablet 400 mg  400 mg Oral BID Gisell Toney MD   400 mg at 07/25/18 1200    sodium chloride (NS) flush 5-10 mL  5-10 mL IntraVENous Q8H Carolyn Flores MD   10 mL at 07/25/18 0645    sodium chloride (NS) flush 5-10 mL  5-10 mL IntraVENous LIZA Granados MD   10 mL at 07/20/18 9725     No Known Allergies  No past medical history   Past Surgical History:   Procedure Laterality Date    CARDIOVERSION, ELECTIVE;EXTERN  7/20/2018          No family history of CHF  Social History   Substance Use Topics    Smoking status: Current Every Day Smoker     Packs/day: 0.50    Smokeless tobacco: Not on file    Alcohol use Yes      Comment: \"on weekends\"        Review of Systems:   Constitutional: Negative for fever, chills, weight loss,   HEENT: Negative for nosebleeds, vision changes. Respiratory: Negative for cough, hemoptysis  Cardiovascular: Negative for chest pain, no further palpitations, orthopnea, claudication, leg swelling, syncope, and PND. Gastrointestinal: Negative for nausea, vomiting, diarrhea, blood in stool and melena. Genitourinary: Negative for dysuria, and hematuria. Musculoskeletal: Negative for myalgias, arthralgia. Skin: Negative for rash. Heme: Does not bleed or bruise easily. Neurological: Negative for speech change and focal weakness     Objective:     Visit Vitals    /85 (BP 1 Location: Right arm, BP Patient Position: At rest)    Pulse (!) 103    Temp 98 °F (36.7 °C)    Resp 18    Wt 222 lb 10.6 oz (101 kg)    SpO2 97%    BMI 27.83 kg/m2      Physical Exam:   Constitutional: well-developed and well-nourished. No respiratory distress. Head: Normocephalic and atraumatic. Eyes: Pupils are equal, round  ENT: hearing normal  Neck: supple. No JVD present. Cardiovascular: Normal rate, regular rhythm. Exam reveals no gallop and no friction rub. No murmur heard. Pulmonary/Chest: Effort normal. Lungs clear  Abdominal: Soft, no tenderness. Musculoskeletal: FROM and no edema. Neurological: alert,oriented. Skin: Skin is warm and dry  Psychiatric: normal mood and affect. Behavior is normal. Judgment and thought content normal.        Assessment/Plan:   1. Non ischemic cardiomyopathy  2.  Persistent atrial fibrillation s/p cardioversion    I discussed with him and Dr Yolette Rob, hospitalist attending, about amiodarone and coumadin  He will need lovenox and coumadin until INR therapeutic 2.0  Amiodarone 400 mg bid total 2 weeks then 400 mg every day x 2 weeks and then 200 mg every day  If he can come to Dr Marco Kong office at [facility], INR can be set up there  If he wants to he can come to my office but he will need to be able to drive for INR checks  In 3 months recheck echo and if despite GDMT his LVEF is still under 36%, he will be candidate for AICD       Thank you for involving me in this patient's care and please call with further concerns or questions. Mortimer Hug, M.D.   Electrophysiology/Cardiology  [facility]  [facility], [address]  [phone]

## 2018-07-25 NOTE — PROGRESS NOTES
[facility] Progress Note      DOS:   7/25/2018  NAME:  Carolina Alejandre   MRN:   [id number]     REFERRING PROVIDER:  Dr. Bautista Semaj: None  PRIMARY CARDIOLOGIST: Dr. Edna Ruiz      Chief Complaint: SOB    HPI: 43y.o. year old male with no significant past medication history except for an exploratory laparotomy for a ruptured viscus- organ unknown who presented to [facility] with complaints of SOB. In the ED he was found to be in Afib with RVR, failed cardizem conversion, CTA was negative for PE but did show a pleural effusion. He was admitted and seen by Dr. Edna Ruiz who initiated the myocarditis work up and contacted Dr Tristan Page for assistance in managing this patient's heart failure. He was transferred to [facility] last evening, seen by Dr. Orlando Dimas who plans a ROBERTO and cardioversion today. He endorses some anorexia and unintentional weight loss, he has been working but denies exposure to toxins. He underwent L/RHC on 7/23 which showed normal coronary arteries and slightly elevated filling pressures. He is scheduled for cardiac MRI on 7/24. 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  Tolerating GDMT     Impression / Plan:   Heart Failure Status: NYHA Class III    Acute HFrEF, NICM, EF 10-20%  Cardiac MRI suggestive of HHV-6  LVEF 17%, RVEF 22%   [place] panel and CMV IgG positive   Remaining blood work unremarkable  L/RHC showed normal coronary arteries, high-normal filling pressures   Cont Coreg, losartan, and spironolactone  Consider transition to Entresto if renal function stable this AM    F/u scheduled with [facility] for 8/9/18 at 12:30 pm     High risk of SCD  Needs LifeVest - order in process     Afib w/ RVR  S/p Cardioversion  Remains in Sinus  Amio BID - mgmt per EP   Note plans for [facility]   Cont enoxaparin as inpatient     All other care per primary team     History:  No past medical history on file.   Past Surgical History:   Procedure Laterality Date    CARDIOVERSION, ELECTIVE;EXTERN  7/20/2018          Social History     Social History    Marital status: SINGLE     Spouse name: N/A    Number of children: N/A    Years of education: N/A     Occupational History    Not on file. Social History Main Topics    Smoking status: Current Every Day Smoker     Packs/day: 0.50    Smokeless tobacco: Not on file    Alcohol use Yes      Comment: \"on weekends\"    Drug use: No    Sexual activity: Yes     Other Topics Concern    Not on file     Social History Narrative     No family history on file.     Current Medications:   Current Facility-Administered Medications   Medication Dose Route Frequency Provider Last Rate Last Dose    spironolactone (ALDACTONE) tablet 25 mg  25 mg Oral DAILY Jim Baxter, NP   25 mg at 07/25/18 0823    enoxaparin (LOVENOX) injection 100 mg  1 mg/kg SubCUTAneous Q12H Javad Glover MD   100 mg at 07/25/18 0645    losartan (COZAAR) tablet 25 mg  25 mg Oral DAILY Kuldeep Meckel, NP   25 mg at 07/25/18 6382    magnesium oxide (MAG-OX) tablet 400 mg  400 mg Oral DAILY Kuldeep Meckel, NP   400 mg at 07/25/18 4150    potassium chloride SR (KLOR-CON 10) tablet 20 mEq  20 mEq Oral DAILY Kuldeep Mecjade, NP   20 mEq at 07/25/18 0455    butamben-tetracaine-benzocaine (CETACAINE) 2 %-2 %-14 % (200 mg/sec) topical spray 1 Spray  1 Spray Topical QID PRN Mojgan Louis MD        benzocaine (HURRICANE) 20 % spray   Mucous Membrane PRN Mojgan Louis MD   2 [place] at 07/20/18 1153    carvedilol (COREG) tablet 3.125 mg  3.125 mg Oral BID WITH MEALS Mojgan Louis MD   3.125 mg at 07/25/18 3543    amiodarone (CORDARONE) tablet 400 mg  400 mg Oral BID Mojgan Louis MD   400 mg at 07/25/18 1349    sodium chloride (NS) flush 5-10 mL  5-10 mL IntraVENous Q8H Shahnaz العلي MD   10 mL at 07/25/18 0645    sodium chloride (NS) flush 5-10 mL  5-10 mL IntraVENous PRN Shahnaz العلي MD   10 mL at 07/20/18 3542       Allergies: No Known Allergies    ROS:    Constitutional: negative, feels well today   HENT: Negative. Respiratory: Positive for FLOWERS,  Negative for cough. Cardiovascular:  Negative for chest pain, leg swelling and PND. Gastrointestinal: Negative. Genitourinary: peeing a lot    Musculoskeletal: Negative. Skin: Negative. Neurological: Negative. Physical Exam:   Constitutional: NAD, sitting up in bed, eating breakfast   HENT:   Head: Normocephalic. Eyes: Pupils are equal, round, and reactive to light. Neck: Normal range of motion. Neck supple. No JVD present. Cardiovascular: Normal heart sounds and intact distal pulses. Tachycardic but regular rhythm. Pulmonary/Chest: Effort normal and breath sounds normal.   Abdominal: Soft. Bowel sounds are normal. He exhibits no distension. Musculoskeletal: He exhibits no edema. Neurological: He is alert and oriented to person, place, and time. Skin: Skin is warm and dry. Nursing note and vitals reviewed.       Vitals:   Visit Vitals    /86 (BP 1 Location: Right arm, BP Patient Position: At rest)    Pulse 93    Temp 97.9 °F (36.6 °C)    Resp 18    Wt 222 lb 10.6 oz (101 kg)    SpO2 97%    BMI 27.83 kg/m2         Temp (24hrs), Av.8 °F (36.6 °C), Min:97.2 °F (36.2 °C), Max:98.3 °F (36.8 °C)      Admission Weight: Last Weight   Weight: 228 lb 2.8 oz (103.5 kg) Weight: 222 lb 10.6 oz (101 kg)     Intake / Output / Drain:  Last 24 hrs.:     Intake/Output Summary (Last 24 hours) at 18 0851  Last data filed at 18 0825   Gross per 24 hour   Intake              600 ml   Output             1825 ml   Net            -1225 ml     Oxygen Therapy:  Oxygen Therapy  O2 Sat (%): 97 % (18 0812)  Pulse via Oximetry: 95 beats per minute (18 2235)  O2 Device: Room air (18 0401)  O2 Flow Rate (L/min): 4 l/min (18 1226)      Recent Labs:   Labs Latest Ref Rng & Units 2018 2018 2018 WBC 4.1 - 11.1 K/uL 6.4 7.8 8.1 7.9 - 10.8 -   RBC 4.10 - 5.70 M/uL 4.65 4.56 4.81 5.10 - 4.99 -   Hemoglobin 12.1 - 17.0 g/dL 14.5 14.3 15.0 15.7 - 15.5 -   Hematocrit 36.6 - 50.3 % 45.8 44.2 47.3 50. 5(H) - 48.3 -   MCV 80.0 - 99.0 FL 98.5 96.9 98.3 99.0 - 96.8 -   Platelets 426 - 335 K/uL 262 279 297 269 - 285 -   Lymphocytes 12 - 49 % 54(H) 56(H) 64(H) 48 - 47 -   Monocytes 5 - 13 % 7 7 5 6 - 6 -   Eosinophils 0 - 7 % 1 1 1 1 - 2 -   Basophils 0 - 1 % 1 1 1 0 - 0 -   Albumin 3.5 - 5.0 g/dL - 2. 4(L) 2. 6(L) 2. 4(L) 2. 6(L) 2. 6(L) -   Calcium 8.5 - 10.1 MG/DL - 8.4(L) 8.7 8.5 8.6 8.5 -   SGOT 15 - 37 U/L - 23 24 24 22 21 -   Glucose 65 - 100 mg/dL - 69 97 86 90 77 -   BUN 6 - 20 MG/DL - 8 8 9 8 8 -   Creatinine 0.70 - 1.30 MG/DL - 1.01 1.10 1.16 1.07 1.03 -   Sodium 136 - 145 mmol/L - 139 140 141 139 142 138   Potassium 3.5 - 5.1 mmol/L - 4.2 4.2 4.2 4.2 3.8 3.8   TSH 0.36 - 3.74 uIU/mL - - - - - - -   LDH 85 - 241 U/L - - 342(H) - - - -     EKG:   EKG Results     Procedure 720 Value Units Date/Time    SCANNED CARDIAC RHYTHM STRIP [[id number]] Collected:  07/25/18 0544    Order Status:  Completed Updated:  07/25/18 0549    SCANNED CARDIAC RHYTHM STRIP [[id number]] Collected:  07/24/18 0553    Order Status:  Completed Updated:  07/24/18 0701    SCANNED CARDIAC RHYTHM STRIP [[id number]] Collected:  07/23/18 0756    Order Status:  Completed Updated:  07/23/18 0806    SCANNED CARDIAC RHYTHM STRIP [[id number]] Collected:  07/22/18 0319    Order Status:  Completed Updated:  07/22/18 0701    SCANNED CARDIAC RHYTHM STRIP [[id number]] Collected:  07/22/18 0252    Order Status:  Completed Updated:  07/22/18 0656        Echocardiogram:   7/17/18  Left ventricle: The ventricle was mildly to moderately dilated. Systolic  function was markedly reduced by visual assessment. Ejection fraction was  estimated in the range of 10 % to 20 %, challenged determination in  presence of what appeared to be rapid atrial fibrillation.  There was    Cardiac MRI scheduled 7/24    Nuclear Studies:   7/17/18- Pending read    Cardiac Catheterizations: Scheduled 7/23    Radiology (CXR, CT scans):    CXR Results  (Last 48 hours)               07/23/18 1228  XR CHEST PORT Final result    Impression:  IMPRESSION: No pneumothorax following right thoracentesis. Narrative:  EXAM:  XR CHEST PORT. INDICATION: Status post right thoracentesis. COMPARISON: 7/21/2018. FINDINGS:    A portable AP radiograph of the chest was obtained at 1220 hours. Lines and tubes: The patient is on a cardiac monitor. Lungs: There is atelectasis or airspace disease at the right lung base. The left   lung is clear. Pleura: Is a right pleural effusion. There is no pneumothorax following   thoracentesis. Mediastinum: The cardiac and mediastinal contours and pulmonary vascularity are   normal.   Bones and soft tissues: The bones and soft tissues are grossly within normal   limits. CT Results (most recent):    Results from Hospital Encounter encounter on 07/17/18   CTA CHEST W OR W WO CONT   Narrative EXAM:  CTA CHEST W OR W WO CONT    INDICATION:   Irregular heartbeat, pleural effusion seen on chest radiograph    COMPARISON: Chest radiograph performed earlier the same day. TECHNIQUE:   Precontrast  images were obtained to localize the volume for acquisition. Multislice helical CT arteriography was performed from the diaphragm to the  thoracic inlet during uneventful rapid bolus of 100 cc Isovue-370. Lung and soft  tissue windows were generated. Coronal and sagittal images were generated and  3D post processing consisting of coronal maximum intensity images was performed. CT dose reduction was achieved through use of a standardized protocol tailored  for this examination and automatic exposure control for dose modulation. FINDINGS:  CHEST:  THYROID: No nodule. MEDIASTINUM: No mass or lymphadenopathy.   GRAYSON: No mass or lymphadenopathy. THORACIC AORTA: No aneurysm. Not well opacified. MAIN PULMONARY ARTERY: There is no evidence of pulmonary embolism. TRACHEA/BRONCHI: Patent. ESOPHAGUS: No wall thickening or dilatation. HEART: Dilatation of the left atrium and left ventricle is noted. PLEURA: There is a moderate right pleural effusion  LUNGS: Right middle and right lower lobe infiltrate is noted. Atelectasis is  seen in the left lower lobe. INCIDENTALLY IMAGED UPPER ABDOMEN: No focal abnormality. BONES: No destructive bone lesion. Impression IMPRESSION: Moderate right pleural effusion with right middle and right lower  lobe infiltrates. Left lower lobe atelectasis. No evidence of pulmonary  embolism. Adrian Bowman NP  [address], [address]  Office [phone]  Fax [phone]  24 hour VAD/HF Pager: [phone]       Patient seen and examined. Data and note reviewed. I have discussed and agree with the plans as noted. 43year old male who presented with acute onset of systolic heart failure. Cardiac MRI suggests viral myocarditis, typical of HHV6. Viral serologies still pending. He is stable on GDMT and has a LifeVest for SCD protection. Will follow closely in the outpatient [facility] for optimization of his GDMT. Thank you for allowing us to participate in your patient's care. Keturah Howard MD, [facility]  Chief of Cardiology, [address] [place] [address] Director  [address], [address] [facility], [address] [place] [address]  Office [phone]  Fax [phone]

## 2018-07-25 NOTE — PROGRESS NOTES
faxed the patient's entresto patient assistance application to [facility] on this date after completing it with the patient.     Brianna Hernandez, MSW, LCSW    Clinical    [address]   [employer] ® ACP Facilitator

## 2018-07-25 NOTE — PROGRESS NOTES
Problem: Falls - Risk of  Goal: *Absence of Falls  Document Ayaan Fall Risk and appropriate interventions in the flowsheet. Outcome: Progressing Towards Goal  Fall Risk Interventions:  Mobility Interventions: Patient to call before getting OOB         Medication Interventions: Evaluate medications/consider consulting pharmacy, Teach patient to arise slowly    Elimination Interventions: Urinal in reach             Problem: Afib: Discharge Outcomes (not in CAT)  Goal: *Verbalizes understanding and describes medication purposes and frequencies  Outcome: Progressing Towards Goal  Explain reasoning for medications being administered. Goal: *Anxiety reduced or absent  Outcome: Progressing Towards Goal  Educate patient on medical condition. Goal: *Describes smoking cessation resources  Outcome: Progressing Towards Goal  Educate patient on resources. Problem: Pressure Injury - Risk of  Goal: *Prevention of pressure injury  Document Clayton Scale and appropriate interventions in the flowsheet. Outcome: Progressing Towards Goal  Pressure Injury Interventions:             Activity Interventions: Pressure redistribution bed/mattress(bed type)    Mobility Interventions: Pressure redistribution bed/mattress (bed type)    Nutrition Interventions: Document food/fluid/supplement intake    Friction and Shear Interventions: Minimize layers

## 2018-07-25 NOTE — PROGRESS NOTES
07/25/18 1200   Six Minute Walk Report (PRE)   Heart Rate 95   O2 Saturation 98   Six Minute Walk Report (POST)   Heart Rate 111   O2 Saturation 90   Distance Walked in Feet (ft) 678 ft.    Distance in Meters 206.65 meters

## 2018-07-25 NOTE — PROGRESS NOTES
Faculty or Preceptor Review of RN Work    7/25/2018  - Shift times - 0730 to 2000    The RN documentation of patient care for Ney Kaiser has been reviewed and approved. All medications have been administered under the direct supervision of the faculty or preceptor.     Jude Pinzon RN

## 2018-07-25 NOTE — PROGRESS NOTES
1930: Bedside and Verbal shift change report given to Mina Hooper RN (oncoming nurse) by Dameon Tyler RN (offgoing nurse). Report included the following information SBAR, Kardex, Intake/Output, MAR, Recent Results and Cardiac Rhythm NSR.   0730: Bedside and Verbal shift change report given to Dameon Tyler RN (oncoming nurse) by Mina Hooper RN (offgoing nurse). Report included the following information SBAR, Kardex, Intake/Output, MAR, Recent Results and Cardiac Rhythm NSR. Problem: Falls - Risk of  Goal: *Absence of Falls  Document Ayaan Fall Risk and appropriate interventions in the flowsheet. Outcome: Progressing Towards Goal  Fall Risk Interventions:  Mobility Interventions: Patient to call before getting OOB         Medication Interventions: Teach patient to arise slowly    Elimination Interventions: Call light in reach, Urinal in reach             Problem: Pressure Injury - Risk of  Goal: *Prevention of pressure injury  Document Clayton Scale and appropriate interventions in the flowsheet. Outcome: Progressing Towards Goal  Pressure Injury Interventions:             Activity Interventions: Pressure redistribution bed/mattress(bed type)    Mobility Interventions: Pressure redistribution bed/mattress (bed type)    Nutrition Interventions: Document food/fluid/supplement intake    Friction and Shear Interventions: Minimize layers

## 2018-07-25 NOTE — PROGRESS NOTES
Bedside and Verbal shift change report given to [facility] (oncoming nurse) by [address] [place] [address] (offgoing nurse). Report included the following information SBAR, Kardex, Recent Results and Cardiac Rhythm NSR. Received patient resting in bed. Patient alert and oriented. 0820: Dressing removed from thoracentesis. Breakfast tray at bedside. 0945: Explained 6-minute walk test.    1200: Patient performing 6 minute walk test.    1400: Educated/showed video to patient regarding coumadin. 1600: RN called office of Dr. Diego Vazquez in order to check if INR follow-up was available. They stated \"Patient will have to set up appointment in order to move forward. \"    1800: Pt groin site oozing. Held pressure. CHG used to clean site. Sterile 4x4 and Tegaderm applied. Bedside and Verbal shift change report given to Jing Boyd (oncoming nurse) by Myrtle Blackwood RN (offgoing nurse). Report included the following information SBAR, Kardex, ED Summary, Procedure Summary, Recent Results and Cardiac Rhythm NSR.

## 2018-07-25 NOTE — PROGRESS NOTES
Hospitalist Progress Note  Antoinette Arias MD  Office: [phone]        Date of Service:  2018  NAME:  Den Martinez  :  1980  MRN:  [id number]      Admission Summary:   43 y.o.  male with past medical history of remote perforated viscus, sepsis, and bacteremia, s/p exploratory laparotomy, repair of ruptured viscus presented as direct admission/ transfer from [facility] to [facility] CVICU with newly diagnosed rapid atrial fibrillation (afib), acute systolic CHF, right sided pleural effusion with infiltrates. Interval history / Subjective:   Was sitting at the edge of bed. Denied chest pain,SOB,LE edema,nausea/vomiting. Assessment & Plan:      Acute systolic CHF,non ischemic cardiomyopathy,suspect viral myocarditis  --Echocardiogram with EF of 10-20%. Cardiac cath with normal coronaries.  -Cardiac MRI done today - reviewed report, differentials were viral myocarditis HHV 6 vs idiopathic dilated cardiomyopathy. No evidence of infiltrative or autoimmune diseases. --medical mx with coreg and losartan. New onset Afib,now rate controlled:  NSR S/p cardioversion : continue amiodarone,coreg.  -on therapeutic lovenox dose      Tabacco abuse:   - was counseled to quit smoking. Pneumonia with right sided parapneumonia effusion:  -S/p US guided thoracentesis s/p removal of 1100 ml clear fluid. Appears exudative.  -will continue ceftriaxone and zithromax.  -follow up with fluid cultures. DVT px:lovenox.   Care Plan discussed with: Patient/Family and Nurse  Disposition: TBD     Hospital Problems  Date Reviewed: 2018          Codes Class Noted POA    Acute systolic heart failure (HCC) ICD-10-CM: I50.21  ICD-9-CM: 428.21  2018 Unknown        Pleural effusion, right ICD-10-CM: J90  ICD-9-CM: 511.9  2018 Unknown        * (Principal)Atrial fibrillation with RVR [facility]) ICD-10-CM: I48.91  ICD-9-CM: 427.31  7/19/2018 Unknown                Review of Systems:   Pertinent items are noted in HPI. Vital Signs:    Last 24hrs VS reviewed since prior progress note. Most recent are:  Visit Vitals    /83 (BP 1 Location: Right arm, BP Patient Position: At rest)    Pulse 97    Temp 98.3 °F (36.8 °C)    Resp 18    Wt 100.4 kg (221 lb 5.5 oz)    SpO2 98%    BMI 27.67 kg/m2         Intake/Output Summary (Last 24 hours) at 07/24/18 2113  Last data filed at 07/24/18 1721   Gross per 24 hour   Intake              820 ml   Output             1150 ml   Net             -330 ml        Physical Examination:             Constitutional:  No acute distress, cooperative, pleasant    ENT:  Oral mucous moist, oropharynx benign. Neck supple,    Resp:  CTA bilaterally. No wheezing/rhonchi/rales. No accessory muscle use   CV:  Regular rhythm, normal rate, no murmurs, gallops, rubs    GI:  Soft, non distended, non tender. normoactive bowel sounds, no hepatosplenomegaly     Musculoskeletal:  No edema, warm, 2+ pulses throughout    Neurologic:  Moves all extremities. AAOx3, CN II-XII reviewed            Data Review:    Review and/or order of clinical lab test and MRI results. Labs:     Recent Labs      07/24/18 0419 07/23/18 0409   WBC  7.8  8.1   HGB  14.3  15.0   HCT  44.2  47.3   PLT  279  297     Recent Labs      07/24/18 0419 07/23/18 0409  07/22/18   0343   NA  139  140  141   K  4.2  4.2  4.2   CL  110*  110*  111*   CO2  20*  19*  23   BUN  8  8  9   CREA  1.01  1.10  1.16   GLU  69  97  86   CA  8.4*  8.7  8.5     Recent Labs      07/24/18 0419 07/23/18   0409  07/22/18   0343   SGOT  23  24  24   ALT  20  23  21   AP  112  126*  123*   TBILI  0.9  0.8  0.7   TP  6.9  7.4  7.0   ALB  2.4*  2.6*  2.4*   GLOB  4.5*  4.8*  4.6*     No results for input(s): INR, PTP, APTT in the last 72 hours.     No lab exists for component: INREXT, INREXT   No results for input(s): FE, TIBC, PSAT, FERR in the last 72 hours. No results found for: FOL, RBCF   No results for input(s): PH, PCO2, PO2 in the last 72 hours. No results for input(s): CPK, CKNDX, TROIQ in the last 72 hours.     No lab exists for component: CPKMB  No results found for: CHOL, CHOLX, CHLST, CHOLV, HDL, LDL, LDLC, DLDLP, TGLX, TRIGL, TRIGP, CHHD, CHHDX  No results found for: Arsh Nance  Lab Results   Component Value Date/Time    Color YELLOW/STRAW 07/17/2018 02:14 PM    Appearance CLEAR 07/17/2018 02:14 PM    Specific gravity 1.005 07/17/2018 02:14 PM    Specific gravity 1.010 07/08/2018 05:56 PM    pH (UA) 6.0 07/17/2018 02:14 PM    Protein NEGATIVE  07/17/2018 02:14 PM    Glucose NEGATIVE  07/17/2018 02:14 PM    Ketone NEGATIVE  07/17/2018 02:14 PM    Bilirubin NEGATIVE  07/17/2018 02:14 PM    Urobilinogen 0.2 07/17/2018 02:14 PM    Nitrites NEGATIVE  07/17/2018 02:14 PM    Leukocyte Esterase NEGATIVE  07/17/2018 02:14 PM    Epithelial cells FEW 07/17/2018 02:14 PM    Bacteria NEGATIVE  07/17/2018 02:14 PM    WBC 0-4 07/17/2018 02:14 PM    RBC 0-5 07/17/2018 02:14 PM         Medications Reviewed:     Current Facility-Administered Medications   Medication Dose Route Frequency    spironolactone (ALDACTONE) tablet 25 mg  25 mg Oral DAILY    enoxaparin (LOVENOX) injection 100 mg  1 mg/kg SubCUTAneous Q12H    losartan (COZAAR) tablet 25 mg  25 mg Oral DAILY    magnesium oxide (MAG-OX) tablet 400 mg  400 mg Oral DAILY    potassium chloride SR (KLOR-CON 10) tablet 20 mEq  20 mEq Oral DAILY    butamben-tetracaine-benzocaine (CETACAINE) 2 %-2 %-14 % (200 mg/sec) topical spray 1 Spray  1 Spray Topical QID PRN    benzocaine (HURRICANE) 20 % spray   Mucous Membrane PRN    carvedilol (COREG) tablet 3.125 mg  3.125 mg Oral BID WITH MEALS    amiodarone (CORDARONE) tablet 400 mg  400 mg Oral BID    sodium chloride (NS) flush 5-10 mL  5-10 mL IntraVENous Q8H    sodium chloride (NS) flush 5-10 mL  5-10 mL IntraVENous PRN ______________________________________________________________________  EXPECTED LENGTH OF STAY: 3d 12h  ACTUAL LENGTH OF STAY:          Ina Fernandez MD

## 2018-07-25 NOTE — PROGRESS NOTES
SHALINI obtained approval for Lifevest for patient - informed by [facility] that patient will likely go home on Coreg, Sprialactone, losartan, and Tyrese Layo with [facility] is working on [facility] approval for patient - Per Dr. Ainsley Contreras patient may need lovenox to bridge for coumadin - CM awaiting script(s) to be written by MD to check on coverage/approval for patient at this time. Referral made to [address] [place] [address] Road specialist to try to connect patient with a free clinic for a new PCP appointment. Heidy with [facility] saw patient today and she is assisting him with medicaid application and patient is to complete a section of his social security disability paperwork at this time and return it to [address] staff or Heidy with [facility], MYRNA LOYA obtained script for Lovenox and obtained approval for [facility] to cover - patient provided with [employer] information and he has an appt with free clinic on 7/31/18 - patient has completed his disability application to best [facility] his [facility] and CM has secured a copy to give to [address] with [facility] in case patient is discharged today - CM awaiting scripts to be written for patient remaining meds to attempt to get covered/filled for patient to be discharged if possible. Per Michol with [employer] that lifevest anticipated to be delivered today. MYRNA Corbett      Referral made to [facility] for [address] visit for CHF via connect care.   Will follow-up in am. MYRNA Corbett

## 2018-07-25 NOTE — PROGRESS NOTES
Patient listed as not having a primary care physician. Hospital follow-up PCP transitional care appointment (financial screening) has been scheduled with [facility] for Tuesday, 7/31/18 at 2:00 p.m followed by physician appointment at 2:15 p.m. Pending patient discharge.   Quincy Sepulveda, Care Management Specialist.

## 2018-07-26 VITALS
RESPIRATION RATE: 18 BRPM | BODY MASS INDEX: 27.28 KG/M2 | DIASTOLIC BLOOD PRESSURE: 82 MMHG | WEIGHT: 218.26 LBS | TEMPERATURE: 97.5 F | OXYGEN SATURATION: 98 % | SYSTOLIC BLOOD PRESSURE: 111 MMHG | HEART RATE: 94 BPM

## 2018-07-26 LAB
ALBUMIN SERPL ELPH-MCNC: 2.8 G/DL (ref 2.9–4.4)
ALBUMIN SERPL-MCNC: 2.4 G/DL (ref 3.5–5)
ALBUMIN/GLOB SERPL: 0.5 {RATIO} (ref 1.1–2.2)
ALBUMIN/GLOB SERPL: 0.7 {RATIO} (ref 0.7–1.7)
ALP SERPL-CCNC: 100 U/L (ref 45–117)
ALPHA1 GLOB SERPL ELPH-MCNC: 0.3 G/DL (ref 0–0.4)
ALPHA2 GLOB SERPL ELPH-MCNC: 0.6 G/DL (ref 0.4–1)
ALT SERPL-CCNC: 19 U/L (ref 12–78)
ANA SER QL: NEGATIVE
ANION GAP SERPL CALC-SCNC: 11 MMOL/L (ref 5–15)
AST SERPL-CCNC: 21 U/L (ref 15–37)
B-GLOBULIN SERPL ELPH-MCNC: 1.1 G/DL (ref 0.7–1.3)
BASOPHILS # BLD: 0.1 K/UL (ref 0–0.1)
BASOPHILS NFR BLD: 1 % (ref 0–1)
BILIRUB SERPL-MCNC: 0.7 MG/DL (ref 0.2–1)
BNP SERPL-MCNC: 1981 PG/ML (ref 0–125)
BUN SERPL-MCNC: 9 MG/DL (ref 6–20)
BUN/CREAT SERPL: 9 (ref 12–20)
CALCIUM SERPL-MCNC: 8.7 MG/DL (ref 8.5–10.1)
CHLORIDE SERPL-SCNC: 110 MMOL/L (ref 97–108)
CO2 SERPL-SCNC: 21 MMOL/L (ref 21–32)
CREAT SERPL-MCNC: 0.97 MG/DL (ref 0.7–1.3)
DIFFERENTIAL METHOD BLD: ABNORMAL
EOSINOPHIL # BLD: 0.1 K/UL (ref 0–0.4)
EOSINOPHIL NFR BLD: 2 % (ref 0–7)
ERYTHROCYTE [DISTWIDTH] IN BLOOD BY AUTOMATED COUNT: 15.2 % (ref 11.5–14.5)
GAMMA GLOB SERPL ELPH-MCNC: 1.9 G/DL (ref 0.4–1.8)
GLOBULIN SER CALC-MCNC: 3.9 G/DL (ref 2.2–3.9)
GLOBULIN SER CALC-MCNC: 4.4 G/DL (ref 2–4)
GLUCOSE SERPL-MCNC: 75 MG/DL (ref 65–100)
HCT VFR BLD AUTO: 45.7 % (ref 36.6–50.3)
HGB BLD-MCNC: 14.6 G/DL (ref 12.1–17)
IMM GRANULOCYTES # BLD: 0 K/UL (ref 0–0.04)
IMM GRANULOCYTES NFR BLD AUTO: 0 % (ref 0–0.5)
INR PPP: 1.3 (ref 0.9–1.1)
LYMPHOCYTES # BLD: 3.5 K/UL (ref 0.8–3.5)
LYMPHOCYTES NFR BLD: 58 % (ref 12–49)
M PROTEIN SERPL ELPH-MCNC: ABNORMAL G/DL
MAGNESIUM SERPL-MCNC: 1.6 MG/DL (ref 1.6–2.4)
MCH RBC QN AUTO: 31.3 PG (ref 26–34)
MCHC RBC AUTO-ENTMCNC: 31.9 G/DL (ref 30–36.5)
MCV RBC AUTO: 98.1 FL (ref 80–99)
MONOCYTES # BLD: 0.4 K/UL (ref 0–1)
MONOCYTES NFR BLD: 7 % (ref 5–13)
NEUTS SEG # BLD: 1.9 K/UL (ref 1.8–8)
NEUTS SEG NFR BLD: 31 % (ref 32–75)
NRBC # BLD: 0 K/UL (ref 0–0.01)
NRBC BLD-RTO: 0 PER 100 WBC
PLATELET # BLD AUTO: 279 K/UL (ref 150–400)
PMV BLD AUTO: 9.8 FL (ref 8.9–12.9)
POTASSIUM SERPL-SCNC: 3.8 MMOL/L (ref 3.5–5.1)
PROT SERPL-MCNC: 6.7 G/DL (ref 6–8.5)
PROT SERPL-MCNC: 6.8 G/DL (ref 6.4–8.2)
PROTHROMBIN TIME: 13.4 SEC (ref 9–11.1)
RBC # BLD AUTO: 4.66 M/UL (ref 4.1–5.7)
SEE BELOW:, 164879: NORMAL
SODIUM SERPL-SCNC: 142 MMOL/L (ref 136–145)
WBC # BLD AUTO: 6 K/UL (ref 4.1–11.1)

## 2018-07-26 PROCEDURE — 99232 SBSQ HOSP IP/OBS MODERATE 35: CPT | Performed by: INTERNAL MEDICINE

## 2018-07-26 PROCEDURE — 87532 HHV-6 DNA AMP PROBE: CPT | Performed by: NURSE PRACTITIONER

## 2018-07-26 PROCEDURE — 74011250637 HC RX REV CODE- 250/637: Performed by: NURSE PRACTITIONER

## 2018-07-26 PROCEDURE — 74011250637 HC RX REV CODE- 250/637: Performed by: HOSPITALIST

## 2018-07-26 PROCEDURE — 94760 N-INVAS EAR/PLS OXIMETRY 1: CPT

## 2018-07-26 PROCEDURE — 85610 PROTHROMBIN TIME: CPT | Performed by: NURSE PRACTITIONER

## 2018-07-26 PROCEDURE — 85025 COMPLETE CBC W/AUTO DIFF WBC: CPT | Performed by: NURSE PRACTITIONER

## 2018-07-26 PROCEDURE — 74011250637 HC RX REV CODE- 250/637: Performed by: INTERNAL MEDICINE

## 2018-07-26 PROCEDURE — 80053 COMPREHEN METABOLIC PANEL: CPT | Performed by: NURSE PRACTITIONER

## 2018-07-26 PROCEDURE — 83880 ASSAY OF NATRIURETIC PEPTIDE: CPT | Performed by: NURSE PRACTITIONER

## 2018-07-26 PROCEDURE — 36415 COLL VENOUS BLD VENIPUNCTURE: CPT | Performed by: NURSE PRACTITIONER

## 2018-07-26 PROCEDURE — 74011250636 HC RX REV CODE- 250/636: Performed by: SPECIALIST

## 2018-07-26 PROCEDURE — 83735 ASSAY OF MAGNESIUM: CPT | Performed by: HOSPITALIST

## 2018-07-26 RX ORDER — ENOXAPARIN SODIUM 100 MG/ML
100 INJECTION SUBCUTANEOUS EVERY 12 HOURS
Qty: 10 SYRINGE | Refills: 0 | Status: SHIPPED
Start: 2018-07-26 | End: 2018-08-09 | Stop reason: ALTCHOICE

## 2018-07-26 RX ORDER — WARFARIN SODIUM 5 MG/1
TABLET ORAL
Qty: 20 TAB | Refills: 0 | Status: SHIPPED | OUTPATIENT
Start: 2018-07-26 | End: 2018-07-27 | Stop reason: SDUPTHER

## 2018-07-26 RX ORDER — AMIODARONE HYDROCHLORIDE 400 MG/1
TABLET ORAL
Qty: 35 TAB | Refills: 0 | Status: SHIPPED | OUTPATIENT
Start: 2018-07-26 | End: 2018-08-24 | Stop reason: DRUGHIGH

## 2018-07-26 RX ORDER — SPIRONOLACTONE 25 MG/1
25 TABLET ORAL DAILY
Qty: 30 TAB | Refills: 0 | Status: SHIPPED | OUTPATIENT
Start: 2018-07-27 | End: 2018-08-24 | Stop reason: SDUPTHER

## 2018-07-26 RX ORDER — LANOLIN ALCOHOL/MO/W.PET/CERES
400 CREAM (GRAM) TOPICAL DAILY
Qty: 30 TAB | Refills: 0 | Status: SHIPPED | OUTPATIENT
Start: 2018-07-27 | End: 2018-08-24 | Stop reason: SDUPTHER

## 2018-07-26 RX ORDER — WARFARIN SODIUM 5 MG/1
5 TABLET ORAL ONCE
Status: COMPLETED | OUTPATIENT
Start: 2018-07-26 | End: 2018-07-26

## 2018-07-26 RX ORDER — CARVEDILOL 3.12 MG/1
3.12 TABLET ORAL 2 TIMES DAILY WITH MEALS
Qty: 60 TAB | Refills: 0 | Status: SHIPPED | OUTPATIENT
Start: 2018-07-26 | End: 2018-08-24 | Stop reason: SDUPTHER

## 2018-07-26 RX ORDER — POTASSIUM CHLORIDE 1500 MG/1
20 TABLET, FILM COATED, EXTENDED RELEASE ORAL DAILY
Qty: 30 TAB | Refills: 0 | Status: SHIPPED | OUTPATIENT
Start: 2018-07-27 | End: 2018-08-24 | Stop reason: SDUPTHER

## 2018-07-26 RX ADMIN — AMIODARONE HYDROCHLORIDE 400 MG: 200 TABLET ORAL at 08:45

## 2018-07-26 RX ADMIN — SACUBITRIL AND VALSARTAN 1 TABLET: 24; 26 TABLET, FILM COATED ORAL at 08:45

## 2018-07-26 RX ADMIN — SPIRONOLACTONE 25 MG: 25 TABLET ORAL at 08:45

## 2018-07-26 RX ADMIN — Medication 400 MG: at 08:45

## 2018-07-26 RX ADMIN — Medication 10 ML: at 07:04

## 2018-07-26 RX ADMIN — ENOXAPARIN SODIUM 100 MG: 100 INJECTION SUBCUTANEOUS at 07:00

## 2018-07-26 RX ADMIN — CARVEDILOL 3.12 MG: 3.12 TABLET, FILM COATED ORAL at 17:19

## 2018-07-26 RX ADMIN — ENOXAPARIN SODIUM 100 MG: 100 INJECTION SUBCUTANEOUS at 18:19

## 2018-07-26 RX ADMIN — Medication 10 ML: at 14:38

## 2018-07-26 RX ADMIN — CARVEDILOL 3.12 MG: 3.12 TABLET, FILM COATED ORAL at 08:45

## 2018-07-26 RX ADMIN — POTASSIUM CHLORIDE 20 MEQ: 750 TABLET, EXTENDED RELEASE ORAL at 08:45

## 2018-07-26 RX ADMIN — WARFARIN SODIUM 5 MG: 5 TABLET ORAL at 12:37

## 2018-07-26 NOTE — PROGRESS NOTES
0915 Bedside and Verbal shift change report given to PAPO Cohen (oncoming nurse) by Akbar Jane (offgoing nurse). Report included the following information SBAR, Kardex, Procedure Summary, Intake/Output, MAR and Recent Results. Simin Lay from Nurse    PATIENT INSTRUCTIONS:    After general anesthesia or intravenous sedation, for 24 hours or while taking prescription Narcotics:  · Limit your activities  · Do not drive and operate hazardous machinery  · Do not make important personal or business decisions  · Do  not drink alcoholic beverages  · If you have not urinated within 8 hours after discharge, please contact your surgeon on call. Report the following to your surgeon:  · Excessive pain, swelling, redness or odor of or around the surgical area  · Temperature over 100.5  · Nausea and vomiting lasting longer than 4 hours or if unable to take medications  · Any signs of decreased circulation or nerve impairment to extremity: change in color, persistent  numbness, tingling, coldness or increase pain  · Any questions    What to do at Home:  Recommended activity: Activity as tolerated,     If you experience any of the following symptoms see instructions, please follow up with MD.    *  Please give a list of your current medications to your Primary Care Provider. *  Please update this list whenever your medications are discontinued, doses are      changed, or new medications (including over-the-counter products) are added. *  Please carry medication information at all times in case of emergency situations. These are general instructions for a healthy lifestyle:    No smoking/ No tobacco products/ Avoid exposure to second hand smoke  Surgeon General's Warning:  Quitting smoking now greatly reduces serious risk to your health.     Obesity, smoking, and sedentary lifestyle greatly increases your risk for illness    A healthy diet, regular physical exercise & weight monitoring are important for maintaining a healthy lifestyle    You may be retaining fluid if you have a history of heart failure or if you experience any of the following symptoms:  Weight gain of 3 pounds or more overnight or 5 pounds in a week, increased swelling in our hands or feet or shortness of breath while lying flat in bed. Please call your doctor as soon as you notice any of these symptoms; do not wait until your next office visit. Recognize signs and symptoms of STROKE:    F-face looks uneven    A-arms unable to move or move unevenly    S-speech slurred or non-existent    T-time-call 911 as soon as signs and symptoms begin-DO NOT go       Back to bed or wait to see if you get better-TIME IS BRAIN. Warning Signs of HEART ATTACK     Call 911 if you have these symptoms:   Chest discomfort. Most heart attacks involve discomfort in the center of the chest that lasts more than a few minutes, or that goes away and comes back. It can feel like uncomfortable pressure, squeezing, fullness, or pain.  Discomfort in other areas of the upper body. Symptoms can include pain or discomfort in one or both arms, the back, neck, jaw, or stomach.  Shortness of breath with or without chest discomfort.  Other signs may include breaking out in a cold sweat, nausea, or lightheadedness. Don't wait more than five minutes to call 911 - MINUTES MATTER! Fast action can save your life. Calling 911 is almost always the fastest way to get lifesaving treatment. Emergency Medical Services staff can begin treatment when they arrive -- up to an hour sooner than if someone gets to the hospital by car. The discharge information has been reviewed with the patient. The patient verbalized understanding.   Discharge medications reviewed with the patient and appropriate educational materials and side effects teaching were provided.   ___________________________________________________________________________________________________________________________________

## 2018-07-26 NOTE — DISCHARGE SUMMARY
Discharge Summary       PATIENT ID: Jessie Buchanan  MRN: [id number]   YOB: 1980    DATE OF ADMISSION: 7/19/2018  8:50 PM    DATE OF DISCHARGE: 7/26/2018   PRIMARY CARE PROVIDER: None     ATTENDING PHYSICIAN: Chintan Vera MD    DISCHARGING PROVIDER: Chintan Vera MD    To contact this individual call [phone] and ask the  to page. If unavailable ask to be transferred the Adult Hospitalist Department. CONSULTATIONS: IP CONSULT TO CARDIOLOGY    PROCEDURES/SURGERIES: * No surgery found *    ADMITTING DIAGNOSES & HOSPITAL COURSE:   43 y.o.  male with past medical history of remote perforated viscus, sepsis, and bacteremia, s/p exploratory laparotomy, repair of ruptured viscus presented as direct admission/ transfer from [facility]) to [facility] CVICU with newly diagnosed rapid atrial fibrillation (afib), acute systolic CHF, right sided pleural effusion with infiltrates. Acute systolic CHF,non ischemic cardiomyopathy,suspect viral myocarditis  --Echocardiogram with EF of 10-20%. Cardiac cath with normal coronaries.  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 No evidence of infiltrative or autoimmune diseases. --medical mx with coreg, spironolactone. Entresto started today.  -Patient is wearing life vest.  -Appears euvolemic today.     New onset Afib,now rate controlled:  NSR S/p cardioversion : continue amiodarone(400 mg BD for total 14 days -->400 mg daily for 14 days and then 200 mg daily),coreg.  -on therapeutic lovenox dose --> continue s/q lovenox till INR is therapeutic. -INR:1.3 today(Received warfarin 7.5 mg on 7/25 and 5 mg on 7/26)        Tobacco abuse:   - was counseled to quit smoking.        Pneumonia with right sided parapneumonia effusion:  -S/p US guided thoracentesis s/p removal of 1100 ml clear fluid. Appears exudative.  -Completed course of ceftriaxone and zithromax.  -fluid cultures negative.  -Pleural fluid showed predominantly lymphocytes,likely due to viral illness. No evidence of sarcoidosis (CT chest did not show lymphadenopathy). No clear evidence of autoimmune disease.  -  CT abdomen done in 7/2018 showed retroperitoneal lymphadenopathy which could be reactive in the setting of viral illness- patient will need repeat CT scan in 6-8 weeks to see if it has resolved. This was discussed with the patient. -ESR,CRP are wnl. LDH very minimal elevation.  -Patient denied any weight loss,rash, joint pains,fever/night sweats or fatigue. Cardiac MRI:  IMPRESSION  Impression:     1. Moderately dilated left ventricle by 3-D volumetric assessment indexed to  body surface area. Mild eccentric left ventricular hypertrophy. Severe global  hypokinesis with regional variation. 3-D LVEF 17%. 2. Mildly dilated right ventricle by 3-D volumetric assessment indexed to body  surface area. Moderate global hypokinesis with regional variation. Severe  hypokinesis of the mid anterior wall. 3-D RVEF 20%. 3. Apically tethered and restricted mobility of the mitral valve leaflets. Moderate 2+ mitral regurgitation. 4. Mild 1+ tricuspid regurgitation. 5. On EGE and LGE study, there is a distinct mid wall stripe enhancement of the  basal septal wall along with RV insertion point enhancement sparing the  subendocardium. The differential diagnosis includes viral myocarditis of HHV-6  viral strain. Other differential diagnoses includes idiopathic dilated  cardiomyopathy although the probability of which is low. There is no features of  giant cell or lymphocytic myocarditis. There is no features of infiltrative  sarcoidosis or amyloidosis. There is no features of recent or old myocardial  infarction. 6. Normal pericardium with trace anterior and posterior pericardial effusion.   Normal pleura with medium-size right-sided pleural effusion and trace left-sided  pleural effusion. 7. No significant mediastinal adenopathy. PENDING TEST RESULTS:   At the time of discharge the following test results are still pending: HHV 6, SPEP,UPEP    FOLLOW UP APPOINTMENTS:    Follow-up Information     Follow up With Details Comments Cecy On 8/9/2018 12:30 pm with Demetris Cohen NP  [address] [place] [id number]  [address] On 7/31/2018 Hospital f/u new patient appointment (financial screening) Tuesday, 7/31/18 @ 2:00 p.m. Physician appoitment 2:15 p.m. Patient needs to provide photo ID, one month proof of income and discharge papers. [address]  [address] On 7/28/2018 one time skilled nursing visit [facility] [phone] [facility]    Lalo Arredondo MD In 1 day inr check [address] [facility] [address]      Lalo Arredondo MD On 7/27/2018 @ 9:00am for INR check [address]  [phone]      None   None (395) Patient stated that they have no PCP               ADDITIONAL CARE RECOMMENDATIONS: follow up with cardiology,PCP and advanced heart failure team    DIET: Cardiac Diet      ACTIVITY: Activity as tolerated    WOUND CARE: na    EQUIPMENT needed: na        DISCHARGE MEDICATIONS:  Current Discharge Medication List      START taking these medications    Details   enoxaparin (LOVENOX) 100 mg/mL 100 mg by SubCUTAneous route every twelve (12) hours. Injections are needed till INR is therapeutic  Qty: 10 Syringe, Refills: 0      amiodarone (PACERONE) 400 mg tablet Take 400 mg twice a day till 8/3/2018 and then take  400 mg once a day till 8/17/2018 and then obtain 200 mg tablets from your cardiologist to continue 200 mg daily after that.   Qty: 35 Tab, Refills: 0      magnesium oxide (MAG-OX) 400 mg tablet Take 1 Tab by mouth daily.  Qty: 30 Tab, Refills: 0      potassium chloride SR (K-TAB) 20 mEq tablet Take 1 Tab by mouth daily. Qty: 30 Tab, Refills: 0      sacubitril-valsartan (ENTRESTO) 24 mg/26 mg tablet Take 1 Tab by mouth every twelve (12) hours. Qty: 30 Tab, Refills: 0      spironolactone (ALDACTONE) 25 mg tablet Take 1 Tab by mouth daily. Qty: 30 Tab, Refills: 0      warfarin (COUMADIN) 5 mg tablet Take 5 mg tablets till next INR check (likely on 7/30/2018) and then dose will be adjusted. Qty: 20 Tab, Refills: 0         CONTINUE these medications which have CHANGED    Details   carvedilol (COREG) 3.125 mg tablet Take 1 Tab by mouth two (2) times daily (with meals). Qty: 60 Tab, Refills: 0         STOP taking these medications       azithromycin 500 mg 500 mg, ADDaptor 1 Device IVPB Comments:   Reason for Stopping:         cefTRIAXone 2 gram 2 g, ADDaptor 1 Device IVPB Comments:   Reason for Stopping:         dilTIAZem (CARDIZEM) infusion Comments:   Reason for Stopping:                 NOTIFY YOUR PHYSICIAN FOR ANY OF THE FOLLOWING:   Fever over 101 degrees for 24 hours. Chest pain, shortness of breath, fever, chills, nausea, vomiting, diarrhea, change in mentation, falling, weakness, bleeding. Severe pain or pain not relieved by medications. Or, any other signs or symptoms that you may have questions about. DISPOSITION:   X Home With:   OT  PT  HH  RN       Long term SNF/Inpatient Rehab    Independent/assisted living    Hospice    Other:       PATIENT CONDITION AT DISCHARGE:     Functional status    Poor     Deconditioned    X Independent      Cognition   X  Lucid     Forgetful     Dementia      Catheters/lines (plus indication)    Dickens     PICC     PEG    X None      Code status    X Full code     DNR      PHYSICAL EXAMINATION AT DISCHARGE:  Gen - NAD  HEENT - MMM  Neck - supple, full ROM  CV - S1,S2 wnl, has a life vest   Resp - lungs CTA b/l, no wheezing, normal WOB  GI - abdomen S, NT, ND, +BS.  No hepatosplenomegaly   - no CVA tenderness, bladder non-palpable in lower abdomen  MSK - normal tone and bulk, no edema  Neuro - A&O, no focal deficits  Psych - calm and cooperative with normal affect        CHRONIC MEDICAL DIAGNOSES:  Problem List as of 7/26/2018  Date Reviewed: 7/19/2018          Codes Class Noted - Resolved    Acute systolic [facility] ([facility] ICD-10-CM: I50.21  ICD-9-CM: 428.21  7/19/2018 - Present        Pleural effusion, right ICD-10-CM: J90  ICD-9-CM: 511.9  7/19/2018 - Present        * (Principal)Atrial fibrillation with RVR (HCC) ICD-10-CM: I48.91  ICD-9-CM: 427.31  7/19/2018 - Present        Rapid atrial fibrillation ([facility] ICD-10-CM: I48.91  ICD-9-CM: 427.31  7/17/2018 - Present              45 minutes were spent with the patient on counseling and coordination of care. Discussed with ,cardiology,advanced heart failure team.    Signed:   Dimple Wood MD  7/26/2018  4:35 PM

## 2018-07-26 NOTE — DISCHARGE INSTRUCTIONS
Future Appointments  Date Time Provider Dougie Moore   8/9/2018 12:30 PM YOUSUF Crawford [facility] ROBERTA [facility]   8/30/2018 8:00 AM Destin Zarate  [address]        Discharge Instructions       PATIENT ID: Mukund Lisa  MRN: [id number]   YOB: 1980    DATE OF ADMISSION: 7/19/2018  8:50 PM    DATE OF DISCHARGE: 7/26/2018    PRIMARY CARE PROVIDER: None     ATTENDING PHYSICIAN: Rock Henok MD  DISCHARGING PROVIDER: Rock Henok MD    To contact this individual call [phone] and ask the  to page. If unavailable ask to be transferred the Adult Hospitalist Department. DISCHARGE DIAGNOSES acute systolic heart failure    CONSULTATIONS: IP CONSULT TO CARDIOLOGY    PROCEDURES/SURGERIES:cardiac cath,cardioversion    PENDING TEST RESULTS:   At the time of discharge the following test results are still pending: EVAN,SPEP,UPEP    FOLLOW UP APPOINTMENTS:   Follow-up Information     Follow up With Details Comments Contact [address] On 8/9/2018 12:30 pm with Samara Bradshaw, MARY  [address] On 7/31/2018 Hospital f/u new patient appointment (financial screening) Tuesday, 7/31/18 @ 2:00 p.m. Physician appoitment 2:15 p.m. Patient needs to provide photo ID, one month proof of income and discharge papers.    [address] [facility] On 7/28/2018 one time skilled nursing visit [facility] [phone] Jose Scott MD In 1 day inr check [address]  [phone]             ADDITIONAL CARE RECOMMENDATIONS: follow up with cardiology,PCP and advanced heart failure team    DIET: Cardiac Diet      ACTIVITY: Activity as tolerated    WOUND CARE: na    EQUIPMENT needed: na      DISCHARGE MEDICATIONS:   See Medication Reconciliation Form    · It is important that you take the medication exactly as they are prescribed. · Keep your medication in the bottles provided by the pharmacist and keep a list of the medication names, dosages, and times to be taken in your wallet. · Do not take other medications without consulting your doctor. NOTIFY YOUR PHYSICIAN FOR ANY OF THE FOLLOWING:   Fever over 101 degrees for 24 hours. Chest pain, shortness of breath, fever, chills, nausea, vomiting, diarrhea, change in mentation, falling, weakness, bleeding. Severe pain or pain not relieved by medications. Or, any other signs or symptoms that you may have questions about.       DISPOSITION:  X  Home With:   OT  PT  HH  RN       SNF/Inpatient Rehab/LTAC    Independent/assisted living    Hospice    Other:         Signed:   Miguel Richmond MD  7/26/2018  4:33 PM

## 2018-07-26 NOTE — PROGRESS NOTES
Hospitalist Progress Note  Eyal Ladd MD  Office: [phone]        Date of Service:  2018  NAME:  Pete Spann  :  1980  MRN:  [id number]      Admission Summary:   43 y.o.  male with past medical history of remote perforated viscus, sepsis, and bacteremia, s/p exploratory laparotomy, repair of ruptured viscus presented as direct admission/ transfer from [facility] to [facility] CVICU with newly diagnosed rapid atrial fibrillation (afib), acute systolic CHF, right sided pleural effusion with infiltrates. Interval history / Subjective:   Was comfortably lying in the bed. Denied shortness of breath,chest pain,nausea/vomiting or cough. Discussed with  about anticoagulation after discharge - as patient cannot afford NOACs, warfarin is only choice.  concerned that patient has transportation issues to come to the office and told me to ask if [facility] is accessible. Discussed with patient about the need for anticoagulation with warfarin at discharge and that he would need INR checks, patient said he has transportation available and can go to [facility] to follow up with . Nurse to call 's office today. Assessment & Plan:      Acute systolic CHF,non ischemic cardiomyopathy,suspect viral myocarditis  --Echocardiogram with EF of 10-20%. Cardiac cath with normal coronaries.  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 No evidence of infiltrative or autoimmune diseases. --medical mx with coreg, spironolactone. Entresto started today.  -Patient to get life vest today. -Appears euvolemic.     New onset Afib,now rate controlled:  NSR S/p cardioversion : continue amiodarone(400 mg BD for total 14 days -->400 mg daily for 14 days and then 200 mg daily),coreg.  -on therapeutic lovenox dose, will start oral warfarin today.  - helping to obtain lovenox injections at discharge as he needs bridging till INR is therapeutic. Tabacco abuse:   - was counseled to quit smoking. Pneumonia with right sided parapneumonia effusion:  -S/p US guided thoracentesis s/p removal of 1100 ml clear fluid. Appears exudative.  -Completed  ceftriaxone and zithromax.  -fluid cultures negative.  -Pleural fluid showed predominantly lymphocytes,likely due to viral illness. No evidence of sarcoidosis (CT chest did not show lymphadenopathy). No clear evidence of autoimmune disease.  - Lymphocytes in CBC very minimally elevated and LDH only very minimally elevated. CT abdomen done in 7/2018 showed retroperitoneal lymphadenopathy - patient will need repeat CT scan in 6-8 weeks for further eval to rule out lymphoma. Will order fluid cytology if the lab still has fluid.  -Will check ESR,EVAN,SPEP and UPEP. Patient denied any weight loss,rash, joint pains,fever/night sweats or fatigue. DVT px:lovenox. Care Plan discussed with: Patient/Family and Nurse,  and advance heart failure team.  Disposition: D     Hospital Problems  Date Reviewed: 7/19/2018          Codes Class Noted POA    Acute systolic heart failure ([facility] ICD-10-CM: I50.21  ICD-9-CM: 428.21  7/19/2018 Unknown        Pleural effusion, right ICD-10-CM: J90  ICD-9-CM: 511.9  7/19/2018 Unknown        * (Principal)Atrial fibrillation with RVR (HCC) ICD-10-CM: I48.91  ICD-9-CM: 427.31  7/19/2018 Unknown                Review of Systems:   Pertinent items are noted in HPI. Vital Signs:    Last 24hrs VS reviewed since prior progress note.  Most recent are:  Visit Vitals    /81 (BP 1 Location: Right arm, BP Patient Position: Sitting)    Pulse 99    Temp 98.5 °F (36.9 °C)    Resp 20    Wt 101 kg (222 lb 10.6 oz)    SpO2 96%    BMI 27.83 kg/m2         Intake/Output Summary (Last 24 hours) at 07/25/18 2057  Last data filed at 07/25/18 1814   Gross per 24 hour   Intake              720 ml   Output             2725 ml   Net            -2005 ml        Physical Examination:             Constitutional:  No acute distress, cooperative, pleasant    ENT:  Oral mucous moist, oropharynx benign. Neck supple,    Resp:  CTA bilaterally. No wheezing/rhonchi/rales. No accessory muscle use   CV:  Regular rhythm, normal rate, no murmurs, gallops, rubs    GI:  Soft, non distended, non tender. normoactive bowel sounds, no hepatosplenomegaly     Musculoskeletal:  No edema, warm, 2+ pulses throughout    Neurologic:  Moves all extremities. AAOx3, CN II-XII reviewed            Data Review:    Review and/or order of clinical lab test and MRI results. Labs:     Recent Labs      07/25/18 0408 07/24/18 0419   WBC  6.4  7.8   HGB  14.5  14.3   HCT  45.8  44.2   PLT  262  279     Recent Labs      07/25/18 0408 07/24/18 0419 07/23/18   0409   NA  139  139  140   K  4.2  4.2  4.2   CL  108  110*  110*   CO2  24  20*  19*   BUN  9  8  8   CREA  1.12  1.01  1.10   GLU  76  69  97   CA  8.6  8.4*  8.7     Recent Labs      07/25/18 0408 07/24/18 0419  07/23/18   0409   SGOT  24  23  24   ALT  21  20  23   AP  108  112  126*   TBILI  0.9  0.9  0.8   TP  7.2  6.9  7.4   ALB  2.5*  2.4*  2.6*   GLOB  4.7*  4.5*  4.8*     No results for input(s): INR, PTP, APTT in the last 72 hours. No lab exists for component: INREXT, INREXT   No results for input(s): FE, TIBC, PSAT, FERR in the last 72 hours. No results found for: FOL, RBCF   No results for input(s): PH, PCO2, PO2 in the last 72 hours. No results for input(s): CPK, CKNDX, TROIQ in the last 72 hours.     No lab exists for component: CPKMB  No results found for: CHOL, CHOLX, CHLST, CHOLV, HDL, LDL, LDLC, DLDLP, TGLX, TRIGL, TRIGP, CHHD, CHHDX  No results found for: Maite Velez  Lab Results   Component Value Date/Time    Color YELLOW/STRAW 07/17/2018 02:14 PM    Appearance CLEAR 07/17/2018 02:14 PM    Specific gravity 1.005 07/17/2018 02:14 PM    Specific gravity 1.010 07/08/2018 05:56 PM    pH (UA) 6.0 07/17/2018 02:14 PM    Protein NEGATIVE  07/17/2018 02:14 PM    Glucose NEGATIVE  07/17/2018 02:14 PM    Ketone NEGATIVE  07/17/2018 02:14 PM    Bilirubin NEGATIVE  07/17/2018 02:14 PM    Urobilinogen 0.2 07/17/2018 02:14 PM    Nitrites NEGATIVE  07/17/2018 02:14 PM    Leukocyte Esterase NEGATIVE  07/17/2018 02:14 PM    Epithelial cells FEW 07/17/2018 02:14 PM    Bacteria NEGATIVE  07/17/2018 02:14 PM    WBC 0-4 07/17/2018 02:14 PM    RBC 0-5 07/17/2018 02:14 PM         Medications Reviewed:     Current Facility-Administered Medications   Medication Dose Route Frequency    sacubitril-valsartan (ENTRESTO) 24-26 mg tablet 1 Tab  1 Tab Oral Q12H    Warfarin -Pharmacy dosing   Other Rx Dosing/Monitoring    spironolactone (ALDACTONE) tablet 25 mg  25 mg Oral DAILY    enoxaparin (LOVENOX) injection 100 mg  1 mg/kg SubCUTAneous Q12H    magnesium oxide (MAG-OX) tablet 400 mg  400 mg Oral DAILY    potassium chloride SR (KLOR-CON 10) tablet 20 mEq  20 mEq Oral DAILY    butamben-tetracaine-benzocaine (CETACAINE) 2 %-2 %-14 % (200 mg/sec) topical spray 1 Spray  1 Spray Topical QID PRN    benzocaine (HURRICANE) 20 % spray   Mucous Membrane PRN    carvedilol (COREG) tablet 3.125 mg  3.125 mg Oral BID WITH MEALS    amiodarone (CORDARONE) tablet 400 mg  400 mg Oral BID    sodium chloride (NS) flush 5-10 mL  5-10 mL IntraVENous Q8H    sodium chloride (NS) flush 5-10 mL  5-10 mL IntraVENous PRN     ______________________________________________________________________  EXPECTED LENGTH OF STAY: 3d 12h  ACTUAL LENGTH OF STAY:          6                 Amye Idler, MD

## 2018-07-26 NOTE — PROGRESS NOTES
[facility] Progress Note      DOS:   7/26/2018  NAME:  Tamela Cruz   MRN:   [id number]     REFERRING PROVIDER:  Dr. Costa Common: None  PRIMARY CARDIOLOGIST: Dr. Julieta Elaine      Chief Complaint: SOB    HPI: 43y.o. year old male with no significant past medication history except for an exploratory laparotomy for a ruptured viscus- organ unknown who presented to [facility] with complaints of SOB. In the ED he was found to be in Afib with RVR, failed cardizem conversion, CTA was negative for PE but did show a pleural effusion. He was admitted and seen by Dr. Julieta Elaine who initiated the myocarditis work up and contacted Dr Geoffrey Patel for assistance in managing this patient's heart failure. He was transferred to [facility] last evening, seen by Dr. Peewee Gunter who plans a ROBERTO and cardioversion today. He endorses some anorexia and unintentional weight loss, he has been working but denies exposure to toxins. He underwent L/RHC on 7/23 which showed normal coronary arteries and slightly elevated filling pressures. He is scheduled for cardiac MRI on 7/24. 24Hr Events  Tolerating Entresto  BP stable   Life Vest delivered    Impression / Plan:   Heart Failure Status: NYHA Class III    Acute HFrEF, NICM, EF 10-20%  Cardiac MRI suggestive of HHV-6  LVEF 17%, RVEF 22%   [place] panel and CMV IgG positive   Remaining blood work unremarkable  L/RHC showed normal coronary arteries, high-normal filling pressures   Cont Coreg, Entresto and spironolactone  Entresto samples given - pt application in process for patient assistance program   F/[facility] with [facility] for 8/9/18 at 12:30 pm     High risk of SCD  Life Vest delivered and on patient   Reinforced importance of 24/7 use (except when showering) - even while in hospital     Afib w/ RVR  S/p Cardioversion  Remains in Sinus  Amio BID - mgmt per EP   Continue warfarin - CAV to manage     Dispo  Stable for D/C from HF standpoint.       All other care per primary team     History:  No past medical history on file. Past Surgical History:   Procedure Laterality Date    CARDIOVERSION, ELECTIVE;EXTERN  7/20/2018          Social History     Social History    Marital status: SINGLE     Spouse name: N/A    Number of children: N/A    Years of education: N/A     Occupational History    Not on file. Social History Main Topics    Smoking status: Current Every Day Smoker     Packs/day: 0.50    Smokeless tobacco: Not on file    Alcohol use Yes      Comment: \"on weekends\"    Drug use: No    Sexual activity: Yes     Other Topics Concern    Not on file     Social History Narrative     No family history on file.     Current Medications:   Current Facility-Administered Medications   Medication Dose Route Frequency Provider Last Rate Last Dose    sacubitril-valsartan (ENTRESTO) 24-26 mg tablet 1 Tab  1 Tab Oral Q12H Ministerio Girard, NP   1 Tab at 07/26/18 0845    Warfarin -Pharmacy dosing   Other Rx Dosing/Monitoring Elisha Price MD        spironolactone (ALDACTONE) tablet 25 mg  25 mg Oral DAILY Caridad Cutler NP   25 mg at 07/26/18 0845    enoxaparin (LOVENOX) injection 100 mg  1 mg/kg SubCUTAneous Q12H Bruce Silva MD   100 mg at 07/26/18 0700    magnesium oxide (MAG-OX) tablet 400 mg  400 mg Oral DAILY Melvena Shaji, NP   400 mg at 07/26/18 0845    potassium chloride SR (KLOR-CON 10) tablet 20 mEq  20 mEq Oral DAILY Melvena Shaji, NP   20 mEq at 07/26/18 0845    butamben-tetracaine-benzocaine (CETACAINE) 2 %-2 %-14 % (200 mg/sec) topical spray 1 Spray  1 Spray Topical QID PRN Real Seat, MD        benzocaine (HURRICANE) 20 % spray   Mucous Membrane PRN Real Seat, MD   2 [place] at 07/20/18 1153    carvedilol (COREG) tablet 3.125 mg  3.125 mg Oral BID WITH MEALS Real Seat, MD   3.125 mg at 07/26/18 0845    amiodarone (CORDARONE) tablet 400 mg  400 mg Oral BID Real Seat, MD   400 mg at 07/26/18 0845    sodium chloride (NS) flush 5-10 mL 5-10 mL IntraVENous Q8H Liza Damian MD   10 mL at 18 0704    sodium chloride (NS) flush 5-10 mL  5-10 mL IntraVENous PRN Liza Damian MD   10 mL at 18       Allergies: No Known Allergies    ROS:    Constitutional: negative, feels well today   HENT: Negative. Respiratory: Negative for cough. Cardiovascular:  Negative for chest pain, leg swelling and PND. Gastrointestinal: Negative. Genitourinary: peeing a lot    Musculoskeletal: Negative. Skin: Negative. Neurological: Negative. Physical Exam:   Constitutional: NAD, sitting up in bed, eating breakfast   HENT:   Head: Normocephalic. Eyes: Pupils are equal, round, and reactive to light. Neck: Normal range of motion. Neck supple. No JVD present. Cardiovascular: Normal heart sounds and intact distal pulses. Tachycardic but regular rhythm. Pulmonary/Chest: Effort normal and breath sounds normal. Life Vest in place. Abdominal: Soft. Bowel sounds are normal. He exhibits no distension. Musculoskeletal: He exhibits no edema. Neurological: He is alert and oriented to person, place, and time. Skin: Skin is warm and dry. Nursing note and vitals reviewed.       Vitals:   Visit Vitals    /77 (BP 1 Location: Right arm, BP Patient Position: At rest)    Pulse 86    Temp 98.1 °F (36.7 °C)    Resp 18    Wt 218 lb 4.1 oz (99 kg)    SpO2 99%    BMI 27.28 kg/m2         Temp (24hrs), Av.1 °F (36.7 °C), Min:98 °F (36.7 °C), Max:98.5 °F (36.9 °C)      Admission Weight: Last Weight   Weight: 228 lb 2.8 oz (103.5 kg) Weight: 218 lb 4.1 oz (99 kg)     Intake / Output / Drain:  Last 24 hrs.:     Intake/Output Summary (Last 24 hours) at 18 0944  Last data filed at 18 0743   Gross per 24 hour   Intake             1280 ml   Output             4275 ml   Net            -2995 ml     Oxygen Therapy:  Oxygen Therapy  O2 Sat (%): 99 % (18 0740)  Pulse via Oximetry: 95 beats per minute (18)  O2 Device: Room air (07/26/18 0440)  O2 Flow Rate (L/min): 4 l/min (07/20/18 1226)      Recent Labs:   Labs Latest Ref Rng & Units 7/26/2018 7/25/2018 7/24/2018 7/23/2018 7/22/2018 7/21/2018 7/20/2018   WBC 4.1 - 11.1 K/uL 6.0 6.4 7.8 8.1 7.9 - 10.8   RBC 4.10 - 5.70 M/uL 4.66 4.65 4.56 4.81 5.10 - 4.99   Hemoglobin 12.1 - 17.0 g/dL 14.6 14.5 14.3 15.0 15.7 - 15.5   Hematocrit 36.6 - 50.3 % 45.7 45.8 44.2 47.3 50. 5(H) - 48.3   MCV 80.0 - 99.0 FL 98.1 98.5 96.9 98.3 99.0 - 96.8   Platelets 233 - 692 K/uL 279 262 279 297 269 - 285   Lymphocytes 12 - 49 % 58(H) 54(H) 56(H) 64(H) 48 - 47   Monocytes 5 - 13 % 7 7 7 5 6 - 6   Eosinophils 0 - 7 % 2 1 1 1 1 - 2   Basophils 0 - 1 % 1 1 1 1 0 - 0   Albumin 3.5 - 5.0 g/dL 2. 4(L) 2. 5(L) 2. 4(L) 2. 6(L) 2. 4(L) 2. 6(L) 2. 6(L)   Calcium 8.5 - 10.1 MG/DL 8.7 8.6 8.4(L) 8.7 8.5 8.6 8.5   SGOT 15 - 37 U/L 21 24 23 24 24 22 21   Glucose 65 - 100 mg/dL 75 76 69 97 86 90 77   BUN 6 - 20 MG/DL 9 9 8 8 9 8 8   Creatinine 0.70 - 1.30 MG/DL 0.97 1.12 1.01 1.10 1.16 1.07 1.03   Sodium 136 - 145 mmol/L 142 139 139 140 141 139 142   Potassium 3.5 - 5.1 mmol/L 3.8 4.2 4.2 4.2 4.2 4.2 3.8   TSH 0.36 - 3.74 uIU/mL - - - - - - -   LDH 85 - 241 U/L - - - 342(H) - - -     EKG:   EKG Results     Procedure 720 Value Units Date/Time    SCANNED CARDIAC RHYTHM STRIP [[id number]] Collected:  07/26/18 0455    Order Status:  Completed Updated:  07/26/18 0535    SCANNED CARDIAC RHYTHM Anna Rondon [[id number]] Collected:  07/25/18 0544    Order Status:  Completed Updated:  07/25/18 0549    SCANNED CARDIAC RHYTHM STRIP [[id number]] Collected:  07/24/18 0553    Order Status:  Completed Updated:  07/24/18 0701    SCANNED CARDIAC RHYTHM STRIP [[id number]] Collected:  07/23/18 0756    Order Status:  Completed Updated:  07/23/18 0806    SCANNED CARDIAC RHYTHM STRIP [[id number]] Collected:  07/22/18 0319    Order Status:  Completed Updated:  07/22/18 0701    SCANNED CARDIAC RHYTHM STRIP [[id number]] Collected:  07/22/18 0252    Order Status:  Completed Updated:  07/22/18 0656        Echocardiogram:   7/17/18  Left ventricle: The ventricle was mildly to moderately dilated. Systolic  function was markedly reduced by visual assessment. Ejection fraction was  estimated in the range of 10 % to 20 %, challenged determination in  presence of what appeared to be rapid atrial fibrillation. There was    Cardiac MRI scheduled 7/24    Nuclear Studies:   7/17/18- Pending read    Cardiac Catheterizations: Scheduled 7/23    Radiology (CXR, CT scans):    CXR Results  (Last 48 hours)               07/25/18 1446  XR CHEST PORT Final result    Impression:  IMPRESSION:   1. Persistent small right pleural effusion with atelectasis at the right lung   base. Aeration has slightly improved at the right lung base. Narrative:  EXAM:  XR CHEST PORT       INDICATION:  Heart Failure       COMPARISON:  7/23/2018       FINDINGS: A portable AP radiograph of the chest was obtained at 1440 hours. The   patient is on a cardiac monitor. There is persistent cardiomegaly though the   heart size has decreased in the interval. There continues to be right pleural   effusion and areas of opacity at the right lung base likely representing   atelectasis. The atelectasis at the right lung base has slightly decreased. There is pulmonary vascular congestion. No definite pulmonary edema. No   pneumothorax. CT Results (most recent):    Results from Hospital Encounter encounter on 07/17/18   CTA CHEST W OR W WO CONT   Narrative EXAM:  CTA CHEST W OR W WO CONT    INDICATION:   Irregular heartbeat, pleural effusion seen on chest radiograph    COMPARISON: Chest radiograph performed earlier the same day. TECHNIQUE:   Precontrast  images were obtained to localize the volume for acquisition. Multislice helical CT arteriography was performed from the diaphragm to the  thoracic inlet during uneventful rapid bolus of 100 cc Isovue-370.  Lung and soft  tissue windows were generated. Coronal and sagittal images were generated and  3D post processing consisting of coronal maximum intensity images was performed. CT dose reduction was achieved through use of a standardized protocol tailored  for this examination and automatic exposure control for dose modulation. FINDINGS:  CHEST:  THYROID: No nodule. MEDIASTINUM: No mass or lymphadenopathy. GRAYSON: No mass or lymphadenopathy. THORACIC AORTA: No aneurysm. Not well opacified. MAIN PULMONARY ARTERY: There is no evidence of pulmonary embolism. TRACHEA/BRONCHI: Patent. ESOPHAGUS: No wall thickening or dilatation. HEART: Dilatation of the left atrium and left ventricle is noted. PLEURA: There is a moderate right pleural effusion  LUNGS: Right middle and right lower lobe infiltrate is noted. Atelectasis is  seen in the left lower lobe. INCIDENTALLY IMAGED UPPER ABDOMEN: No focal abnormality. BONES: No destructive bone lesion. Impression IMPRESSION: Moderate right pleural effusion with right middle and right lower  lobe infiltrates. Left lower lobe atelectasis. No evidence of pulmonary  embolism.           Eliana Burkett NP  [address] [place] [facility]  [address] [facility], [address] [place] [address]  Office [phone]  Fax [phone]  24 hour VAD/HF Pager: [phone]

## 2018-07-26 NOTE — PROGRESS NOTES
Problem: Falls - Risk of  Goal: *Absence of Falls  Document Ayaan Fall Risk and appropriate interventions in the flowsheet. Outcome: Progressing Towards Goal  Fall Risk Interventions:  Mobility Interventions: Communicate number of staff needed for ambulation/transfer, Patient to call before getting OOB         Medication Interventions: Evaluate medications/consider consulting pharmacy, Patient to call before getting OOB    Elimination Interventions: Call light in reach, Patient to call for help with toileting needs             Problem: Pressure Injury - Risk of  Goal: *Prevention of pressure injury  Document Clayton Scale and appropriate interventions in the flowsheet. Outcome: Progressing Towards Goal  Pressure Injury Interventions:             Activity Interventions: Increase time out of bed    Mobility Interventions: Pressure redistribution bed/mattress (bed type)    Nutrition Interventions: Document food/fluid/supplement intake    Friction and Shear Interventions: Apply protective barrier, creams and emollients, Lift sheet               Comments:   Patient Vitals for the past 12 hrs:   Temp Pulse Resp BP SpO2   07/26/18 0740 98.1 °F (36.7 °C) 86 18 119/77 99 %   07/26/18 0345 98.1 °F (36.7 °C) 89 18 120/82 98 %   07/26/18 0010 98 °F (36.7 °C) 90 18 108/87 99 %     Last 3 Recorded Weights in this Encounter    07/24/18 0409 07/25/18 0403 07/26/18 0440   Weight: 100.4 kg (221 lb 5.5 oz) 101 kg (222 lb 10.6 oz) 99 kg (218 lb 4.1 oz)

## 2018-07-26 NOTE — PROGRESS NOTES
2000: Bedside and Verbal shift change report given to Pola Gutierrez RN (oncoming nurse) by [address], RN (offgoing nurse). Report included the following information SBAR, Kardex, ED Summary, Procedure Summary, Intake/Output, MAR, Recent Results and Cardiac Rhythm NSR/ST.     0730: Bedside and Verbal shift change report given to Anh Rojas RN (oncoming nurse) by Pola Gutierrez RN (offgoing nurse). Report included the following information SBAR, Kardex, ED Summary, Procedure Summary, Intake/Output, MAR, Recent Results and Cardiac Rhythm NSR.

## 2018-07-26 NOTE — PROGRESS NOTES
SHALINI obtained approval for patient's meds except for OTC - for amount of $139.77 for [facility] to cover. Awaiting discharge orders from MD at this time. MYRNA Ruiz        [facility] made [facility] aware of discharge today.  MYRNA Ruiz

## 2018-07-27 ENCOUNTER — OFFICE VISIT (OUTPATIENT)
Dept: CARDIOLOGY CLINIC | Age: 38
End: 2018-07-27

## 2018-07-27 ENCOUNTER — HOME CARE VISIT (OUTPATIENT)
Dept: HOME HEALTH SERVICES | Facility: HOME HEALTH | Age: 38
End: 2018-07-27

## 2018-07-27 VITALS
RESPIRATION RATE: 16 BRPM | WEIGHT: 216 LBS | SYSTOLIC BLOOD PRESSURE: 104 MMHG | HEART RATE: 91 BPM | HEIGHT: 75 IN | BODY MASS INDEX: 26.86 KG/M2 | DIASTOLIC BLOOD PRESSURE: 73 MMHG | OXYGEN SATURATION: 98 %

## 2018-07-27 DIAGNOSIS — Z95.810 CARDIAC DEFIBRILLATOR IN PLACE: ICD-10-CM

## 2018-07-27 DIAGNOSIS — Z79.01 ANTICOAGULATED: Primary | ICD-10-CM

## 2018-07-27 DIAGNOSIS — I48.91 ATRIAL FIBRILLATION WITH RVR (HCC): ICD-10-CM

## 2018-07-27 DIAGNOSIS — I50.21 ACUTE SYSTOLIC HEART FAILURE (HCC): ICD-10-CM

## 2018-07-27 LAB
ALBUMIN MFR UR ELPH: 23.1 %
ALPHA1 GLOB MFR UR ELPH: 7.9 %
ALPHA2 GLOB 24H MFR UR ELPH: 19.4 %
B-GLOBULIN 24H MFR UR ELPH: 28.6 %
BACTERIA SPEC CULT: NORMAL
GAMMA GLOB 24H MFR UR ELPH: 21 %
GRAM STN SPEC: NORMAL
GRAM STN SPEC: NORMAL
IGA SERPL-MCNC: 412 MG/DL (ref 90–386)
IGG SERPL-MCNC: 1941 MG/DL (ref 700–1600)
IGM SERPL-MCNC: 50 MG/DL (ref 20–172)
INR BLD: 1.5
M PROTEIN MFR UR ELPH: NORMAL %
PLEASE NOTE:, 133800: NORMAL
PROT PATTERN SERPL IFE-IMP: ABNORMAL
PROT UR-MCNC: 7.1 MG/DL
PT POC: 17.4 SECONDS
SERVICE CMNT-IMP: NORMAL
VALID INTERNAL CONTROL?: YES

## 2018-07-27 RX ORDER — WARFARIN 7.5 MG/1
7.5 TABLET ORAL DAILY
Qty: 5 TAB | Refills: 0 | Status: SHIPPED | OUTPATIENT
Start: 2018-07-27 | End: 2018-08-20 | Stop reason: DRUGHIGH

## 2018-07-27 RX ORDER — WARFARIN SODIUM 5 MG/1
5 TABLET ORAL
Qty: 30 TAB | Refills: 3 | Status: SHIPPED | OUTPATIENT
Start: 2018-07-27 | End: 2018-07-27 | Stop reason: DRUGHIGH

## 2018-07-27 RX ORDER — WARFARIN SODIUM 5 MG/1
5 TABLET ORAL
Qty: 30 TAB | Refills: 3 | Status: SHIPPED | OUTPATIENT
Start: 2018-07-27 | End: 2018-07-27 | Stop reason: SDUPTHER

## 2018-07-27 NOTE — PROGRESS NOTES
Chief Complaint   Patient presents with   [facility] Follow Up     1 week hosp f/u a-fib    Anticoagulation     pt/inr check

## 2018-07-27 NOTE — PATIENT INSTRUCTIONS
-- Take 1 1/2 tablets of Warfarin Friday, Saturday and Sunday, then go back to 1 tablet every night  -- Please make a visit for an INR check next week    -- Don't hesitate to call with any questions    Learning About Heart Failure Zones  What are heart failure zones? Heart failure zones give you an easy way to see changes in your heart failure symptoms. They also tell you when you need to get help. Check every day to see which zone you are in. Green zone. You are doing well. This is where you want to be. · Your weight is stable. This means it is not going up or down. · You breathe easily. · You are sleeping well. You are able to lie flat without shortness of breath. · You can do your usual activities. Yellow zone. Be careful. Your symptoms are changing. Call your doctor. · You have new or increased shortness of breath. · You are dizzy or lightheaded, or you feel like you may faint. · You have sudden weight gain, such as more than 2 to 3 pounds in a day or 5 pounds in a week. (Your doctor may suggest a different range of weight gain.)  · You have increased swelling in your legs, ankles, or feet. · You are so tired or weak that you cannot do your usual activities. · You are not sleeping well. Shortness of breath wakes you up at night. You need extra pillows. Your doctor's name: ____________________________________________________________  Your doctor's contact information: _________________________________________________  Red zone. This is an emergency. Call 911. You have symptoms of sudden heart failure, such as:  · You have severe trouble breathing. · You cough up pink, foamy mucus. · You have a new irregular or fast heartbeat. You have symptoms of a heart attack. These may include:  · Chest pain or pressure, or a strange feeling in the chest.  · Sweating. · Shortness of breath. · Nausea or vomiting.   · Pain, pressure, or a strange feeling in the back, neck, jaw, or upper belly or in one or both shoulders or arms. · Lightheadedness or sudden weakness. · A fast or irregular heartbeat. If you have symptoms of a heart attack: After you call 911, the  may tell you to chew 1 adult-strength or 2 to 4 low-dose aspirin. Wait for an ambulance. Do not try to drive yourself. Follow-up care is a key part of your treatment and safety. Be sure to make and go to all appointments, and call your doctor if you are having problems. It's also a good idea to know your test results and keep a list of the medicines you take. Where can you learn more? Go to http://eleanor-walter.info/. Enter T174 in the search box to learn more about \"Learning About Heart Failure Zones. \"  Current as of: December 6, 2017  Content Version: 11.7  © 0644-3177 [employer]. Care instructions adapted under license by [employer] (which disclaims liability or warranty for this information). If you have questions about a medical condition or this instruction, always ask your healthcare professional. [address] any warranty or liability for your use of this information.

## 2018-07-27 NOTE — PROGRESS NOTES
[facility]   [address] [facility], [address]                                          NEW PATIENT HPI/FOLLOW-UP      NAME:  Den Martinez   :   1980   MRN:   [id number]   PCP:  None           Subjective: The patient is a 43y.o. year old male with h/o A-fib with RVR, acute systolic heart failure (EF 10%), LifeVest/WCD and anticoagulated on warfarin present who presents as a new patient for INR check and to establish care s/p hospitalization for above mentioned PMHx. Since discharge yesterday, pt reports minimal complaints. States some FLOWERS with about 10 mins of activity like walking or getting dressed. He takes things slow and rests to improve symptoms. He is compliant on medications and wearable external defibrillator. He states \"they were really nice to me here and at [facility]" and reports he received excellent education into his disease state. Edema has improved. Baseline. Denies chest pain, edema, medication intolerance, palpitations, PND/orthopnea, wheezing, sputum, syncope, dizziness or light headedness. Doing satisfactorily. Review of Systems  General: Pt denies excessive weight gain or loss. Pt is able to conduct ADL's. Respiratory: +shortness of breath, FLOWERS, Denies wheezing or stridor. Cardiovascular: Denies precordial pain, palpitations, edema or PND  Gastrointestinal: Denies poor appetite, indigestion, abdominal pain or blood in stool  Peripheral vascular: Denies claudication, leg cramps  Neuropsychiatric: Denies paresthesias,tingling,numbness,anxiety,depression,fatigue  Musculoskeletal: Denies pain,tenderness, soreness,swelling      History reviewed. No pertinent past medical history.   Patient Active Problem List    Diagnosis Date Noted    Cardiac defibrillator in place 2018    Anticoagulated     Acute systolic heart failure ([facility] [address] 2018    Pleural effusion, right 2018    Atrial fibrillation with RVR ([facility] 07/19/2018    Rapid atrial fibrillation ([facility] 07/17/2018      Past Surgical History:   Procedure Laterality Date    CARDIOVERSION, ELECTIVE;EXTERN  7/20/2018          No Known Allergies   Family History   Problem Relation Age of Onset    Diabetes Mother     Heart Failure Mother     No Known Problems Father     Hypertension Sister     Hypertension Sister       Social History     Social History    Marital status: SINGLE     Spouse name: N/A    Number of children: N/A    Years of education: N/A     Occupational History    Not on file. Social History Main Topics    Smoking status: Former Smoker     Quit date: 7/20/2018    Smokeless tobacco: Never Used    Alcohol use Yes      Comment: \"on weekends\"    Drug use: No    Sexual activity: Yes     Other Topics Concern    Not on file     Social History Narrative      Current Outpatient Prescriptions   Medication Sig    warfarin (COUMADIN) 5 mg tablet Take 1 Tab by mouth nightly. Indications: PREVENT THROMBOEMBOLISM IN CHRONIC ATRIAL FIBRILLATION    carvedilol (COREG) 3.125 mg tablet Take 1 Tab by mouth two (2) times daily (with meals).  enoxaparin (LOVENOX) 100 mg/mL 100 mg by SubCUTAneous route every twelve (12) hours. Injections are needed till INR is therapeutic    amiodarone (PACERONE) 400 mg tablet Take 400 mg twice a day till 8/3/2018 and then take  400 mg once a day till 8/17/2018 and then obtain 200 mg tablets from your cardiologist to continue 200 mg daily after that.  magnesium oxide (MAG-OX) 400 mg tablet Take 1 Tab by mouth daily.  potassium chloride SR (K-TAB) 20 mEq tablet Take 1 Tab by mouth daily.  sacubitril-valsartan (ENTRESTO) 24 mg/26 mg tablet Take 1 Tab by mouth every twelve (12) hours.  spironolactone (ALDACTONE) 25 mg tablet Take 1 Tab by mouth daily. No current facility-administered medications for this visit.          I have reviewed the nurses notes, vitals, problem list, allergy list, medical history, family medical, social history and medications. Objective:     Physical Exam:     Vitals:    07/27/18 0916   BP: 104/73   Pulse: 91   Resp: 16   SpO2: 98%   Weight: 216 lb (98 kg)   Height: 6' 3\" (1.905 m)    Body mass index is 27 kg/(m^2). General: WDWN adult male, in no acute distress. Pleasant, reserved affect. +LifeVest in place   HEENT: No carotid bruits, no JVD, trach is midline. Heart:  Normal S1/S2 negative S3 or S4. Regular, no murmur, gallop or rub.   Respiratory: Clear bilaterally, no wheezing or rales  Abdomen:   Soft, non-tender, bowel sounds are active.   Extremities:  No edema, normal cap refill, no cyanosis. Neuro: A&Ox3, speech clear, gait stable. Skin: Skin color is normal. No rashes or lesions. No diaphoresis.   Vascular: 2+ pulses symmetric in all extremities      Data Review:       Cardiographics:    EKG interpretation:  None today, see hospital chart      Cardiology Labs:    Results for orders placed or performed during the hospital encounter of 07/17/18   EKG, 12 LEAD, INITIAL   Result Value Ref Range    Ventricular Rate 173 BPM    Atrial Rate 192 BPM    QRS Duration 90 ms    Q-T Interval 256 ms    QTC Calculation (Bezet) 434 ms    Calculated R Axis -70 degrees    Calculated T Axis 17 degrees    Diagnosis       Atrial fibrillation with rapid ventricular response with premature   ventricular or aberrantly conducted complexes  Left anterior fascicular block  Possible Anterior infarct , age undetermined  Abnormal ECG  No previous ECGs available  Confirmed by Juan Carlos Palencia MD, Kevon Byrnes ([id number]) on 7/18/2018 8:24:03 PM         No results found for: CHOL, CHOLX, CHLST, CHOLV, 920704, HDL, LDL, LDLC, DLDLP, TGLX, TRIGL, TRIGP, CHHD, CHHDX    Lab Results   Component Value Date/Time    Sodium 142 07/26/2018 05:12 AM    Potassium 3.8 07/26/2018 05:12 AM    Chloride 110 (H) 07/26/2018 05:12 AM    CO2 21 07/26/2018 05:12 AM    Anion gap 11 07/26/2018 05:12 AM    Glucose 75 07/26/2018 05:12 AM    BUN 9 07/26/2018 05:12 AM    Creatinine 0.97 07/26/2018 05:12 AM    BUN/Creatinine ratio 9 (L) 07/26/2018 05:12 AM    GFR est AA >60 07/26/2018 05:12 AM    GFR est non-AA >60 07/26/2018 05:12 AM    Calcium 8.7 07/26/2018 05:12 AM    Bilirubin, total 0.7 07/26/2018 05:12 AM    AST (SGOT) 21 07/26/2018 05:12 AM    Alk. phosphatase 100 07/26/2018 05:12 AM    Protein, total 6.8 07/26/2018 05:12 AM    Albumin 2.4 (L) 07/26/2018 05:12 AM    Globulin 4.4 (H) 07/26/2018 05:12 AM    A-G Ratio 0.5 (L) 07/26/2018 05:12 AM    ALT (SGPT) 19 07/26/2018 05:12 AM          Assessment:       ICD-10-CM ICD-9-CM    1. Anticoagulated Z79.01 V58.61 AMB POC PT/INR      warfarin (COUMADIN) 5 mg tablet    Warfarin with Goal INR 2-3   2. Acute systolic heart failure (HCC) I50.21 428.21    3. Atrial fibrillation with RVR (HCC) I48.91 427.31 warfarin (COUMADIN) 5 mg tablet   4. Cardiac defibrillator in place Z95.810 V45.02     LifeVest         Discussion: Patient presents at this time stable from a cardiac perspective. BP was well controlled. +LifeVest in place. INR still subtherapeutic, recently started therapy. Pleased with present status and pt compliance. Discussed/seen with Dr. Quinones Stack: 1. Continue same meds. -- But temporary increase in Warfarin to 7.5 mg QHS x 3 days   -- Then back to 5 mg QHS   -- Repeat INR next week    2. Encouraged to exercise to tolerance, stop smoking and follow low fat, low cholesterol, low sodium predominantly Plant-based (consider Mediterranean) diet. 3.Follow up: next week for INR check   --  Call with questions or concerns. I have discussed the diagnosis with the patient and the intended plan as seen in the above orders. The patient has received an after-visit summary and questions were answered concerning future plans. I have discussed any concerning medication side effects and warnings with the patient as well.     Claudia Bales PA-C  7/27/2018

## 2018-07-27 NOTE — MR AVS SNAPSHOT
[address] [place] [address] 
 
 
 [address] [place] [address] 
[phone] Patient: Penny Doyle MRN: [id number] [facility] [place]:3/44/9711 Visit Information Date & Time Provider Department Dept. Phone Encounter #  
 7/27/2018  9:00 AM Lalo Arredondo MD [facility] [phone] [id number] Your Appointments 7/31/2018  2:00 PM  
FINANCIAL SCREENING with [facility] [address] ([facility]) [address] [facility] [phone]  
  
   
 [address] [place] [address]  
  
    
 7/31/2018  2:15 PM  
Office Visit with MEG Phan [facility] ([facility]) Appt Note: hosp. discharge/heart failure [address] [facility] [phone]  
[phone]  
  
   
 [address] [place] [id number]  
  
    
 8/9/2018 12:30 PM  
Follow Up with YOUSUF Nuno [facility] ([facility] [address] [place] [address]  
[phone]  
  
   
 [address] [address]  
  
    
 8/30/2018  8:00 AM  
ESTABLISHED PATIENT with Gisell Toney MD  
[facility] ([facility]) Appt Note: 4 week f/u  
 [address] [facility] [address] [facility] 7 [address] Upcoming Health Maintenance Date Due Pneumococcal 19-64 Medium Risk (1 of 1 - PPSV23) 7/15/1998 DTaP/Tdap/Td series (1 - Tdap) 7/15/2000 Influenza Age 5 to Adult 8/1/2018 Allergies as of 7/27/2018  Review Complete On: 7/27/2018 By: Leticia Parra PA-C No Known Allergies Current Immunizations  Reviewed on 7/22/2018 No immunizations on file. Not reviewed this visit You Were Diagnosed With   
  
 Codes Comments  Anticoagulated    -  Primary ICD-10-CM: Z79.01 
 ICD-9-CM: V58.61 Warfarin with Goal INR 2-3 Acute systolic heart failure (HCC)     ICD-10-CM: I50.21 ICD-9-CM: 428.21 Atrial fibrillation with RVR (HCC)     ICD-10-CM: I48.91 
ICD-9-CM: 427.31 Cardiac defibrillator in place     ICD-10-CM: Z95.810 ICD-9-CM: V45.02 LifeVest  
  
Vitals BP Pulse Resp Height(growth percentile) Weight(growth percentile) SpO2  
 104/73 (BP 1 Location: Left arm, BP Patient Position: Sitting) 91 16 6' 3\" (1.905 m) 216 lb (98 kg) 98% BMI Smoking Status 32 kg/m2 Former Smoker Vitals History BMI and BSA Data Body Mass Index Body Surface Area  
 27 kg/m 2 2.28 m 2 Preferred Pharmacy Pharmacy Name Phone [email or web address] [place], [address] [phone] Your Updated Medication List  
  
   
This list is accurate as of 7/27/18 10:29 AM.  Always use your most recent med list.  
  
  
  
  
 amiodarone 400 mg tablet Commonly known as:  Kristofer Primas Take 400 mg twice a day till 8/3/2018 and then take  400 mg once a day till 8/17/2018 and then obtain 200 mg tablets from your cardiologist to continue 200 mg daily after that.  
  
 carvedilol 3.125 mg tablet Commonly known as:  Marina Locket Take 1 Tab by mouth two (2) times daily (with meals). enoxaparin 100 mg/mL Commonly known as:  LOVENOX  
100 mg by SubCUTAneous route every twelve (12) hours. Injections are needed till INR is therapeutic  
  
 magnesium oxide 400 mg tablet Commonly known as:  MAG-OX Take 1 Tab by mouth daily. potassium chloride SR 20 mEq tablet Commonly known as:  K-TAB Take 1 Tab by mouth daily. sacubitril-valsartan 24-26 mg tablet Commonly known as:  ENTRESTO Take 1 Tab by mouth every twelve (12) hours. spironolactone 25 mg tablet Commonly known as:  ALDACTONE Take 1 Tab by mouth daily. warfarin 7.5 mg tablet Commonly known as:  COUMADIN  
 Take 1 Tab by mouth daily. Indications: atrial fibrillation, cardiomyopathy Prescriptions Sent to Pharmacy Refills  
 warfarin (COUMADIN) 7.5 mg tablet 0 Sig: Take 1 Tab by mouth daily. Indications: atrial fibrillation, cardiomyopathy Class: Normal  
 Pharmacy: [email or web address] [address] [facility] [place] #: [phone] Route: Oral  
  
We Performed the Following AMB POC PT/INR [52081 CPT(R)] Patient Instructions -- Take 1 1/2 tablets of Warfarin Friday, Saturday and Sunday, then go back to 1 tablet every night 
-- Please make a visit for an INR check next week 
 
-- Don't hesitate to call with any questions Learning About Heart Failure Zones What are heart failure zones? Heart failure zones give you an easy way to see changes in your heart failure symptoms. They also tell you when you need to get help. Check every day to see which zone you are in. Green zone. You are doing well. This is where you want to be. · Your weight is stable. This means it is not going up or down. · You breathe easily. · You are sleeping well. You are able to lie flat without shortness of breath. · You can do your usual activities. Yellow zone. Be careful. Your symptoms are changing. Call your doctor. · You have new or increased shortness of breath. · You are dizzy or lightheaded, or you feel like you may faint. · You have sudden weight gain, such as more than 2 to 3 pounds in a day or 5 pounds in a week. (Your doctor may suggest a different range of weight gain.) · You have increased swelling in your legs, ankles, or feet. · You are so tired or weak that you cannot do your usual activities. · You are not sleeping well. Shortness of breath wakes you up at night. You need extra pillows. Your doctor's name: ____________________________________________________________ Your doctor's contact information: _________________________________________________ Red zone. This is an emergency. Call 911. You have symptoms of sudden heart failure, such as: 
· You have severe trouble breathing. · You cough up pink, foamy mucus. · You have a new irregular or fast heartbeat. You have symptoms of a heart attack. These may include: · Chest pain or pressure, or a strange feeling in the chest. 
· Sweating. · Shortness of breath. · Nausea or vomiting. · Pain, pressure, or a strange feeling in the back, neck, jaw, or upper belly or in one or both shoulders or arms. · Lightheadedness or sudden weakness. · A fast or irregular heartbeat. If you have symptoms of a heart attack: After you call 911, the  may tell you to chew 1 adult-strength or 2 to 4 low-dose aspirin. Wait for an ambulance. Do not try to drive yourself. Follow-up care is a key part of your treatment and safety. Be sure to make and go to all appointments, and call your doctor if you are having problems. It's also a good idea to know your test results and keep a list of the medicines you take. Where can you learn more? Go to http://eleanor-walter.info/. Enter T174 in the search box to learn more about \"Learning About Heart Failure Zones. \" Current as of: December 6, 2017 Content Version: 11.7 © 6346-7065 Healthwise, Incorporated. Care instructions adapted under license by [employer] (which disclaims liability or warranty for this information). If you have questions about a medical condition or this instruction, always ask your healthcare professional. [place] [address] any warranty or liability for your use of this information. Introducing [facility]! Jamil Adams introduces [employer] patient portal. Now you can access parts of your medical record, email your doctor's office, and request medication refills online. 1. In your internet browser, go to [email or web address] 2. Click on the First Time User? Click Here link in the Sign In box. You will see the New Member Sign Up page. 3. Enter your mediaBunker Access Code exactly as it appears below. You will not need to use this code after youve completed the sign-up process. If you do not sign up before the expiration date, you must request a new code. · mediaBunker Access Code: [email or web address] Expires: 10/6/2018  5:16 PM 
 
4. Enter the last four digits of your Social Security Number (xxxx) and Date of Birth (mm/dd/yyyy) as indicated and click Submit. You will be taken to the next sign-up page. 5. Create a mediaBunker ID. This will be your mediaBunker login ID and cannot be changed, so think of one that is secure and easy to remember. 6. Create a mediaBunker password. You can change your password at any time. 7. Enter your Password Reset Question and Answer. This can be used at a later time if you forget your password. 8. Enter your e-mail address. You will receive e-mail notification when new information is available in [address] 9. Click Sign Up. You can now view and download portions of your medical record. 10. Click the Download Summary menu link to download a portable copy of your medical information. If you have questions, please visit the Frequently Asked Questions section of the mediaBunker website. Remember, mediaBunker is NOT to be used for urgent needs. For medical emergencies, dial 911. Now available from your iPhone and Android! Please provide this summary of care documentation to your next provider. Your primary care clinician is listed as NONE. If you have any questions after today's visit, please call [phone].

## 2018-07-29 LAB
HHV6 DNA SPEC NAA+PROBE: POSITIVE
SPECIMEN SOURCE: ABNORMAL

## 2018-07-29 NOTE — PROGRESS NOTES
Mr. Yifan Carlson is a 69-year-old male admitted to [facility] last week through the emergency room with acute exacerbation of severe congestive heart failure and atrial fibrillation with rapid ventricular response. He did not previously have known cardiac disease. 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 He was successfully converted to sinus rhythm with amiodarone after several days of anticoagulation with Lovenox. He was discharged transitioning to Coumadin. INR today was still subtherapeutic. He feels substantially better and he is not short of breath with low level self maintenance activity. He is wearing an external defibrillator pending consideration of an indwelling device. He has been registered with the advanced heart failure service and eventually will be considered a candidate for an assist device. ,  Or possibly transplantation. Renal function is intact and within his means he is compliant. He has severe financial issues and social service is busy trying to get him some help. I independently interviewed and examined the patient with full discussion of plan of management with MEG Teixeira. He will continue Lovenox and we will retest his INR this coming week. EKG today was not performed but the patient is in regular rhythm with the most recent prior hospitalization EKG showing sinus rhythm with normal AV conduction. Without the patient's knowledge this office subsidized his medications to make sure he had them.     Ryan Villeda MD [facility]

## 2018-07-30 ENCOUNTER — HOME CARE VISIT (OUTPATIENT)
Dept: HOME HEALTH SERVICES | Facility: HOME HEALTH | Age: 38
End: 2018-07-30

## 2018-07-31 ENCOUNTER — HOME CARE VISIT (OUTPATIENT)
Dept: SCHEDULING | Facility: HOME HEALTH | Age: 38
End: 2018-07-31

## 2018-07-31 PROCEDURE — G0299 HHS/HOSPICE OF RN EA 15 MIN: HCPCS

## 2018-08-06 ENCOUNTER — OFFICE VISIT (OUTPATIENT)
Dept: CARDIOLOGY CLINIC | Age: 38
End: 2018-08-06

## 2018-08-06 DIAGNOSIS — I42.0 CONGESTIVE CARDIOMYOPATHY (HCC): ICD-10-CM

## 2018-08-06 DIAGNOSIS — Z79.01 ANTICOAGULATED: Primary | ICD-10-CM

## 2018-08-06 DIAGNOSIS — I48.91 ATRIAL FIBRILLATION WITH RVR (HCC): ICD-10-CM

## 2018-08-06 NOTE — MR AVS SNAPSHOT
[address] [facility] 
 
 
 [address] 
[phone] Patient: Kristie Rinaldi MRN: [id number] ZPT:9/49/9722 Visit Information Date & Time Provider Department Dept. Phone Encounter #  
 8/6/2018 10:00 AM [facility] [place] [address] [id number] Your Appointments 8/9/2018 12:30 PM  
Follow Up with YOUSUF Felipe [facility] [facility] ([address]) Appt Note: HF follow up [address] [facility], [address]  
[phone]  
  
   
 [address] [place], [address] [place] [address]  
  
    
 8/20/2018 10:00 AM  
ESTABLISHED PATIENT with JESSI Kimball [facility]) Appt Note: F/U INR WITH AGATHA  
 [address]  
[phone] [address]  
  
    
 8/30/2018  8:00 AM  
ESTABLISHED PATIENT with Magali Sheppard MD  
[facility] ([address] [facility]) Appt Note: 4 week f/u  
 [address] [facility] [address] [facility] 7 [address] Upcoming Health Maintenance Date Due Pneumococcal 19-64 Medium Risk (1 of 1 - PPSV23) 7/15/1998 DTaP/Tdap/Td series (1 - Tdap) 7/15/2000 Influenza Age 5 to Adult 8/1/2018 Allergies as of 8/6/2018  Review Complete On: 7/27/2018 By: Rosario Katz PA-C No Known Allergies Current Immunizations  Reviewed on 7/22/2018 No immunizations on file. Not reviewed this visit You Were Diagnosed With   
  
 Codes Comments Anticoagulated    -  Primary ICD-10-CM: Z79.01 
ICD-9-CM: V58.61 Atrial fibrillation with RVR (HCC)     ICD-10-CM: I48.91 
ICD-9-CM: 427.31 Congestive cardiomyopathy ([address]     ICD-10-CM: I42.0 ICD-9-CM: 425.4 Vitals Smoking Status Former Smoker Preferred Pharmacy Pharmacy Name Phone [facility] [address]  [place] [phone] Your Updated Medication List  
  
   
This list is accurate as of 8/6/18 11:59 PM.  Always use your most recent med list.  
  
  
  
  
 amiodarone 400 mg tablet Commonly known as:  John Tinobird Take 400 mg twice a day till 8/3/2018 and then take  400 mg once a day till 8/17/2018 and then obtain 200 mg tablets from your cardiologist to continue 200 mg daily after that.  
  
 carvedilol 3.125 mg tablet Commonly known as:  Vergia Jonathon Take 1 Tab by mouth two (2) times daily (with meals). enoxaparin 100 mg/mL Commonly known as:  LOVENOX  
100 mg by SubCUTAneous route every twelve (12) hours. Injections are needed till INR is therapeutic  
  
 magnesium oxide 400 mg tablet Commonly known as:  MAG-OX Take 1 Tab by mouth daily. potassium chloride SR 20 mEq tablet Commonly known as:  K-TAB Take 1 Tab by mouth daily. * sacubitril-valsartan 24-26 mg tablet Commonly known as:  ENTRESTO Take 1 Tab by mouth two (2) times a day. * sacubitril-valsartan 24-26 mg tablet Commonly known as:  ENTRESTO Take 1 Tab by mouth every twelve (12) hours. spironolactone 25 mg tablet Commonly known as:  ALDACTONE Take 1 Tab by mouth daily. warfarin 7.5 mg tablet Commonly known as:  COUMADIN Take 1 Tab by mouth daily. Indications: atrial fibrillation, cardiomyopathy * Notice: This list has 2 medication(s) that are the same as other medications prescribed for you. Read the directions carefully, and ask your doctor or other care provider to review them with you. Patient Instructions It is extremely important that you follow-up.   If you have found another place to get your care that is okay but please let us know. We need to keep you on the blood thinner until we know whether or not you are at risk for recurrence of atrial fibrillation. You need to continue on the heart failure medication and give your heart muscle a chance to heal.  Please do not make the mistake of disappearing from care altogether. Introducing [facility]! Namrata Willams introduces [employer] patient portal. Now you can access parts of your medical record, email your doctor's office, and request medication refills online. 1. In your internet browser, go to https://"[employer]. [employer]/"[employer] 2. Click on the First Time User? Click Here link in the Sign In box. You will see the New Member Sign Up page. 3. Enter your [employer] Access Code exactly as it appears below. You will not need to use this code after youve completed the sign-up process. If you do not sign up before the expiration date, you must request a new code. · [employer] Access Code: [email or web address] Expires: 10/6/2018  5:16 PM 
 
4. Enter the last four digits of your Social Security Number (xxxx) and Date of Birth (mm/dd/yyyy) as indicated and click Submit. You will be taken to the next sign-up page. 5. Create a [employer] ID. This will be your [employer] login ID and cannot be changed, so think of one that is secure and easy to remember. 6. Create a [employer] password. You can change your password at any time. 7. Enter your Password Reset Question and Answer. This can be used at a later time if you forget your password. 8. Enter your e-mail address. You will receive e-mail notification when new information is available in [address] 9. Click Sign Up. You can now view and download portions of your medical record. 10. Click the Download Summary menu link to download a portable copy of your medical information.  
 
If you have questions, please visit the Frequently Asked Questions section of the [employer]. Remember, [employer] is NOT to be used for urgent needs. For medical emergencies, dial 911. Now available from your iPhone and Android! Please provide this summary of care documentation to your next provider. Your primary care clinician is listed as NONE. If you have any questions after today's visit, please call [phone].

## 2018-08-08 ENCOUNTER — OFFICE VISIT (OUTPATIENT)
Dept: CARDIOLOGY CLINIC | Age: 38
End: 2018-08-08

## 2018-08-08 VITALS
SYSTOLIC BLOOD PRESSURE: 94 MMHG | HEIGHT: 75 IN | BODY MASS INDEX: 26.36 KG/M2 | HEART RATE: 85 BPM | OXYGEN SATURATION: 98 % | RESPIRATION RATE: 16 BRPM | WEIGHT: 212 LBS | DIASTOLIC BLOOD PRESSURE: 78 MMHG

## 2018-08-08 DIAGNOSIS — Z95.810 CARDIAC DEFIBRILLATOR IN PLACE: ICD-10-CM

## 2018-08-08 DIAGNOSIS — I48.0 PAF (PAROXYSMAL ATRIAL FIBRILLATION) (HCC): ICD-10-CM

## 2018-08-08 DIAGNOSIS — I42.8 NICM (NONISCHEMIC CARDIOMYOPATHY) (HCC): ICD-10-CM

## 2018-08-08 DIAGNOSIS — Z79.01 ANTICOAGULATED: Primary | ICD-10-CM

## 2018-08-08 DIAGNOSIS — I50.42 CHRONIC COMBINED SYSTOLIC AND DIASTOLIC HEART FAILURE (HCC): ICD-10-CM

## 2018-08-08 PROBLEM — I34.0 NON-RHEUMATIC MITRAL REGURGITATION: Status: ACTIVE | Noted: 2018-08-08

## 2018-08-08 PROBLEM — F17.200 TOBACCO USE DISORDER: Status: ACTIVE | Noted: 2018-08-08

## 2018-08-08 PROBLEM — B33.22 VIRAL MYOCARDITIS: Status: ACTIVE | Noted: 2018-08-08

## 2018-08-08 PROBLEM — I50.40 COMBINED SYSTOLIC AND DIASTOLIC HEART FAILURE (HCC): Chronic | Status: ACTIVE | Noted: 2018-08-08

## 2018-08-08 LAB
INR BLD: 5.1
PT POC: 60.7 SECONDS
VALID INTERNAL CONTROL?: YES

## 2018-08-08 NOTE — PROGRESS NOTES
Mr. Ophelia Bundy did not keep this appointment. This is the second time he has missed an appointment for measurement of INR in the context of congestive cardiomyopathy with atrial fibrillation and recent cardioversion. We do not know anything about the stability of his rihythm and he may be at risk for stroke. The patient will be contacted.

## 2018-08-08 NOTE — PATIENT INSTRUCTIONS
It is extremely important that you follow-up. If you have found another place to get your care that is okay but please let us know. We need to keep you on the blood thinner until we know whether or not you are at risk for recurrence of atrial fibrillation. You need to continue on the heart failure medication and give your heart muscle a chance to heal.  Please do not make the mistake of disappearing from care altogether.

## 2018-08-08 NOTE — PATIENT INSTRUCTIONS
-- Take 5 mg Coumadin every night  -- 2 week follow up to check INR         Consistent Vitamin K Diet: Care Instructions  Your Care Instructions    Your body needs vitamin K to clot blood and keep your bones strong. It's found in leafy green vegetables such as kale and spinach. If you take the blood thinner warfarin (Coumadin), you need to be careful about how much vitamin K you get. Vitamin K can keep your warfarin from working as it should. Most people who take warfarin can eat normally. The important thing is to get about the same amount of vitamin K each day. Don't suddenly start eating foods with a lot more or a lot less vitamin K. You can choose how much vitamin K you eat. For example, if you already eat a lot of leafy green vegetables, that's fine. Just keep it about the same amount each day. Follow-up care is a key part of your treatment and safety. Be sure to make and go to all appointments, and call your doctor if you are having problems. It's also a good idea to know your test results and keep a list of the medicines you take. How can you care for yourself at home? You don't need to stop eating food high in vitamin K. But you do need to know what foods contain vitamin K. Then you can try to eat about the same amount of vitamin K each day. · You might limit foods that are high in vitamin K to about 1 serving a day. These foods have about 250 to 500 micrograms (mcg) of vitamin K in each serving. They include:  ¨ Cooked leafy green vegetables. Examples are kale, spinach, turnip greens, kiran greens, Swiss chard, and mustard greens. One serving is ½ cup. · You might limit foods that are medium-high in vitamin K to about 3 servings a day. These foods have about 50 to 150 mcg of vitamin K in each serving. These include:  ¨ Cooked brussels sprouts, broccoli, cabbage, and asparagus. One serving is ½ cup. ¨ Raw leafy green vegetables.  Examples are spinach, green leaf lettuce, caroline lettuce, and endive. One serving is 1 cup. · Vitamin K also is found in many multivitamins. You don't need to stop taking your multivitamin if it has vitamin K. But you do need to take it every day. · Check with your doctor before you start or stop taking any supplements or herbal products. Some of these may contain vitamin K. Where can you learn more? Go to http://eleanor-walter.info/. Enter J828 in the search box to learn more about \"Consistent Vitamin K Diet: Care Instructions. \"  Current as of: May 10, 2017  Content Version: 11.7  © 5232-3556 [employer]. Care instructions adapted under license by [employer] (which disclaims liability or warranty for this information). If you have questions about a medical condition or this instruction, always ask your healthcare professional. [address] any warranty or liability for your use of this information.

## 2018-08-08 NOTE — PROGRESS NOTES
[facility]   [address] [place] [address], [address]                                          NEW PATIENT HPI/FOLLOW-UP      NAME:  Carolina Alejandre   :   1980   MRN:   [id number]   PCP:  None           Subjective: The patient is a 43y.o. year old male with h/o A-fib with RVR, acute systolic heart failure (EF 10%), LifeVest/WCD and anticoagulated on warfarin who returns for a routine INR follow-up. Since the last visit, patient reports no new symptoms. Denies bleeding, change in exercise tolerance, chest pain, edema, medication intolerance, palpitations, shortness of breath, PND/orthopnea, wheezing, sputum, syncope, dizziness or light headedness. Doing satisfactorily. Review of Systems  General: Pt denies excessive weight gain or loss. Pt is able to conduct ADL's. Respiratory: Denies shortness of breath, FLOWERS, wheezing or stridor. Cardiovascular: Denies precordial pain, palpitations, edema or PND  Gastrointestinal: Denies poor appetite, indigestion, abdominal pain or blood in stool  Peripheral vascular: Denies claudication, leg cramps  Neuropsychiatric: Denies paresthesias,tingling,numbness,anxiety,depression,fatigue  Musculoskeletal: Denies pain,tenderness, soreness,swelling      Past Medical History:   Diagnosis Date    [facility] systolic and diastolic [facility] 2018    ECHO 18:mild-mod LVD, EF 10-20%, severe DHK, mild LVH, DD, RVD, mild- mod LAE, mod RAY, severe MR, mod-severe TR, RVSP 40, dilated RV outflow tract  ROBERTO 18: LVD, EF 10-15%, severe DHK, mild RVD, reduced RVEF, mod ANNAMARIE, no CELINA thrombus, no PFO, mod MR, mild TR  RHC 18: RA:12, RV 34/6/14.  PA , PCWP 18, [place] 6.33/2.74 TD: 5.37/2.32    CARDIAC MRI: 18: mod LVD (LVDD 66), mil    NICM (nonischemic cardiomyopathy) ([facility] 2018    Non-rheumatic mitral regurgitation 2018    ECHO 18:mild-mod LVD, EF 10-20%, severe DHK, mild LVH, DD, RVD, mild- mod LAE, mod RAY, severe MR, mod-severe TR, RVSP 40, dilated RV outflow tract  ROBERTO 7/20/18: LVD, EF 10-15%, severe DHK, mild RVD, reduced RVEF, mod ANNAMARIE, no CELINA thrombus, no PFO, mod MR, mild TR      PAF (paroxysmal atrial fibrillation) ([address] 8/8/2018    ROBERTO 7/20/18: LVD, EF 10-15%, severe DHK, mild RVD, reduced RVEF, mod ANNAMARIE, no CELINA thrombus, no PFO, mod MR, mild TR    DCCV 7/20/18       Rapid atrial fibrillation ([address] 7/17/2018    Tobacco use disorder 8/8/2018    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     Patient Active Problem List    Diagnosis Date Noted    NICM (nonischemic cardiomyopathy) ([facility] 08/08/2018    Combined systolic and diastolic heart failure ([facility] 08/08/2018    Viral myocarditis 08/08/2018    Tobacco use disorder 08/08/2018    PAF (paroxysmal atrial fibrillation) ([address] 08/08/2018    Non-rheumatic mitral regurgitation 08/08/2018    Cardiac defibrillator in place 07/27/2018    Anticoagulated 12/41/3936    Acute systolic heart failure ([address] 07/19/2018    Pleural effusion, right 07/19/2018    Atrial fibrillation with RVR ([facility] 07/19/2018      Past Surgical History:   Procedure Laterality Date    CARDIOVERSION, ELECTIVE;EXTERN  7/20/2018          No Known Allergies   Family History   Problem Relation Age of Onset    Diabetes Mother     Heart Failure Mother     No Known Problems Father     Hypertension Sister     Hypertension Sister       Social History     Social History    Marital status: SINGLE     Spouse name: N/A    Number of children: N/A    Years of education: N/A     Occupational History    Not on file.      Social History Main Topics    Smoking status: Former Smoker     Quit date: 7/20/2018    Smokeless tobacco: Never Used    Alcohol use Yes      Comment: \"on weekends\"    Drug use: No    Sexual activity: Yes     Other Topics Concern    Not on file     Social History Narrative      Current Outpatient Prescriptions   Medication Sig    warfarin (COUMADIN) 7.5 mg tablet Take 1 Tab by mouth daily. Indications: atrial fibrillation, cardiomyopathy    sacubitril-valsartan (ENTRESTO) 24 mg/26 mg tablet Take 1 Tab by mouth two (2) times a day.  carvedilol (COREG) 3.125 mg tablet Take 1 Tab by mouth two (2) times daily (with meals).  amiodarone (PACERONE) 400 mg tablet Take 400 mg twice a day till 8/3/2018 and then take  400 mg once a day till 8/17/2018 and then obtain 200 mg tablets from your cardiologist to continue 200 mg daily after that. (Patient taking differently: 400 mg daily. Take 400 mg twice a day till 8/3/2018 and then take  400 mg once a day till 8/17/2018 and then obtain 200 mg tablets from your cardiologist to continue 200 mg daily after that.)    magnesium oxide (MAG-OX) 400 mg tablet Take 1 Tab by mouth daily.  potassium chloride SR (K-TAB) 20 mEq tablet Take 1 Tab by mouth daily.  spironolactone (ALDACTONE) 25 mg tablet Take 1 Tab by mouth daily.  sacubitril-valsartan (ENTRESTO) 24 mg/26 mg tablet Take 1 Tab by mouth every twelve (12) hours. (Patient not taking: Reported on 8/8/2018)    enoxaparin (LOVENOX) 100 mg/mL 100 mg by SubCUTAneous route every twelve (12) hours. Injections are needed till INR is therapeutic (Patient not taking: Reported on 8/8/2018)     No current facility-administered medications for this visit. I have reviewed the nurses notes, vitals, problem list, allergy list, medical history, family medical, social history and medications. Objective:     Physical Exam:     Vitals:    08/08/18 0910   BP: 94/78   Pulse: 85   Resp: 16   SpO2: 98%   Weight: 212 lb (96.2 kg)   Height: 6' 3\" (1.905 m)    Body mass index is 26.5 kg/(m^2). General: WDWN adult male, in no acute distress. Pleasant affect. HEENT: No carotid bruits, no JVD, trach is midline. Heart:  Normal S1/S2 negative S3 or S4.  Regular, no murmur, gallop or rub.   Respiratory: Clear bilaterally, no wheezing or rales  Abdomen:   Soft, non-tender, bowel sounds are active.   Extremities:  No edema, normal cap refill, no cyanosis. Neuro: A&Ox3, speech clear, gait stable. Skin: Skin color is normal. No rashes or lesions. No diaphoresis. Vascular: 2+ pulses symmetric in all extremities        Data Review:       Cardiographics:    EKG interpretation:  None today    Cardiology Labs:    Results for orders placed or performed during the hospital encounter of 07/17/18   EKG, 12 LEAD, INITIAL   Result Value Ref Range    Ventricular Rate 173 BPM    Atrial Rate 192 BPM    QRS Duration 90 ms    Q-T Interval 256 ms    QTC Calculation (Bezet) 434 ms    Calculated R Axis -70 degrees    Calculated T Axis 17 degrees    Diagnosis       Atrial fibrillation with rapid ventricular response with premature   ventricular or aberrantly conducted complexes  Left anterior fascicular block  Possible Anterior infarct , age undetermined  Abnormal ECG  No previous ECGs available  Confirmed by Sri Paige MD, Angelica Ramirez ([id number]) on 7/18/2018 8:24:03 PM         No results found for: CHOL, CHOLX, CHLST, CHOLV, 053005, HDL, LDL, LDLC, DLDLP, TGLX, TRIGL, TRIGP, CHHD, CHHDX    Lab Results   Component Value Date/Time    Sodium 142 07/26/2018 05:12 AM    Potassium 3.8 07/26/2018 05:12 AM    Chloride 110 (H) 07/26/2018 05:12 AM    CO2 21 07/26/2018 05:12 AM    Anion gap 11 07/26/2018 05:12 AM    Glucose 75 07/26/2018 05:12 AM    BUN 9 07/26/2018 05:12 AM    Creatinine 0.97 07/26/2018 05:12 AM    BUN/Creatinine ratio 9 (L) 07/26/2018 05:12 AM    GFR est AA >60 07/26/2018 05:12 AM    GFR est non-AA >60 07/26/2018 05:12 AM    Calcium 8.7 07/26/2018 05:12 AM    Bilirubin, total 0.7 07/26/2018 05:12 AM    AST (SGOT) 21 07/26/2018 05:12 AM    Alk.  phosphatase 100 07/26/2018 05:12 AM    Protein, total 6.8 07/26/2018 05:12 AM    Albumin 2.4 (L) 07/26/2018 05:12 AM    Globulin 4.4 (H) 07/26/2018 05:12 AM    A-G Ratio 0.5 (L) 07/26/2018 05:12 AM    ALT (SGPT) 19 07/26/2018 05:12 AM          Assessment:       ICD-10-CM ICD-9-CM 1. Anticoagulated Z79.01 V58.61 AMB POC PT/INR   2. NICM (nonischemic cardiomyopathy) ([facility]) I42.8 425.4    3. Chronic combined systolic and diastolic heart failure ([facility]) I50.42 428.42    4. PAF (paroxysmal atrial fibrillation) ([facility]) I48.0 427.31    5. Cardiac defibrillator in place Z95.810 V45.02          Discussion: Patient presents at this time stable from a cardiac perspective. BP was  controlled. Did not take Coumadin as prescribed from last visit. Instead of taking 7.5 mg x 3 days, he continued at 7.5 dose every night. His INR was supratherapeutic today. Advised pt to cut back to 5 mg and we discussed consistency with leafy greens, etc.    Samples of Entresto 24/26 (2 weeks) given and pt advised that he can return if he needs more until his patient assistance is in place. Pleased with present status. Plan: 1. Continue same meds: Coumadin 5 mg QHS    2. Encouraged to follow a Vitamin K compliant diet. 3.Follow up: 2 weeks for INR   --  Call with questions or concerns. I have discussed the diagnosis with the patient and the intended plan as seen in the above orders. The patient has received an after-visit summary and questions were answered concerning future plans. I have discussed any concerning medication side effects and warnings with the patient as well.     Alcira Pardo PA-C  8/8/2018

## 2018-08-08 NOTE — MR AVS SNAPSHOT
[address] [facility], [place], operated by [facility] 
 
 
 [address] [place] [address] 
[phone] Patient: Penny Doyle MRN: [id number] TGT:[id number] Visit Information Date & Time Provider Department Dept. Phone Encounter #  
 8/8/2018 10:00 AM Rain Benavides [facility] [place] [address] [id number] Your Appointments 8/9/2018 12:30 PM  
Follow Up with YOUSUF Nuno [facility] ([facility]) Appt Note: HF follow up [address] [facility], [address] [facility] [address]  
[phone]  
  
   
 [address] [facility], [address] [facility] [address]  
  
    
 8/20/2018 10:00 AM  
ESTABLISHED PATIENT with JESSI Mathur [facility]) Appt Note: F/U INR WITH AGATHA  
 [address] [place] [address]  
[phone] [address]  
  
    
 8/30/2018  8:00 AM  
ESTABLISHED PATIENT with Gisell Toney MD  
[facility] ([facility]) Appt Note: 4 week f/u  
 [address]  
[address] [address] Upcoming Health Maintenance Date Due Pneumococcal 19-64 Medium Risk (1 of 1 - PPSV23) 7/15/1998 DTaP/Tdap/Td series (1 - Tdap) 7/15/2000 Influenza Age 5 to Adult 8/1/2018 Allergies as of 8/8/2018  Review Complete On: 8/8/2018 By: Kevin Greer No Known Allergies Current Immunizations  Reviewed on 7/22/2018 No immunizations on file. Not reviewed this visit You Were Diagnosed With   
  
 Codes Comments Anticoagulated    -  Primary ICD-10-CM: Z79.01 
ICD-9-CM: V58.61   
 NICM (nonischemic cardiomyopathy) ([facility]     ICD-10-CM: I42.8 ICD-9-CM: 425. 4  Chronic combined systolic and diastolic heart failure ([facility] ICD-10-CM: I50.42 
ICD-9-CM: 428.42 PAF (paroxysmal atrial fibrillation) ([facility])     ICD-10-CM: I48.0 ICD-9-CM: 427.31 Cardiac defibrillator in place     ICD-10-CM: Z95.810 ICD-9-CM: V45.02 Vitals BP Pulse Resp Height(growth percentile) Weight(growth percentile) SpO2  
 94/78 (BP 1 Location: Left arm, BP Patient Position: Sitting) 85 16 6' 3\" (1.905 m) 212 lb (96.2 kg) 98% BMI Smoking Status 26.5 kg/m2 Former Smoker Vitals History BMI and BSA Data Body Mass Index Body Surface Area  
 26.5 kg/m 2 2.26 m 2 Preferred Pharmacy Pharmacy Name Phone [facility] [place]  [place] [phone] Your Updated Medication List  
  
   
This list is accurate as of 8/8/18 10:01 AM.  Always use your most recent med list.  
  
  
  
  
 amiodarone 400 mg tablet Commonly known as:  Joe Bogus Take 400 mg twice a day till 8/3/2018 and then take  400 mg once a day till 8/17/2018 and then obtain 200 mg tablets from your cardiologist to continue 200 mg daily after that.  
  
 carvedilol 3.125 mg tablet Commonly known as:  Antonella Chew Take 1 Tab by mouth two (2) times daily (with meals). enoxaparin 100 mg/mL Commonly known as:  LOVENOX  
100 mg by SubCUTAneous route every twelve (12) hours. Injections are needed till INR is therapeutic  
  
 magnesium oxide 400 mg tablet Commonly known as:  MAG-OX Take 1 Tab by mouth daily. potassium chloride SR 20 mEq tablet Commonly known as:  K-TAB Take 1 Tab by mouth daily. * sacubitril-valsartan 24-26 mg tablet Commonly known as:  ENTRESTO Take 1 Tab by mouth two (2) times a day. * sacubitril-valsartan 24-26 mg tablet Commonly known as:  ENTRESTO Take 1 Tab by mouth every twelve (12) hours. spironolactone 25 mg tablet Commonly known as:  ALDACTONE Take 1 Tab by mouth daily. warfarin 7.5 mg tablet Commonly known as:  COUMADIN Take 1 Tab by mouth daily. Indications: atrial fibrillation, cardiomyopathy * Notice: This list has 2 medication(s) that are the same as other medications prescribed for you. Read the directions carefully, and ask your doctor or other care provider to review them with you. We Performed the Following AMB POC PT/INR [26229 CPT(R)] Patient Instructions -- Take 5 mg Coumadin every night 
-- 2 week follow up to check INR Consistent Vitamin K Diet: Care Instructions Your Care Instructions Your body needs vitamin K to clot blood and keep your bones strong. It's found in leafy green vegetables such as kale and spinach. If you take the blood thinner warfarin (Coumadin), you need to be careful about how much vitamin K you get. Vitamin K can keep your warfarin from working as it should. Most people who take warfarin can eat normally. The important thing is to get about the same amount of vitamin K each day. Don't suddenly start eating foods with a lot more or a lot less vitamin K. You can choose how much vitamin K you eat. For example, if you already eat a lot of leafy green vegetables, that's fine. Just keep it about the same amount each day. Follow-up care is a key part of your treatment and safety. Be sure to make and go to all appointments, and call your doctor if you are having problems. It's also a good idea to know your test results and keep a list of the medicines you take. How can you care for yourself at home? You don't need to stop eating food high in vitamin K. But you do need to know what foods contain vitamin K. Then you can try to eat about the same amount of vitamin K each day. · You might limit foods that are high in vitamin K to about 1 serving a day. These foods have about 250 to 500 micrograms (mcg) of vitamin K in each serving. They include: ¨ Cooked leafy green vegetables.  Examples are kale, spinach, turnip greens, kiran greens, Swiss chard, and mustard greens. One serving is ½ cup. · You might limit foods that are medium-high in vitamin K to about 3 servings a day. These foods have about 50 to 150 mcg of vitamin K in each serving. These include: ¨ Cooked brussels sprouts, broccoli, cabbage, and asparagus. One serving is ½ cup. ¨ Raw leafy green vegetables. Examples are spinach, green leaf lettuce, caroline lettuce, and endive. One serving is 1 cup. · Vitamin K also is found in many multivitamins. You don't need to stop taking your multivitamin if it has vitamin K. But you do need to take it every day. · Check with your doctor before you start or stop taking any supplements or herbal products. Some of these may contain vitamin K. Where can you learn more? Go to http://eleanor-walter.info/. Enter V670 in the search box to learn more about \"Consistent Vitamin K Diet: Care Instructions. \" Current as of: May 10, 2017 Content Version: 11.7 © 7783-6692 codesy. Care instructions adapted under license by [employer] (which disclaims liability or warranty for this information). If you have questions about a medical condition or this instruction, always ask your healthcare professional. [place] [address] any warranty or liability for your use of this information. Introducing [facility]! [employer] introduces Hojoki patient portal. Now you can access parts of your medical record, email your doctor's office, and request medication refills online. 1. In your internet browser, go to [email or web address] 2. Click on the First Time User? Click Here link in the Sign In box. You will see the New Member Sign Up page. 3. Enter your Hojoki Access Code exactly as it appears below. You will not need to use this code after youve completed the sign-up process.  If you do not sign up before the expiration date, you must request a new code. · UrbanBuz Access Code: [email or web address] Expires: 10/6/2018  5:16 PM 
 
4. Enter the last four digits of your Social Security Number (xxxx) and Date of Birth (mm/dd/yyyy) as indicated and click Submit. You will be taken to the next sign-up page. 5. Create a UrbanBuz ID. This will be your UrbanBuz login ID and cannot be changed, so think of one that is secure and easy to remember. 6. Create a UrbanBuz password. You can change your password at any time. 7. Enter your Password Reset Question and Answer. This can be used at a later time if you forget your password. 8. Enter your e-mail address. You will receive e-mail notification when new information is available in [address] 9. Click Sign Up. You can now view and download portions of your medical record. 10. Click the Download Summary menu link to download a portable copy of your medical information. If you have questions, please visit the Frequently Asked Questions section of the UrbanBuz website. Remember, UrbanBuz is NOT to be used for urgent needs. For medical emergencies, dial 911. Now available from your iPhone and Android! Please provide this summary of care documentation to your next provider. Your primary care clinician is listed as NONE. If you have any questions after today's visit, please call [phone].

## 2018-08-08 NOTE — PROGRESS NOTES
Chief Complaint   Patient presents with    Labs     INR check      1. Have you been to the ER, urgent care clinic since your last visit? Hospitalized since your last visit? No    2. Have you seen or consulted any other health care providers outside of the [address] [place] [address] since your last visit? Include any pap smears or colon screening.  No

## 2018-08-09 ENCOUNTER — DOCUMENTATION ONLY (OUTPATIENT)
Dept: CARDIOLOGY CLINIC | Age: 38
End: 2018-08-09

## 2018-08-09 ENCOUNTER — OFFICE VISIT (OUTPATIENT)
Dept: CARDIOLOGY CLINIC | Age: 38
End: 2018-08-09

## 2018-08-09 VITALS
HEART RATE: 85 BPM | BODY MASS INDEX: 26.78 KG/M2 | SYSTOLIC BLOOD PRESSURE: 92 MMHG | RESPIRATION RATE: 20 BRPM | TEMPERATURE: 98.7 F | HEIGHT: 75 IN | WEIGHT: 215.4 LBS | DIASTOLIC BLOOD PRESSURE: 60 MMHG | OXYGEN SATURATION: 98 %

## 2018-08-09 DIAGNOSIS — I48.0 PAF (PAROXYSMAL ATRIAL FIBRILLATION) (HCC): ICD-10-CM

## 2018-08-09 DIAGNOSIS — Z95.810 CARDIAC DEFIBRILLATOR IN PLACE: ICD-10-CM

## 2018-08-09 DIAGNOSIS — E55.9 VITAMIN D DEFICIENCY: ICD-10-CM

## 2018-08-09 DIAGNOSIS — R06.09 DOE (DYSPNEA ON EXERTION): ICD-10-CM

## 2018-08-09 DIAGNOSIS — R06.83 SNORING: ICD-10-CM

## 2018-08-09 DIAGNOSIS — I42.8 NICM (NONISCHEMIC CARDIOMYOPATHY) (HCC): ICD-10-CM

## 2018-08-09 DIAGNOSIS — I34.0 NON-RHEUMATIC MITRAL REGURGITATION: ICD-10-CM

## 2018-08-09 DIAGNOSIS — I50.42 CHRONIC COMBINED SYSTOLIC AND DIASTOLIC HEART FAILURE (HCC): Primary | Chronic | ICD-10-CM

## 2018-08-09 DIAGNOSIS — I40.0 SUBACUTE VIRAL MYOCARDITIS: ICD-10-CM

## 2018-08-09 DIAGNOSIS — I50.20 SYSTOLIC LEFT HEART FAILURE, NYHA CLASS 2 (HCC): ICD-10-CM

## 2018-08-09 NOTE — PROGRESS NOTES
[facility] Clinic Note      DOS:   8/9/2018  NAME:  Dasha Rogers   MRN:   [id number]     REFERRING PROVIDER:  Dr. Aviles Le: None  PRIMARY CARDIOLOGIST: Dr. 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 HHV-6 positive      HFrEF, NYHA II LVEF 10-20%, RVEF 22%   Increase entresto 49/51 hs and continue 24/26 am     Cont Coreg,  and spironolactone   Entresto pt application in process for patient assistance program    F/u scheduled with [facility] 1 month    Cardiac rehab    SHRUTHI eval       High risk of SCD   Life Vest  on patient    Reinforced importance of 24/7 use (except when showering) - even while in hospital    Recommend holding on EF reassessment until meds optimized    Afib w/ RVR S/p Cardioversion- Remains in Sinus   Amio BID per EP    Continue warfarin - CAV to manage       Chronic anticoagulation recent INR 5.1   Followed by [facility]      Functional MR   Follow up echo once meds optimized   ? SHRUTHI    sleep eval     Dasha Rogers met with Nathan Grubbs today about financial issues      Chief Complaint:  Chief Complaint   Patient presents with   [facility] Follow Up    Shortness of Breath     with exertion         HPI: 43y.o. year old male with recent hx of HFrEF (10-20%) in the setting of viral myocarditis (Human Herpes Virus 6)  Complicated by new onset PAF with RVR s/p DCCV. He was recently presented to [facility]  with ADHF found to be in afib with RVR. CTA (-) for PE, but showed a large R pleural effusion. He underwent a thoracentesis for ~ 1 L, and  ROBERTO and DCCV. Right and left heart cath showed no epicardial disease and slightly elevated filling pressures. Cardiac MRI showed LVEF 17%, RVEF 20%  confirmed viral myocarditis and lab work was positive for  Coxsackie pane, CMV IgG positive. HHV-6 positive.  Recent Echo shows EF   He was started on GDMT and discharged with a life vest. He was seen by [facility] yesterday who is managing  His coumadin, INR was 5.1 Recent K 3.8, Cr 0.97    Since returning home he feels 70-80% better. He has FLOWERS with moderate exertion, such as stair climbing. He has improved energy level (8/10), but still low stamina 6/10. Denies LE edema. He is following his weights that are 212 lbs most days   He is generally sedentary since returning from the hospital, and limited by his living conditions. Sleep:  He does not sleep well, he relates to anxiety. He sleeps on 1 pillow, denies orthopnea,  PND, cough, wheeze. He snores, daytime fatigue, never tested for SHRUTHI. Diet: He does not have a stove right now so is limited to prepared foods. He is trying to make low NA choices. Not following BP at home  Denies exertional chest pain, palpitations, syncope, near syncope, dizziness. He is complainant with medications, wearing the Life vest, no bleeding or Life Vest shocks   He is in th e process of applying for Medicaid,  and the care card, and pt asst for [facility] lives in a hotel with his sister and he is in transition to a new home. 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 He smoked 15 years ~ 1/2 ppd, quit in July. Use to drink on the weekends an has stopped since returning home from the hospital. No additional substances. Some college courses. Review of Systems:     Constitutional:  Reduced stamina, negative, feels well today   HEENT:   Negative. Denies angioedema sx    Respiratory:   Negative for cough. Cardiovascular:   Per HPI   Gastrointestinal:  Negative. Genitourinary:  denies dysuria, frequency    Musculoskeletal:  Negative. Skin:    Negative. Neurological:  Negative.   Psy:    Positive for anxiety        CARDIAC EVALUATION   ECHO 7/17/18:mild-mod LVD, EF 10-20%, severe DHK, mild LVH, DD, RVD, mild- mod LAE, mod RAY, severe MR, mod-severe TR, RVSP 40, dilated RV outflow tract    ROBERTO 7/20/18: LVD, EF 10-15%, severe DHK, mild RVD, reduced RVEF, mod ANNAMARIE, no CELINA thrombus, no PFO, mod MR, mild TR      CATH 7/20/18: no epicardial disease, LVEP 1     [facility] 7/20/18: RA:12, RV 34/6/14. PA 30/20/26, PCWP 18, [place] 6.33/2.74 TD: 5.37/2.32     DCCV 7/20/18    CARDIAC MRI: 7/20/18: mod LVD (LVDD 66), mild LVH (13mm), LVEF 17%, severe GHK, mild RVD, RVEF 20%, mod GHK, mod MR, mild TR   Distinct mid wall stripe enhancement of the basal septal wall along with RV insertion point enhancement sparing the subendocardium. viral myocarditis of HHV-6  viral strain. NO features of giant cell or lymphocytic myocarditis. ,  infiltrative  Sarcoidosis, amyloidosis, old MI    UPEP, SPEP,  heavy metals, FLC (-) HIV ( NR), Legionella (-) Ferritin WNL     History:  Past Medical History:   Diagnosis Date    [facility] systolic and diastolic [facility] ([facility] 8/8/2018    ECHO 7/17/18:mild-mod LVD, EF 10-20%, severe DHK, mild LVH, DD, RVD, mild- mod LAE, mod RAY, severe MR, mod-severe TR, RVSP 40, dilated RV outflow tract  ROBERTO 7/20/18: LVD, EF 10-15%, severe DHK, mild RVD, reduced RVEF, mod ANNAMARIE, no CELINA thrombus, no PFO, mod MR, mild TR  [facility] 7/20/18: RA:12, RV 34/6/14.  PA 30/20/26, PCWP 18, [place] 6.33/2.74 TD: 5.37/2.32    CARDIAC MRI: 7/20/18: mod LVD (LVDD 66), mil    Congestive heart failure (HCC)     NICM (nonischemic cardiomyopathy) ([facility] 8/8/2018    Non-rheumatic mitral regurgitation 8/8/2018    ECHO 7/17/18:mild-mod LVD, EF 10-20%, severe DHK, mild LVH, DD, RVD, mild- mod LAE, mod RAY, severe MR, mod-severe TR, RVSP 40, dilated RV outflow tract  ROBERTO 7/20/18: LVD, EF 10-15%, severe DHK, mild RVD, reduced RVEF, mod ANNAMARIE, no CELINA thrombus, no PFO, mod MR, mild TR      PAF (paroxysmal atrial fibrillation) ([address] 8/8/2018    ROBERTO 7/20/18: LVD, EF 10-15%, severe DHK, mild RVD, reduced RVEF, mod ANNAMARIE, no CELINA thrombus, no PFO, mod MR, mild TR    DCCV 7/20/18       Rapid atrial fibrillation ([address] 7/17/2018    Tobacco use disorder 8/8/2018    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     Past Surgical History:   Procedure Laterality Date    CARDIOVERSION, ELECTIVE;EXTERN  7/20/2018          Social History     Social History    Marital status: SINGLE     Spouse name: N/A    Number of children: N/A    Years of education: N/A     Occupational History    Not on file. Social History Main Topics    Smoking status: Former Smoker     Quit date: 7/20/2018    Smokeless tobacco: Never Used    Alcohol use No    Drug use: No    Sexual activity: Yes     Other Topics Concern    Not on file     Social History Narrative     Family History   Problem Relation Age of Onset    Diabetes Mother     Heart Failure Mother     No Known Problems Father     Hypertension Sister     Hypertension Sister        Current Medications:   Current Outpatient Prescriptions   Medication Sig Dispense Refill    warfarin (COUMADIN) 7.5 mg tablet Take 1 Tab by mouth daily. Indications: atrial fibrillation, cardiomyopathy 5 Tab 0    sacubitril-valsartan (ENTRESTO) 24 mg/26 mg tablet Take 1 Tab by mouth every twelve (12) hours. 60 Tab 1    carvedilol (COREG) 3.125 mg tablet Take 1 Tab by mouth two (2) times daily (with meals). 60 Tab 0    amiodarone (PACERONE) 400 mg tablet Take 400 mg twice a day till 8/3/2018 and then take  400 mg once a day till 8/17/2018 and then obtain 200 mg tablets from your cardiologist to continue 200 mg daily after that. (Patient taking differently: 400 mg daily. Take 400 mg twice a day till 8/3/2018 and then take  400 mg once a day till 8/17/2018 and then obtain 200 mg tablets from your cardiologist to continue 200 mg daily after that.) 35 Tab 0    magnesium oxide (MAG-OX) 400 mg tablet Take 1 Tab by mouth daily. 30 Tab 0    potassium chloride SR (K-TAB) 20 mEq tablet Take 1 Tab by mouth daily. 30 Tab 0    spironolactone (ALDACTONE) 25 mg tablet Take 1 Tab by mouth daily.  30 Tab 0    enoxaparin (LOVENOX) 100 mg/mL 100 mg by SubCUTAneous route every twelve (12) hours. Injections are needed till INR is therapeutic (Patient not taking: Reported on 8/8/2018) 10 Syringe 0       Allergies: No Known Allergies      Vitals:   Visit Vitals    BP 92/60 (BP 1 Location: Left arm, BP Patient Position: Sitting)    Pulse 85    Temp 98.7 °F (37.1 °C) (Oral)    Resp 20    Ht 6' 3\" (1.905 m)    Wt 215 lb 6.4 oz (97.7 kg)    SpO2 98%    BMI 26.92 kg/m2       Physical Exam:   Constitutional:   Well developed AA male, cooperative, pleasant, in NAD  HEENT:   Normocephalic, atraumatic, sclera anicteric, multiple gold teeth   Neck:    Normal range of motion. Neck supple. Pulmonary/Chest:  Effort normal and breath sounds normal. Life Vest in place. Cardiovascular:   Life Vest in place, PMI:diminished  Normal heart sounds and intact distal pulses. RRR, extra systoles noted, S1, S2 normal, + S3,   JVP  10 cm (+)  HJR   Abdominal:   Soft. Non tender, non distended,  Bowel sounds are normal.    Musculoskeletal:  well developed, no edema    Neurological:  A& O x3, no focal deficits, ambulates w/o difficulty    Skin:    Skin is warm and dry.    Psy:    appears calm, not agitated     Recent Labs:   Lab Results   Component Value Date/Time    WBC 6.0 07/26/2018 05:12 AM    HGB 14.6 07/26/2018 05:12 AM    HCT 45.7 07/26/2018 05:12 AM    PLATELET 412 07/96/5341 05:12 AM    MCV 98.1 07/26/2018 05:12 AM     Lab Results   Component Value Date/Time    TSH 0.80 07/18/2018 04:18 AM    T4, Free 1.4 07/20/2018 01:50 PM      Lab Results   Component Value Date/Time    CK 91 07/17/2018 02:00 PM    CK - MB 1.6 07/17/2018 02:00 PM    CK-MB Index 1.8 07/17/2018 02:00 PM      Lab Results   Component Value Date/Time    NT pro-BNP 1981 (H) 07/26/2018 05:12 AM    NT pro-BNP 2507 (H) 07/25/2018 04:08 AM    NT pro-BNP 2972 (H) 07/24/2018 04:19 AM    NT pro-BNP 2629 (H) 07/23/2018 04:09 AM    NT pro-BNP 2747 (H) 07/21/2018 03:38 AM      Lab Results   Component Value Date/Time    Sodium 142 07/26/2018 05:12 AM    Potassium 3.8 07/26/2018 05:12 AM    Chloride 110 (H) 07/26/2018 05:12 AM    CO2 21 07/26/2018 05:12 AM    Anion gap 11 07/26/2018 05:12 AM    Glucose 75 07/26/2018 05:12 AM    BUN 9 07/26/2018 05:12 AM    Creatinine 0.97 07/26/2018 05:12 AM    BUN/Creatinine ratio 9 (L) 07/26/2018 05:12 AM    GFR est AA >60 07/26/2018 05:12 AM    GFR est non-AA >60 07/26/2018 05:12 AM    Calcium 8.7 07/26/2018 05:12 AM    Bilirubin, total 0.7 07/26/2018 05:12 AM    ALT (SGPT) 19 07/26/2018 05:12 AM    AST (SGOT) 21 07/26/2018 05:12 AM    Alk. phosphatase 100 07/26/2018 05:12 AM    Protein, total 6.8 07/26/2018 05:12 AM    Albumin 2.4 (L) 07/26/2018 05:12 AM    Globulin 4.4 (H) 07/26/2018 05:12 AM    A-G Ratio 0.5 (L) 07/26/2018 05:12 AM      No results found for: HBA1C, VFO6SQRL, HGBE8, GWD5IVSR, WRJ2FWTV, FQF7XIRY            CTA Results (most recent): 7/17/18:        Impression IMPRESSION: Moderate right pleural effusion with right middle and right lower  lobe infiltrates. Left lower lobe atelectasis. No evidence of pulmonary  embolism. I have discussed the diagnosis with  Shad Guthrie and the intended plan as seen in the above orders. Questions were answered concerning future plans, and written instructions provided. I have discussed medication side effects and warnings with the patient as well. Thank you for allowing me to participate in the care of this delightful gentleman. Please do not hesitate to call with any questions. Christiana Ruiz  RN, ACNP-BC, [address] [place] [phone]  24 hour VAD/HF Pager: [phone]

## 2018-08-09 NOTE — LETTER
8/9/2018 5:54 PM 
 
Patient:  Alcides Reyez YOB: 1980 Date of Visit: 8/9/2018 Dear Cameorn Dey MD 
[address] [place] [id number] VIA In Basket Unique Ricardo PA-C 
[address] [place] [phone] VIA In Basket 
 : Thank you for referring Mr. Alcides Reyez to me for evaluation/treatment. Below are the relevant portions of my assessment and plan of care. If you have questions, please do not hesitate to call me. I look forward to following Mr. Barney Grande along with you. Sincerely, YOUSUF Boss

## 2018-08-09 NOTE — PATIENT INSTRUCTIONS
INCREASE Entresto Take 1 tablet  In the morning and 2 tablets in the evening for 3 days, then if you are feeling OK, increase both the morning and the evening dose to 2 tabs. Take with food  *      CONTINUE other medications      Take twice a day medications 12 hours apart with food    Labs today      Limit salt    Walk more      HF Education: Continue daily weights (in the morning, after voiding). Notify HF team of overnight weight gains > 2 lbs or weekly >5 lbs or if any of the following Sx.  Continue to limit sodium intake & monitor your fluid intake - stay hydrated

## 2018-08-09 NOTE — MR AVS SNAPSHOT
[address] [facility], [address] [place] 
[phone] Patient: Aman Dudley MRN: [id number] GQA:[birth year] Visit Information Date & Time Provider Department Dept. Phone Encounter #  
 8/9/2018 12:30 PM Jalen Glass [id number] Follow-up Instructions Return in about 2 weeks (around 8/23/2018). Your Appointments 8/20/2018 10:00 AM  
ESTABLISHED PATIENT with JESS[facility]) Appt Note: F/U INR WITH AGATHA  
 [address] [place]  
[phone] [address]  
  
    
 8/30/2018  8:00 AM  
ESTABLISHED PATIENT with Cyndi Russell MD  
[facility] ([address]) Appt Note: 4 week f/u  
 [address] [facility] [address] [place]  
[address] [facility] [address] Upcoming Health Maintenance Date Due Pneumococcal 19-64 Medium Risk (1 of 1 - PPSV23) 7/15/1998 DTaP/Tdap/Td series (1 - Tdap) 7/15/2000 Influenza Age 5 to Adult 8/1/2018 Allergies as of 8/9/2018  Review Complete On: 8/9/2018 By: YOUSUF Glass No Known Allergies Current Immunizations  Reviewed on 7/22/2018 No immunizations on file. Not reviewed this visit You Were Diagnosed With   
  
 Codes Comments Chronic combined systolic and diastolic heart failure (HCC)    -  Primary ICD-10-CM: I50.42 
ICD-9-CM: 428.42   
 NICM (nonischemic cardiomyopathy) ([facility] [address]     ICD-10-CM: I42.8 ICD-9-CM: 425.4 PAF (paroxysmal atrial fibrillation) (HCC)     ICD-10-CM: I48.0 ICD-9-CM: 427.31 Non-rheumatic mitral regurgitation     ICD-10-CM: I34.0 ICD-9-CM: 424.0 Cardiac defibrillator in place     ICD-10-CM: Z95.810 ICD-9-CM: V45.02   
 Subacute viral myocarditis     ICD-10-CM: I40.0 ICD-9-CM: 422.91   
 FLOWERS (dyspnea on exertion)     ICD-10-CM: R06.09 
ICD-9-CM: 786.09 Snoring     ICD-10-CM: R06.83 
ICD-9-CM: 786.09 Vitamin D deficiency     ICD-10-CM: E55.9 ICD-9-CM: 268.9 Vitals BP Pulse Temp Resp Height(growth percentile) Weight(growth percentile) 92/60 (BP 1 Location: Left arm, BP Patient Position: Sitting) 85 98.7 °F (37.1 °C) (Oral) 20 6' 3\" (1.905 m) 215 lb 6.4 oz (97.7 kg) SpO2 BMI Smoking Status 98% 26.92 kg/m2 Former Smoker Vitals History BMI and BSA Data Body Mass Index Body Surface Area  
 26.92 kg/m 2 2.27 m 2 Preferred Pharmacy Pharmacy Name Phone [facility] [address]  [place] [phone] Your Updated Medication List  
  
   
This list is accurate as of 8/9/18  2:27 PM.  Always use your most recent med list.  
  
  
  
  
 amiodarone 400 mg tablet Commonly known as:  Abhinav Babb Take 400 mg twice a day till 8/3/2018 and then take  400 mg once a day till 8/17/2018 and then obtain 200 mg tablets from your cardiologist to continue 200 mg daily after that.  
  
 carvedilol 3.125 mg tablet Commonly known as:  Nas Pintos Take 1 Tab by mouth two (2) times daily (with meals). magnesium oxide 400 mg tablet Commonly known as:  MAG-OX Take 1 Tab by mouth daily. potassium chloride SR 20 mEq tablet Commonly known as:  K-TAB Take 1 Tab by mouth daily. * sacubitril-valsartan 24-26 mg tablet Commonly known as:  ENTRESTO Take 1 Tab by mouth every twelve (12) hours. Take 1 tablet  In the morning and 2 tablets in the evening for 3 days, then if you are feeling OK, increase both the morning and the evening dose to 2 tabs. Take with food  Indications: chronic heart failure * sacubitril-valsartan 49-51 mg Tab tablet Commonly known as:  ENTRESTO Take 1 Tab by mouth two (2) times a day. Indications: chronic heart failure  
  
 spironolactone 25 mg tablet Commonly known as:  ALDACTONE Take 1 Tab by mouth daily. warfarin 7.5 mg tablet Commonly known as:  COUMADIN Take 1 Tab by mouth daily. Indications: atrial fibrillation, cardiomyopathy * Notice: This list has 2 medication(s) that are the same as other medications prescribed for you. Read the directions carefully, and ask your doctor or other care provider to review them with you. We Performed the Following METABOLIC PANEL, BASIC [06112 CPT(R)] NT-PRO BNP [id number] CPT(R)] VITAMIN D, 25-HYDROXY, TOTAL [[id number] Custom] Follow-up Instructions Return in about 2 weeks (around 8/23/2018). Patient Instructions INCREASE Entresto Take 1 tablet  In the morning and 2 tablets in the evening for 3 days, then if you are feeling OK, increase both the morning and the evening dose to 2 tabs. Take with food  * CONTINUE other medications Take twice a day medications 12 hours apart with food Labs today Limit salt Walk more HF Education: Continue daily weights (in the morning, after voiding). Notify HF team of overnight weight gains > 2 lbs or weekly >5 lbs or if any of the following Sx. Continue to limit sodium intake & monitor your fluid intake - stay hydrated Introducing [facility]! Alix Slater introduces [employer] patient portal. Now you can access parts of your medical record, email your doctor's office, and request medication refills online. 1. In your internet browser, go to [email or web address] 2. Click on the First Time User? Click Here link in the Sign In box. You will see the New Member Sign Up page. 3. Enter your [employer] Access Code exactly as it appears below. You will not need to use this code after youve completed the sign-up process.  If you do not sign up before the expiration date, you must request a new code. · [employer] Access Code: [email or web address] Expires: 10/6/2018  5:16 PM 
 
4. Enter the last four digits of your Social Security Number (xxxx) and Date of Birth (mm/dd/yyyy) as indicated and click Submit. You will be taken to the next sign-up page. 5. Create a [employer] ID. This will be your [employer] login ID and cannot be changed, so think of one that is secure and easy to remember. 6. Create a [employer] password. You can change your password at any time. 7. Enter your Password Reset Question and Answer. This can be used at a later time if you forget your password. 8. Enter your e-mail address. You will receive e-mail notification when new information is available in [address] 9. Click Sign Up. You can now view and download portions of your medical record. 10. Click the Download Summary menu link to download a portable copy of your medical information. If you have questions, please visit the Frequently Asked Questions section of the [employer] website. Remember, [employer] is NOT to be used for urgent needs. For medical emergencies, dial 911. Now available from your iPhone and Android! Please provide this summary of care documentation to your next provider. Your primary care clinician is listed as NONE. If you have any questions after today's visit, please call [phone].

## 2018-08-09 NOTE — PROGRESS NOTES
Notified the patient during his office visit that he's been approved for [employer] patient assistance for entresto. Provided him with the contact number to call and set up his first shipment of the medication. Also completed the care card application with him - he took the application with him and reports he will have his sister fax it back once completed.     Nelia Evangelista, MYRNA, LCSW    Clinical    [phone]   [employer] ® ACP Facilitator

## 2018-08-10 ENCOUNTER — HOME CARE VISIT (OUTPATIENT)
Dept: SCHEDULING | Facility: HOME HEALTH | Age: 38
End: 2018-08-10

## 2018-08-10 ENCOUNTER — TELEPHONE (OUTPATIENT)
Dept: CARDIOLOGY CLINIC | Age: 38
End: 2018-08-10

## 2018-08-10 PROCEDURE — G0299 HHS/HOSPICE OF RN EA 15 MIN: HCPCS

## 2018-08-13 ENCOUNTER — TELEPHONE (OUTPATIENT)
Dept: CARDIAC REHAB | Age: 38
End: 2018-08-13

## 2018-08-13 ENCOUNTER — TELEPHONE (OUTPATIENT)
Dept: CARDIOLOGY CLINIC | Age: 38
End: 2018-08-13

## 2018-08-13 NOTE — TELEPHONE ENCOUNTER
Received a call from Jimy Man from cardiac rehab-patient is limited by lack of transportation and cannot come to rehab until after September 6. She has left patient a message and is awaiting his call back. Once she schedules him, she will call this office and we will try to reschedule him on the same day as his cardiac rehab due to transportation issues. Patient states he will call back in next day or two when he schedules cardiac rehab and will reschedule appointment in [facility] for same day as cardiac rehab.

## 2018-08-14 ENCOUNTER — TELEPHONE (OUTPATIENT)
Dept: CARDIOLOGY CLINIC | Age: 38
End: 2018-08-14

## 2018-08-14 NOTE — TELEPHONE ENCOUNTER
Per Koffi Ortiz, NP -  Appointment made for sleep study evaluation, [address]   [phone]    3:00pm ( he was put on the wait list as well)    I spoke with patient, giving him this information. He states understanding.

## 2018-08-15 ENCOUNTER — DOCUMENTATION ONLY (OUTPATIENT)
Dept: CARDIOLOGY CLINIC | Age: 38
End: 2018-08-15

## 2018-08-15 LAB
25(OH)D3 SERPL-MCNC: 12 NG/ML
BUN SERPL-MCNC: 9 MG/DL (ref 6–20)
BUN/CREAT SERPL: 11 (ref 9–20)
CALCIUM SERPL-MCNC: 8.7 MG/DL (ref 8.7–10.2)
CHLORIDE SERPL-SCNC: 106 MMOL/L (ref 96–106)
CO2 SERPL-SCNC: 19 MMOL/L (ref 20–29)
CREAT SERPL-MCNC: 0.85 MG/DL (ref 0.76–1.27)
GLUCOSE SERPL-MCNC: 81 MG/DL (ref 65–99)
NT-PROBNP SERPL-MCNC: 1486 PG/ML (ref 0–86)
POTASSIUM SERPL-SCNC: 4.5 MMOL/L (ref 3.5–5.2)
SODIUM SERPL-SCNC: 140 MMOL/L (ref 134–144)

## 2018-08-15 NOTE — PROGRESS NOTES
Heavy metal screen is negative, consistent with presumed viral origin for his myocardial dysfunction

## 2018-08-15 NOTE — PROGRESS NOTES
called the patient to follow up with him on his care card application; requested he return the application with income verifications. He reports he will ask his sister to do so this week. He also shared he received a Medicaid plan first card in the mail - no determination has been received yet per his report on his disability application.     Angel Barragan, MSW, LCSW    Clinical    [phone]   [employer] ® ACP Facilitator

## 2018-08-16 ENCOUNTER — TELEPHONE (OUTPATIENT)
Dept: CARDIOLOGY CLINIC | Age: 38
End: 2018-08-16

## 2018-08-16 RX ORDER — ACETAMINOPHEN 500 MG
2000 TABLET ORAL DAILY
Qty: 30 CAP | Refills: 6
Start: 2018-08-16 | End: 2018-09-20 | Stop reason: SDUPTHER

## 2018-08-16 NOTE — TELEPHONE ENCOUNTER
----- Message from YOUSUF Ness sent at 8/16/2018 11:50 AM EDT -----  Please add vit d 2000 units daily' thank you      I called patient and reviewed all lab results, he will start vitamin D3-put on medication list-no further questions.

## 2018-08-20 ENCOUNTER — DOCUMENTATION ONLY (OUTPATIENT)
Dept: CARDIOLOGY CLINIC | Age: 38
End: 2018-08-20

## 2018-08-20 ENCOUNTER — OFFICE VISIT (OUTPATIENT)
Dept: CARDIOLOGY CLINIC | Age: 38
End: 2018-08-20

## 2018-08-20 VITALS
OXYGEN SATURATION: 97 % | DIASTOLIC BLOOD PRESSURE: 70 MMHG | SYSTOLIC BLOOD PRESSURE: 100 MMHG | BODY MASS INDEX: 26.78 KG/M2 | HEIGHT: 75 IN | WEIGHT: 215.4 LBS | HEART RATE: 80 BPM | RESPIRATION RATE: 16 BRPM

## 2018-08-20 DIAGNOSIS — I48.0 PAF (PAROXYSMAL ATRIAL FIBRILLATION) (HCC): Primary | ICD-10-CM

## 2018-08-20 DIAGNOSIS — I42.8 NICM (NONISCHEMIC CARDIOMYOPATHY) (HCC): ICD-10-CM

## 2018-08-20 DIAGNOSIS — I50.42 CHRONIC COMBINED SYSTOLIC AND DIASTOLIC HEART FAILURE (HCC): ICD-10-CM

## 2018-08-20 DIAGNOSIS — Z95.810 CARDIAC DEFIBRILLATOR IN PLACE: ICD-10-CM

## 2018-08-20 DIAGNOSIS — T14.8XXA HEMATOMA: ICD-10-CM

## 2018-08-20 RX ORDER — WARFARIN 2.5 MG/1
2.5 TABLET ORAL DAILY
Qty: 30 TAB | Refills: 1 | Status: SHIPPED | OUTPATIENT
Start: 2018-08-20 | End: 2018-08-27 | Stop reason: SDUPTHER

## 2018-08-20 NOTE — PROGRESS NOTES
1. Have you been to the ER, urgent care clinic since your last visit? Hospitalized since your last visit? No    2. Have you seen or consulted any other health care providers outside of the [address] [place] [address] since your last visit? Include any pap smears or colon screening. No     Patient continues to wear life vest  He C/O burning sensation in right groin at times.

## 2018-08-20 NOTE — PROGRESS NOTES
[facility]   [address] [place] [address], [address]                                          NEW PATIENT HPI/FOLLOW-UP      NAME:  Rizwan Fung   :   1980   MRN:   [id number]   PCP:  None           Subjective: The patient is a 43y.o. year old male with h/o combined chronic systolic diastolic heart failure, pAF with RVR, LifeVest in place, NICM and non-rheumatic Mitral regurg who returns for a routine follow-up. Since the last visit, patient reports intermittent burning sensation at right groin site of catheter. Denies change in exercise tolerance, chest pain, edema, medication intolerance, palpitations, shortness of breath, PND/orthopnea wheezing, sputum, syncope, dizziness or light headedness. Doing satisfactorily. Ready to return to work. Review of Systems  General: Pt denies excessive weight gain or loss. Pt is able to conduct ADL's. Respiratory: Denies shortness of breath, FLOWERS, wheezing or stridor. Cardiovascular: Denies precordial pain, palpitations, edema or PND  Gastrointestinal: Denies poor appetite, indigestion, abdominal pain or blood in stool  Peripheral vascular: Denies claudication, leg cramps  Neuropsychiatric: +paresthesias,tingling,Denies numbness,anxiety,depression,fatigue  Musculoskeletal: Denies pain,tenderness, soreness,swelling      Past Medical History:   Diagnosis Date    Combined systolic and diastolic heart failure (HCC) 2018    ECHO 18:mild-mod LVD, EF 10-20%, severe DHK, mild LVH, DD, RVD, mild- mod LAE, mod RAY, severe MR, mod-severe TR, RVSP 40, dilated RV outflow tract  ROBERTO 18: LVD, EF 10-15%, severe DHK, mild RVD, reduced RVEF, mod ANNAMARIE, no CELINA thrombus, no PFO, mod MR, mild TR  RHC 18: RA:12, RV 34/6/14.  PA , PCWP 18, [place] 6.33/2.74 TD: 5.37/2.32    CARDIAC MRI: 18: mod LVD (LVDD 66), mil    Congestive heart failure ([address]     NICM (nonischemic cardiomyopathy) ([facility] [address] 2018    Non-rheumatic mitral regurgitation 8/8/2018    ECHO 7/17/18:mild-mod LVD, EF 10-20%, severe DHK, mild LVH, DD, RVD, mild- mod LAE, mod RAY, severe MR, mod-severe TR, RVSP 40, dilated RV outflow tract  ROBERTO 7/20/18: LVD, EF 10-15%, severe DHK, mild RVD, reduced RVEF, mod ANNAMARIE, no CELINA thrombus, no PFO, mod MR, mild TR      PAF (paroxysmal atrial fibrillation) ([address] 8/8/2018    ROBERTO 7/20/18: LVD, EF 10-15%, severe DHK, mild RVD, reduced RVEF, mod ANNAMARIE, no CELINA thrombus, no PFO, mod MR, mild TR    DCCV 7/20/18       Rapid atrial fibrillation ([address] 7/17/2018    Tobacco use disorder 8/8/2018    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     Patient Active Problem List    Diagnosis Date Noted    FLOWERS (dyspnea on exertion) 08/09/2018    Snoring 08/09/2018    NICM (nonischemic cardiomyopathy) ([address] 08/08/2018    Combined systolic and diastolic heart failure ([address] 08/08/2018    Viral myocarditis 08/08/2018    Tobacco use disorder 08/08/2018    PAF (paroxysmal atrial fibrillation) ([address] 08/08/2018    Non-rheumatic mitral regurgitation 08/08/2018    Cardiac defibrillator in place 07/27/2018    Anticoagulated 25/11/6704    Acute systolic heart failure ([address] 07/19/2018    Pleural effusion, right 07/19/2018    Atrial fibrillation with RVR ([address] 07/19/2018      Past Surgical History:   Procedure Laterality Date    CARDIOVERSION, ELECTIVE;EXTERN  7/20/2018          No Known Allergies   Family History   Problem Relation Age of Onset    Diabetes Mother     Heart Failure Mother     No Known Problems Father     Hypertension Sister     Hypertension Sister       Social History     Social History    Marital status: SINGLE     Spouse name: N/A    Number of children: N/A    Years of education: N/A     Occupational History    Not on file.      Social History Main Topics    Smoking status: Former Smoker     Quit date: 7/20/2018    Smokeless tobacco: Never Used    Alcohol use No    Drug use: No    Sexual activity: Yes Other Topics Concern    Not on file     Social History Narrative      Current Outpatient Prescriptions   Medication Sig    warfarin (COUMADIN) 2.5 mg tablet Take 1 Tab by mouth daily.  sacubitril-valsartan (ENTRESTO) 49 mg/51 mg tablet Take 1 Tab by mouth two (2) times a day. Indications: chronic heart failure    carvedilol (COREG) 3.125 mg tablet Take 1 Tab by mouth two (2) times daily (with meals).  amiodarone (PACERONE) 400 mg tablet Take 400 mg twice a day till 8/3/2018 and then take  400 mg once a day till 8/17/2018 and then obtain 200 mg tablets from your cardiologist to continue 200 mg daily after that. (Patient taking differently: 200 mg daily. Take 400 mg twice a day till 8/3/2018 and then take  400 mg once a day till 8/17/2018 and then obtain 200 mg tablets from your cardiologist to continue 200 mg daily after that.)    magnesium oxide (MAG-OX) 400 mg tablet Take 1 Tab by mouth daily.  potassium chloride SR (K-TAB) 20 mEq tablet Take 1 Tab by mouth daily.  spironolactone (ALDACTONE) 25 mg tablet Take 1 Tab by mouth daily.  Cholecalciferol, Vitamin D3, (VITAMIN D3) 2,000 unit cap capsule Take 2,000 Units by mouth daily. (Patient not taking: Reported on 8/20/2018)     No current facility-administered medications for this visit. I have reviewed the nurses notes, vitals, problem list, allergy list, medical history, family medical, social history and medications. Objective:     Physical Exam:     Vitals:    08/20/18 1422   BP: 100/70   Pulse: 80   Resp: 16   SpO2: 97%   Weight: 215 lb 6.4 oz (97.7 kg)   Height: 6' 3\" (1.905 m)    Body mass index is 26.92 kg/(m^2). General: WDWN adult male in no acute distress. Pleasant affect. Extremities:  No edema, normal cap refill, no cyanosis. Neuro: A&Ox3, speech clear, gait stable. Skin: Skin color is normal. No rashes or lesions. +hematoma right groin, no erythema, ecchymosis or drainage. Non-ttp.   Vascular: 2+ pulses symmetric in all extremities        Data Review:       Cardiographics:    EKG interpretation:  None today    Cardiology Labs:    Results for orders placed or performed during the hospital encounter of 07/17/18   EKG, 12 LEAD, INITIAL   Result Value Ref Range    Ventricular Rate 173 BPM    Atrial Rate 192 BPM    QRS Duration 90 ms    Q-T Interval 256 ms    QTC Calculation (Bezet) 434 ms    Calculated R Axis -70 degrees    Calculated T Axis 17 degrees    Diagnosis       Atrial fibrillation with rapid ventricular response with premature   ventricular or aberrantly conducted complexes  Left anterior fascicular block  Possible Anterior infarct , age undetermined  Abnormal ECG  No previous ECGs available  Confirmed by Claude Dimmer MD, Bob Vazquez ([id number]) on 7/18/2018 8:24:03 PM         No results found for: CHOL, CHOLX, CHLST, CHOLV, 973914, HDL, LDL, LDLC, DLDLP, TGLX, TRIGL, TRIGP, CHHD, CHHDX    Lab Results   Component Value Date/Time    Sodium 140 08/09/2018 12:00 AM    Potassium 4.5 08/09/2018 12:00 AM    Chloride 106 08/09/2018 12:00 AM    CO2 19 (L) 08/09/2018 12:00 AM    Anion gap 11 07/26/2018 05:12 AM    Glucose 81 08/09/2018 12:00 AM    BUN 9 08/09/2018 12:00 AM    Creatinine 0.85 08/09/2018 12:00 AM    BUN/Creatinine ratio 11 08/09/2018 12:00 AM    GFR est  08/09/2018 12:00 AM    GFR est non- 08/09/2018 12:00 AM    Calcium 8.7 08/09/2018 12:00 AM    Bilirubin, total 0.7 07/26/2018 05:12 AM    AST (SGOT) 21 07/26/2018 05:12 AM    Alk. phosphatase 100 07/26/2018 05:12 AM    Protein, total 6.8 07/26/2018 05:12 AM    Albumin 2.4 (L) 07/26/2018 05:12 AM    Globulin 4.4 (H) 07/26/2018 05:12 AM    A-G Ratio 0.5 (L) 07/26/2018 05:12 AM    ALT (SGPT) 19 07/26/2018 05:12 AM          Assessment:       ICD-10-CM ICD-9-CM    1. PAF (paroxysmal atrial fibrillation) (HCC) I48.0 427.31    2. Hematoma T14. 8XXA 924.9    3. NICM (nonischemic cardiomyopathy) (HCC) I42.8 425.4    4.  Chronic combined systolic and diastolic heart failure (HCC) I50.42 428.42    5. Cardiac defibrillator in place Z95.810 V45.02     LifeVest         Discussion: Patient presents at this time stable from a cardiac perspective. BP was well controlled. INR 5.0; pt is supratherapeutic. Likely d/t amiodarone and warfarin interaction. Will reduce to 2.5 mg QHS and recheck in 1 week. (Coverning LPN unable to upload INR result into [facility]). Hematoma at site of cath insertion right groin. No signs of infection. Could be d/t over anticoagulation. Have given pt precautions but suspect this will improve with lower INR. Pleased with present status. Plan:     1. REDUCE warfarin to 2.5 mg QHS; recheck INR in 1 week    I have discussed the diagnosis with the patient and the intended plan as seen in the above orders. The patient has received an after-visit summary and questions were answered concerning future plans. I have discussed any concerning medication side effects and warnings with the patient as well.     Rosario Katz PA-C  8/20/2018

## 2018-08-20 NOTE — MR AVS SNAPSHOT
303 [facility] [place] 
 
 
 [address] 
[phone] Patient: Valeriy Gomez MRN: [id number] [facility]:3/93/[birth year]21 Visit Information Date & Time Provider Department Dept. Phone Encounter #  
 8/20/2018  1:20 PM Azell Rinne [facility] - PSYCHIATRIC SUPPORT [place] [phone] [id number] Your Appointments 8/30/2018  8:00 AM  
ESTABLISHED PATIENT with Brayan Shah MD  
[facility] ([facility]) Appt Note: 4 week f/u  
 330 [facility] [address] [facility] [address]  
[address] [facility] [address]  
  
    
 9/4/2018 11:00 AM  
Follow Up with YOUSUF Horner [address] [facility] ([facility]) Appt Note: HF Follow Up  
 200 [facility], [address] [facility] [address]  
411 [facility], 3[address] [facility] [address]  
  
    
 11/19/2018  3:00 PM  
New Patient with Chip Wells MD  
3240 [facility] PSYCHIATRIC [place] ([facility]) Appt Note: NP refd by Karlee Aden NP for snoring-  
 217 [facility] [address] [facility] [address]  
  
   
 217 [facility] 3200 [facility] [address] Upcoming Health Maintenance Date Due Pneumococcal 19-64 Medium Risk (1 of 1 - PPSV23) 7/15/1998 DTaP/Tdap/Td series (1 - Tdap) 7/15/2000 Influenza Age 5 to Adult 8/1/2018 Allergies as of 8/20/2018  Review Complete On: 8/20/2018 By: Alcira Pardo PA-C No Known Allergies Current Immunizations  Reviewed on 7/22/2018 No immunizations on file. Not reviewed this visit You Were Diagnosed With   
  
 Codes Comments PAF (paroxysmal [facility] fibrillation) ([facility]    -  Primary ICD-10-CM: I48.0 ICD-9-CM: 427.31 Vitals BP Pulse Resp Height(growth percentile) Weight(growth percentile) SpO2 100/70 (BP Patient Position: Sitting) 80 16 6' 3\" (1.905 m) 215 lb 6.4 oz (97.7 kg) 97% BMI Smoking Status 26.92 kg/m2 Former Smoker BMI and BSA Data Body Mass Index Body Surface Area  
 26.92 kg/m 2 2.27 m 2 Preferred Pharmacy Pharmacy Name Phone [facility] [address]  [place] [phone] Your Updated Medication List  
  
   
This list is accurate as of 8/20/18  2:43 PM.  Always use your most recent med list.  
  
  
  
  
 amiodarone 400 mg tablet Commonly known as:  Gloxin Messinalin Take 400 mg twice a day till 8/3/2018 and then take  400 mg once a day till 8/17/2018 and then obtain 200 mg tablets from your cardiologist to continue 200 mg daily after that.  
  
 carvedilol 3.125 mg tablet Commonly known as:  Mar Megargel Take 1 Tab by mouth two (2) times daily (with meals). Cholecalciferol (Vitamin D3) 2,000 unit Cap capsule Commonly known as:  VITAMIN D3 Take 2,000 Units by mouth daily. magnesium oxide 400 mg tablet Commonly known as:  MAG-OX Take 1 Tab by mouth daily. potassium chloride SR 20 mEq tablet Commonly known as:  K-TAB Take 1 Tab by mouth daily. sacubitril-valsartan 49-51 mg Tab tablet Commonly known as:  ENTRESTO Take 1 Tab by mouth two (2) times a day. Indications: chronic heart failure  
  
 spironolactone 25 mg tablet Commonly known as:  ALDACTONE Take 1 Tab by mouth daily. warfarin 7.5 mg tablet Commonly known as:  COUMADIN Take 1 Tab by mouth daily. Indications: atrial fibrillation, cardiomyopathy Patient Instructions -- Please take 2.5 mg warfarin every night, you can break the tablets you have at home 
-- Your groin looks like it is healing well; please watch for signs of infection, including: skin that is red or hot to the touch, drainage at the puncture site or fever/chills, nausea/vomiting -- 1 week for INR check Consistent Vitamin K Diet: Care Instructions Your Care Instructions Your body needs vitamin K to clot blood and keep your bones strong. It's found in leafy green vegetables such as kale and spinach. If you take the blood thinner warfarin (Coumadin), you need to be careful about how much vitamin K you get. Vitamin K can keep your warfarin from working as it should. Most people who take warfarin can eat normally. The important thing is to get about the same amount of vitamin K each day. Don't suddenly start eating foods with a lot more or a lot less vitamin K. You can choose how much vitamin K you eat. For example, if you already eat a lot of leafy green vegetables, that's fine. Just keep it about the same amount each day. Follow-up care is a key part of your treatment and safety. Be sure to make and go to all appointments, and call your doctor if you are having problems. It's also a good idea to know your test results and keep a list of the medicines you take. How can you care for yourself at home? You don't need to stop eating food high in vitamin K. But you do need to know what foods contain vitamin K. Then you can try to eat about the same amount of vitamin K each day. · You might limit foods that are high in vitamin K to about 1 serving a day. These foods have about 250 to 500 micrograms (mcg) of vitamin K in each serving. They include: ¨ Cooked leafy green vegetables. Examples are kale, spinach, turnip greens, kiran greens, Swiss chard, and mustard greens. One serving is ½ cup. · You might limit foods that are medium-high in vitamin K to about 3 servings a day. These foods have about 50 to 150 mcg of vitamin K in each serving. These include: ¨ Cooked brussels sprouts, broccoli, cabbage, and asparagus. One serving is ½ cup. ¨ Raw leafy green vegetables. Examples are spinach, green leaf lettuce, caroline lettuce, and endive. One serving is 1 cup. · Vitamin K also is found in many multivitamins. You don't need to stop taking your multivitamin if it has vitamin K. But you do need to take it every day. · Check with your doctor before you start or stop taking any supplements or herbal products. Some of these may contain vitamin K. Where can you learn more? Go to http://eleanor-walter.info/. Enter Y758 in the search box to learn more about \"Consistent Vitamin K Diet: Care Instructions. \" Current as of: May 10, 2017 Content Version: 11.7 © 4819-8862 Win Win Slots. Care instructions adapted under license by [employer] (which disclaims liability or warranty for this information). If you have questions about a medical condition or this instruction, always ask your healthcare professional. [address] any warranty or liability for your use of this information. Introducing \[facility] [place] [facility]\"" & HEALTH SERVICES! Laura Castillo introduces [employer] patient portal. Now you can access parts of your medical record, email your doctor's office, and request medication refills online. 1. In your internet browser, go to [email or web address] 2. Click on the First Time User? Click Here link in the Sign In box. You will see the New Member Sign Up page. 3. Enter your [employer] Access Code exactly as it appears below. You will not need to use this code after youve completed the sign-up process. If you do not sign up before the expiration date, you must request a new code. · [employer] Access Code: [email or web address] Expires: 10/6/2018  5:16 PM 
 
4. Enter the last four digits of your Social Security Number (xxxx) and Date of Birth (mm/dd/yyyy) as indicated and click Submit. You will be taken to the next sign-up page. 5. Create a [employer] ID. This will be your [employer] login ID and cannot be changed, so think of one that is secure and easy to remember. 6. Create a MediaLink password. You can change your password at any time. 7. Enter your Password Reset Question and Answer. This can be used at a later time if you forget your password. 8. Enter your e-mail address. You will receive e-mail notification when new information is available in [address] 9. Click Sign Up. You can now view and download portions of your medical record. 10. Click the Download Summary menu link to download a portable copy of your medical information. If you have questions, please visit the Frequently Asked Questions section of the [employer] website. Remember, [employer] is NOT to be used for urgent needs. For medical emergencies, dial 911. Now available from your iPhone and Android! Please provide this summary of care documentation to your next provider. Your primary care clinician is listed as NONE. If you have any questions after today's visit, please call [phone].

## 2018-08-20 NOTE — PATIENT INSTRUCTIONS
-- Please take 2.5 mg warfarin every night, you can break the tablets you have at home  -- Your groin looks like it is healing well; please watch for signs of infection, including: skin that is red or hot to the touch, drainage at the puncture site or fever/chills, nausea/vomiting    -- 1 week for INR check    Consistent Vitamin K Diet: Care Instructions  Your Care Instructions    Your body needs vitamin K to clot blood and keep your bones strong. It's found in leafy green vegetables such as kale and spinach. If you take the blood thinner warfarin (Coumadin), you need to be careful about how much vitamin K you get. Vitamin K can keep your warfarin from working as it should. Most people who take warfarin can eat normally. The important thing is to get about the same amount of vitamin K each day. Don't suddenly start eating foods with a lot more or a lot less vitamin K. You can choose how much vitamin K you eat. For example, if you already eat a lot of leafy green vegetables, that's fine. Just keep it about the same amount each day. Follow-up care is a key part of your treatment and safety. Be sure to make and go to all appointments, and call your doctor if you are having problems. It's also a good idea to know your test results and keep a list of the medicines you take. How can you care for yourself at home? You don't need to stop eating food high in vitamin K. But you do need to know what foods contain vitamin K. Then you can try to eat about the same amount of vitamin K each day. · You might limit foods that are high in vitamin K to about 1 serving a day. These foods have about 250 to 500 micrograms (mcg) of vitamin K in each serving. They include:  ¨ Cooked leafy green vegetables. Examples are kale, spinach, turnip greens, kiran greens, Swiss chard, and mustard greens. One serving is ½ cup. · You might limit foods that are medium-high in vitamin K to about 3 servings a day.  These foods have about 50 to 150 mcg of vitamin K in each serving. These include:  ¨ Cooked brussels sprouts, broccoli, cabbage, and asparagus. One serving is ½ cup. ¨ Raw leafy green vegetables. Examples are spinach, green leaf lettuce, caroline lettuce, and endive. One serving is 1 cup. · Vitamin K also is found in many multivitamins. You don't need to stop taking your multivitamin if it has vitamin K. But you do need to take it every day. · Check with your doctor before you start or stop taking any supplements or herbal products. Some of these may contain vitamin K. Where can you learn more? Go to http://eleanor-walter.info/. Enter I601 in the search box to learn more about \"Consistent Vitamin K Diet: Care Instructions. \"  Current as of: May 10, 2017  Content Version: 11.7  © 0409-5968 Ardian. Care instructions adapted under license by [employer] (which disclaims liability or warranty for this information). If you have questions about a medical condition or this instruction, always ask your healthcare professional. [address] any warranty or liability for your use of this information.

## 2018-08-24 DIAGNOSIS — I42.8 NICM (NONISCHEMIC CARDIOMYOPATHY) (HCC): ICD-10-CM

## 2018-08-24 DIAGNOSIS — I50.40 COMBINED SYSTOLIC AND DIASTOLIC HEART FAILURE, UNSPECIFIED HF CHRONICITY (HCC): Primary | Chronic | ICD-10-CM

## 2018-08-24 DIAGNOSIS — I48.0 PAF (PAROXYSMAL ATRIAL FIBRILLATION) (HCC): ICD-10-CM

## 2018-08-24 RX ORDER — CARVEDILOL 3.12 MG/1
3.12 TABLET ORAL 2 TIMES DAILY WITH MEALS
Qty: 60 TAB | Refills: 1 | Status: SHIPPED | OUTPATIENT
Start: 2018-08-24 | End: 2018-08-27 | Stop reason: SDUPTHER

## 2018-08-24 RX ORDER — LANOLIN ALCOHOL/MO/W.PET/CERES
400 CREAM (GRAM) TOPICAL DAILY
Qty: 30 TAB | Refills: 1 | Status: SHIPPED | OUTPATIENT
Start: 2018-08-24 | End: 2019-11-07 | Stop reason: SDUPTHER

## 2018-08-24 RX ORDER — POTASSIUM CHLORIDE 1500 MG/1
20 TABLET, FILM COATED, EXTENDED RELEASE ORAL DAILY
Qty: 30 TAB | Refills: 1 | Status: SHIPPED | OUTPATIENT
Start: 2018-08-24 | End: 2018-09-20 | Stop reason: ALTCHOICE

## 2018-08-24 RX ORDER — AMIODARONE HYDROCHLORIDE 200 MG/1
200 TABLET ORAL DAILY
Qty: 30 TAB | Refills: 12 | Status: SHIPPED | OUTPATIENT
Start: 2018-08-24 | End: 2018-08-27 | Stop reason: SDUPTHER

## 2018-08-24 RX ORDER — SPIRONOLACTONE 25 MG/1
25 TABLET ORAL DAILY
Qty: 30 TAB | Refills: 1 | Status: SHIPPED | OUTPATIENT
Start: 2018-08-24 | End: 2018-08-27 | Stop reason: SDUPTHER

## 2018-08-27 ENCOUNTER — OFFICE VISIT (OUTPATIENT)
Dept: CARDIOLOGY CLINIC | Age: 38
End: 2018-08-27

## 2018-08-27 ENCOUNTER — DOCUMENTATION ONLY (OUTPATIENT)
Dept: CARDIOLOGY CLINIC | Age: 38
End: 2018-08-27

## 2018-08-27 VITALS
SYSTOLIC BLOOD PRESSURE: 104 MMHG | OXYGEN SATURATION: 96 % | BODY MASS INDEX: 26.86 KG/M2 | HEART RATE: 80 BPM | RESPIRATION RATE: 18 BRPM | HEIGHT: 75 IN | DIASTOLIC BLOOD PRESSURE: 86 MMHG | WEIGHT: 216 LBS

## 2018-08-27 DIAGNOSIS — I50.40 COMBINED SYSTOLIC AND DIASTOLIC HEART FAILURE, UNSPECIFIED HF CHRONICITY (HCC): ICD-10-CM

## 2018-08-27 DIAGNOSIS — I42.8 NICM (NONISCHEMIC CARDIOMYOPATHY) (HCC): Primary | ICD-10-CM

## 2018-08-27 DIAGNOSIS — Z79.01 ANTICOAGULATION MONITORING, INR RANGE 2-3: ICD-10-CM

## 2018-08-27 DIAGNOSIS — I48.0 PAF (PAROXYSMAL ATRIAL FIBRILLATION) (HCC): ICD-10-CM

## 2018-08-27 RX ORDER — CARVEDILOL 3.12 MG/1
3.12 TABLET ORAL 2 TIMES DAILY WITH MEALS
Qty: 60 TAB | Refills: 3 | Status: SHIPPED | OUTPATIENT
Start: 2018-08-27 | End: 2018-08-27 | Stop reason: SDUPTHER

## 2018-08-27 RX ORDER — SPIRONOLACTONE 25 MG/1
25 TABLET ORAL DAILY
Qty: 30 TAB | Refills: 3 | Status: SHIPPED | OUTPATIENT
Start: 2018-08-27 | End: 2018-08-27 | Stop reason: SDUPTHER

## 2018-08-27 RX ORDER — SPIRONOLACTONE 25 MG/1
25 TABLET ORAL DAILY
Qty: 30 TAB | Refills: 3 | Status: SHIPPED | OUTPATIENT
Start: 2018-08-27 | End: 2019-02-28 | Stop reason: SDUPTHER

## 2018-08-27 RX ORDER — WARFARIN 2.5 MG/1
2.5 TABLET ORAL DAILY
Qty: 30 TAB | Refills: 3 | Status: SHIPPED | OUTPATIENT
Start: 2018-08-27 | End: 2018-09-21 | Stop reason: SDUPTHER

## 2018-08-27 RX ORDER — AMIODARONE HYDROCHLORIDE 200 MG/1
200 TABLET ORAL DAILY
Qty: 30 TAB | Refills: 12 | Status: SHIPPED | OUTPATIENT
Start: 2018-08-27 | End: 2019-04-18 | Stop reason: SDUPTHER

## 2018-08-27 RX ORDER — CARVEDILOL 3.12 MG/1
3.12 TABLET ORAL 2 TIMES DAILY WITH MEALS
Qty: 60 TAB | Refills: 3 | Status: SHIPPED | OUTPATIENT
Start: 2018-08-27 | End: 2018-09-26 | Stop reason: SDUPTHER

## 2018-08-27 NOTE — MR AVS SNAPSHOT
303 [facility] [place] 
 
 
 [address] 
[phone] Patient: Jessie Buchanan MRN: [id number] UTQ:[birth year] Visit Information Date & Time Provider Department Dept. Phone Encounter #  
 8/27/2018 10:20 AM Gerri Mendoza [facility] [place] [address] [id number] Your Appointments 8/30/2018  8:00 AM  
ESTABLISHED PATIENT with Elaine Servin MD  
[facility] ([facility]) Appt Note: 4 week f/u  
 330 [facility] [address] [facility] [address]  
[address] [place] [address]  
  
    
 9/4/2018 11:00 AM  
Follow Up with HELEN Perea 1229 [facility] includes the [facility] ([address] [place] [address]) Appt Note: HF Follow Up  
 [address] [place] [address], [address] [facility] [address]  
411 [facility], 200 [place] [address]  
  
    
 11/19/2018  3:00 PM  
New Patient with Meng Molina MD  
3240 [facility] ([address] [place] [address]) Appt Note: NP refd by Jose Malloy NP for snoring-  
 217 [facility] [address] [facility] [address]  
  
   
 217 [facility] 200 [place] [address] Upcoming Health Maintenance Date Due Pneumococcal 19-64 Medium Risk (1 of 1 - PPSV23) 7/15/1998 DTaP/Tdap/Td series (1 - Tdap) 7/15/2000 Influenza Age 5 to Adult 8/1/2018 Allergies as of 8/27/2018  Review Complete On: 8/27/2018 By: Angela Fowler LPN No Known Allergies Current Immunizations  Reviewed on 7/22/2018 No immunizations on file. Not reviewed this visit You Were Diagnosed With   
  
 Codes Comments NICM (nonischemic cardiomyopathy) ([facility]    -  Primary ICD-10-CM: I42.8 ICD-9-CM: 425.4 Combined systolic and diastolic heart failure, unspecified HF chronicity ([facility]     ICD-10-CM: I50.40 ICD-9-CM: 428.40 PAF (paroxysmal atrial fibrillation) ([facility])     ICD-10-CM: I48.0 ICD-9-CM: 427.31 Vitals BP Pulse Resp Height(growth percentile) Weight(growth percentile) SpO2  
 104/86 (BP 1 Location: Right arm, BP Patient Position: Sitting) 80 18 6' 3\" (1.905 m) 216 lb (98 kg) 96% BMI Smoking Status 27 kg/m2 Current Some Day Smoker Vitals History BMI and BSA Data Body Mass Index Body Surface Area  
 27 kg/m 2 2.28 m 2 Preferred Pharmacy Pharmacy Name Phone [facility] [address] [phone] Your Updated Medication List  
  
   
This list is accurate as of 8/27/18  2:29 PM.  Always use your most recent med list.  
  
  
  
  
 amiodarone 200 mg tablet Commonly known as:  CORDARONE Take 1 Tab by mouth daily. carvedilol 3.125 mg tablet Commonly known as:  Kathlean Due Take 1 Tab by mouth two (2) times daily (with meals). Cholecalciferol (Vitamin D3) 2,000 unit Cap capsule Commonly known as:  VITAMIN D3 Take 2,000 Units by mouth daily. magnesium oxide 400 mg tablet Commonly known as:  MAG-OX Take 1 Tab by mouth daily. potassium chloride SR 20 mEq tablet Commonly known as:  K-TAB Take 1 Tab by mouth daily. sacubitril-valsartan 49-51 mg Tab tablet Commonly known as:  ENTRESTO Take 1 Tab by mouth two (2) times a day. Indications: chronic heart failure  
  
 spironolactone 25 mg tablet Commonly known as:  ALDACTONE Take 1 Tab by mouth daily. warfarin 2.5 mg tablet Commonly known as:  COUMADIN Take 1 Tab by mouth daily. Prescriptions Sent to Pharmacy Refills  
 warfarin (COUMADIN) 2.5 mg tablet 3 Sig: Take 1 Tab by mouth daily. Class: Normal  
 Pharmacy: [facility] [place] [address], [address] [place] [address] [address] #: [phone]  Route: Oral  
 spironolactone (ALDACTONE) 25 mg tablet 3  
 Sig: Take 1 Tab by mouth daily. Class: Normal  
 Pharmacy: [facility] [address] TE[address] RIVERA [address] [facility], [address] [place] [address] Ph #: [phone] Route: Oral  
 carvedilol (COREG) 3.125 mg tablet 3 Sig: Take 1 Tab by mouth two (2) times daily (with meals). Class: Normal  
 Pharmacy: [facility] [address] TE[address] RIVERA [address] [place] [address], [address] [place] [address] Ph #: [phone] Route: Oral  
 amiodarone (CORDARONE) 200 mg tablet 12 Sig: Take 1 Tab by mouth daily. Class: Normal  
 Pharmacy: [facility] [address] TEODORO ARREDONDOVALERIE [address] [place] [address], [address] [place] [address] Ph #: [phone] Route: Oral  
  
Patient Instructions -- 1 month for INR check 
-- Call us if you need us with any questions or symptoms such as shortness of breath, leg swelling, fatigue 
 
-- Refills sent to [facility] on [address] [facility] Consistent Vitamin K Diet: Care Instructions Your Care Instructions Your body needs vitamin K to clot blood and keep your bones strong. It's found in leafy green vegetables such as kale and spinach. If you take the blood thinner warfarin (Coumadin), you need to be careful about how much vitamin K you get. Vitamin K can keep your warfarin from working as it should. Most people who take warfarin can eat normally. The important thing is to get about the same amount of vitamin K each day. Don't suddenly start eating foods with a lot more or a lot less vitamin K. You can choose how much vitamin K you eat. For example, if you already eat a lot of leafy green vegetables, that's fine. Just keep it about the same amount each day. Follow-up care is a key part of your treatment and safety. Be sure to make and go to all appointments, and call your doctor if you are having problems. It's also a good idea to know your test results and keep a list of the medicines you take. How can you care for yourself at home? You don't need to stop eating food high in vitamin K.  But you do need to know what foods contain vitamin K. Then you can try to eat about the same amount of vitamin K each day. · You might limit foods that are high in vitamin K to about 1 serving a day. These foods have about 250 to 500 micrograms (mcg) of vitamin K in each serving. They include: ¨ Cooked leafy green vegetables. Examples are kale, spinach, turnip greens, kiran greens, Swiss chard, and mustard greens. One serving is ½ cup. · You might limit foods that are medium-high in vitamin K to about 3 servings a day. These foods have about 50 to 150 mcg of vitamin K in each serving. These include: ¨ Cooked brussels sprouts, broccoli, cabbage, and asparagus. One serving is ½ cup. ¨ Raw leafy green vegetables. Examples are spinach, green leaf lettuce, caroline lettuce, and endive. One serving is 1 cup. · Vitamin K also is found in many multivitamins. You don't need to stop taking your multivitamin if it has vitamin K. But you do need to take it every day. · Check with your doctor before you start or stop taking any supplements or herbal products. Some of these may contain vitamin K. Where can you learn more? Go to http://eleanor-walter.info/. Enter G873 in the search box to learn more about \"Consistent Vitamin K Diet: Care Instructions. \" Current as of: May 10, 2017 Content Version: 11.7 © 7777-3751 Med[employer] Games. Care instructions adapted under license by [employer] (which disclaims liability or warranty for this information). If you have questions about a medical condition or this instruction, always ask your healthcare professional. [address] any warranty or liability for your use of this information. Introducing [facility] & HEALTH SERVICES! Jose Francisco Cisneros introduces [employer] patient portal. Now you can access parts of your medical record, email your doctor's office, and request medication refills online.    
 
1. In your internet browser, go to [email or web address] 2. Click on the First Time User? Click Here link in the Sign In box. You will see the New Member Sign Up page. 3. Enter your [employer] Access Code exactly as it appears below. You will not need to use this code after youve completed the sign-up process. If you do not sign up before the expiration date, you must request a new code. · [employer] Access Code: [email or web address] Expires: 10/6/2018  5:16 PM 
 
4. Enter the last four digits of your Social Security Number (xxxx) and Date of Birth (mm/dd/yyyy) as indicated and click Submit. You will be taken to the next sign-up page. 5. Create a Insem Spat ID. This will be your [employer] login ID and cannot be changed, so think of one that is secure and easy to remember. 6. Create a [employer] password. You can change your password at any time. 7. Enter your Password Reset Question and Answer. This can be used at a later time if you forget your password. 8. Enter your e-mail address. You will receive e-mail notification when new information is available in [address] 9. Click Sign Up. You can now view and download portions of your medical record. 10. Click the Download Summary menu link to download a portable copy of your medical information. If you have questions, please visit the Frequently Asked Questions section of the [employer] website. Remember, [employer] is NOT to be used for urgent needs. For medical emergencies, dial 911. Now available from your iPhone and Android! Please provide this summary of care documentation to your next provider. Your primary care clinician is listed as NONE. If you have any questions after today's visit, please call [phone].

## 2018-08-27 NOTE — PATIENT INSTRUCTIONS
-- 1 month for INR check  -- Call us if you need us with any questions or symptoms such as shortness of breath, leg swelling, fatigue    -- Refills sent to [facility] on [address] [facility]    Consistent Vitamin K Diet: Care Instructions  Your Care Instructions    Your body needs vitamin K to clot blood and keep your bones strong. It's found in leafy green vegetables such as kale and spinach. If you take the blood thinner warfarin (Coumadin), you need to be careful about how much vitamin K you get. Vitamin K can keep your warfarin from working as it should. Most people who take warfarin can eat normally. The important thing is to get about the same amount of vitamin K each day. Don't suddenly start eating foods with a lot more or a lot less vitamin K. You can choose how much vitamin K you eat. For example, if you already eat a lot of leafy green vegetables, that's fine. Just keep it about the same amount each day. Follow-up care is a key part of your treatment and safety. Be sure to make and go to all appointments, and call your doctor if you are having problems. It's also a good idea to know your test results and keep a list of the medicines you take. How can you care for yourself at home? You don't need to stop eating food high in vitamin K. But you do need to know what foods contain vitamin K. Then you can try to eat about the same amount of vitamin K each day. · You might limit foods that are high in vitamin K to about 1 serving a day. These foods have about 250 to 500 micrograms (mcg) of vitamin K in each serving. They include:  ¨ Cooked leafy green vegetables. Examples are kale, spinach, turnip greens, kiran greens, Swiss chard, and mustard greens. One serving is ½ cup. · You might limit foods that are medium-high in vitamin K to about 3 servings a day. These foods have about 50 to 150 mcg of vitamin K in each serving. These include:  ¨ Cooked brussels sprouts, broccoli, cabbage, and asparagus.  One serving is ½ cup.  ¨ Raw leafy green vegetables. Examples are spinach, green leaf lettuce, caroline lettuce, and endive. One serving is 1 cup. · Vitamin K also is found in many multivitamins. You don't need to stop taking your multivitamin if it has vitamin K. But you do need to take it every day. · Check with your doctor before you start or stop taking any supplements or herbal products. Some of these may contain vitamin K. Where can you learn more? Go to http://eleanor-walter.info/. Enter H923 in the search box to learn more about \"Consistent Vitamin K Diet: Care Instructions. \"  Current as of: May 10, 2017  Content Version: 11.7  © 1866-5734 [employer]. Care instructions adapted under license by [employer] (which disclaims liability or warranty for this information). If you have questions about a medical condition or this instruction, always ask your healthcare professional. [place] [address] any warranty or liability for your use of this information.

## 2018-08-27 NOTE — PROGRESS NOTES
[facility]   [address] [facility], [address] [place] [address] Road                                          NEW PATIENT HPI/FOLLOW-UP      NAME:  Paige Hines   :   1980   MRN:   [id number]   PCP:  None           Subjective: The patient is a 43y.o. year old male with h/o combined systolic/diastolic CHF, viral myocarditis, pAF with RVR, LifeVest in place, anticoagulated with warfarin, NICM, non-rheumatic MR who returns for a routine follow-up for INR management. Since the last visit, patient reports no new symptoms. Is concerned about the cost of medications, as his current insurance plan does not cover many prescriptions. Is in process for medicaid beyond the [employer] program he was signed up for. Denies change in exercise tolerance, chest pain, edema, medication intolerance, palpitations, shortness of breath, PND/orthopnea, wheezing, sputum, syncope, dizziness or light headedness. Denies prolonged bleeding, nosebleeds, hematuria or melena. Doing satisfactorily. Review of Systems  General: Pt denies excessive weight gain or loss. Pt is able to conduct ADL's. Respiratory: Denies shortness of breath, FLOWERS, wheezing or stridor.   Cardiovascular: Denies precordial pain, palpitations, edema or PND  Gastrointestinal: Denies poor appetite, indigestion, abdominal pain or blood in stool  Peripheral vascular: Denies claudication, leg cramps  Neuropsychiatric: Denies paresthesias,tingling,numbness,anxiety,depression,fatigue  Musculoskeletal: Denies pain,tenderness, soreness,swelling      Past Medical History:   Diagnosis Date    Combined systolic and diastolic heart failure ([facility] 2018    ECHO 18:mild-mod LVD, EF 10-20%, severe DHK, mild LVH, DD, RVD, mild- mod LAE, mod RAY, severe MR, mod-severe TR, RVSP 40, dilated RV outflow tract  ROBERTO 18: LVD, EF 10-15%, severe DHK, mild RVD, reduced RVEF, mod ANNAMARIE, no CELINA thrombus, no PFO, mod MR, mild TR  RHC 18: RA:12, RV 34/6/14.  PA 30/20/26, PCWP 18, [place] 6.33/2.74 TD: 5.37/2.32    CARDIAC MRI: 7/20/18: mod LVD (LVDD 66), mil    Congestive heart failure ([address]     NICM (nonischemic cardiomyopathy) ([address] 8/8/2018    Non-rheumatic mitral regurgitation 8/8/2018    ECHO 7/17/18:mild-mod LVD, EF 10-20%, severe DHK, mild LVH, DD, RVD, mild- mod LAE, mod RAY, severe MR, mod-severe TR, RVSP 40, dilated RV outflow tract  ROBERTO 7/20/18: LVD, EF 10-15%, severe DHK, mild RVD, reduced RVEF, mod ANNAMARIE, no CELINA thrombus, no PFO, mod MR, mild TR      PAF (paroxysmal atrial fibrillation) ([address] 8/8/2018    ROBERTO 7/20/18: LVD, EF 10-15%, severe DHK, mild RVD, reduced RVEF, mod ANNAMARIE, no CELINA thrombus, no PFO, mod MR, mild TR    DCCV 7/20/18       Rapid atrial fibrillation ([address] 7/17/2018    Tobacco use disorder 8/8/2018    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     Patient Active Problem List    Diagnosis Date Noted    FLOWERS (dyspnea on exertion) 08/09/2018    Snoring 08/09/2018    NICM (nonischemic cardiomyopathy) ([address] 08/08/2018    Combined systolic and diastolic heart failure ([address] 08/08/2018    Viral myocarditis 08/08/2018    Tobacco use disorder 08/08/2018    PAF (paroxysmal atrial fibrillation) ([address] 08/08/2018    Non-rheumatic mitral regurgitation 08/08/2018    Cardiac defibrillator in place 07/27/2018    Anticoagulated 21/63/9987    Acute systolic heart failure ([address] 07/19/2018    Pleural effusion, right 07/19/2018    Atrial fibrillation with RVR ([address] 07/19/2018      Past Surgical History:   Procedure Laterality Date    CARDIOVERSION, ELECTIVE;EXTERN  7/20/2018          No Known Allergies   Family History   Problem Relation Age of Onset    Diabetes Mother     Heart Failure Mother     No Known Problems Father     Hypertension Sister     Hypertension Sister       Social History     Social History    Marital status: SINGLE     Spouse name: N/A    Number of children: N/A    Years of education: N/A     Occupational History    Not on file. Social History Main Topics    Smoking status: Current Some Day Smoker     Types: Cigarettes     Last attempt to quit: 7/20/2018    Smokeless tobacco: Never Used      Comment: 3-4 cigarettes vaughn    Alcohol use No    Drug use: No    Sexual activity: Yes     Other Topics Concern    Not on file     Social History Narrative      Current Outpatient Prescriptions   Medication Sig    warfarin (COUMADIN) 2.5 mg tablet Take 1 Tab by mouth daily.  spironolactone (ALDACTONE) 25 mg tablet Take 1 Tab by mouth daily.  carvedilol (COREG) 3.125 mg tablet Take 1 Tab by mouth two (2) times daily (with meals).  amiodarone (CORDARONE) 200 mg tablet Take 1 Tab by mouth daily.  potassium chloride SR (K-TAB) 20 mEq tablet Take 1 Tab by mouth daily.  magnesium oxide (MAG-OX) 400 mg tablet Take 1 Tab by mouth daily.  sacubitril-valsartan (ENTRESTO) 49 mg/51 mg tablet Take 1 Tab by mouth two (2) times a day. Indications: chronic heart failure    Cholecalciferol, Vitamin D3, (VITAMIN D3) 2,000 unit cap capsule Take 2,000 Units by mouth daily. (Patient not taking: Reported on 8/20/2018)     No current facility-administered medications for this visit. I have reviewed the nurses notes, vitals, problem list, allergy list, medical history, family medical, social history and medications. Objective:     Physical Exam:     Vitals:    08/27/18 1346 08/27/18 1401   BP: 100/84 104/86   Pulse: 80    Resp: 18    SpO2: 96%    Weight: 216 lb (98 kg)    Height: 6' 3\" (1.905 m)     Body mass index is 27 kg/(m^2). General: WDWN adult male, in no acute distress. Pleasant affect. HEENT: No carotid bruits, no JVD, trach is midline. Heart:  Normal S1/S2 negative S3 or S4. Regular, no murmur, gallop or rub.   Respiratory: Clear bilaterally, no wheezing or rales  Abdomen:   Soft, non-tender, bowel sounds are active.   Extremities:  No edema, normal cap refill, no cyanosis.   Neuro: A&Ox3, speech clear, gait stable. Skin: Skin color is normal. No rashes or lesions. No diaphoresis. Vascular: 2+ pulses symmetric in all extremities      Data Review:       Cardiographics:    EKG interpretation:  None today      Cardiology Labs:    Results for orders placed or performed during the hospital encounter of 07/17/18   EKG, 12 LEAD, INITIAL   Result Value Ref Range    Ventricular Rate 173 BPM    Atrial Rate 192 BPM    QRS Duration 90 ms    Q-T Interval 256 ms    QTC Calculation (Bezet) 434 ms    Calculated R Axis -70 degrees    Calculated T Axis 17 degrees    Diagnosis       Atrial fibrillation with rapid ventricular response with premature   ventricular or aberrantly conducted complexes  Left anterior fascicular block  Possible Anterior infarct , age undetermined  Abnormal ECG  No previous ECGs available  Confirmed by Edna Ruiz MD, Jodi Barrientos ([id number]) on 7/18/2018 8:24:03 PM         No results found for: CHOL, CHOLX, CHLST, CHOLV, 323211, HDL, LDL, LDLC, DLDLP, TGLX, TRIGL, TRIGP, CHHD, CHHDX    Lab Results   Component Value Date/Time    Sodium 140 08/09/2018 12:00 AM    Potassium 4.5 08/09/2018 12:00 AM    Chloride 106 08/09/2018 12:00 AM    CO2 19 (L) 08/09/2018 12:00 AM    Anion gap 11 07/26/2018 05:12 AM    Glucose 81 08/09/2018 12:00 AM    BUN 9 08/09/2018 12:00 AM    Creatinine 0.85 08/09/2018 12:00 AM    BUN/Creatinine ratio 11 08/09/2018 12:00 AM    GFR est  08/09/2018 12:00 AM    GFR est non- 08/09/2018 12:00 AM    Calcium 8.7 08/09/2018 12:00 AM    Bilirubin, total 0.7 07/26/2018 05:12 AM    AST (SGOT) 21 07/26/2018 05:12 AM    Alk. phosphatase 100 07/26/2018 05:12 AM    Protein, total 6.8 07/26/2018 05:12 AM    Albumin 2.4 (L) 07/26/2018 05:12 AM    Globulin 4.4 (H) 07/26/2018 05:12 AM    A-G Ratio 0.5 (L) 07/26/2018 05:12 AM    ALT (SGPT) 19 07/26/2018 05:12 AM          Assessment:       ICD-10-CM ICD-9-CM    1.  NICM (nonischemic cardiomyopathy) (HCC) I42.8 425.4 warfarin (COUMADIN) 2.5 mg tablet      spironolactone (ALDACTONE) 25 mg tablet      carvedilol (COREG) 3.125 mg tablet      amiodarone (CORDARONE) 200 mg tablet      DISCONTINUED: carvedilol (COREG) 3.125 mg tablet      DISCONTINUED: spironolactone (ALDACTONE) 25 mg tablet   2. Combined systolic and diastolic heart failure, unspecified HF chronicity ([facility]) I50.40 428.40 warfarin (COUMADIN) 2.5 mg tablet      spironolactone (ALDACTONE) 25 mg tablet      carvedilol (COREG) 3.125 mg tablet      amiodarone (CORDARONE) 200 mg tablet      DISCONTINUED: carvedilol (COREG) 3.125 mg tablet      DISCONTINUED: spironolactone (ALDACTONE) 25 mg tablet   3. PAF (paroxysmal atrial fibrillation) ([facility]) I48.0 427.31 amiodarone (CORDARONE) 200 mg tablet   4. Anticoagulation monitoring, INR range 2-3 Z79.01 V58.61          Discussion: Patient presents at this time stable from a cardiac perspective. BP was well controlled. INR therapeutic in 2-3 range. Found better prices for pt's meds at [facility], through the [facility]. Pleased with present status. Plan: 1. Continue same meds. -- Refills sent to [facility]    2. Encouraged to follow Vitamin K/warfarin compliant diet. 3.Follow up: 1 month INR check   --  Call with questions or concerns. -- Will follow up any test results by phone and/or f/u here in office if needed. .        I have discussed the diagnosis with the patient and the intended plan as seen in the above orders. The patient has received an after-visit summary and questions were answered concerning future plans. I have discussed any concerning medication side effects and warnings with the patient as well.     Mali Moran PA-C  8/27/2018

## 2018-08-27 NOTE — PROGRESS NOTES
Patient returned care card application;  faxed it to Clemencia Dimas, financial counselor, on this date.     Rima Contreras, MSW, LCSW    Clinical    [phone]   [employer] ® ACP Facilitator

## 2018-08-27 NOTE — PROGRESS NOTES
1. Have you been to the ER, urgent care clinic since your last visit? Hospitalized since your last visit? No.    2. Have you seen or consulted any other health care providers outside of the [address] [place] [address] since your last visit? Include any pap smears or colon screening.    No.      Chief Complaint   Patient presents with    Other     INR check- pt denies any cardiac symptoms-wearing life vest     INR 2.2 AND PT 26.3

## 2018-08-28 ENCOUNTER — DOCUMENTATION ONLY (OUTPATIENT)
Dept: CARDIOLOGY CLINIC | Age: 38
End: 2018-08-28

## 2018-08-28 NOTE — PROGRESS NOTES
Voicemail received from the patient inquiring if  received his care card application via fax.  called him back but he did not answer. Left a voicemail indicating his application was faxed to Kaity Buckley, financial counselor.     Kun Moran, MSW, LCSW    Clinical    [address]   [employer] ® ACP Facilitator

## 2018-08-30 ENCOUNTER — TELEPHONE (OUTPATIENT)
Dept: CARDIOLOGY CLINIC | Age: 38
End: 2018-08-30

## 2018-08-30 ENCOUNTER — OFFICE VISIT (OUTPATIENT)
Dept: CARDIOLOGY CLINIC | Age: 38
End: 2018-08-30

## 2018-08-30 DIAGNOSIS — I42.8 NICM (NONISCHEMIC CARDIOMYOPATHY) (HCC): Primary | ICD-10-CM

## 2018-08-30 NOTE — TELEPHONE ENCOUNTER
Patient called stating he received medication from [facility]-but it was wrong dose-24/26-I sent new script of 49/51-advised him to call tomorrow to make sure to set up shipment with new dose, will have him take 2 tabs in am and 2 tabs in pm for now of 24/26 dose. He states understanding of instructions.

## 2018-09-04 ENCOUNTER — OFFICE VISIT (OUTPATIENT)
Dept: CARDIOLOGY CLINIC | Age: 38
End: 2018-09-04

## 2018-09-04 ENCOUNTER — TELEPHONE (OUTPATIENT)
Dept: CARDIOLOGY CLINIC | Age: 38
End: 2018-09-04

## 2018-09-04 VITALS
OXYGEN SATURATION: 97 % | WEIGHT: 213 LBS | HEART RATE: 76 BPM | TEMPERATURE: 98.7 F | DIASTOLIC BLOOD PRESSURE: 98 MMHG | HEIGHT: 75 IN | RESPIRATION RATE: 16 BRPM | SYSTOLIC BLOOD PRESSURE: 138 MMHG | BODY MASS INDEX: 26.49 KG/M2

## 2018-09-04 DIAGNOSIS — I40.0 SUBACUTE VIRAL MYOCARDITIS: ICD-10-CM

## 2018-09-04 DIAGNOSIS — R06.09 DOE (DYSPNEA ON EXERTION): ICD-10-CM

## 2018-09-04 DIAGNOSIS — I50.42 CHRONIC COMBINED SYSTOLIC AND DIASTOLIC HEART FAILURE (HCC): Primary | Chronic | ICD-10-CM

## 2018-09-04 DIAGNOSIS — I48.0 PAF (PAROXYSMAL ATRIAL FIBRILLATION) (HCC): ICD-10-CM

## 2018-09-04 DIAGNOSIS — I42.8 NICM (NONISCHEMIC CARDIOMYOPATHY) (HCC): ICD-10-CM

## 2018-09-04 RX ORDER — IBUPROFEN 200 MG
1 TABLET ORAL EVERY 24 HOURS
Qty: 30 PATCH | Refills: 0 | Status: SHIPPED | OUTPATIENT
Start: 2018-09-04 | End: 2018-10-04

## 2018-09-04 NOTE — LETTER
9/4/2018 3:32 PM 
 
Patient:  Dora Ovalle YOB: 1980 Date of Visit: 9/4/2018 Dear Yanna Gonzalez MD 
[address] [place] [id number] VIA In Basket Rich Owen PA-C 
[facility] [place] [id number] VIA In Basket 
 : Thank you for referring Mr. Dora Ovalle to me for evaluation/treatment. Below are the relevant portions of my assessment and plan of care. If you have questions, please do not hesitate to call me. I look forward to following Mr. Ondina Villagomez along with you. Sincerely, YOUSUF Mcdermott

## 2018-09-04 NOTE — PROGRESS NOTES
[facility] Clinic Note      DOS:   9/4/2018  NAME:  Jackson Calderon   MRN:   [id number]     REFERRING PROVIDER:  Dr. Yennifer Forrester: None  PRIMARY CARDIOLOGIST: Dr. 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, CMV IgG positive, HHV-6 positive      HFrEF, NYHA IIb LVEF 10-20%, RVEF 22%   Increase entresto 97/103 mg BID after we check labs   Cont Coreg 3.125 mg BID  and spironolactone 25 mg daily   Stop K             May add BiDil at next visit if BP OK             Entresto from [facility]   F/u scheduled with [facility] 1 month    Cardiac rehab referral     SHRUTHI eval - November            Recheck echo once meds optimized, if EF still low will get CPET             Follow up 2-3 weeks     High risk of SCD   Life Vest  on patient    Reinforced importance of 24/7 use (except when showering)    Recommend holding on EF reassessment until meds optimized    Afib w/ RVR S/p Cardioversion- Remains in Sinus   Amio BID per EP    Continue warfarin - CAV to manage       Chronic anticoagulation recent INR    Followed by [facility] recent 2.5      Functional MR   Follow up echo once meds optimized   ? SHRUTHI    sleep eval in November  Tobacco Use disorder            Smoking cessation            Add Nicoderm patch          Jackson Calderon met with Mitchell Houston today about financial issues      Chief Complaint:  Chief Complaint   Patient presents with    Follow-up    Dizziness     \"every now and then\"         HPI: 43y.o. year old male with recent hx of HFrEF (10-20%) in the setting of viral myocarditis (Human Herpes Virus 6)  Complicated by new onset PAF with RVR s/p DCCV. He was recently presented to [facility]  with ADHF found to be in afib with RVR. CTA (-) for PE, but showed a large R pleural effusion. He underwent a thoracentesis for ~ 1 L, and  ROBERTO and DCCV.   Right and left heart cath showed no epicardial disease and slightly elevated filling pressures. Cardiac MRI showed LVEF 17%, RVEF 20%  confirmed viral myocarditis and lab work was positive for  Coxsackie pane, CMV IgG positive. HHV-6 positive. Recent Echo  7/17/18 shows :mild-mod LVD, EF 10-20%, severe DHK, mild LVH, DD, RVD, mild- mod LAE, mod RAY, severe MR, mod-severe TR, RVSP 40, dilated RV outflow tract   He was started on GDMT and discharged with a life vest.     Last visit changes: increased Entresto 49/51 BID    Recent ProBNP 1400  (decreased)   Cr 0.85   K4.5  Since last visit seen by  Cardiologist  For INR management , he did not see Dr. Mary Ellen Mena as scheduled 2/2 transportation issues      Pulm: Breathing:   FLOWERS   with mild-moderate activity with lifting, or being active for up to an hour, he has to stope an rest. Able to perform ADLS w/o sx. Denies  Cough/wheezing  Tobacco use- still smoking  ~ 5 cigarettes/da    Fatigue: Mild improvement   Naps some days- 2-3 days per week. Sleep: OK + hx snoring, denies  orthopnea  PND,  Nocturia. - sleep eval for November     Activity: her tries to remain active, limited by FLOWERS/fatigue. He walks throughout the day. Appetite:  good, denies  early satiety, nausea, restricts NA  Fluid restriction   Diet: watching his diet most of the time- limiting sodium and not using NuSalt, flavoring with herbs. His sister does most of the cooking   Weight: follows daily and ranges 212 at home, he has lost 3 pounds on our scale since his last visit. Fluid: denies swelling in his  abdomen/ ankles/LE extremities. Dizziness on occasion when he \" does too much\"       Denies exertional chest pain, palpitations, lightheadedness, syncope, near syncope, AICD shock, bleeding. Compliant with the meds and LIfe vest no shocks. [facility] Entresto [facility]       He is in the process of applying for Medicaid,  and the care card, and pt asst for [facility] lives with his  sister in their new home.  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 Breonna Glass is working on ST disability). He smoked 15 years ~ 1/2 ppd, quit in July. Use to drink on the weekends an has stopped since returning home from the hospital. No additional substances. Some college courses. Review of Systems:     Constitutional:  Some improvement in his  stamina, negative, feels well today   HEENT:   Negative. Denies angioedema sx    Respiratory:  Negative for cough. Cardiovascular:  Per HPI   Gastrointestinal:  Negative. Genitourinary:  Denies dysuria, frequency    Musculoskeletal:  Negative. Skin:    Negative. Neurological:  Negative. Psy:    Positive for anxiety        CARDIAC EVALUATION   ECHO 7/17/18:mild-mod LVD, EF 10-20%, severe DHK, mild LVH, DD, RVD, mild- mod LAE, mod RAY, severe MR, mod-severe TR, RVSP 40, dilated RV outflow tract    ROBERTO 7/20/18: LVD, EF 10-15%, severe DHK, mild RVD, reduced RVEF, mod ANNAMARIE, no CELINA thrombus, no PFO, mod MR, mild TR      CATH 7/20/18: no epicardial disease, LVEP 1     RHC 7/20/18: RA:12, RV 34/6/14. PA 30/20/26, PCWP 18, [place] 6.33/2.74 TD: 5.37/2.32     DCCV 7/20/18    CARDIAC MRI: 7/20/18: mod LVD (LVDD 66), mild LVH (13mm), LVEF 17%, severe GHK, mild RVD, RVEF 20%, mod GHK, mod MR, mild TR   Distinct mid wall stripe enhancement of the basal septal wall along with RV insertion point enhancement sparing the subendocardium. viral myocarditis of HHV-6  viral strain. NO features of giant cell or lymphocytic myocarditis. ,  infiltrative  Sarcoidosis, amyloidosis, old MI    UPEP, SPEP,  heavy metals, FLC (-) HIV ( NR), Legionella (-) Ferritin WNL     History:  Past Medical History:   Diagnosis Date    Combined systolic and diastolic heart failure ([facility] [address] 8/8/2018    ECHO 7/17/18:mild-mod LVD, EF 10-20%, severe DHK, mild LVH, DD, RVD, mild- mod LAE, mod RAY, severe MR, mod-severe TR, RVSP 40, dilated RV outflow tract  ROBERTO 7/20/18: LVD, EF 10-15%, severe DHK, mild RVD, reduced RVEF, mod ANNAMARIE, no CELINA thrombus, no PFO, mod MR, mild TR  RHC 7/20/18: RA:12, RV 34/6/14. PA 30/20/26, PCWP 18, [place] 6.33/2.74 TD: 5.37/2.32    CARDIAC MRI: 7/20/18: mod LVD (LVDD 66), mil    Congestive heart failure (HCC)     NICM (nonischemic cardiomyopathy) ([address] 8/8/2018    Non-rheumatic mitral regurgitation 8/8/2018    ECHO 7/17/18:mild-mod LVD, EF 10-20%, severe DHK, mild LVH, DD, RVD, mild- mod LAE, mod RAY, severe MR, mod-severe TR, RVSP 40, dilated RV outflow tract  ROBERTO 7/20/18: LVD, EF 10-15%, severe DHK, mild RVD, reduced RVEF, mod ANNAMARIE, no CELINA thrombus, no PFO, mod MR, mild TR      PAF (paroxysmal atrial fibrillation) ([facility] [address] 8/8/2018    ROBERTO 7/20/18: LVD, EF 10-15%, severe DHK, mild RVD, reduced RVEF, mod ANNAMARIE, no CELINA thrombus, no PFO, mod MR, mild TR    DCCV 7/20/18       Rapid atrial fibrillation ([address] 7/17/2018    Tobacco use disorder 8/8/2018    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     Past Surgical History:   Procedure Laterality Date    CARDIOVERSION, ELECTIVE;EXTERN  7/20/2018         HX HEART CATHETERIZATION       Social History     Social History    Marital status: SINGLE     Spouse name: N/A    Number of children: N/A    Years of education: N/A     Occupational History    Not on file. Social History Main Topics    Smoking status: Current Some Day Smoker     Types: Cigarettes     Last attempt to quit: 7/20/2018    Smokeless tobacco: Never Used      Comment: 3-4 cigarettes vaughn    Alcohol use No    Drug use: No    Sexual activity: Yes     Other Topics Concern    Not on file     Social History Narrative     Family History   Problem Relation Age of Onset    Diabetes Mother     Heart Failure Mother     No Known Problems Father     Hypertension Sister     Hypertension Sister        Current Medications:   Current Outpatient Prescriptions   Medication Sig Dispense Refill    sacubitril-valsartan (ENTRESTO) 49 mg/51 mg tablet Take 1 Tab by mouth two (2) times a day.  Indications: chronic heart failure 180 Tab 1    warfarin (COUMADIN) 2.5 mg tablet Take 1 Tab by mouth daily. 30 Tab 3    spironolactone (ALDACTONE) 25 mg tablet Take 1 Tab by mouth daily. 30 Tab 3    carvedilol (COREG) 3.125 mg tablet Take 1 Tab by mouth two (2) times daily (with meals). 60 Tab 3    amiodarone (CORDARONE) 200 mg tablet Take 1 Tab by mouth daily. 30 Tab 12    potassium chloride SR (K-TAB) 20 mEq tablet Take 1 Tab by mouth daily. 30 Tab 1    magnesium oxide (MAG-OX) 400 mg tablet Take 1 Tab by mouth daily. 30 Tab 1    Cholecalciferol, Vitamin D3, (VITAMIN D3) 2,000 unit cap capsule Take 2,000 Units by mouth daily. (Patient not taking: Reported on 8/20/2018) 30 Cap 6       Allergies: No Known Allergies      Vitals:   Visit Vitals    BP (!) 138/98 (BP 1 Location: Left arm, BP Patient Position: Sitting)    Pulse 76    Temp 98.7 °F (37.1 °C) (Oral)    Resp 16    Ht 6' 3\" (1.905 m)    Wt 213 lb (96.6 kg)    SpO2 97%    BMI 26.62 kg/m2       Physical Exam:     Constitutional:   Well developed AA male, cooperative, pleasant, in NAD  HEENT:   Normocephalic, atraumatic, sclera anicteric, multiple gold teeth   Neck:    Normal range of motion. Neck supple. Pulmonary/Chest:  Effort normal and breath sounds normal. Life Vest in place. Cardiovascular:   Life Vest in place, PMI:diminished  Normal heart sounds and intact distal pulses. RRR,  S1, S2 normal, + S3,   JVP  10 cm (-)  HJR   Abdominal:   Soft. Non tender, non distended,  Bowel sounds are normal.    Musculoskeletal:  well developed, no edema    Neurological:  A& O x3, no focal deficits, ambulates w/o difficulty    Skin:    Skin is warm and dry.    Psy:    appears calm, not agitated     Recent Labs:   Lab Results   Component Value Date/Time    WBC 6.0 07/26/2018 05:12 AM    HGB 14.6 07/26/2018 05:12 AM    HCT 45.7 07/26/2018 05:12 AM    PLATELET 989 24/71/2354 05:12 AM    MCV 98.1 07/26/2018 05:12 AM     Lab Results   Component Value Date/Time    TSH 0.80 07/18/2018 04:18 AM    T4, Free 1.4 07/20/2018 01:50 PM      Lab Results   Component Value Date/Time    CK 91 07/17/2018 02:00 PM    CK - MB 1.6 07/17/2018 02:00 PM    CK-MB Index 1.8 07/17/2018 02:00 PM      Lab Results   Component Value Date/Time    NT pro-BNP 1981 (H) 07/26/2018 05:12 AM    NT pro-BNP 2507 (H) 07/25/2018 04:08 AM    NT pro-BNP 2972 (H) 07/24/2018 04:19 AM    NT pro-BNP 2629 (H) 07/23/2018 04:09 AM    NT pro-BNP 2747 (H) 07/21/2018 03:38 AM    PROBNP 1486 (H) 08/09/2018 12:00 AM      Lab Results   Component Value Date/Time    Sodium 140 08/09/2018 12:00 AM    Potassium 4.5 08/09/2018 12:00 AM    Chloride 106 08/09/2018 12:00 AM    CO2 19 (L) 08/09/2018 12:00 AM    Anion gap 11 07/26/2018 05:12 AM    Glucose 81 08/09/2018 12:00 AM    BUN 9 08/09/2018 12:00 AM    Creatinine 0.85 08/09/2018 12:00 AM    BUN/Creatinine ratio 11 08/09/2018 12:00 AM    GFR est  08/09/2018 12:00 AM    GFR est non- 08/09/2018 12:00 AM    Calcium 8.7 08/09/2018 12:00 AM    Bilirubin, total 0.7 07/26/2018 05:12 AM    ALT (SGPT) 19 07/26/2018 05:12 AM    AST (SGOT) 21 07/26/2018 05:12 AM    Alk. phosphatase 100 07/26/2018 05:12 AM    Protein, total 6.8 07/26/2018 05:12 AM    Albumin 2.4 (L) 07/26/2018 05:12 AM    Globulin 4.4 (H) 07/26/2018 05:12 AM    A-G Ratio 0.5 (L) 07/26/2018 05:12 AM      No results found for: HBA1C, AAN2FOIO, HGBE8, NAA1JMTY, BGX0TOGL, FYF1RBGG            CTA Results (most recent): 7/17/18:       Impression IMPRESSION: Moderate right pleural effusion with right middle and right lower  lobe infiltrates. Left lower lobe atelectasis. No evidence of pulmonary  embolism. I have discussed the diagnosis with  Riley Oakes and the intended plan as seen in the above orders. Questions were answered concerning future plans, and written instructions provided. I have discussed medication side effects and warnings with the patient as well.  Thank you for allowing me to participate in the care of this delightful gentleman. Please do not hesitate to call with any questions. Christiana Tyson RN, ACNP-BC, [address]   24 hour VAD/HF Pager: [phone]

## 2018-09-04 NOTE — PATIENT INSTRUCTIONS
You are doing better     CONTINUE CURRENT medications until your labs are back in a few days. If they look OK we will increase the Entresto and probably stop the potassium. Take twice a day medications 12 hours apart with food    Labs today BMP, proBNP     See cardiology for your blood thinner- coumadin testing     Limit WHITE foods (flour, sugar, pasta, rice, potatoes)    Walk more    Cardiac rehab eval    Stop smoking nicoderm patches   Keep a positive attitude       HF Education: Continue daily weights (in the morning, after voiding). Notify HF team of overnight weight gains > 2 lbs or weekly >5 lbs or if any of the following Sx. Continue to limit sodium intake & monitor your fluid intake .

## 2018-09-04 NOTE — MR AVS SNAPSHOT
[address] [facility], [address] [place] [address] 
[phone] Patient: Pete Spann MRN: [id number] CODY:[birth year] Visit Information Date & Time Provider Department Dept. Phone Encounter #  
 9/4/2018 11:00 AM Brandon Hull Ishan 3. [phone] [id number] Follow-up Instructions Return in about 3 weeks (around 9/25/2018). Your Appointments 9/26/2018  1:20 PM  
ESTABLISHED PATIENT with George Pardo MD  
[facility]) Appt Note: 1 MO. F/U INR CK  
 [address] [facility] [address] [place] [address]  
[phone] [address]  
  
    
 11/19/2018  3:00 PM  
New Patient with Veronica Wooten MD  
[address] - [address] [facility] ([address]) Appt Note: NP refd by Julio Jordan NP for snoring-  
 [address] [facility] [address] Critical access hospital [address]  
  
   
 [address] [facility] [address] [facility] [address] Upcoming Health Maintenance Date Due Pneumococcal 19-64 Medium Risk (1 of 1 - PPSV23) 7/15/1998 DTaP/Tdap/Td series (1 - Tdap) 7/15/2000 Influenza Age 5 to Adult 8/1/2018 Allergies as of 9/4/2018  Review Complete On: 9/4/2018 By: YOUSUF Hull No Known Allergies Current Immunizations  Reviewed on 7/22/2018 No immunizations on file. Not reviewed this visit You Were Diagnosed With   
  
 Codes Comments Chronic combined systolic and diastolic heart failure (HCC)    -  Primary ICD-10-CM: I50.42 
ICD-9-CM: 428.42   
 NICM (nonischemic cardiomyopathy) ([facility] [address]     ICD-10-CM: I42.8 ICD-9-CM: 425.4 Subacute viral myocarditis     ICD-10-CM: I40.0 ICD-9-CM: 422.91   
 FLOWERS (dyspnea on exertion)     ICD-10-CM: R06.09 
ICD-9-CM: 786.09   
 PAF (paroxysmal atrial fibrillation) (HCC)     ICD-10-CM: I48.0 ICD-9-CM: 427.31   
 Vitals BP Pulse Temp Resp Height(growth percentile) Weight(growth percentile) (!) 138/98 (BP 1 Location: Left arm, BP Patient Position: Sitting) 76 98.7 °F (37.1 °C) (Oral) 16 6' 3\" (1.905 m) 213 lb (96.6 kg) SpO2 BMI Smoking Status 97% 26.62 kg/m2 Current Some Day Smoker Vitals History BMI and BSA Data Body Mass Index Body Surface Area  
 26.62 kg/m 2 2.26 m 2 Preferred Pharmacy Pharmacy Name Phone [address], [place] [phone] Your Updated Medication List  
  
   
This list is accurate as of 18 12:15 PM.  Always use your most recent med list.  
  
  
  
  
 amiodarone 200 mg tablet Commonly known as:  CORDARONE Take 1 Tab by mouth daily. carvedilol 3.125 mg tablet Commonly known as:  Hammad Bourdon Take 1 Tab by mouth two (2) times daily (with meals). Cholecalciferol (Vitamin D3) 2,000 unit Cap capsule Commonly known as:  VITAMIN D3 Take 2,000 Units by mouth daily. magnesium oxide 400 mg tablet Commonly known as:  MAG-OX Take 1 Tab by mouth daily. nicotine 14 mg/24 hr patch Commonly known as:  NICODERM CQ  
1 Patch by TransDERmal route every twenty-four (24) hours for 30 days. potassium chloride SR 20 mEq tablet Commonly known as:  K-TAB Take 1 Tab by mouth daily. sacubitril-valsartan 49-51 mg Tab tablet Commonly known as:  ENTRESTO Take 1 Tab by mouth two (2) times a day. Indications: chronic heart failure  
  
 spironolactone 25 mg tablet Commonly known as:  ALDACTONE Take 1 Tab by mouth daily. warfarin 2.5 mg tablet Commonly known as:  COUMADIN Take 1 Tab by mouth daily. Prescriptions Sent to Pharmacy Refills  
 nicotine (NICODERM CQ) 14 mg/24 hr patch 0 Si Patch by TransDERmal route every twenty-four (24) hours for 30 days.   
 Class: Normal  
 Pharmacy: [facility] [address] TE[address] RIVERA [address] [facility], [address] [facility] #: [phone] Route: TransDERmal  
  
We Performed the Following METABOLIC PANEL, BASIC [42409 CPT(R)] NT-PRO BNP [id number] CPT(R)] REFERRAL TO CARDIAC REHAB [AAA584 Custom] Comments: [id number] or [facility] Follow-up Instructions Return in about 3 weeks (around 9/25/2018). Referral Information Referral ID Referred By Referred To  
  
 [id number] [place] Not Available Visits Status Start Date End Date 1 New Request 9/4/18 9/4/19 If your referral has a status of pending review or denied, additional information will be sent to support the outcome of this decision. Patient Instructions You are doing better CONTINUE CURRENT medications until your labs are back in a few days. If they look OK we will increase the Entresto and probably stop the potassium. Take twice a day medications 12 hours apart with food Labs today BMP, proBNP See cardiology for your blood thinner- coumadin testing Limit WHITE foods (flour, sugar, pasta, rice, potatoes) Walk more Cardiac rehab eval  
 Stop smoking nicoderm patches Keep a positive attitude HF Education: Continue daily weights (in the morning, after voiding). Notify HF team of overnight weight gains > 2 lbs or weekly >5 lbs or if any of the following Sx. Continue to limit sodium intake & monitor your fluid intake . Introducing [facility]! Neli Velez introduces InCast patient portal. Now you can access parts of your medical record, email your doctor's office, and request medication refills online. 1. In your internet browser, go to [email or web address] 2. Click on the First Time User? Click Here link in the Sign In box. You will see the New Member Sign Up page. 3. Enter your InCast Access Code exactly as it appears below.  You will not need to use this code after youve completed the sign-up process. If you do not sign up before the expiration date, you must request a new code. · inSparq Access Code: [email or web address] Expires: 10/6/2018  5:16 PM 
 
4. Enter the last four digits of your Social Security Number (xxxx) and Date of Birth (mm/dd/yyyy) as indicated and click Submit. You will be taken to the next sign-up page. 5. Create a inSparq ID. This will be your inSparq login ID and cannot be changed, so think of one that is secure and easy to remember. 6. Create a inSparq password. You can change your password at any time. 7. Enter your Password Reset Question and Answer. This can be used at a later time if you forget your password. 8. Enter your e-mail address. You will receive e-mail notification when new information is available in [address]. 9. Click Sign Up. You can now view and download portions of your medical record. 10. Click the Download Summary menu link to download a portable copy of your medical information. If you have questions, please visit the Frequently Asked Questions section of the inSparq website. Remember, inSparq is NOT to be used for urgent needs. For medical emergencies, dial 911. Now available from your iPhone and Android! Please provide this summary of care documentation to your next provider. Your primary care clinician is listed as NONE. If you have any questions after today's visit, please call [phone].

## 2018-09-05 ENCOUNTER — TELEPHONE (OUTPATIENT)
Dept: CARDIOLOGY CLINIC | Age: 38
End: 2018-09-05

## 2018-09-05 LAB
BUN SERPL-MCNC: 12 MG/DL (ref 6–20)
BUN/CREAT SERPL: 12 (ref 9–20)
CALCIUM SERPL-MCNC: 9.5 MG/DL (ref 8.7–10.2)
CHLORIDE SERPL-SCNC: 101 MMOL/L (ref 96–106)
CO2 SERPL-SCNC: 22 MMOL/L (ref 20–29)
CREAT SERPL-MCNC: 0.98 MG/DL (ref 0.76–1.27)
GLUCOSE SERPL-MCNC: 90 MG/DL (ref 65–99)
NT-PROBNP SERPL-MCNC: 940 PG/ML (ref 0–86)
POTASSIUM SERPL-SCNC: 4.5 MMOL/L (ref 3.5–5.2)
SODIUM SERPL-SCNC: 140 MMOL/L (ref 134–144)

## 2018-09-05 NOTE — TELEPHONE ENCOUNTER
----- Message from Ena Schaumann, ACNP sent at 9/5/2018  9:26 AM EDT -----  Labs stable  Continue Current plan  Thank you    Left message for patient to return call with message as noted above. Patient returned call, I gave him lab results, he states understanding and has no questions.

## 2018-09-12 ENCOUNTER — TELEPHONE (OUTPATIENT)
Dept: CARDIOLOGY CLINIC | Age: 38
End: 2018-09-12

## 2018-09-12 NOTE — TELEPHONE ENCOUNTER
Cardiac rehab called and will be sending paperwork for patient to complete while he is here for his visit prior to going to cardiac rehab on samw day.

## 2018-09-19 PROBLEM — E55.9 VITAMIN D DEFICIENCY: Chronic | Status: ACTIVE | Noted: 2018-09-19

## 2018-09-20 ENCOUNTER — OFFICE VISIT (OUTPATIENT)
Dept: CARDIOLOGY CLINIC | Age: 38
End: 2018-09-20

## 2018-09-20 VITALS
HEART RATE: 85 BPM | DIASTOLIC BLOOD PRESSURE: 70 MMHG | WEIGHT: 218 LBS | OXYGEN SATURATION: 97 % | SYSTOLIC BLOOD PRESSURE: 106 MMHG | RESPIRATION RATE: 14 BRPM | HEIGHT: 75 IN | TEMPERATURE: 98.2 F | BODY MASS INDEX: 27.1 KG/M2

## 2018-09-20 DIAGNOSIS — R06.83 SNORING: ICD-10-CM

## 2018-09-20 DIAGNOSIS — I48.0 PAF (PAROXYSMAL ATRIAL FIBRILLATION) (HCC): ICD-10-CM

## 2018-09-20 DIAGNOSIS — F17.200 TOBACCO USE DISORDER: ICD-10-CM

## 2018-09-20 DIAGNOSIS — I50.42 CHRONIC COMBINED SYSTOLIC AND DIASTOLIC HEART FAILURE (HCC): Primary | Chronic | ICD-10-CM

## 2018-09-20 DIAGNOSIS — I42.8 NICM (NONISCHEMIC CARDIOMYOPATHY) (HCC): ICD-10-CM

## 2018-09-20 DIAGNOSIS — R06.02 SOB (SHORTNESS OF BREATH): ICD-10-CM

## 2018-09-20 DIAGNOSIS — E55.9 VITAMIN D DEFICIENCY: Chronic | ICD-10-CM

## 2018-09-20 DIAGNOSIS — I34.0 NON-RHEUMATIC MITRAL REGURGITATION: ICD-10-CM

## 2018-09-20 RX ORDER — ACETAMINOPHEN 500 MG
2000 TABLET ORAL DAILY
Qty: 30 CAP | Refills: 6 | Status: SHIPPED | OUTPATIENT
Start: 2018-09-20 | End: 2020-05-18 | Stop reason: ALTCHOICE

## 2018-09-20 NOTE — PROGRESS NOTES
[facility] Clinic Note      DOS:   9/20/2018  NAME:  Penny Doyle   MRN:   [id number]     REFERRING PROVIDER:  Dr. Guzman Mike: None  PRIMARY CARDIOLOGIST: Dr. 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, CMV IgG positive, HHV-6 positive      HFrEF, NYHA II LVEF 10-20%, RVEF 22%   Increase entresto 97/103 mg BID after we check labs- if labs OK increase pm dose first for 3 nights and then BID    Cont Coreg 3.125 mg BID  and spironolactone 25 mg daily   Stop K             May add BiDil at next visit if BP OK             Entresto from [employer]   F/u scheduled with [facility] 2-3 weeks     Cardiac rehab he is in the process of getting registered    SHRUTHI eval - November            Recheck echo once meds optimized, if EF still low will get CPET, and consider GALACTIN -HF              Follow up 2-3 weeks     High risk of SCD   Life Vest  on patient    Reinforced importance of 24/7 use (except when showering)    Recommend holding on EF reassessment until meds optimized    Afib w/ RVR S/p Cardioversion- Remains in Sinus   Amio  per EP    Continue warfarin - Dr. Jorge Ochoa- no bleeding       Chronic anticoagulation recent INR    Followed by [facility]      Functional MR   Follow up echo once meds optimized   ? SHRUTHI    sleep eval in November  Tobacco Use disorder            Smoking cessation            Encouraged to use the  Nicoderm patch   ETOH use- encouraged to stop drinking        Thank you for allowing me to participate in the care of this delightful gentleman. Please do not hesitate to call with any questions. Christiana Couch RN, ACNP-BC, [address]    Chief Complaint:  Chief Complaint   Patient presents with    Follow Up Chronic Condition         Oscar Anand is a delightful  43y.o. year old AA male with recent hx of HFrEF (10-20%) in the setting of viral myocarditis (Human Herpes Virus 6)  Complicated by new onset PAF with RVR s/p DCCV. He presents for follow up of his systolic heart failure. He was last seen 3 weeks ago. He was first diagnosed 8/2018 when he presented to [facility]  with ADHF found to be in afib with RVR. CTA (-) for PE, but showed a large R pleural effusion. He underwent a thoracentesis for ~ 1 L, and  ROBERTO and DCCV. Right and left heart cath showed no epicardial disease and slightly elevated filling pressures. Cardiac MRI showed LVEF 17%, RVEF 20%  confirmed viral myocarditis and lab work was positive for  Coxsackie pane, CMV IgG positive. HHV-6 positive. Recent Echo  7/17/18 shows :mild-mod LVD, EF 10-20%, severe DHK, mild LVH, DD, RVD, mild- mod LAE, mod RAY, severe MR, mod-severe TR, RVSP 40, dilated RV outflow tract   He was started on GDMT and discharged with a life vest.     Last visit changes: increased Entresto 97/103 mg BID, and he continued to take the 49/51 mg dose as he only had 24/26 mg tablets and did not have enough to take the higher dose. He has not stopped his K or started using the nicoderm patch     Recent ProBNP 1486,   Cr stable 0.84   K4.5     Pulm: Breathing:   FLOWERS  with moderate activity with lifting, or being active for up to an hour, he has to stope an rest. Able to perform ADLS w/o sx. Denies  Cough/wheezing  Tobacco use- still smoking  ~ 5 cigarettes/day - he did not fill the nicoderm patch     Fatigue: Mild improvement   Naps some days- 2-3 days per week. Sleep: OK + hx snoring- sleep eval in November, Denies  Orthopnea- sleeps o n 2 pillows for comfort, Denies   PND,  Nocturia. Activity: her tries to remain active, limited by FLOWERS/fatigue. He walks around the block several days a week. Appetite:  good, denies  early satiety, nausea, restricts NA  Fluid restriction   Diet: watching his diet most of the time- limiting sodium- limits prepared foods, and does not eat fast food.  He is not using NuSalt and  flavoring with herbs. His sister does most of the cooking   Weight: follows daily and has gained 5 pounds, he attributes to portion size. Denies feeling bloated or edema. .  Fluid: denies swelling in his  abdomen/ ankles/LE extremities. Dizziness on occasion when he \" does too much\"       Denies exertional chest pain, palpitations, lightheadedness, syncope, near syncope, AICD shock, bleeding. Compliant with the meds and LIfe vest no shocks. Receiving Entresto from [employer], applied for  Medicaid and awaiting  Care card notification. Tamela Cruz lives with his  sister in their new home. 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 He smoked 15 years ~ 1/2 ppd. 2 etoh drinks each day of the  weekends No additional substances. Some college courses. Review of Systems:     Constitutional:  Some improvement in his stamina, negative, feels well today   HEENT:   Negative. Denies angioedema sx    Respiratory:  Negative for cough. Cardiovascular:  Per HPI   Gastrointestinal:  Negative. Genitourinary:  Denies dysuria, frequency    Musculoskeletal:  Negative. Skin:    Negative. Neurological:  Negative. Psy:    Positive for anxiety- improved         CARDIAC EVALUATION   ECHO 7/17/18:mild-mod LVD, EF 10-20%, severe DHK, mild LVH, DD, RVD, mild- mod LAE, mod RAY, severe MR, mod-severe TR, RVSP 40, dilated RV outflow tract    ROBERTO 7/20/18: LVD, EF 10-15%, severe DHK, mild RVD, reduced RVEF, mod ANNAMARIE, no CELINA thrombus, no PFO, mod MR, mild TR      CATH 7/20/18: no epicardial disease, LVEP 1     RHC 7/20/18: RA:12, RV 34/6/14.  PA 30/20/26, PCWP 18, [place] 6.33/2.74 TD: 5.37/2.32     DCCV 7/20/18    CARDIAC MRI: 7/20/18: mod LVD (LVDD 66), mild LVH (13mm), LVEF 17%, severe GHK, mild RVD, RVEF 20%, mod GHK, mod MR, mild TR   Distinct mid wall stripe enhancement of the basal septal wall along with RV insertion point enhancement sparing the subendocardium. viral myocarditis of HHV-6  viral strain. NO features of giant cell or lymphocytic myocarditis. ,  infiltrative  Sarcoidosis, amyloidosis, old MI    UPEP, SPEP,  heavy metals, FLC (-) HIV ( NR), Legionella (-) Ferritin WNL     History:  Past Medical History:   Diagnosis Date    Combined systolic and diastolic heart failure ([address] 8/8/2018    ECHO 7/17/18:mild-mod LVD, EF 10-20%, severe DHK, mild LVH, DD, RVD, mild- mod LAE, mod RAY, severe MR, mod-severe TR, RVSP 40, dilated RV outflow tract  ROBERTO 7/20/18: LVD, EF 10-15%, severe DHK, mild RVD, reduced RVEF, mod ANNAMARIE, no CELINA thrombus, no PFO, mod MR, mild TR  RHC 7/20/18: RA:12, RV 34/6/14. PA 30/20/26, PCWP 18, [place] 6.33/2.74 TD: 5.37/2.32    CARDIAC MRI: 7/20/18: mod LVD (LVDD 66), mil    Congestive heart failure ([address]     Long term current use of anticoagulant therapy     NICM (nonischemic cardiomyopathy) ([address] 8/8/2018    Non-rheumatic mitral regurgitation 8/8/2018    ECHO 7/17/18:mild-mod LVD, EF 10-20%, severe DHK, mild LVH, DD, RVD, mild- mod LAE, mod RAY, severe MR, mod-severe TR, RVSP 40, dilated RV outflow tract  ROBERTO 7/20/18: LVD, EF 10-15%, severe DHK, mild RVD, reduced RVEF, mod ANNAMARIE, no CELINA thrombus, no PFO, mod MR, mild TR      PAF (paroxysmal atrial fibrillation) ([address] 8/8/2018    ROBERTO 7/20/18: LVD, EF 10-15%, severe DHK, mild RVD, reduced RVEF, mod ANNAMARIE, no CELINA thrombus, no PFO, mod MR, mild TR    DCCV 7/20/18       Rapid atrial fibrillation ([address] 7/17/2018    Tobacco use disorder 8/8/2018    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    Vitamin D deficiency 9/19/2018    Level 12 8/2018     Past Surgical History:   Procedure Laterality Date    CARDIOVERSION, ELECTIVE;EXTERN  7/20/2018         HX HEART CATHETERIZATION       Social History     Social History    Marital status: SINGLE     Spouse name: N/A    Number of children: N/A    Years of education: N/A     Occupational History    Not on file.      Social History Main Topics    Smoking status: Current Some Day Smoker     Types: Cigarettes     Last attempt to quit: 7/20/2018    Smokeless tobacco: Never Used      Comment: 3-4 cigarettes vaughn    Alcohol use No    Drug use: No    Sexual activity: Yes     Other Topics Concern    Not on file     Social History Narrative     Family History   Problem Relation Age of Onset    Diabetes Mother     Heart Failure Mother     No Known Problems Father     Hypertension Sister     Hypertension Sister        Current Medications:   Current Outpatient Prescriptions   Medication Sig Dispense Refill    nicotine (NICODERM CQ) 14 mg/24 hr patch 1 Patch by TransDERmal route every twenty-four (24) hours for 30 days. 30 Patch 0    sacubitril-valsartan (ENTRESTO) 49 mg/51 mg tablet Take 1 Tab by mouth two (2) times a day. Indications: chronic heart failure 180 Tab 1    warfarin (COUMADIN) 2.5 mg tablet Take 1 Tab by mouth daily. 30 Tab 3    spironolactone (ALDACTONE) 25 mg tablet Take 1 Tab by mouth daily. 30 Tab 3    carvedilol (COREG) 3.125 mg tablet Take 1 Tab by mouth two (2) times daily (with meals). 60 Tab 3    amiodarone (CORDARONE) 200 mg tablet Take 1 Tab by mouth daily. 30 Tab 12    potassium chloride SR (K-TAB) 20 mEq tablet Take 1 Tab by mouth daily. 30 Tab 1    magnesium oxide (MAG-OX) 400 mg tablet Take 1 Tab by mouth daily. 30 Tab 1    Cholecalciferol, Vitamin D3, (VITAMIN D3) 2,000 unit cap capsule Take 2,000 Units by mouth daily. (Patient not taking: Reported on 8/20/2018) 30 Cap 6       Allergies: No Known Allergies      Vitals:   Visit Vitals    /70    Pulse 85    Temp 98.2 °F (36.8 °C)    Resp 14    Wt 218 lb (98.9 kg)    SpO2 97%    BMI 27.25 kg/m2       Physical Exam:   Constitutional:   Well developed AA male, cooperative, pleasant, in NAD  HEENT:   Normocephalic, atraumatic, sclera anicteric, multiple gold teeth   Neck:    Normal range of motion. Neck supple.     Pulmonary/Chest:  Effort normal and breath sounds normal. Life Vest in place. Cardiovascular:    PMI:diminished, RRR,  RRR,  S1, S2 normal, + S3,  JVP  10 cm (-)  HJR   Abdominal:   Soft. Non tender, non distended,  Bowel sounds are normal. No hepatomegally   Musculoskeletal:    no edema, calf tenderness, cyanosis or clubbing   Neurological:  A& O x3, no focal deficits, ambulates w/o difficulty    Extremities:           Intact distal pulses. Skin:    Skin is warm and dry. Psy:    appears calm, not agitated     Recent Labs:   Lab Results   Component Value Date/Time    WBC 6.0 07/26/2018 05:12 AM    HGB 14.6 07/26/2018 05:12 AM    HCT 45.7 07/26/2018 05:12 AM    PLATELET 191 48/96/3182 05:12 AM    MCV 98.1 07/26/2018 05:12 AM     Lab Results   Component Value Date/Time    TSH 0.80 07/18/2018 04:18 AM    T4, Free 1.4 07/20/2018 01:50 PM      Lab Results   Component Value Date/Time    CK 91 07/17/2018 02:00 PM    CK - MB 1.6 07/17/2018 02:00 PM    CK-MB Index 1.8 07/17/2018 02:00 PM      Lab Results   Component Value Date/Time    NT pro-BNP 1981 (H) 07/26/2018 05:12 AM    NT pro-BNP 2507 (H) 07/25/2018 04:08 AM    NT pro-BNP 2972 (H) 07/24/2018 04:19 AM    NT pro-BNP 2629 (H) 07/23/2018 04:09 AM    NT pro-BNP 2747 (H) 07/21/2018 03:38 AM    PROBNP 940 (H) 09/04/2018 12:00 AM    PROBNP 1486 (H) 08/09/2018 12:00 AM      Lab Results   Component Value Date/Time    Sodium 140 09/04/2018 12:00 AM    Potassium 4.5 09/04/2018 12:00 AM    Chloride 101 09/04/2018 12:00 AM    CO2 22 09/04/2018 12:00 AM    Anion gap 11 07/26/2018 05:12 AM    Glucose 90 09/04/2018 12:00 AM    BUN 12 09/04/2018 12:00 AM    Creatinine 0.98 09/04/2018 12:00 AM    BUN/Creatinine ratio 12 09/04/2018 12:00 AM    GFR est  09/04/2018 12:00 AM    GFR est non-AA 97 09/04/2018 12:00 AM    Calcium 9.5 09/04/2018 12:00 AM    Bilirubin, total 0.7 07/26/2018 05:12 AM    ALT (SGPT) 19 07/26/2018 05:12 AM    AST (SGOT) 21 07/26/2018 05:12 AM    Alk.  phosphatase 100 07/26/2018 05:12 AM    Protein, total 6.8 07/26/2018 05:12 AM    Albumin 2.4 (L) 07/26/2018 05:12 AM    Globulin 4.4 (H) 07/26/2018 05:12 AM    A-G Ratio 0.5 (L) 07/26/2018 05:12 AM      No results found for: HBA1C, XOV4RRUU, HGBE8, FQU8HFOI, XGM0UPYR, QAE6PIVH     CTA Results (most recent): 7/17/18:       Impression IMPRESSION: Moderate right pleural effusion with right middle and right lower  lobe infiltrates. Left lower lobe atelectasis. No evidence of pulmonary  embolism. I have discussed the diagnosis with  Alcides Reyez and the intended plan as seen in the above orders. Questions were answered concerning future plans, and written instructions provided. I have discussed medication side effects and warnings with the patient as well. Thank you for allowing me to participate in the care of this delightful gentleman. Please do not hesitate to call with any questions. Christiana Mcintosh RN, ACNP-BC, [facility] [place] [place]. [place] [address] [phone]  24 hour VAD/HF Pager: [phone]

## 2018-09-20 NOTE — MR AVS SNAPSHOT
[address] 
[phone] Patient: Mukund Lisa MRN: [id number] TCD:8/62/7399 Visit Information Date & Time Provider Department Dept. Phone Encounter #  
 9/20/2018 10:00 AM Veena JohnsJalen [id number] Follow-up Instructions Return in about 3 years (around 9/20/2021) for 30 minutes . Your Appointments 9/26/2018  1:20 PM  
ESTABLISHED PATIENT with MD [facility]) Appt Note: 1 MO. F/U INR CK  
 [address] [facility] [address]  
[phone] [address]  
  
    
 11/19/2018  3:00 PM  
New Patient with Dakotah Houston MD  
[address] [facility] ([facility]) Appt Note: NP refd by Samara Bradshaw NP for snoring-  
 [address] [facility] [address]  
  
   
 [address] [facility] [address] Upcoming Health Maintenance Date Due Pneumococcal 19-64 Medium Risk (1 of 1 - PPSV23) 7/15/1998 DTaP/Tdap/Td series (1 - Tdap) 7/15/2000 Influenza Age 5 to Adult 8/1/2018 Allergies as of 9/20/2018  Review Complete On: 9/20/2018 By: YOUSUF Crawford No Known Allergies Current Immunizations  Reviewed on 7/22/2018 No immunizations on file. Not reviewed this visit You Were Diagnosed With   
  
 Codes Comments Chronic combined systolic and diastolic heart failure (HCC)    -  Primary ICD-10-CM: I50.42 
ICD-9-CM: 428.42   
 NICM (nonischemic cardiomyopathy) ([facility]     ICD-10-CM: I42.8 ICD-9-CM: 425.4 PAF (paroxysmal atrial fibrillation) (HCC)     ICD-10-CM: I48.0 ICD-9-CM: 427.31 Non-rheumatic mitral regurgitation     ICD-10-CM: I34.0 ICD-9-CM: 424.0 Tobacco use disorder     ICD-10-CM: F17.200 ICD-9-CM: 305.1 Snoring     ICD-10-CM: R06.83 
ICD-9-CM: 786.09   
 SOB (shortness of breath)     ICD-10-CM: R06.02 
ICD-9-CM: 786.05 Vitamin D deficiency     ICD-10-CM: E55.9 ICD-9-CM: 268.9 Vitals BP Pulse Temp Resp Weight(growth percentile) SpO2  
 106/70 85 98.2 °F (36.8 °C) 14 218 lb (98.9 kg) 97% BMI Smoking Status 27.25 kg/m2 Current Some Day Smoker BMI and BSA Data Body Mass Index Body Surface Area  
 27.25 kg/m 2 2.29 m 2 Preferred Pharmacy Pharmacy Name Phone [address] [phone] Your Updated Medication List  
  
   
This list is accurate as of 9/20/18 11:32 AM.  Always use your most recent med list.  
  
  
  
  
 amiodarone 200 mg tablet Commonly known as:  CORDARONE Take 1 Tab by mouth daily. carvedilol 3.125 mg tablet Commonly known as:  Juline Balzarine Take 1 Tab by mouth two (2) times daily (with meals). Cholecalciferol (Vitamin D3) 2,000 unit Cap capsule Commonly known as:  VITAMIN D3 Take 2,000 Units by mouth daily. magnesium oxide 400 mg tablet Commonly known as:  MAG-OX Take 1 Tab by mouth daily. nicotine 14 mg/24 hr patch Commonly known as:  NICODERM CQ  
1 Patch by TransDERmal route every twenty-four (24) hours for 30 days. sacubitril-valsartan  mg tablet Commonly known as:  ENTRESTO Take 1 Tab by mouth two (2) times a day. Indications: chronic heart failure  
  
 spironolactone 25 mg tablet Commonly known as:  ALDACTONE Take 1 Tab by mouth daily. warfarin 2.5 mg tablet Commonly known as:  COUMADIN Take 1 Tab by mouth daily. Prescriptions Printed Refills  
 sacubitril-valsartan (ENTRESTO) 97 mg/103 mg tablet 3 Sig: Take 1 Tab by mouth two (2) times a day. Indications: chronic heart failure Class: Print Route: Oral  
  
Prescriptions Sent to Pharmacy Refills Cholecalciferol, Vitamin D3, (VITAMIN D3) 2,000 unit cap capsule 6 Sig: Take 2,000 Units by mouth daily. Class: Normal  
 Pharmacy: [facility] DR TEODORO RIVERA [address] [place] [address], [address] [place] [address] #: [phone] Route: Oral  
  
We Performed the Following METABOLIC PANEL, BASIC [72906 CPT(R)] NT-PRO BNP [id number] CPT(R)] Follow-up Instructions Return in about 3 years (around 9/20/2021) for 30 minutes . To-Do List   
 09/20/2018 3:00 PM  
  Appointment with Timbo Bocanegra RN at [address] [place] [address] ([phone])  
  
 09/20/2018 4:00 PM  
  Appointment with Marcela Jean at [address] [place] [address] ([phone]) Patient Instructions You are doing well 
 labs today, if they look good then:  
INCREASE Entrsto 97/103 mg twice a day- SLOWLY ( take 2 of the 49/51 mg tablets at night for 3 nights then increase to 2 tablets twice a day if you are tolerating the night time dose) We will send the higher dose RX to [facility] STOP potassium CONTINUE CURRENT medications Take twice a day medications 12 hours apart with food Labs today BMP, proBNP Cardiac rehab Stop smoking - cut down to 2 cigarettes daily Stop drinking for now, Limit WHITE foods ( flour, sugar, pasta, rice, potatoes) Walk more-goal track your steps Keep a positive attitude HF Education: Continue daily weights (in the morning, after voiding). Notify HF team of overnight weight gains > 2 lbs or weekly >5 lbs or if any of the following Sx. Continue to limit sodium intake & monitor your fluid intake . Introducing [facility]! [facility] introduces Patients Know Best patient portal. Now you can access parts of your medical record, email your doctor's office, and request medication refills online. 1. In your internet browser, go to [email or web address] 2. Click on the First Time User? Click Here link in the Sign In box. You will see the New Member Sign Up page. 3. Enter your [employer] Access Code exactly as it appears below. You will not need to use this code after youve completed the sign-up process. If you do not sign up before the expiration date, you must request a new code. · [employer] Access Code: [email or web address] Expires: 10/6/2018  5:16 PM 
 
4. Enter the last four digits of your Social Security Number (xxxx) and Date of Birth (mm/dd/yyyy) as indicated and click Submit. You will be taken to the next sign-up page. 5. Create a [employer] ID. This will be your [employer] login ID and cannot be changed, so think of one that is secure and easy to remember. 6. Create a [employer] password. You can change your password at any time. 7. Enter your Password Reset Question and Answer. This can be used at a later time if you forget your password. 8. Enter your e-mail address. You will receive e-mail notification when new information is available in [address] 9. Click Sign Up. You can now view and download portions of your medical record. 10. Click the Download Summary menu link to download a portable copy of your medical information. If you have questions, please visit the Frequently Asked Questions section of the [employer] website. Remember, [employer] is NOT to be used for urgent needs. For medical emergencies, dial 911. Now available from your iPhone and Android! Please provide this summary of care documentation to your next provider. Your primary care clinician is listed as NONE. If you have any questions after today's visit, please call [phone].

## 2018-09-20 NOTE — LETTER
9/20/2018 11:42 AM 
 
Patient:  Mukund Lisa YOB: 1980 Date of Visit: 9/20/2018 Dear Netta Scott MD 
[address] [place] [address] VIA In Basket 
 : Thank you for referring Mr. Mukund Lisa to me for evaluation/treatment. Below are the relevant portions of my assessment and plan of care. If you have questions, please do not hesitate to call me. I look forward to following Mr. Evelyn Lam along with you. Sincerely, YOUSUF Crawford

## 2018-09-20 NOTE — PATIENT INSTRUCTIONS
You are doing well   labs today, if they look good then:   INCREASE Entrsto 97/103 mg twice a day- SLOWLY ( take 2 of the 49/51 mg tablets at night for 3 nights then increase to 2 tablets twice a day if you are tolerating the night time dose)        We will send the higher dose RX to [facility]     STOP potassium   CONTINUE CURRENT medications    Take twice a day medications 12 hours apart with food    Labs today BMP, proBNP     Cardiac rehab   Stop smoking - cut down to 2 cigarettes daily   Stop drinking for now,   Limit WHITE foods ( flour, sugar, pasta, rice, potatoes)    Walk more-goal track your steps     Keep a positive attitude       HF Education: Continue daily weights (in the morning, after voiding). Notify HF team of overnight weight gains > 2 lbs or weekly >5 lbs or if any of the following Sx. Continue to limit sodium intake & monitor your fluid intake .

## 2018-09-21 ENCOUNTER — TELEPHONE (OUTPATIENT)
Dept: CARDIAC REHAB | Age: 38
End: 2018-09-21

## 2018-09-21 DIAGNOSIS — I42.8 NICM (NONISCHEMIC CARDIOMYOPATHY) (HCC): ICD-10-CM

## 2018-09-21 DIAGNOSIS — I50.40 COMBINED SYSTOLIC AND DIASTOLIC HEART FAILURE, UNSPECIFIED HF CHRONICITY (HCC): ICD-10-CM

## 2018-09-21 LAB
BUN SERPL-MCNC: 7 MG/DL (ref 6–20)
BUN/CREAT SERPL: 7 (ref 9–20)
CALCIUM SERPL-MCNC: 9.5 MG/DL (ref 8.7–10.2)
CHLORIDE SERPL-SCNC: 106 MMOL/L (ref 96–106)
CO2 SERPL-SCNC: 18 MMOL/L (ref 20–29)
CREAT SERPL-MCNC: 0.94 MG/DL (ref 0.76–1.27)
GLUCOSE SERPL-MCNC: 84 MG/DL (ref 65–99)
NT-PROBNP SERPL-MCNC: 477 PG/ML (ref 0–86)
POTASSIUM SERPL-SCNC: 4.4 MMOL/L (ref 3.5–5.2)
SODIUM SERPL-SCNC: 139 MMOL/L (ref 134–144)

## 2018-09-21 RX ORDER — WARFARIN 2.5 MG/1
2.5 TABLET ORAL DAILY
Qty: 30 TAB | Refills: 3 | Status: SHIPPED | OUTPATIENT
Start: 2018-09-21 | End: 2018-10-26

## 2018-09-21 NOTE — TELEPHONE ENCOUNTER
9/21/2018 Cardiac Wellness:  Mr. Milly Chandra was a no show for his intake appointment, I had faxed over his paperwork that he would need for our appointment to Dr. Keith Schwarz office. We coordinated our appointment same day as the appointment he had with her office since there is a transportation issue, but he still no called no showed.   Marianna Esteves

## 2018-09-24 ENCOUNTER — TELEPHONE (OUTPATIENT)
Dept: CARDIOLOGY CLINIC | Age: 38
End: 2018-09-24

## 2018-09-24 NOTE — TELEPHONE ENCOUNTER
----- Message from YOUSUF Villar sent at 9/24/2018  8:29 AM EDT -----  Labs stable  Continue Current plan  Thank you    I called patient and reviewed all lab results, he states understanding and has no questions.

## 2018-09-26 ENCOUNTER — OFFICE VISIT (OUTPATIENT)
Dept: CARDIOLOGY CLINIC | Age: 38
End: 2018-09-26

## 2018-09-26 DIAGNOSIS — Z95.810 CARDIAC DEFIBRILLATOR IN PLACE: ICD-10-CM

## 2018-09-26 DIAGNOSIS — Z79.01 ANTICOAGULATED: ICD-10-CM

## 2018-09-26 DIAGNOSIS — I48.0 PAF (PAROXYSMAL ATRIAL FIBRILLATION) (HCC): Primary | ICD-10-CM

## 2018-09-26 DIAGNOSIS — I50.40 COMBINED SYSTOLIC AND DIASTOLIC HEART FAILURE, UNSPECIFIED HF CHRONICITY (HCC): ICD-10-CM

## 2018-09-26 DIAGNOSIS — I42.8 NICM (NONISCHEMIC CARDIOMYOPATHY) (HCC): ICD-10-CM

## 2018-09-26 DIAGNOSIS — I50.42 CHRONIC COMBINED SYSTOLIC AND DIASTOLIC HEART FAILURE (HCC): ICD-10-CM

## 2018-09-26 RX ORDER — CARVEDILOL 3.12 MG/1
3.12 TABLET ORAL 2 TIMES DAILY WITH MEALS
Qty: 60 TAB | Refills: 3 | Status: SHIPPED | OUTPATIENT
Start: 2018-09-26 | End: 2018-09-26 | Stop reason: SDUPTHER

## 2018-09-26 RX ORDER — CARVEDILOL 3.12 MG/1
3.12 TABLET ORAL 2 TIMES DAILY WITH MEALS
Qty: 60 TAB | Refills: 3 | Status: SHIPPED | OUTPATIENT
Start: 2018-09-26 | End: 2019-02-28 | Stop reason: SDUPTHER

## 2018-09-26 NOTE — PROGRESS NOTES
[facility]   [address] [place] [address], [address] [place] [address] Road                                          NEW PATIENT HPI/FOLLOW-UP      NAME:  Ginette Reed   :   1980   MRN:   [id number]   PCP:  None           Subjective: The patient is a 43y.o. year old male with h/o A-fib with RVR, combined systolic/diastolic CHF,  NICM, on warfarin for anticoagulation who returns for a routine INR follow up. Also being followed by Advanced Heart Failure clinic, is compliant with visits. Since the last visit, patient reports no complaints. Denies change in exercise tolerance, chest pain, edema, medication intolerance, palpitations, shortness of breath, PND/orthopnea wheezing, sputum, syncope, dizziness or light headedness. Doing satisfactorily. Review of Systems  General: Pt denies excessive weight gain or loss. Pt is able to conduct ADL's. Respiratory: Denies shortness of breath, FLOWERS, wheezing or stridor. Cardiovascular: Denies precordial pain, palpitations, edema or PND  Gastrointestinal: Denies poor appetite, indigestion, abdominal pain or blood in stool  Peripheral vascular: Denies claudication, leg cramps  Neuropsychiatric: Denies paresthesias,tingling,numbness,anxiety,depression,fatigue  Musculoskeletal: Denies pain,tenderness, soreness,swelling      Past Medical History:   Diagnosis Date    Combined systolic and diastolic heart failure ([facility] 2018    ECHO 18:mild-mod LVD, EF 10-20%, severe DHK, mild LVH, DD, RVD, mild- mod LAE, mod RAY, severe MR, mod-severe TR, RVSP 40, dilated RV outflow tract  ROBERTO 18: LVD, EF 10-15%, severe DHK, mild RVD, reduced RVEF, mod ANNAMARIE, no CELINA thrombus, no PFO, mod MR, mild TR  RHC 18: RA:12, RV 34/6/14.  PA , PCWP 18, [place] 6.33/2.74 TD: 5.37/2.32    CARDIAC MRI: 18: mod LVD (LVDD 66), mil    Congestive heart failure ([address]     Long term current use of anticoagulant therapy     NICM (nonischemic cardiomyopathy) ([facility] [address] [facility] 8/8/2018    Non-rheumatic mitral regurgitation 8/8/2018    ECHO 7/17/18:mild-mod LVD, EF 10-20%, severe DHK, mild LVH, DD, RVD, mild- mod LAE, mod RAY, severe MR, mod-severe TR, RVSP 40, dilated RV outflow tract  ROBERTO 7/20/18: LVD, EF 10-15%, severe DHK, mild RVD, reduced RVEF, mod ANNAMARIE, no CELINA thrombus, no PFO, mod MR, mild TR      PAF (paroxysmal atrial fibrillation) ([address] 8/8/2018    ROBERTO 7/20/18: LVD, EF 10-15%, severe DHK, mild RVD, reduced RVEF, mod ANNAMARIE, no CELINA thrombus, no PFO, mod MR, mild TR    DCCV 7/20/18       Rapid atrial fibrillation ([address] 7/17/2018    Tobacco use disorder 8/8/2018    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    Vitamin D deficiency 9/19/2018    Level 12 8/2018     Patient Active Problem List    Diagnosis Date Noted    Vitamin D deficiency 09/19/2018    SOB (shortness of breath) 08/09/2018    Snoring 08/09/2018    NICM (nonischemic cardiomyopathy) ([address] 08/08/2018    Combined systolic and diastolic heart failure ([address] 08/08/2018    Viral myocarditis 08/08/2018    Tobacco use disorder 08/08/2018    PAF (paroxysmal atrial fibrillation) ([address] 08/08/2018    Non-rheumatic mitral regurgitation 08/08/2018    Cardiac defibrillator in place 07/27/2018    Anticoagulated 60/47/5602    Acute systolic heart failure ([address] 07/19/2018    Pleural effusion, right 07/19/2018    Atrial fibrillation with RVR ([address] 07/19/2018      Past Surgical History:   Procedure Laterality Date    CARDIOVERSION, ELECTIVE;EXTERN  7/20/2018         HX HEART CATHETERIZATION       No Known Allergies   Family History   Problem Relation Age of Onset    Diabetes Mother     Heart Failure Mother     No Known Problems Father     Hypertension Sister     Hypertension Sister       Social History     Social History    Marital status: SINGLE     Spouse name: N/A    Number of children: N/A    Years of education: N/A     Occupational History    Not on file.      Social History Main Topics    Smoking status: Current Some Day Smoker     Types: Cigarettes     Last attempt to quit: 7/20/2018    Smokeless tobacco: Never Used      Comment: 3-4 cigarettes vaughn    Alcohol use No    Drug use: No    Sexual activity: Yes     Other Topics Concern    Not on file     Social History Narrative      Current Outpatient Prescriptions   Medication Sig    carvedilol (COREG) 3.125 mg tablet Take 1 Tab by mouth two (2) times daily (with meals).  warfarin (COUMADIN) 2.5 mg tablet Take 1 Tab by mouth daily.  Cholecalciferol, Vitamin D3, (VITAMIN D3) 2,000 unit cap capsule Take 2,000 Units by mouth daily.  sacubitril-valsartan (ENTRESTO) 97 mg/103 mg tablet Take 1 Tab by mouth two (2) times a day. Indications: chronic heart failure    nicotine (NICODERM CQ) 14 mg/24 hr patch 1 Patch by TransDERmal route every twenty-four (24) hours for 30 days.  spironolactone (ALDACTONE) 25 mg tablet Take 1 Tab by mouth daily.  amiodarone (CORDARONE) 200 mg tablet Take 1 Tab by mouth daily.  magnesium oxide (MAG-OX) 400 mg tablet Take 1 Tab by mouth daily. No current facility-administered medications for this visit. I have reviewed the nurses notes, vitals, problem list, allergy list, medical history, family medical, social history and medications. Objective:     Physical Exam:     There were no vitals filed for this visit. There is no height or weight on file to calculate BMI. General: WDWN adult male, in no acute distress. Pleasant affect. Heart:  Normal S1/S2 negative S3 or S4. Regular, no murmur, gallop or rub.   Respiratory: Clear bilaterally, no wheezing or rales  Neuro: A&Ox3, speech clear, gait stable. Skin: Skin color is normal. No rashes or lesions. No diaphoresis.   Vascular: 2+ pulses symmetric in all extremities      Data Review:       Cardiographics:    EKG interpretation:  None today    Cardiology Labs:    Results for orders placed or performed during the hospital encounter of 07/17/18   EKG, 12 LEAD, INITIAL   Result Value Ref Range    Ventricular Rate 173 BPM    Atrial Rate 192 BPM    QRS Duration 90 ms    Q-T Interval 256 ms    QTC Calculation (Bezet) 434 ms    Calculated R Axis -70 degrees    Calculated T Axis 17 degrees    Diagnosis       Atrial fibrillation with rapid ventricular response with premature   ventricular or aberrantly conducted complexes  Left anterior fascicular block  Possible Anterior infarct , age undetermined  Abnormal ECG  No previous ECGs available  Confirmed by Rito Mack MD ([id number]) on 7/18/2018 8:24:03 PM         No results found for: CHOL, CHOLX, CHLST, CHOLV, 441659, HDL, LDL, LDLC, DLDLP, TGLX, TRIGL, TRIGP, CHHD, CHHDX    Lab Results   Component Value Date/Time    Sodium 139 09/20/2018 12:25 PM    Potassium 4.4 09/20/2018 12:25 PM    Chloride 106 09/20/2018 12:25 PM    CO2 18 (L) 09/20/2018 12:25 PM    Anion gap 11 07/26/2018 05:12 AM    Glucose 84 09/20/2018 12:25 PM    BUN 7 09/20/2018 12:25 PM    Creatinine 0.94 09/20/2018 12:25 PM    BUN/Creatinine ratio 7 (L) 09/20/2018 12:25 PM    GFR est  09/20/2018 12:25 PM    GFR est non- 09/20/2018 12:25 PM    Calcium 9.5 09/20/2018 12:25 PM    Bilirubin, total 0.7 07/26/2018 05:12 AM    AST (SGOT) 21 07/26/2018 05:12 AM    Alk. phosphatase 100 07/26/2018 05:12 AM    Protein, total 6.8 07/26/2018 05:12 AM    Albumin 2.4 (L) 07/26/2018 05:12 AM    Globulin 4.4 (H) 07/26/2018 05:12 AM    A-G Ratio 0.5 (L) 07/26/2018 05:12 AM    ALT (SGPT) 19 07/26/2018 05:12 AM          Assessment:       ICD-10-CM ICD-9-CM    1. PAF (paroxysmal atrial fibrillation) ([facility]) I48.0 427.31    2. Anticoagulated Z79.01 V58.61    3. NICM (nonischemic cardiomyopathy) ([facility]) I42.8 425.4    4. Chronic combined systolic and diastolic heart failure ([facility]) I50.42 428.42    5. Cardiac defibrillator in place Z95.810 V45.02     External, LifeVest         Discussion: Patient presents at this time stable from a cardiac perspective.  INR therapeutic at 2.3. Pleased with present status. Plan: 1. Continue same meds. 2.Encouraged to exercise to tolerance, lose weight, low cholesterol, low sodium predominantly Plant-based (consider Mediterranean) diet. 3.Follow up: 1 month for INR   --  Call with questions or concerns. I have discussed the diagnosis with the patient and the intended plan as seen in the above orders. The patient has received an after-visit summary and questions were answered concerning future plans. I have discussed any concerning medication side effects and warnings with the patient as well.     Rich Owen PA-C  9/26/2018

## 2018-09-26 NOTE — PATIENT INSTRUCTIONS
-- Continue same medications  -- See you in 1 month for INR check         Consistent Vitamin K Diet: Care Instructions  Your Care Instructions    Your body needs vitamin K to clot blood and keep your bones strong. It's found in leafy green vegetables such as kale and spinach. If you take the blood thinner warfarin (Coumadin), you need to be careful about how much vitamin K you get. Vitamin K can keep your warfarin from working as it should. Most people who take warfarin can eat normally. The important thing is to get about the same amount of vitamin K each day. Don't suddenly start eating foods with a lot more or a lot less vitamin K. You can choose how much vitamin K you eat. For example, if you already eat a lot of leafy green vegetables, that's fine. Just keep it about the same amount each day. Follow-up care is a key part of your treatment and safety. Be sure to make and go to all appointments, and call your doctor if you are having problems. It's also a good idea to know your test results and keep a list of the medicines you take. How can you care for yourself at home? You don't need to stop eating food high in vitamin K. But you do need to know what foods contain vitamin K. Then you can try to eat about the same amount of vitamin K each day. · You might limit foods that are high in vitamin K to about 1 serving a day. These foods have about 250 to 500 micrograms (mcg) of vitamin K in each serving. They include:  ¨ Cooked leafy green vegetables. Examples are kale, spinach, turnip greens, kiran greens, Swiss chard, and mustard greens. One serving is ½ cup. · You might limit foods that are medium-high in vitamin K to about 3 servings a day. These foods have about 50 to 150 mcg of vitamin K in each serving. These include:  ¨ Cooked brussels sprouts, broccoli, cabbage, and asparagus. One serving is ½ cup. ¨ Raw leafy green vegetables. Examples are spinach, green leaf lettuce, caroline lettuce, and endive. One serving is 1 cup. · Vitamin K also is found in many multivitamins. You don't need to stop taking your multivitamin if it has vitamin K. But you do need to take it every day. · Check with your doctor before you start or stop taking any supplements or herbal products. Some of these may contain vitamin K. Where can you learn more? Go to http://eleanor-walter.info/. Enter T225 in the search box to learn more about \"Consistent Vitamin K Diet: Care Instructions. \"  Current as of: May 10, 2017  Content Version: 11.7  © 7948-6154 [email or web address]. Care instructions adapted under license by [employer] (which disclaims liability or warranty for this information). If you have questions about a medical condition or this instruction, always ask your healthcare professional. [address] any warranty or liability for your use of this information.

## 2018-09-26 NOTE — MR AVS SNAPSHOT
[address] [facility] 
 
 
 [address] [place] [phone] Patient: Sahd Guthrie MRN: [id number] Visit Information Date & Time Provider Department Dept. Phone Encounter #  
 9/26/2018  1:20 PM Burak Perry MD [facility] at [facility] [phone] Your Appointments 10/10/2018 12:30 PM  
Follow Up with YOUSUF Brambila [address] ([address]) Appt Note: HF follow up [address] [facility], [address] [facility] [address]  
411 [facility], [phone]  
  
    
 11/19/2018  3:00 PM  
New Patient with Tammy Carolina MD  
[address] [facility] ([address] [place] [address]) Appt Note: NP refd by Damon Hall NP for snoring-  
 [address] [facility] [address]  
  
   
 [address] [place] [phone] Upcoming Health Maintenance Date Due Pneumococcal 19-64 Medium Risk (1 of 1 - PPSV23) 7/15/1998 DTaP/Tdap/Td series (1 - Tdap) 7/15/2000 Influenza Age 5 to Adult 8/1/2018 Allergies as of 9/26/2018  Review Complete On: 9/20/2018 By: YOUSUF Brambila No Known Allergies Current Immunizations  Reviewed on 7/22/2018 No immunizations on file. Not reviewed this visit You Were Diagnosed With   
  
 Codes Comments PAF (paroxysmal atrial fibrillation) ([facility]    -  Primary ICD-10-CM: I48.0 ICD-9-CM: 427.31 Anticoagulated     ICD-10-CM: Z79.01 
ICD-9-CM: V58.61   
 NICM (nonischemic cardiomyopathy) ([facility]     ICD-10-CM: I42.8 ICD-9-CM: 425. 4 Chronic combined systolic and diastolic heart failure (HCC)     ICD-10-CM: I50.42 
ICD-9-CM: 428.42 Cardiac defibrillator in place     ICD-10-CM: Z95.810 ICD-9-CM: V45.02 External, LifeVest  
  
Vitals Smoking Status Current Some Day Smoker Preferred Pharmacy Pharmacy Name Phone [address], [place] [phone] Your Updated Medication List  
  
   
This list is accurate as of 9/26/18  2:22 PM.  Always use your most recent med list.  
  
  
  
  
 amiodarone 200 mg tablet Commonly known as:  CORDARONE Take 1 Tab by mouth daily. carvedilol 3.125 mg tablet Commonly known as:  May Hush Take 1 Tab by mouth two (2) times daily (with meals). Cholecalciferol (Vitamin D3) 2,000 unit Cap capsule Commonly known as:  VITAMIN D3 Take 2,000 Units by mouth daily. magnesium oxide 400 mg tablet Commonly known as:  MAG-OX Take 1 Tab by mouth daily. nicotine 14 mg/24 hr patch Commonly known as:  NICODERM CQ  
1 Patch by TransDERmal route every twenty-four (24) hours for 30 days. sacubitril-valsartan  mg tablet Commonly known as:  ENTRESTO Take 1 Tab by mouth two (2) times a day. Indications: chronic heart failure  
  
 spironolactone 25 mg tablet Commonly known as:  ALDACTONE Take 1 Tab by mouth daily. warfarin 2.5 mg tablet Commonly known as:  COUMADIN Take 1 Tab by mouth daily. Introducing [facility]! [employer] introduces [employer] patient portal. Now you can access parts of your medical record, email your doctor's office, and request medication refills online. 1. In your internet browser, go to [email or web address] 2. Click on the First Time User? Click Here link in the Sign In box. You will see the New Member Sign Up page. 3. Enter your [employer] Access Code exactly as it appears below. You will not need to use this code after youve completed the sign-up process. If you do not sign up before the expiration date, you must request a new code. · [employer] Access Code: [email or web address] Expires: 10/6/2018  5:16 PM 
 
 4. Enter the last four digits of your Social Security Number (xxxx) and Date of Birth (mm/dd/yyyy) as indicated and click Submit. You will be taken to the next sign-up page. 5. Create a Appuri ID. This will be your App[employer] login ID and cannot be changed, so think of one that is secure and easy to remember. 6. Create a [employer] password. You can change your password at any time. 7. Enter your Password Reset Question and Answer. This can be used at a later time if you forget your password. 8. Enter your e-mail address. You will receive e-mail notification when new information is available in [address] 9. Click Sign Up. You can now view and download portions of your medical record. 10. Click the Download Summary menu link to download a portable copy of your medical information. If you have questions, please visit the Frequently Asked Questions section of the [employer] website. Remember, App[employer] is NOT to be used for urgent needs. For medical emergencies, dial 911. Now available from your iPhone and Android! Please provide this summary of care documentation to your next provider. Your primary care clinician is listed as NONE. If you have any questions after today's visit, please call [phone].

## 2018-10-09 ENCOUNTER — TELEPHONE (OUTPATIENT)
Dept: CARDIOLOGY CLINIC | Age: 38
End: 2018-10-09

## 2018-10-09 NOTE — TELEPHONE ENCOUNTER
Telephone Call RE:  Appointment reminder     Outcome:     [] Patient confirmed appointment   [] Patient rescheduled appointment for    [] Unable to reach   [] Left message              [x] Other:     Patient is unable to keep this appointment. He will call us on Monday to reschedule.       Tito Cabrera

## 2018-10-24 ENCOUNTER — HOSPITAL ENCOUNTER (OUTPATIENT)
Dept: LAB | Age: 38
Discharge: HOME | End: 2018-10-24

## 2018-10-24 ENCOUNTER — OFFICE VISIT (OUTPATIENT)
Dept: CARDIOLOGY CLINIC | Age: 38
End: 2018-10-24

## 2018-10-24 VITALS
OXYGEN SATURATION: 97 % | TEMPERATURE: 97.2 F | SYSTOLIC BLOOD PRESSURE: 112 MMHG | BODY MASS INDEX: 28.89 KG/M2 | RESPIRATION RATE: 16 BRPM | DIASTOLIC BLOOD PRESSURE: 80 MMHG | HEIGHT: 75 IN | WEIGHT: 232.4 LBS | HEART RATE: 72 BPM

## 2018-10-24 DIAGNOSIS — R06.02 SOB (SHORTNESS OF BREATH): ICD-10-CM

## 2018-10-24 DIAGNOSIS — I34.0 NON-RHEUMATIC MITRAL REGURGITATION: ICD-10-CM

## 2018-10-24 DIAGNOSIS — I42.8 NICM (NONISCHEMIC CARDIOMYOPATHY) (HCC): ICD-10-CM

## 2018-10-24 DIAGNOSIS — I48.0 PAF (PAROXYSMAL ATRIAL FIBRILLATION) (HCC): ICD-10-CM

## 2018-10-24 DIAGNOSIS — I50.42 CHRONIC COMBINED SYSTOLIC AND DIASTOLIC HEART FAILURE (HCC): Primary | Chronic | ICD-10-CM

## 2018-10-24 DIAGNOSIS — E83.42 HYPOMAGNESEMIA: ICD-10-CM

## 2018-10-24 PROCEDURE — 83880 ASSAY OF NATRIURETIC PEPTIDE: CPT | Performed by: NURSE PRACTITIONER

## 2018-10-24 PROCEDURE — 80048 BASIC METABOLIC PNL TOTAL CA: CPT | Performed by: NURSE PRACTITIONER

## 2018-10-24 PROCEDURE — 83735 ASSAY OF MAGNESIUM: CPT | Performed by: NURSE PRACTITIONER

## 2018-10-24 NOTE — LETTER
10/24/2018 11:11 AM 
 
Patient:  Mini Smith YOB: 1980 Date of Visit: 10/24/2018 Dear Phillip Gastelum MD 
[facility] [place] [phone] VIA In Basket 
 : Thank you for referring Mr. Mini Smith to me for evaluation/treatment. Below are the relevant portions of my assessment and plan of care. If you have questions, please do not hesitate to call me. I look forward to following Mr. Orin Hathaway along with you. Sincerely, Solomon Haskins, HELENP

## 2018-10-24 NOTE — PROGRESS NOTES
[facility] Clinic Note      DOS:   10/24/2018  NAME:  Nereyda Munoz   MRN:   [id number]     REFERRING PROVIDER:  Dr. Kayla Del Rio: None  PRIMARY CARDIOLOGIST: Dr. Edmund Boyd  Sleep: Dr. 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, CMV IgG positive, HHV-6 positive     HFrEF, NYHA II LVEF 10-20%, RVEF 22%   Conntinue entresto 97/103 mg BID ([employer] providing)   Cont Coreg 3.125 mg BID  and spironolactone 25 mg daily   Cardiac rehab- hold until echo completed     SHRUTHI eval - November   Recheck echo,  if EF still low will get CPET, and consider GALACTIN -HF     Weight gain due to diet indiscretion, no evidence of volume overload or decompensated HF- encouraged to watch diet/portion control and exercise more             Follow up 1 month and by phone after echo     High risk of SCD   Life Vest - encouraged to resume    Recheck echo soon     Reinforced importance of 24/7 use (except when showering)        Afib w/ RVR S/p Cardioversion- Remains in Sinus   Amio  per EP    Continue warfarin - Dr. Edmund Boyd- no bleeding     Functional MR-    Follow up echo      ? SHRUTHI    sleep eval in November    Tobacco Use disorder            Smoking cessation                   Thank you for allowing me to participate in the care of this delightful gentleman. Please do not hesitate to call with any questions. Christiana Ramos RN, ACNP-BC, [facility]      Chief Complaint:  Chief Complaint   Patient presents with   Amairani Son is a delightful  43[facility]. [facility] with recent hx of HFrEF (10-20%) in the setting of viral myocarditis (Human Herpes Virus 6)  Complicated by new onset PAF with RVR s/p DCCV. He presents for follow up of his systolic heart failure. He was last seen 1 month ago. He was first diagnosed 8/2018 when he presented to [facility]  with ADHF found to be in afib with RVR.  CTA (-) for PE, but showed a large R pleural effusion. He underwent a thoracentesis for ~ 1 L, and  ROBERTO and DCCV. Right and left heart cath showed no epicardial disease and slightly elevated filling pressures. Cardiac MRI showed LVEF 17%, RVEF 20%  confirmed viral myocarditis and lab work was positive for  Coxsackie pane, CMV IgG positive. HHV-6 positive. Recent Echo  7/17/18 shows :mild-mod LVD, EF 10-20%, severe DHK, mild LVH, DD, RVD, mild- mod LAE, mod RAY, severe MR, mod-severe TR, RVSP 40, dilated RV outflow tract   He was started on GDMT and discharged with a life vest.     Denies exercise intolerance, no FLOWERS- walks daily   Follows daily weights, and he attributes weight gain to eating too much., limiting sodium, rarely eats out or packaged foods, or adding salt. Denies LE edema, bloating. Sleeping well, denies orthopnea, PND, seeing Dr. Micaela Meredith 11/19. Energy level better- naps 2-3 times per week. compliant with meds, He has not started cardiac rehab. He has stopped wearing the LifeVest since it is 90 days, although he has not had a repeat echo. Still smoking  ~ 3-4  Cigarettes/day, a few etoh beverages  on the weekends      INR being followed by cardiology- no bleeding issues              Denies exertional chest pain, palpitations, lightheadedness, syncope, near syncope, AICD shock, bleeding. Compliant with the meds    Receiving Entresto from [employer], applied for  Medicaid and awaiting  Care card notification. Jaylene Perry lives with his  sister in their new home. 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 Robe Constantino is working on ST disability). He smoked 15 years ~ 1/2 ppd. 2 etoh drinks each day of the  weekends No additional substances. Some college courses. Review of Systems:     Constitutional:  Some improvement in his stamina, negative, feels well today   HEENT:   Negative. Denies angioedema sx    Respiratory:  Negative for cough. Cardiovascular:  Per HPI   Gastrointestinal:  Negative. Genitourinary:  Denies dysuria, frequency    Musculoskeletal:  Negative. Skin:    Negative. Neurological:  Negative. Psy:    Positive for anxiety- improved         CARDIAC EVALUATION   ECHO 7/17/18:mild-mod LVD, EF 10-20%, severe DHK, mild LVH, DD, RVD, mild- mod LAE, mod RAY, severe MR, mod-severe TR, RVSP 40, dilated RV outflow tract    ROBERTO 7/20/18: LVD, EF 10-15%, severe DHK, mild RVD, reduced RVEF, mod ANNAMARIE, no CELINA thrombus, no PFO, mod MR, mild TR      CATH 7/20/18: no epicardial disease, LVEP 1     RHC 7/20/18: RA:12, RV 34/6/14. PA 30/20/26, PCWP 18, [place] 6.33/2.74 TD: 5.37/2.32     DCCV 7/20/18    CARDIAC MRI: 7/20/18: mod LVD (LVDD 66), mild LVH (13mm), LVEF 17%, severe GHK, mild RVD, RVEF 20%, mod GHK, mod MR, mild TR   Distinct mid wall stripe enhancement of the basal septal wall along with RV insertion point enhancement sparing the subendocardium. viral myocarditis of HHV-6  viral strain. NO features of giant cell or lymphocytic myocarditis. ,  infiltrative  Sarcoidosis, amyloidosis, old MI    UPEP, SPEP,  heavy metals, FLC (-) HIV ( NR), Legionella (-) Ferritin WNL     History:  Past Medical History:   Diagnosis Date    Combined systolic and diastolic heart failure ([address] 8/8/2018    ECHO 7/17/18:mild-mod LVD, EF 10-20%, severe DHK, mild LVH, DD, RVD, mild- mod LAE, mod RAY, severe MR, mod-severe TR, RVSP 40, dilated RV outflow tract  ROBERTO 7/20/18: LVD, EF 10-15%, severe DHK, mild RVD, reduced RVEF, mod ANNAMARIE, no CELINA thrombus, no PFO, mod MR, mild TR  RHC 7/20/18: RA:12, RV 34/6/14.  PA 30/20/26, PCWP 18, [place] 6.33/2.74 TD: 5.37/2.32    CARDIAC MRI: 7/20/18: mod LVD (LVDD 66), mil    Congestive heart failure ([address]     Long term current use of anticoagulant therapy     N[facility]M (nonischemic cardiomyopathy) [address] 8/8/2018    Non-rheumatic mitral regurgitation 8/8/2018    ECHO 7/17/18:mild-mod LVD, EF 10-20%, severe DHK, mild LVH, DD, RVD, mild- mod LAE, mod RAY, severe MR, mod-severe TR, RVSP 40, dilated RV outflow tract  ROBERTO 18: LVD, EF 10-15%, severe DHK, mild RVD, reduced RVEF, mod ANNAMARIE, no CELINA thrombus, no PFO, mod MR, mild TR      PAF (paroxysmal atrial fibrillation) [address] 2018    ROBERTO 18: LVD, EF 10-15%, severe DHK, mild RVD, reduced RVEF, mod ANNAMARIE, no CELINA thrombus, no PFO, mod MR, mild TR    DCCV 18       Rapid atrial fibrillation ([facility] 2018    Tobacco use disorder 2018    Viral myocarditis 2018    Human herpes virus 6    Vitamin D deficiency 2018    Level 12 2018     Past Surgical History:   Procedure Laterality Date    CARDIOVERSION, ELECTIVE;EXTERN  2018         HX HEART CATHETERIZATION       Social History     Socioeconomic History    Marital status: SINGLE     Spouse name: Not on file    Number of children: Not on file    Years of education: Not on file    Highest education level: Not on file   Social Needs    Financial resource strain: Not on file    Food insecurity - worry: Not on file    Food insecurity - inability: Not on file   Pikum needs - medical: Not on file   [employer] needs - non-medical: Not on file   Occupational History    Not on file   Tobacco Use    Smoking status: Current Some Day Smoker     Types: Cigarettes     Last attempt to quit: 2018     Years since quittin.2    Smokeless tobacco: Never Used    Tobacco comment: 3-4 cigarettes vaughn   Substance and Sexual Activity    Alcohol use: No    Drug use: No    Sexual activity: Yes   Other Topics Concern    Not on file   Social History Narrative    Not on file     Family History   Problem Relation Age of Onset    Diabetes Mother     Heart Failure Mother     No Known Problems Father     Hypertension Sister     Hypertension Sister        Current Medications:   Current Outpatient Medications   Medication Sig Dispense Refill    carvedilol (COREG) 3.125 mg tablet Take 1 Tab by mouth two (2) times daily (with meals).  60 Tab 3    warfarin (COUMADIN) 2.5 mg tablet Take 1 Tab by mouth daily. 30 Tab 3    Cholecalciferol, Vitamin D3, (VITAMIN D3) 2,000 unit cap capsule Take 2,000 Units by mouth daily. 30 Cap 6    sacubitril-valsartan (ENTRESTO) 97 mg/103 mg tablet Take 1 Tab by mouth two (2) times a day. Indications: chronic heart failure 180 Tab 3    spironolactone (ALDACTONE) 25 mg tablet Take 1 Tab by mouth daily. 30 Tab 3    amiodarone (CORDARONE) 200 mg tablet Take 1 Tab by mouth daily. 30 Tab 12    magnesium oxide (MAG-OX) 400 mg tablet Take 1 Tab by mouth daily. 30 Tab 1       Allergies: No Known Allergies      Vitals:   Visit Vitals  /80 (BP 1 Location: Right arm, BP Patient Position: Sitting)   Pulse 72   Temp 97.2 °F (36.2 °C) (Oral)   Resp 16   Ht 6' 3\" (1.905 m)   Wt 232 lb 6.4 oz (105.4 kg)   SpO2 97%   BMI 29.05 kg/m²       Physical Exam:   Constitutional:   Well developed AA male, cooperative, pleasant, in NAD  HEENT:   Normocephalic, atraumatic, sclera anicteric, multiple gold teeth   Neck:    Normal range of motion. Neck supple. Pulmonary/Chest:  Effort normal and breath sounds normal. Life Vest in place. Cardiovascular:    PMI:diminished, RRR,  RRR,  S1, S2 normal, - S3,  JVP  10 cm (-)  HJR   Abdominal:   Soft. Non tender, non distended,  Bowel sounds are normal. No hepatomegally   Musculoskeletal:    no edema, calf tenderness, cyanosis or clubbing   Neurological:  A& O x3, no focal deficits, ambulates w/o difficulty    Extremities:           Intact distal pulses. Skin:    Skin is warm and dry.    Psy:    appears calm, not agitated     Recent Labs:   Lab Results   Component Value Date/Time    WBC 6.0 07/26/2018 05:12 AM    HGB 14.6 07/26/2018 05:12 AM    HCT 45.7 07/26/2018 05:12 AM    PLATELET 560 21/85/1969 05:12 AM    MCV 98.1 07/26/2018 05:12 AM     Lab Results   Component Value Date/Time    TSH 0.80 07/18/2018 04:18 AM    T4, Free 1.4 07/20/2018 01:50 PM      Lab Results   Component Value Date/Time    CK 91 07/17/2018 02:00 PM    CK - MB 1.6 07/17/2018 02:00 PM    CK-MB Index 1.8 07/17/2018 02:00 PM      Lab Results   Component Value Date/Time    NT pro-BNP 1,981 (H) 07/26/2018 05:12 AM    NT pro-BNP 2,507 (H) 07/25/2018 04:08 AM    NT pro-BNP 2,972 (H) 07/24/2018 04:19 AM    NT pro-BNP 2,629 (H) 07/23/2018 04:09 AM    NT pro-BNP 2,747 (H) 07/21/2018 03:38 AM    PROBNP 477 (H) 09/20/2018 12:25 PM    PROBNP 940 (H) 09/04/2018 12:00 AM    PROBNP 1,486 (H) 08/09/2018 12:00 AM      Lab Results   Component Value Date/Time    Sodium 139 09/20/2018 12:25 PM    Potassium 4.4 09/20/2018 12:25 PM    Chloride 106 09/20/2018 12:25 PM    CO2 18 (L) 09/20/2018 12:25 PM    Anion gap 11 07/26/2018 05:12 AM    Glucose 84 09/20/2018 12:25 PM    BUN 7 09/20/2018 12:25 PM    Creatinine 0.94 09/20/2018 12:25 PM    BUN/Creatinine ratio 7 (L) 09/20/2018 12:25 PM    GFR est  09/20/2018 12:25 PM    GFR est non- 09/20/2018 12:25 PM    Calcium 9.5 09/20/2018 12:25 PM    Bilirubin, total 0.7 07/26/2018 05:12 AM    ALT (SGPT) 19 07/26/2018 05:12 AM    AST (SGOT) 21 07/26/2018 05:12 AM    Alk. phosphatase 100 07/26/2018 05:12 AM    Protein, total 6.8 07/26/2018 05:12 AM    Albumin 2.4 (L) 07/26/2018 05:12 AM    Globulin 4.4 (H) 07/26/2018 05:12 AM    A-G Ratio 0.5 (L) 07/26/2018 05:12 AM      No results found for: HBA1C, UMP8KSSS, HGBE8, JVT7XITB, KDZ2AGSA, BCS4ABME     CTA Results (most recent): 7/17/18:       Impression IMPRESSION: Moderate right pleural effusion with right middle and right lower  lobe infiltrates. Left lower lobe atelectasis. No evidence of pulmonary  embolism. I have discussed the diagnosis with  Daisy Chacon and the intended plan as seen in the above orders. Questions were answered concerning future plans, and written instructions provided. I have discussed medication side effects and warnings with the patient as well. Thank you for allowing me to participate in the care of this delightful gentleman. Please do not hesitate to call with any questions. Christiana Rodriguez RN, ACNP-BC, [facility]  [address] [facility] [address] [phone]  24 hour VAD/HF Pager: [phone]

## 2018-10-24 NOTE — PATIENT INSTRUCTIONS
You are doing better  Resume Life vest just until we get your echo back   Echo soon       CONTINUE CURRENT medications    Take twice a day medications 12 hours apart with food    Labs today BMP, proBNP , Mag    See Dr. Juju Patel for sleep study   Hold off on rehab for now until we get your echo results back  Watch your diet, eat less, move more    Limit WHITE foods ( flour, sugar, pasta, rice, potatoes)    Walk more    Keep a positive attitude   Follow-up 1 month     HF Education: Continue daily weights (in the morning, after voiding). Notify HF team of overnight weight gains > 2 lbs or weekly >5 lbs or if any of the following Sx. Continue to limit sodium intake & monitor your fluid intake .

## 2018-10-25 ENCOUNTER — TELEPHONE (OUTPATIENT)
Dept: CARDIOLOGY CLINIC | Age: 38
End: 2018-10-25

## 2018-10-25 LAB
ANION GAP SERPL CALC-SCNC: 9 MMOL/L (ref 5–15)
BNP SERPL-MCNC: 50 PG/ML (ref 0–125)
BUN SERPL-MCNC: 10 MG/DL (ref 6–20)
BUN/CREAT SERPL: 10 (ref 12–20)
CALCIUM SERPL-MCNC: 8.7 MG/DL (ref 8.5–10.1)
CHLORIDE SERPL-SCNC: 106 MMOL/L (ref 97–108)
CO2 SERPL-SCNC: 23 MMOL/L (ref 21–32)
CREAT SERPL-MCNC: 1.01 MG/DL (ref 0.7–1.3)
GLUCOSE SERPL-MCNC: 98 MG/DL (ref 65–100)
MAGNESIUM SERPL-MCNC: 1.9 MG/DL (ref 1.6–2.4)
POTASSIUM SERPL-SCNC: 4.3 MMOL/L (ref 3.5–5.1)
SODIUM SERPL-SCNC: 138 MMOL/L (ref 136–145)

## 2018-10-25 NOTE — TELEPHONE ENCOUNTER
----- Message from YOUSUF Castro sent at 10/25/2018 12:02 PM EDT -----  Labs stable  Continue Current plan  Thank you      Left message for patient to return call with message as noted above. Patient returned call, I reviewed all lab results, he states understanding.

## 2018-10-26 ENCOUNTER — HOSPITAL ENCOUNTER (OUTPATIENT)
Dept: NON INVASIVE DIAGNOSTICS | Age: 38
Discharge: HOME | End: 2018-10-26
Attending: NURSE PRACTITIONER
Payer: SUBSIDIZED

## 2018-10-26 ENCOUNTER — OFFICE VISIT (OUTPATIENT)
Dept: CARDIOLOGY CLINIC | Age: 38
End: 2018-10-26

## 2018-10-26 VITALS
HEIGHT: 75 IN | DIASTOLIC BLOOD PRESSURE: 86 MMHG | OXYGEN SATURATION: 97 % | WEIGHT: 230 LBS | SYSTOLIC BLOOD PRESSURE: 115 MMHG | BODY MASS INDEX: 28.6 KG/M2 | RESPIRATION RATE: 18 BRPM | HEART RATE: 73 BPM

## 2018-10-26 DIAGNOSIS — I48.91 ATRIAL FIBRILLATION, UNSPECIFIED TYPE (HCC): Primary | ICD-10-CM

## 2018-10-26 DIAGNOSIS — I50.42 CHRONIC COMBINED SYSTOLIC AND DIASTOLIC HEART FAILURE (HCC): Chronic | ICD-10-CM

## 2018-10-26 DIAGNOSIS — I50.40 COMBINED SYSTOLIC AND DIASTOLIC HEART FAILURE, UNSPECIFIED HF CHRONICITY (HCC): ICD-10-CM

## 2018-10-26 DIAGNOSIS — I42.8 NICM (NONISCHEMIC CARDIOMYOPATHY) (HCC): ICD-10-CM

## 2018-10-26 DIAGNOSIS — I34.0 NON-RHEUMATIC MITRAL REGURGITATION: ICD-10-CM

## 2018-10-26 LAB
INR BLD: 1
PT POC: 12.1 SECONDS
VALID INTERNAL CONTROL?: YES

## 2018-10-26 PROCEDURE — 93306 TTE W/DOPPLER COMPLETE: CPT

## 2018-10-26 RX ORDER — WARFARIN 4 MG/1
4 TABLET ORAL DAILY
Qty: 30 TAB | Refills: 3 | Status: SHIPPED | OUTPATIENT
Start: 2018-10-26 | End: 2018-12-19

## 2018-10-26 NOTE — PATIENT INSTRUCTIONS
Please take an extra 2.5 COUMADIN(WARFARIN) today, and after that there is a new prescription in your pharmacy for 4 mg pills, to be taken one daily.     PLEASE WEAR YOUR LIFE VEST UNTIL INSTRUCTED OTHERWISE

## 2018-10-26 NOTE — PROGRESS NOTES
Chief Complaint   Patient presents with    Irregular Heart Beat     1 mo fu     1. Have you been to the ER, urgent care clinic since your last visit? Hospitalized since your last visit? No    2. Have you seen or consulted any other health care providers outside of the [address] [place] [address] since your last visit? Include any pap smears or colon screening.  No

## 2018-10-27 ENCOUNTER — DOCUMENTATION ONLY (OUTPATIENT)
Dept: CARDIOLOGY CLINIC | Age: 38
End: 2018-10-27

## 2018-10-27 NOTE — PROGRESS NOTES
Cardiology:    I called the patient to inform him that the echo offers encouraging news. His LV has improved significantly on current Rx. I asked him to continue to use the LifeVest until he is seens again in the [address] [place] [address]. WE will need to update his INR on the increased coumadin dose.      Estefani Bruner MD

## 2018-10-29 ENCOUNTER — TELEPHONE (OUTPATIENT)
Dept: CARDIOLOGY CLINIC | Age: 38
End: 2018-10-29

## 2018-10-29 NOTE — PROGRESS NOTES
Great news !!!! EF has improved  To  45% with meds. Note that he still has significant Diastolic dysfunction and still needs to be treated as a pt with HF. OK to DC life vest and he not longer qualifies for cardiac rehab. Continue current meds and plan as outlined. Reinforce that he needs to very closely watch his sodium intake.   Thank you

## 2018-10-31 NOTE — PROGRESS NOTES
Shena Guo is a 71-year-old male being followed in the ambulatory setting for severe congestive cardiomyopathy believed to be related to an acute viremia. He was first admitted to [facility] about 2 months ago and found to have extremely poor ventricular function with a relatively short duration of symptoms according to the patient. After initial establishment of therapy and anticoagulation because of sustained atrial fibrillation he was transferred to the care of the advanced heart failure center, under Dr. Jeb Garza, in [facility].  He was subsequently discharged with an external cardio converter defibrillator with a plan for serious consideration of an implanted device at the end of a 90-day period of maximal medical therapy. He actually came back for an INR check today but this needed to be expanding into a visit partially for patient education. He brought the defibrillator vest in a small carry back but was not wearing it and had a variety of inane excuses as to why not. He denies chest pain and syncope. And he is able to sleep flat although he tends to use 1 extra pillow. He denies paroxysmal nocturnal dyspnea. It is difficult to get a clear answer regarding activity tolerance. He states he has been compliant with his medications but he is just reluctant to wear the vest.  He is also scheduled for a follow-up echocardiogram to be performed today. On examination, blood pressure is 115/86. Pulse is 73 and moderately irregular. He weighs 230 pounds. At a height of 6 feet 3 inches this yields a body mass index of 28.75. Respiratory rate is 18 without distress. Room air oxygen saturation is 97%. Jugular veins are flat in the seated position but feel below 20 degrees torso elevation. Lungs are clear to auscultation. Cardiac auscultation shows regular rhythm with normal quality S1 in contrast to prior examinations, mildly increased S2.   There is a faint systolic murmur at the apex and left axilla but not centrally. Peripheral pulses are intact with normal volume and timing. Carotids are silent. Abdomen is nontender without pulsation or bruit. There is no peripheral edema and pulses show normal volume and timing.

## 2018-11-01 ENCOUNTER — TELEPHONE (OUTPATIENT)
Dept: CARDIOLOGY CLINIC | Age: 38
End: 2018-11-01

## 2018-11-01 NOTE — TELEPHONE ENCOUNTER
I returned call to patient, reviewed echo results and lab results. Please see notes in other encounters.

## 2018-11-01 NOTE — TELEPHONE ENCOUNTER
Patient called and left a message stating that he missed the last call from the office, and would like a call back. Thank you.

## 2018-11-26 ENCOUNTER — OFFICE VISIT (OUTPATIENT)
Dept: SLEEP MEDICINE | Age: 38
End: 2018-11-26

## 2018-11-26 VITALS
HEIGHT: 75 IN | HEART RATE: 78 BPM | OXYGEN SATURATION: 96 % | SYSTOLIC BLOOD PRESSURE: 115 MMHG | BODY MASS INDEX: 29.22 KG/M2 | DIASTOLIC BLOOD PRESSURE: 80 MMHG | WEIGHT: 235 LBS

## 2018-11-26 DIAGNOSIS — G47.33 OSA (OBSTRUCTIVE SLEEP APNEA): Primary | ICD-10-CM

## 2018-11-28 ENCOUNTER — OFFICE VISIT (OUTPATIENT)
Dept: CARDIOLOGY CLINIC | Age: 38
End: 2018-11-28

## 2018-11-28 VITALS
DIASTOLIC BLOOD PRESSURE: 80 MMHG | BODY MASS INDEX: 29.37 KG/M2 | WEIGHT: 235 LBS | TEMPERATURE: 97.5 F | OXYGEN SATURATION: 98 % | RESPIRATION RATE: 14 BRPM | HEART RATE: 74 BPM | SYSTOLIC BLOOD PRESSURE: 142 MMHG

## 2018-11-28 DIAGNOSIS — F17.200 TOBACCO USE DISORDER: ICD-10-CM

## 2018-11-28 DIAGNOSIS — I48.0 PAF (PAROXYSMAL ATRIAL FIBRILLATION) (HCC): ICD-10-CM

## 2018-11-28 DIAGNOSIS — R06.02 SOB (SHORTNESS OF BREATH): ICD-10-CM

## 2018-11-28 DIAGNOSIS — R06.83 SNORING: ICD-10-CM

## 2018-11-28 DIAGNOSIS — I42.8 NICM (NONISCHEMIC CARDIOMYOPATHY) (HCC): ICD-10-CM

## 2018-11-28 DIAGNOSIS — I50.42 CHRONIC COMBINED SYSTOLIC AND DIASTOLIC HEART FAILURE (HCC): Primary | Chronic | ICD-10-CM

## 2018-11-28 NOTE — PATIENT INSTRUCTIONS
You are doing great! START Ocean spray- nasal saline several times per day, consider using   Flonase ( over the counter) twice a day  Establish with a PCP- see Medicaid list   STOP smoking     CONTINUE CURRENT medications    Take twice a day medications 12 hours apart with food    Labs today BMP, proBNP    See Dr. Alicia Gannon and ask him about possibility of doing an event monitor to look if you are still having afib and his thoughts on stopping the amiodarone       Limit WHITE foods ( flour, sugar, pasta, rice, potatoes)  Watch portion size   Walk more    Keep a positive attitude       HF Education: Continue daily weights (in the morning, after voiding). Notify HF team of overnight weight gains > 2 lbs or weekly >5 lbs or if any of the following Sx. Continue to limit sodium intake & monitor your fluid intake.

## 2018-11-28 NOTE — LETTER
11/28/2018 12:19 PM 
 
Patient:  Daisy Chacon YOB: 1980 Date of Visit: 11/28/2018 Dear Rj Logn MD 
[facility] [id number] VIA In Basket 
 : Thank you for referring Mr. Daisy Chacon to me for evaluation/treatment. Below are the relevant portions of my assessment and plan of care. If you have questions, please do not hesitate to call me. I look forward to following Mr. Rosario Bassett along with you. Sincerely, Martha Boxer, ACNP

## 2018-11-28 NOTE — PROGRESS NOTES
[facility] Note      DOS:   11/28/2018  NAME:  Diaz Del Real   MRN:   [id number]     REFERRING PROVIDER:  Dr. Mary Khanna: None  PRIMARY CARDIOLOGIST: Dr. Susie Joe  Sleep: Dr. 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, CMV IgG positive, HHV-6 positive     HFrEF, NYHA II LVEF 10-20%, RVEF 22%- EF now improved 45%   Conntinue entresto 97/103 mg BID ([employer] providing)   Cont Coreg 3.125 mg BID  and spironolactone 25 mg daily   Cardiac rehab- hold until echo completed     SHRUTHI eval - under going eval    Encouraged to exercise regularly    Weight gain due to diet indiscretion, no evidence of volume overload or decompensated HF- encouraged to watch diet/portion control and exercise more             Follow up 1 month and by phone after echo        Paroxysmal Afib w/ RVR S/p Cardioversion- Remains in Sinus   Amio  per EP    Continue warfarin - Dr. Susie Joe- no bleeding    Consider implantable monitor to assess afib burden and if not present consider stopping amiodarone and coumadin- defer to Dr. Susie Joe and EP     Functional MR- improved with improved EF      ? SHRUTHI    sleep eval underway     Tobacco Use disorder            Smoking cessation                 Thank you for allowing me to participate in the care of this delightful gentleman. Please do not hesitate to call with any questions. Christiana Hernández RN, ACNP-BC, [facility]      Chief Complaint:  Chief Complaint   Patient presents with    Follow Up Chronic Condition         Asim Basilio is a delightful  43y.o. year old [address] male with recent hx of HFrEF (10-20%) in the setting of viral myocarditis (Human Herpes Virus 6)  Complicated by new onset PAF with RVR s/p DCCV. He presents for follow up of his systolic heart failure. He was last seen 1 month ago. He was first diagnosed 8/2018 when he presented to [facility]  with ADHF found to be in afib with RVR.  CTA (-) for PE, but showed a large R pleural effusion. He underwent a thoracentesis for ~ 1 L, and  ROBERTO and DCCV. Right and left heart cath showed no epicardial disease and slightly elevated filling pressures. Cardiac MRI showed LVEF 17%, RVEF 20%  confirmed viral myocarditis and lab work was positive for  Coxsackie pane, CMV IgG positive. HHV-6 positive. Recent Echo  10/26/18: showed EF improved to 45% with grade 3/4 diastolic dysfunction and improved MR now to mild      He is feeling better, more energy and stamina  Denies exercise intolerance, no FLOWERS- walks daily- no formal exercise   Follows daily weights, and he attributes weight gain to eating too much. limiting sodium, rarely eats out or packaged foods, or adding salt. Denies LE edema, bloating. Sleeping well, denies orthopnea, PND, saw Dr. Jenifer Zavaleta - planning for sleep study  Naps ~ 2 x/week   compliant with med       Still smoking  ~ 3-4  Cigarettes/day, a few etoh beverages  on the weekends      INR being followed by cardiology- no bleeding issues    Compliant with the meds    Receiving Entresto from [employer], applied for  Medicaid and approved for   Care card. Elli Martinez lives with his  sister in their new home. 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 He smoked 15 years ~ 1/2 ppd. 2 etoh drinks each day of the  weekends No additional substances. Some college courses. Review of Systems:     Constitutional:   improvement in his stamina, negative, feels well today   HEENT:   Recent URI sx, no fever Negative. Denies angioedema sx    Respiratory:  Negative for cough. Cardiovascular:  Denies exertional chest pain, palpitations, lightheadedness, syncope, near syncope, bleeding. Gastrointestinal:  Negative. Genitourinary:  Denies dysuria, frequency    Musculoskeletal:  Negative. Skin:    Negative. Neurological:  Negative.   Psy:    Positive for anxiety- improved         CARDIAC EVALUATION   ECHO 7/17/18:mild-mod LVD, EF 10-20%, severe DHK, mild LVH, DD, RVD, mild- mod LAE, mod RAY, severe MR, mod-severe TR, RVSP 40, dilated RV outflow tract  ECHO 10/26/2018: mild LVD, EF 45%, G3/4 DD, mod ant leaflet thickening, mild MR, dil RVOT      ROBERTO 7/20/18: LVD, EF 10-15%, severe DHK, mild RVD, reduced RVEF, mod ANNAMARIE, no CELINA thrombus, no PFO, mod MR, mild TR      CATH 7/20/18: no epicardial disease, LVEP 1     RHC 7/20/18: RA:12, RV 34/6/14. PA 30/20/26, PCWP 18, [place] 6.33/2.74 TD: 5.37/2.32     DCCV 7/20/18    CARDIAC MRI: 7/20/18: mod LVD (LVDD 66), mild LVH (13mm), LVEF 17%, severe GHK, mild RVD, RVEF 20%, mod GHK, mod MR, mild TR   Distinct mid wall stripe enhancement of the basal septal wall along with RV insertion point enhancement sparing the subendocardium. viral myocarditis of HHV-6  viral strain. NO features of giant cell or lymphocytic myocarditis. , infiltrative  Sarcoidosis, amyloidosis, old MI    UPEP, SPEP,  heavy metals, FLC (-) HIV ( NR), Legionella (-) Ferritin WNL     History:  Past Medical History:   Diagnosis Date    Anxiety     Combined systolic and diastolic heart failure ([address] 8/8/2018    ECHO 7/17/18:mild-mod LVD, EF 10-20%, severe DHK, mild LVH, DD, RVD, mild- mod LAE, mod RAY, severe MR, mod-severe TR, RVSP 40, dilated RV outflow tract  ROBERTO 7/20/18: LVD, EF 10-15%, severe DHK, mild RVD, reduced RVEF, mod ANNAMARIE, no CELINA thrombus, no PFO, mod MR, mild TR  RHC 7/20/18: RA:12, RV 34/6/14.  PA 30/20/26, PCWP 18, [place] 6.33/2.74 TD: 5.37/2.32    CARDIAC MRI: 7/20/18: mod LVD (LVDD 66), mil    Congestive heart failure [facility]     Long term current use of anticoagulant therapy     NICM (nonischemic cardiomyopathy) ([facility] 8/8/2018    Non-rheumatic mitral regurgitation 8/8/2018    ECHO 7/17/18:mild-mod LVD, EF 10-20%, severe DHK, mild LVH, DD, RVD, mild- mod LAE, mod RAY, severe MR, mod-severe TR, RVSP 40, dilated RV outflow tract  ROBERTO 7/20/18: LVD, EF 10-15%, severe DHK, mild RVD, reduced RVEF, mod ANNAMARIE, no CELINA thrombus, no PFO, mod MR, mild TR      PAF (paroxysmal atrial fibrillation) ([facility] 2018    ROBERTO 18: LVD, EF 10-15%, severe DHK, mild RVD, reduced RVEF, mod ANNAMARIE, no CELINA thrombus, no PFO, mod MR, mild TR    DCCV 18       Rapid atrial fibrillation ([facility] 2018    Tobacco use disorder 2018    Viral myocarditis 2018    Human herpes virus 6    Vitamin D deficiency 2018    Level 12 2018     Past Surgical History:   Procedure Laterality Date    CARDIOVERSION, ELECTIVE;EXTERN  2018         HX HEART CATHETERIZATION       Social History     Socioeconomic History    Marital status: SINGLE     Spouse name: Not on file    Number of children: Not on file    Years of education: Not on file    Highest education level: Not on file   Social Needs    Financial resource strain: Not on file    Food insecurity - worry: Not on file    Food insecurity - inability: Not on file   [email or web address] needs - medical: Not on file   [email or web address] needs - non-medical: Not on file   Occupational History    Not on file   Tobacco Use    Smoking status: Current Some Day Smoker     Types: Cigarettes     Last attempt to quit: 2018     Years since quittin.3    Smokeless tobacco: Never Used    Tobacco comment: 3-4 cigarettes vaughn   Substance and Sexual Activity    Alcohol use: No    Drug use: No    Sexual activity: Yes   Other Topics Concern     Service Not Asked    Blood Transfusions Not Asked    Caffeine Concern Not Asked    Occupational Exposure Not Asked    Hobby Hazards Not Asked    Sleep Concern Not Asked    Stress Concern Not Asked    Weight Concern Not Asked    Special Diet Not Asked    Back Care Not Asked    Exercise Not Asked    Bike Helmet Not Asked    Seat Belt Not Asked    Self-Exams Not Asked   Social History Narrative    Not on file     Family History   Problem Relation Age of Onset    Diabetes Mother     Heart Failure Mother     No Known Problems Father     Hypertension Sister     Hypertension Sister        Current Medications:   Current Outpatient Medications   Medication Sig Dispense Refill    warfarin (COUMADIN) 4 mg tablet Take 1 Tab by mouth daily. 30 Tab 3    carvedilol (COREG) 3.125 mg tablet Take 1 Tab by mouth two (2) times daily (with meals). 60 Tab 3    Cholecalciferol, Vitamin D3, (VITAMIN D3) 2,000 unit cap capsule Take 2,000 Units by mouth daily. 30 Cap 6    sacubitril-valsartan (ENTRESTO) 97 mg/103 mg tablet Take 1 Tab by mouth two (2) times a day. Indications: chronic heart failure 180 Tab 3    spironolactone (ALDACTONE) 25 mg tablet Take 1 Tab by mouth daily. 30 Tab 3    amiodarone (CORDARONE) 200 mg tablet Take 1 Tab by mouth daily. 30 Tab 12    magnesium oxide (MAG-OX) 400 mg tablet Take 1 Tab by mouth daily. 30 Tab 1       Allergies: No Known Allergies      Vitals:   Visit Vitals  /80   Pulse 74   Temp 97.5 °F (36.4 °C)   Resp 14   Wt 235 lb (106.6 kg)   SpO2 98%   BMI 29.37 kg/m²       Physical Exam:   Constitutional:   Well developed AA male, cooperative, pleasant, in NAD  HEENT:   Normocephalic, atraumatic, sclera anicteric, multiple gold teeth   Neck:    Normal range of motion. Neck supple. Pulmonary/Chest:  Effort normal and breath sounds normal.    Cardiovascular:    PMI:diminished, RRR,  RRR,  S1, S2 normal, - S3,  JVP  10 cm (-)  HJR   Abdominal:   Soft. Non tender, non distended,  Bowel sounds are normal. No hepatomegally   Musculoskeletal:    no edema, calf tenderness, cyanosis or clubbing   Neurological:  A& O x3, no focal deficits, ambulates w/o difficulty    Extremities:           Intact distal pulses. Skin:    Skin is warm and dry.    Psy:    appears calm, not agitated     Recent Labs:   Lab Results   Component Value Date/Time    WBC 6.0 07/26/2018 05:12 AM    HGB 14.6 07/26/2018 05:12 AM    HCT 45.7 07/26/2018 05:12 AM    PLATELET 997 18/11/8773 05:12 AM    MCV 98.1 07/26/2018 05:12 AM     Lab Results   Component Value Date/Time    TSH 0.80 07/18/2018 04:18 AM    T4, Free 1.4 07/20/2018 01:50 PM      Lab Results   Component Value Date/Time    CK 91 07/17/2018 02:00 PM    CK - MB 1.6 07/17/2018 02:00 PM    CK-MB Index 1.8 07/17/2018 02:00 PM      Lab Results   Component Value Date/Time    NT pro-BNP 50 10/24/2018 09:18 AM    NT pro-BNP 1,981 (H) 07/26/2018 05:12 AM    NT pro-BNP 2,507 (H) 07/25/2018 04:08 AM    NT pro-BNP 2,972 (H) 07/24/2018 04:19 AM    NT pro-BNP 2,629 (H) 07/23/2018 04:09 AM    PROBNP 477 (H) 09/20/2018 12:25 PM    PROBNP 940 (H) 09/04/2018 12:00 AM    PROBNP 1,486 (H) 08/09/2018 12:00 AM      Lab Results   Component Value Date/Time    Sodium 138 10/24/2018 09:18 AM    Potassium 4.3 10/24/2018 09:18 AM    Chloride 106 10/24/2018 09:18 AM    CO2 23 10/24/2018 09:18 AM    Anion gap 9 10/24/2018 09:18 AM    Glucose 98 10/24/2018 09:18 AM    BUN 10 10/24/2018 09:18 AM    Creatinine 1.01 10/24/2018 09:18 AM    BUN/Creatinine ratio 10 (L) 10/24/2018 09:18 AM    GFR est AA >60 10/24/2018 09:18 AM    GFR est non-AA >60 10/24/2018 09:18 AM    Calcium 8.7 10/24/2018 09:18 AM    Bilirubin, total 0.7 07/26/2018 05:12 AM    ALT (SGPT) 19 07/26/2018 05:12 AM    AST (SGOT) 21 07/26/2018 05:12 AM    Alk. phosphatase 100 07/26/2018 05:12 AM    Protein, total 6.8 07/26/2018 05:12 AM    Albumin 2.4 (L) 07/26/2018 05:12 AM    Globulin 4.4 (H) 07/26/2018 05:12 AM    A-G Ratio 0.5 (L) 07/26/2018 05:12 AM      No results found for: HBA1C, BWQ8WZBM, HGBE8, SWJ6TIHD, ZRP8XLHB, AXV6ICUY     CTA Results (most recent): 7/17/18:       Impression IMPRESSION: Moderate right pleural effusion with right middle and right lower  lobe infiltrates. Left lower lobe atelectasis. No evidence of pulmonary  embolism. I have discussed the diagnosis with  Lawrence Conte and the intended plan as seen in the above orders.   Questions were answered concerning future plans, and written instructions provided. I have discussed medication side effects and warnings with the patient as well. Thank you for allowing me to participate in the care of this delightful gentleman. Please do not hesitate to call with any questions. Christiana Catalan RN, ACNP-BC, [address] [place] [address] [phone]  24 hour VAD/HF Pager: [phone]

## 2018-11-29 ENCOUNTER — TELEPHONE (OUTPATIENT)
Dept: CARDIOLOGY CLINIC | Age: 38
End: 2018-11-29

## 2018-11-29 LAB
BUN SERPL-MCNC: 12 MG/DL (ref 6–20)
BUN/CREAT SERPL: 13 (ref 9–20)
CALCIUM SERPL-MCNC: 9.3 MG/DL (ref 8.7–10.2)
CHLORIDE SERPL-SCNC: 102 MMOL/L (ref 96–106)
CO2 SERPL-SCNC: 19 MMOL/L (ref 20–29)
CREAT SERPL-MCNC: 0.95 MG/DL (ref 0.76–1.27)
GLUCOSE SERPL-MCNC: 82 MG/DL (ref 65–99)
POTASSIUM SERPL-SCNC: 4.2 MMOL/L (ref 3.5–5.2)
SODIUM SERPL-SCNC: 139 MMOL/L (ref 134–144)

## 2018-11-29 NOTE — TELEPHONE ENCOUNTER
----- Message from YOUSUF Copeland sent at 11/29/2018  9:36 AM EST -----  Labs stable  Continue Current plan  Thank you    Left message for patient to return call with message as noted above. I called patient and reviewed all lab results. He states understanding and has no further questions.

## 2018-12-19 ENCOUNTER — OFFICE VISIT (OUTPATIENT)
Dept: CARDIOLOGY CLINIC | Age: 38
End: 2018-12-19

## 2018-12-19 VITALS — HEART RATE: 78 BPM | DIASTOLIC BLOOD PRESSURE: 86 MMHG | SYSTOLIC BLOOD PRESSURE: 125 MMHG

## 2018-12-19 DIAGNOSIS — Z79.01 WARFARIN ANTICOAGULATION: Primary | ICD-10-CM

## 2018-12-19 DIAGNOSIS — I50.40 COMBINED SYSTOLIC AND DIASTOLIC HEART FAILURE, UNSPECIFIED HF CHRONICITY (HCC): ICD-10-CM

## 2018-12-19 DIAGNOSIS — I42.8 NICM (NONISCHEMIC CARDIOMYOPATHY) (HCC): ICD-10-CM

## 2018-12-19 LAB
INR BLD: 1.4
PT POC: 17.4 SECONDS
VALID INTERNAL CONTROL?: YES

## 2018-12-19 RX ORDER — WARFARIN SODIUM 5 MG/1
5 TABLET ORAL DAILY
Qty: 30 TAB | Refills: 3 | Status: SHIPPED | OUTPATIENT
Start: 2018-12-19 | End: 2019-02-15

## 2018-12-19 NOTE — PATIENT INSTRUCTIONS
Your coumadin dose is increased to 5 mg daily.  Please come back in 3 weeks for a new test and continue all of the other medications

## 2018-12-19 NOTE — PROGRESS NOTES
INR is 1.4. If he is fully compliant he needs to increase his dose to 5 mg coumadin and submit to retesting in 3 weeks or so. He has been permitted to surrender the life vest because of significant improvement in LV function. BP today is 125/86. Continue existing medications otherwise and recheck INR and VS in 3-4 weeks.

## 2018-12-30 NOTE — PATIENT INSTRUCTIONS

## 2018-12-30 NOTE — PROGRESS NOTES
[address] [facility] [address] [place], [address]  Tel.  [phone]  Fax. [facility]  [address] [facility] [address], [address]  Tel.  [phone]  Fax. [phone] [facility]  Jyoti Rabago  Tel.  [phone]  Fax. [phone]       Chief Complaint       Chief Complaint   Patient presents with    Sleep Problem     NP refd by Den Elise NP for snoring. JANICE Parham is a 43 y.o. male seen for evaluation of a sleep disorder . He has a history of Afib, systolic , diastolic heart failure. He notes snoring , unable to quantify severity. Unaware of associated apnea. He retires at 1 am and awakens at 7:30 am. He does not report frequent awakening. He notes vivid dreaming. He has episodes of daytime fatigue. Hamilton Sleepiness Scale 5. He notes he will awaken 1-3 times . He has not fallen asleep inappropriately during the day. He denies headache on awakening. The patient has not undergone diagnostic testing for the current problems. Hamilton Sleepiness Score: 5       No Known Allergies    Current Outpatient Medications   Medication Sig Dispense Refill    carvedilol (COREG) 3.125 mg tablet Take 1 Tab by mouth two (2) times daily (with meals). 60 Tab 3    Cholecalciferol, Vitamin D3, (VITAMIN D3) 2,000 unit cap capsule Take 2,000 Units by mouth daily. 30 Cap 6    sacubitril-valsartan (ENTRESTO) 97 mg/103 mg tablet Take 1 Tab by mouth two (2) times a day. Indications: chronic heart failure 180 Tab 3    spironolactone (ALDACTONE) 25 mg tablet Take 1 Tab by mouth daily. 30 Tab 3    amiodarone (CORDARONE) 200 mg tablet Take 1 Tab by mouth daily. 30 Tab 12    magnesium oxide (MAG-OX) 400 mg tablet Take 1 Tab by mouth daily. 30 Tab 1    warfarin (COUMADIN) 5 mg tablet Take 1 Tab by mouth daily.  30 Tab 3        He  has a past medical history of Anxiety, Combined systolic and diastolic heart failure ([address] Congestive heart failure ([address] Long term current use of anticoagulant therapy, NICM (nonischemic cardiomyopathy) ([address] Non-rheumatic mitral regurgitation, PAF (paroxysmal atrial fibrillation) ([address] Rapid atrial fibrillation ([address] Tobacco use disorder, Viral myocarditis, and Vitamin D deficiency. He  has a past surgical history that includes cardioversion, elective;extern (7/20/2018) and hx heart catheterization. He family history includes Diabetes in his mother; Heart Failure in his mother; Hypertension in his sister and sister; No Known Problems in his father. He  reports that he has been smoking cigarettes. he has never used smokeless tobacco. He reports that he does not drink alcohol or use drugs. Review of Systems:  Review of Systems   Constitutional: Negative for chills and fever. HENT: Negative for hearing loss and tinnitus. Eyes: Negative for blurred vision. Respiratory: Negative for cough and shortness of breath. Cardiovascular: Negative for chest pain and palpitations. Gastrointestinal: Negative for abdominal pain and heartburn. Genitourinary: Negative for frequency and urgency. Musculoskeletal: Negative for back pain and neck pain. Skin: Negative for itching and rash. Neurological: Negative for dizziness and headaches. Psychiatric/Behavioral: Positive for depression. The patient is nervous/anxious. Objective:     Visit Vitals  /80 (BP 1 Location: Left arm, BP Patient Position: Sitting)   Pulse 78   Ht 6' 3\" (1.905 m)   Wt 235 lb (106.6 kg)   SpO2 96%   BMI 29.37 kg/m²     Body mass index is 29.37 kg/m². General:   Conversant, cooperative   Eyes:  Pupils equal and reactive, no nystagmus   Oropharynx:   Mallampati score IV,  tongue scalloped   Tonsils:      Neck:   No carotid bruits; Neck circ.  in \"inches\": 15   Chest/Lungs:  Clear on auscultation    CVS:  Normal rate, regular rhythm   Skin:  Warm to touch; no obvious rashes   Neuro:  Speech fluent, face symmetrical, tongue movement normal   Psych:  Normal affect,  normal countenance        Assessment:       ICD-10-CM ICD-9-CM    1. SHRUTHI (obstructive sleep apnea) G47.33 327.23 POLYSOMNOGRAPHY 1 NIGHT     History of afib, systolic, diastolic heart failure. He will be evaluated with an inlab sleep study. Plan:     Orders Placed This Encounter    [address]21 POLYSOMNOGRAPHY (1st NIGHT STUDY) SPLIT IF MEETS CRITERIA     Standing Status:   Future     Standing Expiration Date:   6/29/2019     Order Specific Question:   Reason for Exam     Answer:   afib, snoring       * Patient has a history and examination consistent with the diagnosis of sleep apnea. * Sleep testing was ordered for initial evaluation. * He was provided information on sleep apnea including corresponding risk factors and the importance of proper treatment. * Treatment options if indicated were reviewed today. Instructions:  o Do not engage in activities requiring a normal degree of alertness if fatigue is present. o The patient understands that untreated or undertreated sleep apnea could impair judgement and the ability to function normally during the day.  o Call or return if symptoms worsen or persist.          Viviane Meyer MD, FAASM  Electronically signed 12/29/18       This note was created using voice recognition software. Despite editing, there may be syntax errors. This note will not be viewable in [address]

## 2019-01-10 ENCOUNTER — OFFICE VISIT (OUTPATIENT)
Dept: CARDIOLOGY CLINIC | Age: 39
End: 2019-01-10

## 2019-01-10 VITALS
HEART RATE: 74 BPM | OXYGEN SATURATION: 96 % | DIASTOLIC BLOOD PRESSURE: 87 MMHG | RESPIRATION RATE: 18 BRPM | SYSTOLIC BLOOD PRESSURE: 120 MMHG

## 2019-01-10 DIAGNOSIS — Z79.01 WARFARIN ANTICOAGULATION: Primary | ICD-10-CM

## 2019-01-10 LAB
INR BLD: 1.9
PT POC: 22.4 SECONDS
VALID INTERNAL CONTROL?: YES

## 2019-01-18 ENCOUNTER — TELEPHONE (OUTPATIENT)
Dept: CARDIOLOGY CLINIC | Age: 39
End: 2019-01-18

## 2019-01-18 NOTE — TELEPHONE ENCOUNTER
I called the patient. Two person ID done. He has continued with dosage of Warfarin. I did inform him that Dr. Jcarlos Wayne does want to see him back in February but this time it needs to be a follow up appointment. I then transferred him to the .

## 2019-02-02 DIAGNOSIS — I50.40 COMBINED SYSTOLIC AND DIASTOLIC HEART FAILURE, UNSPECIFIED HF CHRONICITY (HCC): ICD-10-CM

## 2019-02-02 DIAGNOSIS — I42.8 NICM (NONISCHEMIC CARDIOMYOPATHY) (HCC): ICD-10-CM

## 2019-02-04 RX ORDER — CARVEDILOL 3.12 MG/1
TABLET ORAL
Qty: 60 TAB | Refills: 3 | Status: SHIPPED | OUTPATIENT
Start: 2019-02-04 | End: 2019-02-28

## 2019-02-06 NOTE — PROGRESS NOTES
Impression: Congestive cardiomyopathy, post viral    Poor compliance with LifeVest used    He is apparently not compliant with Coumadin, INR is 1.0    His motivations are complex and difficult to decipher and his compliance is probably a victim of some of the requirements of lifestyle.     Plan:  Patient will have updated echocardiogram today    Patient urged to wear the LifeVest until we see the results of the new echo and the case can be discussed with Dr. Aakash Zavaleta    Supplemental dose of Coumadin 2.5 mg today and then a new prescription for 4 mg daily    New INR in 1 week    Ace Traore MD [facility]

## 2019-02-15 ENCOUNTER — OFFICE VISIT (OUTPATIENT)
Dept: CARDIOLOGY CLINIC | Age: 39
End: 2019-02-15

## 2019-02-15 VITALS
OXYGEN SATURATION: 98 % | WEIGHT: 257 LBS | DIASTOLIC BLOOD PRESSURE: 86 MMHG | RESPIRATION RATE: 18 BRPM | HEIGHT: 75 IN | HEART RATE: 85 BPM | BODY MASS INDEX: 31.95 KG/M2 | SYSTOLIC BLOOD PRESSURE: 120 MMHG

## 2019-02-15 DIAGNOSIS — I50.40 COMBINED SYSTOLIC AND DIASTOLIC HEART FAILURE, UNSPECIFIED HF CHRONICITY (HCC): ICD-10-CM

## 2019-02-15 DIAGNOSIS — I42.8 NICM (NONISCHEMIC CARDIOMYOPATHY) (HCC): ICD-10-CM

## 2019-02-15 DIAGNOSIS — I48.0 PAROXYSMAL ATRIAL FIBRILLATION (HCC): ICD-10-CM

## 2019-02-15 DIAGNOSIS — Z79.01 WARFARIN ANTICOAGULATION: Primary | ICD-10-CM

## 2019-02-15 LAB
INR BLD: 1.2
PT POC: 14.9 SECONDS
VALID INTERNAL CONTROL?: YES

## 2019-02-15 RX ORDER — WARFARIN 6 MG/1
6 TABLET ORAL DAILY
Qty: 30 TAB | Refills: 3 | Status: SHIPPED | OUTPATIENT
Start: 2019-02-15 | End: 2019-05-03 | Stop reason: ALTCHOICE
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
## 2019-02-15 NOTE — PATIENT INSTRUCTIONS
1900: Report received from JD Degroot RN. POC discussed pt us to have a repeat  section at midnight, understands that she is to have nothing by mouth.   0245: Spoke with GENIA Hassan concerning pt 's/90's, no  New orders received. Updated on pt bleeding, light at this time.  0300: Pt transferred to PPU in stable condition. Report given to NATALIA Keyes. Dressing came on when transferred to bed, reinforced per RN.   Please continue the same medications except that I increased the WARFARIN (blood thinner) to 6 mg daily. I need to have a conversation with your . I have ordered a 48-hour Holter monitor to determine whether or not you still have atrial fibrillation. If you do not it will be possible to stop the Coumadin. In the meantime, please continue to make sure that you take the Coumadin and all of your other medications daily.

## 2019-02-21 ENCOUNTER — TELEPHONE (OUTPATIENT)
Dept: CARDIOLOGY CLINIC | Age: 39
End: 2019-02-21

## 2019-02-21 NOTE — TELEPHONE ENCOUNTER
Need status pf paperwork he turned in to be completed by Kaveh Meyer for [employer]. Please advise. Thanks.

## 2019-02-22 NOTE — TELEPHONE ENCOUNTER
Late entry: Reminded Dr. Martinez of form. Informed him that patient requesting due to social service. I spoke with Dr. Martinez later he filled out the form and gave it to the [facility].

## 2019-02-27 PROBLEM — Z95.810 CARDIAC DEFIBRILLATOR IN PLACE: Status: RESOLVED | Noted: 2018-07-27 | Resolved: 2019-02-27

## 2019-02-27 PROBLEM — E66.09 CLASS 1 OBESITY DUE TO EXCESS CALORIES WITH SERIOUS COMORBIDITY AND BODY MASS INDEX (BMI) OF 32.0 TO 32.9 IN ADULT: Status: ACTIVE | Noted: 2019-02-27

## 2019-02-27 NOTE — PROGRESS NOTES
[facility] Clinic Note      DOS:   2/28/2019  NAME:  Frankie Redd   MRN:   [id number]     REFERRING PROVIDER:  Dr. Prem Carvalho: None  PRIMARY CARDIOLOGIST: Dr. Marco Antonio Gonzales  Sleep: Dr. Janna Mcpherson      IMPRESSION/PLAN:    Heart Failure Status: NYHA Class II    HTN BP elevated and on exam     Increase coreg 6.26 mg BID    Resume aldactone 25 mg daily   Follow BP at home 2 hours after meds - he does not have a cuff    Follow up 7-10 days week     Stop smoking and drinking etoh       NICM 2/2 viral myocarditis- EF improved to 45% Coxsackie panel, CMV IgG positive, HHV-6 positive     HFrEF, NYHA II LVEF 10-20%, RVEF 22%- EF now improved 45%   Conntinue entresto 97/103 mg BID ([employer] providing)   Increase  Coreg 3.125 mg BID     Resume spironolactone 25 mg daily   SHRUTHI eval - April 4    Encouraged to exercise regularly    Weight gain due to diet indiscretion, no evidence of volume overload or decompensated HF- encouraged to watch diet/portion control and exercise more        Paroxysmal Afib w/ RVR S/p Cardioversion- Remains in Sinus   Amio  per EP    Continue warfarin - Dr. Marco Antonio Gonzales- no bleeding   INR 1.2    Encouraged to get holter to  assess afib burden and if not present favor  stopping amiodarone and coumadin- defer to Dr. Marco Antonio Gonzales and EP     Functional MR- improved with improved EF      ? SHRUTHI    sleep eval underway April 4   Obesity    Recommend he limit portion and become more aware of his diet   Encouraged to exercise regularly     Tobacco Use disorder            Smoking cessation                 Thank you for allowing me to participate in the care of this delightful gentleman. Please do not hesitate to call with any questions. Christiana Kerr  RN, ACNP-BC, [facility]      Chief Complaint:  Chief Complaint   Patient presents with    Follow Up Chronic Condition     Pt is here for a f/u for chronic condition.           Romy Wells is a delightful  43y.o. year old AA male with recent hx of HFrEF (10-20%) in the setting of viral myocarditis (Human Herpes Virus 6)  Complicated by new onset PAF with RVR s/p DCCV. Mare Schmitz He was first diagnosed 8/2018 when he presented to [facility]  with ADHF found to be in afib with RVR. CTA (-) for PE, but showed a large R pleural effusion. He underwent a thoracentesis for ~ 1 L, and  ROBERTO and DCCV. Right and left heart cath showed no epicardial disease and slightly elevated filling pressures. Cardiac MRI showed LVEF 17%, RVEF 20%  confirmed viral myocarditis and lab work was positive for  Coxsackie pane, CMV IgG positive. HHV-6 positive. Recent Echo  10/26/18: showed EF improved to 45% with grade 3/4 diastolic dysfunction and improved MR now to mild    He presents for follow up of his systolic heart failure. He was last seen 3 months ago. He has seen Dr. Shayla Mena several times. A 48 Hour holter has been ordered, but patient does not have it to date. ProBNP 50, Cr 0.95    He has gained 40 pounds since September, he attributes to diet, lack of exercise. Denies volume overload sx. He states has lost 5 pounds and is trying to lose the weight. Not eating salt or prepackaged foods. He is not taking aldactone for unclear reasons, BP elevated today, not following at home. Stamina improved on meds. Stable pattern of FLOWERS with walking fast, and moderate exertion, unchanged. Not exercising regularly. When he sleeps he sleeps well, but issues with insomnia,  denies orthopnea, PND, saw Dr. Nathan Mendez - planning for sleep study April 4. Naps ~ 2 x/week   Compliant with meds no bleeding       Still smoking  ~ 3-4  Cigarettes/day, a few etoh beverages  on the weekends      INR being followed by cardiology- no bleeding issues    Receiving Entresto from [employer], applied for  Medicaid and approved for   Care card. Alexander Womack lives with his  sister in their new home.  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 Shayla Mena is working on [employer] disability). He smoked 15 years ~ 1/2 ppd. 2 etoh drinks each day of the  weekends No additional substances. Some college courses. Review of Systems:     Constitutional:   improvement in his stamina, negative, feels well today   HEENT:    Negative. Denies angioedema sx    Respiratory:  Negative for cough. Cardiovascular:  Denies exertional chest pain, palpitations, lightheadedness, syncope, near syncope, bleeding. Gastrointestinal:  Negative. Genitourinary:  Denies dysuria, frequency    Musculoskeletal:  Negative. Skin:    Negative. Neurological:  Negative. Psy:    Positive for anxiety- improved         CARDIAC EVALUATION   ECHO 7/17/18:mild-mod LVD, EF 10-20%, severe DHK, mild LVH, DD, RVD, mild- mod LAE, mod RAY, severe MR, mod-severe TR, RVSP 40, dilated RV outflow tract  ECHO 10/26/2018: mild LVD, EF 45%, G3/4 DD, mod ant leaflet thickening, mild MR, dil RVOT     ROBERTO 7/20/18: LVD, EF 10-15%, severe DHK, mild RVD, reduced RVEF, mod ANNAMARIE, no CELINA thrombus, no PFO, mod MR, mild TR      CATH 7/20/18: no epicardial disease, LVEP 1     RHC 7/20/18: RA:12, RV 34/6/14. PA 30/20/26, PCWP 18, [place] 6.33/2.74 TD: 5.37/2.32     DCCV 7/20/18    CARDIAC MRI: 7/20/18: mod LVD (LVDD 66), mild LVH (13mm), LVEF 17%, severe GHK, mild RVD, RVEF 20%, mod GHK, mod MR, mild TR. Distinct mid wall stripe enhancement of the basal septal wall along with RV insertion point enhancement sparing the subendocardium. viral myocarditis of HHV-6 viral strain. NO features of giant cell or lymphocytic myocarditis. , infiltrative Sarcoidosis, amyloidosis, old MI    UPEP, SPEP,  heavy metals, FLC (-) HIV ( NR), Legionella (-) Ferritin WNL     History:  Past Medical History:   Diagnosis Date    Anxiety     Class 1 obesity due to excess calories with serious comorbidity and body mass index (BMI) of 32.0 to 32.9 in adult 2/27/2019    Combined systolic and diastolic heart failure ([facility] [address] 8/8/2018    ECHO 7/17/18:mild-mod LVD, EF 10-20%, severe DHK, mild LVH, DD, RVD, mild- mod LAE, mod RAY, severe MR, mod-severe TR, RVSP 40, dilated RV outflow tract  ROBERTO 7/20/18: LVD, EF 10-15%, severe DHK, mild RVD, reduced RVEF, mod ANNAMARIE, no CELINA thrombus, no PFO, mod MR, mild TR  RHC 7/20/18: RA:12, RV 34/6/14.  PA 30/20/26, PCWP 18, [place] 6.33/2.74 TD: 5.37/2.32    CARDIAC MRI: 7/20/18: mod LVD (LVDD 66), mil    Congestive heart failure ([address]     Long term current use of anticoagulant therapy     NICM (nonischemic cardiomyopathy) ([facility] [address] 8/8/2018    Non-rheumatic mitral regurgitation 8/8/2018    ECHO 7/17/18:mild-mod LVD, EF 10-20%, severe DHK, mild LVH, DD, RVD, mild- mod LAE, mod RAY, severe MR, mod-severe TR, RVSP 40, dilated RV outflow tract  ROBERTO 7/20/18: LVD, EF 10-15%, severe DHK, mild RVD, reduced RVEF, mod ANNAMARIE, no CELINA thrombus, no PFO, mod MR, mild TR      PAF (paroxysmal atrial fibrillation) ([address] 8/8/2018    ROBERTO 7/20/18: LVD, EF 10-15%, severe DHK, mild RVD, reduced RVEF, mod ANNAMARIE, no CELINA thrombus, no PFO, mod MR, mild TR    DCCV 7/20/18       Rapid atrial fibrillation ([address] 7/17/2018    Tobacco use disorder 8/8/2018    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    Vitamin D deficiency 9/19/2018    Level 12 8/2018     Past Surgical History:   Procedure Laterality Date    CARDIOVERSION, ELECTIVE;EXTERN  7/20/2018         HX HEART CATHETERIZATION       Social History     Socioeconomic History    Marital status: SINGLE     Spouse name: Not on file    Number of children: Not on file    Years of education: Not on file    Highest education level: Not on file   Social Needs    Financial resource strain: Not on file    Food insecurity - worry: Not on file    Food insecurity - inability: Not on file   [employer] needs - medical: Not on file   [employer] needs - non-medical: Not on file   Occupational History    Not on file   Tobacco Use    Smoking status: Current Some Day Smoker     Types: Cigarettes     Last attempt to quit: 2018     Years since quittin.6    Smokeless tobacco: Never Used    Tobacco comment: 3-4 cigarettes vaughn   Substance and Sexual Activity    Alcohol use: No    Drug use: No    Sexual activity: Yes   Other Topics Concern     Service Not Asked    Blood Transfusions Not Asked    Caffeine Concern Not Asked    Occupational Exposure Not Asked    Hobby Hazards Not Asked    Sleep Concern Not Asked    Stress Concern Not Asked    Weight Concern Not Asked    Special Diet Not Asked    Back Care Not Asked    Exercise Not Asked    Bike Helmet Not Asked    Seat Belt Not Asked    Self-Exams Not Asked   Social History Narrative    Not on file     Family History   Problem Relation Age of Onset    Diabetes Mother     Heart Failure Mother     No Known Problems Father     Hypertension Sister     Hypertension Sister        Current Medications:   Current Outpatient Medications   Medication Sig Dispense Refill    multivitamin (ONE A DAY) tablet Take 1 Tab by mouth daily.  warfarin (COUMADIN) 6 mg tablet Take 1 Tab by mouth daily. 30 Tab 3    carvedilol (COREG) 3.125 mg tablet TAKE 1 TABLET BY MOUTH TWICE DAILY WITH MEALS 60 Tab 3    carvedilol (COREG) 3.125 mg tablet Take 1 Tab by mouth two (2) times daily (with meals). 60 Tab 3    Cholecalciferol, Vitamin D3, (VITAMIN D3) 2,000 unit cap capsule Take 2,000 Units by mouth daily. 30 Cap 6    sacubitril-valsartan (ENTRESTO) 97 mg/103 mg tablet Take 1 Tab by mouth two (2) times a day. Indications: chronic heart failure 180 Tab 3    amiodarone (CORDARONE) 200 mg tablet Take 1 Tab by mouth daily. 30 Tab 12    magnesium oxide (MAG-OX) 400 mg tablet Take 1 Tab by mouth daily. 30 Tab 1    spironolactone (ALDACTONE) 25 mg tablet Take 1 Tab by mouth daily.  30 Tab 3       Allergies: No Known Allergies      Vitals:   Visit Vitals  BP (!) 144/106 (BP 1 Location: Left arm, BP Patient Position: Sitting)   Pulse 81   Temp 97.7 °F (36.5 °C)   Resp 16   Ht 6' 3\" (1.905 m)   Wt 258 lb (117 kg)   SpO2 96%   BMI 32.25 kg/m²     BP recheck: 140/100     Physical Exam:   Constitutional:   Well developed AA male, cooperative, pleasant, in NAD  HEENT:   Normocephalic, atraumatic, sclera anicteric, multiple gold teeth   Neck:    Normal range of motion. Neck supple. Pulmonary/Chest:  Effort normal and breath sounds normal.    Cardiovascular:    PMI:diminished, RRR,  RRR,  S1, S2 normal, - S3,  JVP  10 cm (-)  HJR   Abdominal:   Soft. Non tender, non distended,  Bowel sounds are normal. No hepatomegally   Musculoskeletal:    no edema, calf tenderness, cyanosis or clubbing   Neurological:  A& O x3, no focal deficits, ambulates w/o difficulty    Extremities:            Intact distal pulses. Skin:    Skin is warm and dry.    Psy:    appears calm, not agitated     Recent Labs:   Lab Results   Component Value Date/Time    WBC 6.0 07/26/2018 05:12 AM    HGB 14.6 07/26/2018 05:12 AM    HCT 45.7 07/26/2018 05:12 AM    PLATELET 125 38/73/6553 05:12 AM    MCV 98.1 07/26/2018 05:12 AM     Lab Results   Component Value Date/Time    TSH 0.80 07/18/2018 04:18 AM    T4, Free 1.4 07/20/2018 01:50 PM      Lab Results   Component Value Date/Time    CK 91 07/17/2018 02:00 PM    CK - MB 1.6 07/17/2018 02:00 PM    CK-MB Index 1.8 07/17/2018 02:00 PM      Lab Results   Component Value Date/Time    NT pro-BNP 50 10/24/2018 09:18 AM    NT pro-BNP 1,981 (H) 07/26/2018 05:12 AM    NT pro-BNP 2,507 (H) 07/25/2018 04:08 AM    NT pro-BNP 2,972 (H) 07/24/2018 04:19 AM    NT pro-BNP 2,629 (H) 07/23/2018 04:09 AM    PROBNP 477 (H) 09/20/2018 12:25 PM    PROBNP 940 (H) 09/04/2018 12:00 AM    PROBNP 1,486 (H) 08/09/2018 12:00 AM      Lab Results   Component Value Date/Time    Sodium 139 11/28/2018 12:00 AM    Potassium 4.2 11/28/2018 12:00 AM    Chloride 102 11/28/2018 12:00 AM    CO2 19 (L) 11/28/2018 12:00 AM    Anion gap 9 10/24/2018 09:18 AM    Glucose 82 11/28/2018 12:00 AM    BUN 12 11/28/2018 12:00 AM    Creatinine 0.95 11/28/2018 12:00 AM    BUN/Creatinine ratio 13 11/28/2018 12:00 AM    GFR est  11/28/2018 12:00 AM    GFR est non- 11/28/2018 12:00 AM    Calcium 9.3 11/28/2018 12:00 AM    Bilirubin, total 0.7 07/26/2018 05:12 AM    ALT (SGPT) 19 07/26/2018 05:12 AM    AST (SGOT) 21 07/26/2018 05:12 AM    Alk. phosphatase 100 07/26/2018 05:12 AM    Protein, total 6.8 07/26/2018 05:12 AM    Albumin 2.4 (L) 07/26/2018 05:12 AM    Globulin 4.4 (H) 07/26/2018 05:12 AM    A-G Ratio 0.5 (L) 07/26/2018 05:12 AM      No results found for: HBA1C, BUN0JIRT, HGBE8, EKN9XLBN, DSC2TSJU, LBH1IAPI     CTA Results (most recent): 7/17/18:       Impression IMPRESSION: Moderate right pleural effusion with right middle and right lower  lobe infiltrates. Left lower lobe atelectasis. No evidence of pulmonary  embolism. I have discussed the diagnosis with  Eder Bryan and the intended plan as seen in the above orders. Questions were answered concerning future plans, and written instructions provided. I have discussed medication side effects and warnings with the patient as well. Thank you for allowing me to participate in the care of this delightful gentleman. Please do not hesitate to call with any questions. Christiana Olguin RN, ACNP-BC, [address] [place] [place] [address] [phone]  [phone]  24 hour VAD/HF Pager: [phone]

## 2019-02-27 NOTE — PATIENT INSTRUCTIONS
You absolutely have to take your warfarin every day. You are placing yourself at risk of having a stroke with complete incapacity and dependency on others. The blood testing does not have any meaning in terms of determining dose unless you are taking the pill every day.
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
## 2019-02-28 ENCOUNTER — OFFICE VISIT (OUTPATIENT)
Dept: CARDIOLOGY CLINIC | Age: 39
End: 2019-02-28

## 2019-02-28 VITALS
DIASTOLIC BLOOD PRESSURE: 106 MMHG | WEIGHT: 258 LBS | OXYGEN SATURATION: 96 % | BODY MASS INDEX: 32.08 KG/M2 | TEMPERATURE: 97.7 F | HEART RATE: 81 BPM | RESPIRATION RATE: 16 BRPM | HEIGHT: 75 IN | SYSTOLIC BLOOD PRESSURE: 144 MMHG

## 2019-02-28 DIAGNOSIS — E66.09 CLASS 1 OBESITY DUE TO EXCESS CALORIES WITH SERIOUS COMORBIDITY AND BODY MASS INDEX (BMI) OF 32.0 TO 32.9 IN ADULT: ICD-10-CM

## 2019-02-28 DIAGNOSIS — R06.83 SNORING: ICD-10-CM

## 2019-02-28 DIAGNOSIS — I48.0 PAF (PAROXYSMAL ATRIAL FIBRILLATION) (HCC): ICD-10-CM

## 2019-02-28 DIAGNOSIS — I50.40 COMBINED SYSTOLIC AND DIASTOLIC HEART FAILURE, UNSPECIFIED HF CHRONICITY (HCC): ICD-10-CM

## 2019-02-28 DIAGNOSIS — R06.02 SOB (SHORTNESS OF BREATH): ICD-10-CM

## 2019-02-28 DIAGNOSIS — I10 ESSENTIAL HYPERTENSION: ICD-10-CM

## 2019-02-28 DIAGNOSIS — I42.8 NICM (NONISCHEMIC CARDIOMYOPATHY) (HCC): Primary | ICD-10-CM

## 2019-02-28 RX ORDER — SPIRONOLACTONE 25 MG/1
25 TABLET ORAL DAILY
Qty: 30 TAB | Refills: 3 | Status: SHIPPED | OUTPATIENT
Start: 2019-02-28 | End: 2019-05-23 | Stop reason: SDUPTHER

## 2019-02-28 RX ORDER — BISMUTH SUBSALICYLATE 262 MG
1 TABLET,CHEWABLE ORAL DAILY
COMMUNITY

## 2019-02-28 RX ORDER — CARVEDILOL 12.5 MG/1
12.5 TABLET ORAL 2 TIMES DAILY WITH MEALS
Qty: 60 TAB | Refills: 2 | Status: SHIPPED | OUTPATIENT
Start: 2019-02-28 | End: 2019-04-18 | Stop reason: SDUPTHER

## 2019-02-28 NOTE — LETTER
2/28/2019 4:00 PM 
 
Patient:  Tanya Bryan YOB: 1980 Date of Visit: 2/28/2019 Dear Yaneth Medrano MD 
[facility] [id number] VIA In Basket 
 : Thank you for referring Mr. Kodi Ordaz to me for evaluation/treatment. Below are the relevant portions of my assessment and plan of care. If you have questions, please do not hesitate to call me. I look forward to following Mr. Halle Jerry along with you. Sincerely, Alfonzo Sicard, ACNP

## 2019-02-28 NOTE — PATIENT INSTRUCTIONS
BP too high 144/106 range  Increase coreg to 12.5 mg twice a day   Resume aldactone 25 mg daily   check BP daily- keep record   stop smoking   stop drinking alcohol  Holter monitor per Dr. Melia Katz- to see if we can stop the amiodarone and coumadin      Sleep study   resume exercise    Work on losing weight      CONTINUE CURRENT medications    Take twice a day medications 12 hours apart with food    Labs today BMP, proBNP , thyroid       Limit WHITE foods (flour, sugar, pasta, rice, potatoes), portion control and snacking   Cauliflower rice to replace rice    Walk more    Keep a positive attitude       HF Education: Continue daily weights (in the morning, after voiding). Notify HF team of overnight weight gains > 2 lbs or weekly >5 lbs or if any of the following Sx. Continue to limit sodium intake & monitor your fluid intake .     Future Appointments   Date Time Provider Dougie Moore   3/7/2019 10:40 AM Hardeep Salcedo MD [address] [place] [address]   4/4/2019  9:30 PM BEDROOM 4 [address] SLEEP LAB [place]

## 2019-02-28 NOTE — PROGRESS NOTES
Prabhu Trent is a 43 y.o. male    EXAM Rm: 9    Chief Complaint   Patient presents with    Follow Up Chronic Condition     Pt is here for a f/u for chronic condition. 1. Have you been to the ER, urgent care clinic since your last visit? Hospitalized since your last visit? No    2. Have you seen or consulted any other health care providers outside of the [address] [place] [address] since your last visit? Include any pap smears or colon screening.  No  Health Maintenance Due   Topic Date Due    Pneumococcal 19-64 Medium Risk (1 of 1 - PPSV23) 07/15/1998    DTaP/Tdap/Td series (1 - Tdap) 07/15/2000    Influenza Age 9 to Adult  08/01/2018         Visit Vitals  BP (!) 144/106 (BP 1 Location: Left arm, BP Patient Position: Sitting)   Pulse 81   Temp 97.7 °F (36.5 °C)   Resp 16   Ht 6' 3\" (1.905 m)   Wt 258 lb (117 kg)   SpO2 96%   BMI 32.25 kg/m²     \

## 2019-03-01 ENCOUNTER — TELEPHONE (OUTPATIENT)
Dept: CARDIOLOGY CLINIC | Age: 39
End: 2019-03-01

## 2019-03-01 LAB
BUN SERPL-MCNC: 11 MG/DL (ref 6–20)
BUN/CREAT SERPL: 9 (ref 9–20)
CALCIUM SERPL-MCNC: 9.2 MG/DL (ref 8.7–10.2)
CHLORIDE SERPL-SCNC: 106 MMOL/L (ref 96–106)
CO2 SERPL-SCNC: 19 MMOL/L (ref 20–29)
CREAT SERPL-MCNC: 1.19 MG/DL (ref 0.76–1.27)
GLUCOSE SERPL-MCNC: 76 MG/DL (ref 65–99)
NT-PROBNP SERPL-MCNC: 17 PG/ML (ref 0–86)
POTASSIUM SERPL-SCNC: 3.8 MMOL/L (ref 3.5–5.2)
SODIUM SERPL-SCNC: 141 MMOL/L (ref 134–144)
T4 FREE SERPL-MCNC: 1.69 NG/DL (ref 0.82–1.77)
TSH SERPL DL<=0.005 MIU/L-ACNC: 1.4 UIU/ML (ref 0.45–4.5)

## 2019-03-07 ENCOUNTER — OFFICE VISIT (OUTPATIENT)
Dept: CARDIOLOGY CLINIC | Age: 39
End: 2019-03-07

## 2019-03-07 VITALS — SYSTOLIC BLOOD PRESSURE: 139 MMHG | DIASTOLIC BLOOD PRESSURE: 97 MMHG

## 2019-03-07 DIAGNOSIS — F41.9 ANXIETY: ICD-10-CM

## 2019-03-07 DIAGNOSIS — I48.0 PAROXYSMAL ATRIAL FIBRILLATION (HCC): ICD-10-CM

## 2019-03-07 DIAGNOSIS — F32.2 CURRENT SEVERE EPISODE OF MAJOR DEPRESSIVE DISORDER WITHOUT PSYCHOTIC FEATURES WITHOUT PRIOR EPISODE (HCC): ICD-10-CM

## 2019-03-07 DIAGNOSIS — F43.10 PTSD (POST-TRAUMATIC STRESS DISORDER): ICD-10-CM

## 2019-03-07 DIAGNOSIS — Z79.01 WARFARIN ANTICOAGULATION: Primary | ICD-10-CM

## 2019-03-07 LAB
INR BLD: 2.2
PT POC: 26.2 SECONDS
VALID INTERNAL CONTROL?: YES
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
## 2019-03-11 ENCOUNTER — TELEPHONE (OUTPATIENT)
Dept: CARDIOLOGY CLINIC | Age: 39
End: 2019-03-11

## 2019-03-11 NOTE — TELEPHONE ENCOUNTER
Dr. Arabella Albarran was going to order another monitor . Patient calling for status.         Thanks,

## 2019-03-12 PROBLEM — I11.0 HYPERTENSIVE HEART DISEASE WITH COMBINED SYSTOLIC AND DIASTOLIC CONGESTIVE HEART FAILURE (HCC): Status: ACTIVE | Noted: 2019-03-12

## 2019-03-12 PROBLEM — I10 ESSENTIAL HYPERTENSION: Status: ACTIVE | Noted: 2019-03-12

## 2019-03-12 PROBLEM — I50.40 HYPERTENSIVE HEART DISEASE WITH COMBINED SYSTOLIC AND DIASTOLIC CONGESTIVE HEART FAILURE (HCC): Status: ACTIVE | Noted: 2019-03-12

## 2019-03-12 NOTE — PROGRESS NOTES
[facility] Clinic Note      DOS:   3/13/2019  NAME:  Dianna Rousseau   MRN:   [id number]     REFERRING PROVIDER:  Dr. Bravo Lab: None  PRIMARY CARDIOLOGIST: Dr. Salima Alva  Sleep: Dr. Vandana Horne      IMPRESSION/PLAN:    Heart Failure Status: NYHA Class II    HTN  DBP remains elevated and on exam     Start amlodipine 5mg daily    Continue coreg 12.5 mg BID    Continue aldactone 25 mg daily   Follow BP at home 2 hours after meds - he does not have a cuff - encouraged to purchase on or follow BP at [facility]   Counseled on smoking cessation   Counseled on ETOH cessation    Follow up 2 weeks    [facility] 2/2 viral myocarditis- EF improved to 45% Coxsackie panel, CMV IgG positive, HHV-6 positive     HFrEF, NYHA II LVEF 10-20%, RVEF 22%- EF now improved 45%-euvolemic    Conntinue entresto 97/103 mg BID ([employer] providing)   Continue  Coreg 12.5  mg BID     Continue spironolactone 25 mg daily   SHRUTHI eval - April 4    Encouraged to exercise regularly         Paroxysmal Afib w/ RVR S/p Cardioversion- Remains in Sinus   Amio  per EP    Continue warfarin - Dr. Salima Alva- no bleeding   INR 2.2    Holter to  assess afib burden pending to help determine if he can stop  amiodarone and coumadin- defer to Dr. Salima Alva and EP     Functional MR- improved with improved EF      ? SHRUTHI    sleep eval underway April 4     Obesity- he has lost a few pounds    Recommend he limit portion and become more aware of his diet   Encouraged to exercise regularly     Tobacco Use disorder            Smoking cessation                 Thank you for allowing me to participate in the care of this delightful gentleman. Please do not hesitate to call with any questions. Linda L. Harolyn Angelucci,  RN, ACNP-BC, [facility]      Chief Complaint:  Chief Complaint   Patient presents with    Follow Up Chronic Condition         Ivy Salas is a delightful  43y.o. year old AA male with recent hx of HFrEF (10-20%) in the setting of viral myocarditis (Human Herpes Virus 6)  Complicated by new onset PAF with RVR s/p DCCV. Jeane Menon He was first diagnosed 8/2018 when he presented to [facility]  with ADHF found to be in afib with RVR. CTA (-) for PE, but showed a large R pleural effusion. He underwent a thoracentesis for ~ 1 L, and  ROBERTO and DCCV. Right and left heart cath showed no epicardial disease and slightly elevated filling pressures. Cardiac MRI showed LVEF 17%, RVEF 20%  confirmed viral myocarditis and lab work was positive for  Coxsackie pane, CMV IgG positive. HHV-6 positive. Recent Echo  10/26/18: showed EF improved to 45% with grade 3/4 diastolic dysfunction and improved MR now to mild    He presents for follow up of his systolic heart failure. He was last seen  ~ 2 weeks  Ago, increased coreg 12.5 mg BID and resumed aldactone 25 mg. He has seen Dr. Martinez several times. A 48 Hour holter  Is pending, being rescheduled. ProBNP 17, Cr 1.19     He has gained 40 pounds since September, he attributes to diet, lack of exercise. Denies volume overload sx. He states has lost 5 pounds and is trying to lose the weight. Not eating salt or prepackaged foods. BP elevated today, even on re-eval. He is not following at home. Stamina improved on meds. Stable pattern of FLOWERS with walking fast, and moderate exertion, unchanged. When he sleeps he sleeps well, but issues with insomnia, gets about 6 hrs at night. using 2 pillows -sleeping on side  bendopenia now when after sleeping in chair, denies orthopnea, PND. Sleep study scheduled for  April 4. Naps ~ 2 x/week   Compliant with meds no bleeding    Still smoking  ~ 3-4  Cigarettes/day, a few etoh beverages  on the weekends      INR being followed by cardiology- no bleeding issues    Receiving Entresto from [place],  Medicaid approved       Lellan lives with his  sister in their new home. 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 Martinez is working on [employer] disability).  He smoked 15 years ~ 1/2 ppd. 2 etoh drinks each day of the  weekends No additional substances. Some college courses. Although cleared by disability aboard to return to work, he feels emotionally unfit to return to work and Working on disability with Dr. Poncho Robledo  EF 45%,  AICD, periods of afib, holter pending     Review of Systems:     Constitutional:   improvement in his stamina, negative, feels well today   HEENT:    Negative. Denies angioedema sx    Respiratory:  Negative for cough. Cardiovascular:  Denies exertional chest pain, palpitations, lightheadedness, syncope, near syncope, bleeding. Gastrointestinal:  Negative. Genitourinary:  Denies dysuria, frequency    Musculoskeletal:  Negative. Skin:    Negative. Neurological:  Negative. Psy:    Positive for anxiety- improved         CARDIAC EVALUATION   ECHO 7/17/18:mild-mod LVD, EF 10-20%, severe DHK, mild LVH, DD, RVD, mild- mod LAE, mod RAY, severe MR, mod-severe TR, RVSP 40, dilated RV outflow tract  ECHO 10/26/2018: mild LVD, EF 45%, G3/4 DD, mod ant leaflet thickening, mild MR, dil RVOT     ROBERTO 7/20/18: LVD, EF 10-15%, severe DHK, mild RVD, reduced RVEF, mod ANNAMARIE, no CELINA thrombus, no PFO, mod MR, mild TR      CATH 7/20/18: no epicardial disease, LVEP 1     RHC 7/20/18: RA:12, RV 34/6/14. PA 30/20/26, PCWP 18, [place] 6.33/2.74 TD: 5.37/2.32     DCCV 7/20/18    CARDIAC MRI: 7/20/18: mod LVD (LVDD 66), mild LVH (13mm), LVEF 17%, severe GHK, mild RVD, RVEF 20%, mod GHK, mod MR, mild TR. Distinct mid wall stripe enhancement of the basal septal wall along with RV insertion point enhancement sparing the subendocardium. viral myocarditis of HHV-6 viral strain. NO features of giant cell or lymphocytic myocarditis. , infiltrative Sarcoidosis, amyloidosis, old MI    UPEP, SPEP,  heavy metals, FLC (-) HIV ( NR), Legionella (-) Ferritin WNL     History:  Past Medical History:   Diagnosis Date    Anxiety     Class 1 obesity due to excess calories with serious comorbidity and body mass index (BMI) of 32.0 to 32.9 in adult 2/27/2019    Combined systolic and diastolic heart failure ([address] 8/8/2018    ECHO 7/17/18:mild-mod LVD, EF 10-20%, severe DHK, mild LVH, DD, RVD, mild- mod LAE, mod RAY, severe MR, mod-severe TR, RVSP 40, dilated RV outflow tract  ROBERTO 7/20/18: LVD, EF 10-15%, severe DHK, mild RVD, reduced RVEF, mod ANNAMARIE, no CELINA thrombus, no PFO, mod MR, mild TR  RHC 7/20/18: RA:12, RV 34/6/14.  PA 30/20/26, PCWP 18, [place] 6.33/2.74 TD: 5.37/2.32    CARDIAC MRI: 7/20/18: mod LVD (LVDD 66), mil    Congestive heart failure ([address]     Essential hypertension 3/12/2019    Hypertensive heart disease with combined systolic and diastolic congestive heart failure (HCC) 3/12/2019    ECHO 7/17/18:mild-mod LVD, EF 10-20%, severe DHK, mild LVH, DD, RVD, mild- mod LAE, mod RAY, severe MR, mod-severe TR, RVSP 40, dilated RV outflow tract ECHO 10/26/2018: mild LVD, EF 45%, G3/4 DD, mod ant leaflet thickening, mild MR, dil RVOT        Long term current use of anticoagulant therapy     NICM (nonischemic cardiomyopathy) ([address] 8/8/2018    Non-rheumatic mitral regurgitation 8/8/2018    ECHO 7/17/18:mild-mod LVD, EF 10-20%, severe DHK, mild LVH, DD, RVD, mild- mod LAE, mod RAY, severe MR, mod-severe TR, RVSP 40, dilated RV outflow tract  ROBERTO 7/20/18: LVD, EF 10-15%, severe DHK, mild RVD, reduced RVEF, mod ANNAMARIE, no CELINA thrombus, no PFO, mod MR, mild TR      PAF (paroxysmal atrial fibrillation) ([address] 8/8/2018    ROBERTO 7/20/18: LVD, EF 10-15%, severe DHK, mild RVD, reduced RVEF, mod ANNAMARIE, no CELINA thrombus, no PFO, mod MR, mild TR    DCCV 7/20/18       Rapid atrial fibrillation ([address] 7/17/2018    Tobacco use disorder 8/8/2018    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    Vitamin D deficiency 9/19/2018    Level 12 8/2018     Past Surgical History:   Procedure Laterality Date    CARDIOVERSION, ELECTIVE;EXTERN  7/20/2018         HX HEART CATHETERIZATION       Social History Socioeconomic History    Marital status: SINGLE     Spouse name: Not on file    Number of children: Not on file    Years of education: Not on file    Highest education level: Not on file   Social Needs    Financial resource strain: Not on file    Food insecurity - worry: Not on file    Food insecurity - inability: Not on file    Transportation needs - medical: Not on file   [employer] needs - non-medical: Not on file   Occupational History    Not on file   Tobacco Use    Smoking status: Current Some Day Smoker     Types: Cigarettes     Last attempt to quit: 2018     Years since quittin.6    Smokeless tobacco: Never Used    Tobacco comment: 3-4 cigarettes vaughn   Substance and Sexual Activity    Alcohol use: No    Drug use: No    Sexual activity: Yes   Other Topics Concern     Service Not Asked    Blood Transfusions Not Asked    Caffeine Concern Not Asked    Occupational Exposure Not Asked    Hobby Hazards Not Asked    Sleep Concern Not Asked    Stress Concern Not Asked    Weight Concern Not Asked    Special Diet Not Asked    Back Care Not Asked    Exercise Not Asked    Bike Helmet Not Asked    Seat Belt Not Asked    Self-Exams Not Asked   Social History Narrative    Not on file     Family History   Problem Relation Age of Onset    Diabetes Mother     Heart Failure Mother     No Known Problems Father     Hypertension Sister     Hypertension Sister        Current Medications:   Current Outpatient Medications   Medication Sig Dispense Refill    multivitamin (ONE A DAY) tablet Take 1 Tab by mouth daily.  carvedilol (COREG) 12.5 mg tablet Take 1 Tab by mouth two (2) times daily (with meals). 60 Tab 2    spironolactone (ALDACTONE) 25 mg tablet Take 1 Tab by mouth daily. 30 Tab 3    warfarin (COUMADIN) 6 mg tablet Take 1 Tab by mouth daily. 30 Tab 3    Cholecalciferol, Vitamin D3, (VITAMIN D3) 2,000 unit cap capsule Take 2,000 Units by mouth daily.  27 Cap 6    sacubitril-valsartan (ENTRESTO) 97 mg/103 mg tablet Take 1 Tab by mouth two (2) times a day. Indications: chronic heart failure 180 Tab 3    amiodarone (CORDARONE) 200 mg tablet Take 1 Tab by mouth daily. 30 Tab 12    magnesium oxide (MAG-OX) 400 mg tablet Take 1 Tab by mouth daily. 30 Tab 1       Allergies: No Known Allergies      Vitals:   Visit Vitals  Pulse 72   Temp 98.4 °F (36.9 °C)   Resp 12   Ht 6' 3\" (1.905 m)   Wt 256 lb (116.1 kg)   SpO2 97%   BMI 32.00 kg/m²     BP recheck: 140/100     Physical Exam:   Constitutional:   Well developed AA male, cooperative, pleasant, in NAD  HEENT:   Normocephalic, atraumatic, sclera anicteric, multiple gold teeth   Neck:    Normal range of motion. Neck supple. Pulmonary/Chest:  Effort normal and breath sounds normal.    Cardiovascular:    PMI:diminished, RRR,  RRR,  S1, S2 normal, - S3,  Intermittent S4 JVP  10 cm (-)  HJR   Abdominal:   Soft. Non tender, non distended,  Bowel sounds are normal. No hepatomegally   Musculoskeletal:    no edema, calf tenderness, cyanosis or clubbing   Neurological:  A& O x3, no focal deficits, ambulates w/o difficulty    Extremities:            Intact distal pulses. Skin:    Skin is warm and dry.    Psy:    appears calm, not agitated     Recent Labs:   Lab Results   Component Value Date/Time    WBC 6.0 07/26/2018 05:12 AM    HGB 14.6 07/26/2018 05:12 AM    HCT 45.7 07/26/2018 05:12 AM    PLATELET 797 85/37/4802 05:12 AM    MCV 98.1 07/26/2018 05:12 AM     Lab Results   Component Value Date/Time    TSH 1.400 02/28/2019 12:52 PM    T4, Free 1.69 02/28/2019 12:52 PM      Lab Results   Component Value Date/Time    CK 91 07/17/2018 02:00 PM    CK - MB 1.6 07/17/2018 02:00 PM    CK-MB Index 1.8 07/17/2018 02:00 PM      Lab Results   Component Value Date/Time    NT pro-BNP 50 10/24/2018 09:18 AM    NT pro-BNP 1,981 (H) 07/26/2018 05:12 AM    NT pro-BNP 2,507 (H) 07/25/2018 04:08 AM    NT pro-BNP 2,972 (H) 07/24/2018 04:19 AM    NT pro-BNP 2,629 (H) 07/23/2018 04:09 AM    PROBNP 17 02/28/2019 12:51 PM    PROBNP 477 (H) 09/20/2018 12:25 PM    PROBNP 940 (H) 09/04/2018 12:00 AM    PROBNP 1,486 (H) 08/09/2018 12:00 AM      Lab Results   Component Value Date/Time    Sodium 141 02/28/2019 12:51 PM    Potassium 3.8 02/28/2019 12:51 PM    Chloride 106 02/28/2019 12:51 PM    CO2 19 (L) 02/28/2019 12:51 PM    Anion gap 9 10/24/2018 09:18 AM    Glucose 76 02/28/2019 12:51 PM    BUN 11 02/28/2019 12:51 PM    Creatinine 1.19 02/28/2019 12:51 PM    BUN/Creatinine ratio 9 02/28/2019 12:51 PM    GFR est AA 88 02/28/2019 12:51 PM    GFR est non-AA 76 02/28/2019 12:51 PM    Calcium 9.2 02/28/2019 12:51 PM    Bilirubin, total 0.7 07/26/2018 05:12 AM    ALT (SGPT) 19 07/26/2018 05:12 AM    AST (SGOT) 21 07/26/2018 05:12 AM    Alk. phosphatase 100 07/26/2018 05:12 AM    Protein, total 6.8 07/26/2018 05:12 AM    Albumin 2.4 (L) 07/26/2018 05:12 AM    Globulin 4.4 (H) 07/26/2018 05:12 AM    A-G Ratio 0.5 (L) 07/26/2018 05:12 AM      No results found for: HBA1C, PPO0LGEM, HGBE8, UBT1WPZG, FVQ2RGAF, AGU0QBDD     CTA Results (most recent): 7/17/18:       Impression IMPRESSION: Moderate right pleural effusion with right middle and right lower  lobe infiltrates. Left lower lobe atelectasis. No evidence of pulmonary  embolism. I have discussed the diagnosis with  Serena Conner and the intended plan as seen in the above orders. Questions were answered concerning future plans, and written instructions provided. I have discussed medication side effects and warnings with the patient as well. Thank you for allowing me to participate in the care of this delightful gentleman. Please do not hesitate to call with any questions. Christiana Shah RN, ACNP-BC, [address]  [address] [facility] [address] [phone]  24 hour VAD/HF Pager: [phone]

## 2019-03-12 NOTE — TELEPHONE ENCOUNTER
After Visit Summary   10/3/2018    Barbi Vale    MRN: [id number]           Patient Information     Date Of Birth          1986        Visit Information        Provider Department      10/3/2018 3:15 PM Irvin Norris APRN CNM [facility] [place]        Today's Diagnoses     Supervision of normal first pregnancy in second trimester          Care Instructions    Return to office in 4 weeks for prenatal care and as needed.    Thank you for allowing Irvin MASTERS CNM and our OB team to participate in your care.  If you have a scheduling or an appointment question please contact [facility] Unit Coordinator at their direct line [phone].   ALL nursing questions or concerns can be directed to your OB nurse at: [phone] Michelle Bhatia/Chandrika               Follow-ups after your visit        Your next 10 appointments already scheduled     Oct 15, 2018  2:00 PM CDT   Consult HOD with HI LACTATION NURSE   HI LACTATION ([facility] )    [address] [place] [id number] [phone]            Oct 23, 2018  3:30 PM CDT   (Arrive by 3:15 PM)   US OB > 14 WEEKS COMPLETE SINGLE with HIUS2   HI ULTRASOUND ([facility] )    [address] [place] [address]   [phone]           How do I prepare for my exam? (Food and drink instructions) Drink four 8-ounce glasses of fluid an hour before your exam. If you need to empty your bladder before your exam, try to release only a little urine. Then, drink another glass of fluid.  How do I prepare for my exam? (Other instructions) You may have up to two family members in the exam room. If you bring a small child, an adult must be there to care for him or her. No video or camera photography during the procedure.  What should I wear: Wear comfortable clothes.  How long does the exam take: Most ultrasounds take 30 to 60 minutes.  What should I bring: Bring a list of your medicines, including vitamins, minerals  I called the patient after speaking with Dr. Nikos Calix. I informed him that Dr. Nikos Calix did put the order in the system. He will double check to make sure everything is correct but they should be calling you soon. He acknowledged understanding and thanked me for the call. and over-the-counter drugs. It is safest to leave personal items at home.  Do I need a :  No  is needed.  What do I need to tell my doctor: Tell your doctor about any allergies you may have.  What should I do after the exam: No restrictions, You may resume normal activities.  What is this test: An ultrasound uses sound waves to make pictures of the body. Sound waves do not cause pain. The only discomfort may be the pressure of the wand against your skin or full bladder.  Who should I call with questions: If you have any questions, please call the Imaging Department where you will have your exam. Directions, parking instructions, and other information is available on our website, [email or web address].            Oct 24, 2018  3:30 PM CDT   (Arrive by 3:15 PM)   ESTABLISHED PRENATAL with GUERRERO Oneal CNM   [facility]    [address]  [facility] [address]   [phone]              Who to contact     If you have questions or need follow up information about today's clinic visit or your schedule please contact [facility] directly at [phone].  Normal or non-critical lab and imaging results will be communicated to you by MyChart, letter or phone within 4 business days after the clinic has received the results. If you do not hear from us within 7 days, please contact the clinic through MyChart or phone. If you have a critical or abnormal lab result, we will notify you by phone as soon as possible.  Submit refill requests through [employer] or call your pharmacy and they will forward the refill request to us. Please allow 3 business days for your refill to be completed.          Additional Information About Your Visit        [employer] Information     [employer] gives you secure access to your electronic health record. If you see a primary care provider, you can also send messages to your care team and make appointments. If  "you have questions, please call your primary care clinic.  If you do not have a primary care provider, please call [phone] and they will assist you.        Care EveryWhere ID     This is your Care EveryWhere ID. This could be used by other organizations to access your [place] medical records  [id number]        Your Vitals Were     Height Last Period BMI (Body Mass Index)             5' 3\" (1.6 m) 05/31/2018 28.87 kg/m2          Blood Pressure from Last 3 Encounters:   10/03/18 107/62   09/24/18 92/60   09/23/18 114/69    Weight from Last 3 Encounters:   10/03/18 163 lb (73.9 kg)   09/24/18 162 lb 6.4 oz (73.7 kg)   09/14/18 160 lb (72.6 kg)              We Performed the Following     Maternal Quad Marker 2nd Trimester        Primary Care Provider Office Phone # Fax #    Caprice Pimentel [phone]       [facility] [place] [address]  [place] [address]        Equal Access to Services     [facility]: Hadii aad ku hadasho Soomaali, waaxda luqadaha, qaybta kaalmada adeegyada, waxay susannein haymaria elenan rox rai . So [facility] [phone].    ATENCIÓN: Si habla español, tiene a kinney disposición servicios gratuitos de asistencia lingüística. Llame al [phone].    We comply with applicable federal civil rights laws and [place] laws. We do not discriminate on the basis of race, color, national origin, age, disability, sex, sexual orientation, or gender identity.            Thank you!     Thank you for choosing [facility]  for your care. Our goal is always to provide you with excellent care. Hearing back from our patients is one way we can continue to improve our services. Please take a few minutes to complete the written survey that you may receive in the mail after your visit with us. Thank you!             Your Updated Medication List - Protect others around you: Learn how to safely use, store and throw away your medicines at www.disposemymeds.org.        "   This list is accurate as of 10/3/18  3:47 PM.  Always use your most recent med list.                   Brand Name Dispense Instructions for use Diagnosis    ACETAMINOPHEN PO      Take 500 mg by mouth once        fish oil-omega-3 fatty acids 1000 MG capsule      Take 1,000 mg by mouth daily    Paresthesia       folic acid 400 MCG tablet    FOLVITE     Take 400 mcg by mouth daily        prenatal multivitamin plus iron 27-0.8 MG Tabs per tablet      Take 1 tablet by mouth daily        VITAMIN D (CHOLECALCIFEROL) PO      Take 2,000 Units by mouth daily

## 2019-03-13 ENCOUNTER — OFFICE VISIT (OUTPATIENT)
Dept: CARDIOLOGY CLINIC | Age: 39
End: 2019-03-13

## 2019-03-13 VITALS
RESPIRATION RATE: 12 BRPM | OXYGEN SATURATION: 97 % | DIASTOLIC BLOOD PRESSURE: 92 MMHG | BODY MASS INDEX: 31.83 KG/M2 | HEIGHT: 75 IN | TEMPERATURE: 98.4 F | HEART RATE: 72 BPM | WEIGHT: 256 LBS | SYSTOLIC BLOOD PRESSURE: 122 MMHG

## 2019-03-13 DIAGNOSIS — R06.02 SOB (SHORTNESS OF BREATH): ICD-10-CM

## 2019-03-13 DIAGNOSIS — I50.42 CHRONIC COMBINED SYSTOLIC AND DIASTOLIC HEART FAILURE (HCC): Primary | Chronic | ICD-10-CM

## 2019-03-13 DIAGNOSIS — I48.0 PAF (PAROXYSMAL ATRIAL FIBRILLATION) (HCC): ICD-10-CM

## 2019-03-13 DIAGNOSIS — I11.0 HYPERTENSIVE HEART DISEASE WITH CHRONIC COMBINED SYSTOLIC AND DIASTOLIC CONGESTIVE HEART FAILURE (HCC): ICD-10-CM

## 2019-03-13 DIAGNOSIS — I50.42 HYPERTENSIVE HEART DISEASE WITH CHRONIC COMBINED SYSTOLIC AND DIASTOLIC CONGESTIVE HEART FAILURE (HCC): ICD-10-CM

## 2019-03-13 DIAGNOSIS — I10 ESSENTIAL HYPERTENSION: ICD-10-CM

## 2019-03-13 DIAGNOSIS — I42.8 NICM (NONISCHEMIC CARDIOMYOPATHY) (HCC): ICD-10-CM

## 2019-03-13 DIAGNOSIS — R06.83 SNORING: ICD-10-CM

## 2019-03-13 RX ORDER — AMLODIPINE BESYLATE 5 MG/1
5 TABLET ORAL DAILY
Qty: 30 TAB | Refills: 3 | Status: SHIPPED | OUTPATIENT
Start: 2019-03-13 | End: 2019-05-23 | Stop reason: SDUPTHER

## 2019-03-13 NOTE — LETTER
3/13/2019 1:07 PM 
 
Patient:  Nima Dumont YOB: 1980 Date of Visit: 3/13/2019 Dear Magnolia Lewis MD 
[address] [id number] VIA In Basket Ajit Araujo MD 
[address] [place] [address] [address] [address] VIA In Basket 
 : Thank you for referring Mr. Linda Zhang to me for evaluation/treatment. Below are the relevant portions of my assessment and plan of care. If you have questions, please do not hesitate to call me. I look forward to following Mr. Cherry Morrison along with you. Sincerely, YOUSUF Luque

## 2019-03-14 LAB
BUN SERPL-MCNC: 13 MG/DL (ref 6–20)
BUN/CREAT SERPL: 12 (ref 9–20)
CALCIUM SERPL-MCNC: 9.5 MG/DL (ref 8.7–10.2)
CHLORIDE SERPL-SCNC: 106 MMOL/L (ref 96–106)
CO2 SERPL-SCNC: 20 MMOL/L (ref 20–29)
CREAT SERPL-MCNC: 1.1 MG/DL (ref 0.76–1.27)
GLUCOSE SERPL-MCNC: 83 MG/DL (ref 65–99)
NT-PROBNP SERPL-MCNC: 17 PG/ML (ref 0–86)
POTASSIUM SERPL-SCNC: 4.5 MMOL/L (ref 3.5–5.2)
SODIUM SERPL-SCNC: 141 MMOL/L (ref 134–144)

## 2019-03-20 ENCOUNTER — HOSPITAL ENCOUNTER (OUTPATIENT)
Dept: NON INVASIVE DIAGNOSTICS | Age: 39
Discharge: HOME | End: 2019-03-20
Attending: INTERNAL MEDICINE
Payer: MEDICAID

## 2019-03-20 ENCOUNTER — OFFICE VISIT (OUTPATIENT)
Dept: BEHAVIORAL/MENTAL HEALTH CLINIC | Age: 39
End: 2019-03-20

## 2019-03-20 ENCOUNTER — TELEPHONE (OUTPATIENT)
Dept: CARDIOLOGY CLINIC | Age: 39
End: 2019-03-20

## 2019-03-20 VITALS
WEIGHT: 261.8 LBS | DIASTOLIC BLOOD PRESSURE: 77 MMHG | HEART RATE: 66 BPM | HEIGHT: 75 IN | SYSTOLIC BLOOD PRESSURE: 114 MMHG | BODY MASS INDEX: 32.55 KG/M2

## 2019-03-20 DIAGNOSIS — Z79.01 WARFARIN ANTICOAGULATION: ICD-10-CM

## 2019-03-20 DIAGNOSIS — I48.0 PAROXYSMAL ATRIAL FIBRILLATION (HCC): ICD-10-CM

## 2019-03-20 DIAGNOSIS — F41.8 DEPRESSION WITH ANXIETY: Primary | ICD-10-CM

## 2019-03-20 DIAGNOSIS — F17.200 TOBACCO USE DISORDER: ICD-10-CM

## 2019-03-20 DIAGNOSIS — F10.21 ALCOHOL USE DISORDER, MODERATE, IN SUSTAINED REMISSION (HCC): ICD-10-CM

## 2019-03-20 DIAGNOSIS — F60.9 PERSONALITY DISORDER (HCC): ICD-10-CM

## 2019-03-20 PROCEDURE — 93225 XTRNL ECG REC<48 HRS REC: CPT

## 2019-03-20 RX ORDER — SERTRALINE HYDROCHLORIDE 50 MG/1
50 TABLET, FILM COATED ORAL DAILY
Qty: 30 TAB | Refills: 1 | Status: SHIPPED | OUTPATIENT
Start: 2019-03-20 | End: 2019-04-18 | Stop reason: SDUPTHER

## 2019-03-20 NOTE — PROGRESS NOTES
Ambulatory Initial Psychiatric Evaluation     Chief Complaint:   Chief Complaint   Patient presents with    New Patient     Referred by Dr. Berny Mckeon for MDD, PTSD and Anxiety        History of Present Illness: Dariana Sierra is a 43 y.o. Single, [place], [place] male who presents with no significant psychiatric history, history of alcohol use disorder-in remission, tobacco use disorder, antisocial behaviors with multiple incarcerations over the years, and multiple medical problems was seen today to establish his mental health treatment here at the recommendation of his cardiologist, Dr. Berny Mckeon. Patient reported that he has a history of major depressive disorder, PTSD and anxiety but is currently not in any kind of treatment. From the very beginning patient was very nonchalant about getting into psychiatric treatment and did not appear to be very interested. Patient is currently wearing a heart monitor for another onset of arrhythmias. Patient reported that he was doing fine until a few years ago. He had a surgery on his stomach in 2015 and since then they have found multiple medical problems with him includes cardiomyopathy, arrhythmias, congestive heart and hypertension. He has been worked up thoroughly over the years for his cardiac problems and other medical issues. He is currently followed by Dr. Berny Mckeon for his cardiac issues. He reported that he has not been able to work since 2006 and has been denied disability multiple times. Today reported that he was told to see a psychiatrist because of his attitude, which he realizes is very poor. He reported that he is not sleeping that well and has been nightmares. Eating okay and his weight fluctuates. Energy level is low. He describes some auditory and visual hallucinations but is very vague about it. He denies any suicidal or homicidal ideations but then immediately reported that he wants to kill some of his enemies, which he did not identify.   Patient denies any obsession or compulsion. Patient denies any substance use except smoking 1 ppd despite having severe heart problems. He also talked about his losses including mother dying in 2017 and sister dying 2009. He stated that he has never completely grieved his losses. He also acknowledged that he had some problems in his behavior when he was in high school and was sent to psychiatric [facility]. He does not know the details of the treatment he received as a teenager. Patient is currently not taking any psychotropic medications and was not that interested in taking medication. Patient has a history of arrhythmia, cardiomyopathy, congestive heart failure, hypertension, obesity, vitamin D deficiency. He currently weighs 261 pounds with BMI of 33. His last 1-15 2019 shows QTC of 417. History is TSH level is 1.4 on 2/28/2019. Scales:( 3/20/19)  PHQ-9: 25 - severe depression  HAM-A: 48 - severe anxiety  MDQ: ++     Past Psychiatric History:   As per HPI, patient has a history of depressive disorder, PTSD and anxiety but has not been in treatment for any of these diagnoses. Patient denied any history of psychiatric hospitalization. Patient has never received ECT or [facility] [place]. Patient denies any outpatient psychiatric treatment in the recent years. Past history of substance use:  Patient reported that he has problem with alcohol for a long time and has been in remission for 2 years. He currently smokes half a pack of sugar per day. Patient denies any use of any other illicit drug use. Patient denies being in any kind of drug rehab program.    Social History:   Patient is single and goss. He currently lives with his sister. Patient has 11th grade education. He was held back 1 year in high school because of his behaviors. Patient has not worked since 2006. Patient reported that he has been incarcerated more than 10 times for alcohol-related crimes and assault and battery.   He spent more than 5 years in the correction system. She denied any history of childhood abuse. He reported that his niece has a schizophrenia and sister has bipolar. Family History:   Family History   Problem Relation Age of Onset    Diabetes Mother     Heart Failure Mother     No Known Problems Father     Hypertension Sister     Hypertension Sister         Past Medical History:   Past Medical History:   Diagnosis Date    Anxiety     Class 1 obesity due to excess calories with serious comorbidity and body mass index (BMI) of 32.0 to 32.9 in adult 2/27/2019    Combined systolic and diastolic heart failure ([address] 8/8/2018    ECHO 7/17/18:mild-mod LVD, EF 10-20%, severe DHK, mild LVH, DD, RVD, mild- mod LAE, mod RAY, severe MR, mod-severe TR, RVSP 40, dilated RV outflow tract  ROBERTO 7/20/18: LVD, EF 10-15%, severe DHK, mild RVD, reduced RVEF, mod ANNAMARIE, no CELINA thrombus, no PFO, mod MR, mild TR  RHC 7/20/18: RA:12, RV 34/6/14.  PA 30/20/26, PCWP 18, [place] 6.33/2.74 TD: 5.37/2.32    CARDIAC MRI: 7/20/18: mod LVD (LVDD 66), mil    Congestive heart failure ([address]     Essential hypertension 3/12/2019    Hypertensive heart disease with combined systolic and diastolic congestive heart failure ([address] 3/12/2019    ECHO 7/17/18:mild-mod LVD, EF 10-20%, severe DHK, mild LVH, DD, RVD, mild- mod LAE, mod RAY, severe MR, mod-severe TR, RVSP 40, dilated RV outflow tract ECHO 10/26/2018: mild LVD, EF 45%, G3/4 DD, mod ant leaflet thickening, mild MR, dil RVOT        Long term current use of anticoagulant therapy     NICM (nonischemic cardiomyopathy) ([address] 8/8/2018    Non-rheumatic mitral regurgitation 8/8/2018    ECHO 7/17/18:mild-mod LVD, EF 10-20%, severe DHK, mild LVH, DD, RVD, mild- mod LAE, mod RAY, severe MR, mod-severe TR, RVSP 40, dilated RV outflow tract  ROBERTO 7/20/18: LVD, EF 10-15%, severe DHK, mild RVD, reduced RVEF, mod ANNAMARIE, no CELINA thrombus, no PFO, mod MR, mild TR      PAF (paroxysmal atrial fibrillation) ([address] 8/8/2018    ROBERTO 7/20/18: LVD, EF 10-15%, severe DHK, mild RVD, reduced RVEF, mod ANNAMARIE, no CELINA thrombus, no PFO, mod MR, mild TR    DCCV 7/20/18       Rapid atrial fibrillation ([facility] 7/17/2018    Tobacco use disorder 8/8/2018    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    Vitamin D deficiency 9/19/2018    Level 12 8/2018         Allergies:   No Known Allergies     Medication List:   Current Outpatient Medications   Medication Sig Dispense Refill    sertraline (ZOLOFT) 50 mg tablet Take 1 Tab by mouth daily. Start 1/2 tablet, PO,  AM after breakfast x 2 weeks >> 1 tablet , PO , AM after breakfast 30 Tab 1    amLODIPine (NORVASC) 5 mg tablet Take 1 Tab by mouth daily for 360 days. 30 Tab 3    multivitamin (ONE A DAY) tablet Take 1 Tab by mouth daily.  carvedilol (COREG) 12.5 mg tablet Take 1 Tab by mouth two (2) times daily (with meals). 60 Tab 2    spironolactone (ALDACTONE) 25 mg tablet Take 1 Tab by mouth daily. 30 Tab 3    warfarin (COUMADIN) 6 mg tablet Take 1 Tab by mouth daily. 30 Tab 3    Cholecalciferol, Vitamin D3, (VITAMIN D3) 2,000 unit cap capsule Take 2,000 Units by mouth daily. 30 Cap 6    sacubitril-valsartan (ENTRESTO) 97 mg/103 mg tablet Take 1 Tab by mouth two (2) times a day. Indications: chronic heart failure 180 Tab 3    amiodarone (CORDARONE) 200 mg tablet Take 1 Tab by mouth daily. 30 Tab 12    magnesium oxide (MAG-OX) 400 mg tablet Take 1 Tab by mouth daily.  30 Tab 1        ROS:  Constitutional: positive for fatigue and weight loss  Eyes: positive for contacts/glasses  Ears, nose, mouth, throat, and face: negative for hearing loss  Respiratory: negative for cough or wheezing  Cardiovascular: positive for irregular heart beats, fatigue  Gastrointestinal: negative for reflux symptoms and constipation  Genitourinary:negative for frequency and urinary incontinence  Musculoskeletal:positive for arthralgias  Neurological: positive for memory problems  Behavioral/Psych: positive for anxiety, behavior problems, depression and sleep disturbance, negative for SI or HI    Psychiatric/Mental Status Examination:     MENTAL STATUS EXAM:  Sensorium  oriented to time, place and person   Orientation person, place, time/date, situation, day of week, month of year and year   Relations passive, uncooperative and unreliable   Eye Contact poor   Appearance:  age appropriate and casually dressed   Motor Behavior:  within normal limits   Speech:  normal pitch and normal volume   Vocabulary below average   Thought Process: goal directed and logical   Thought Content free of delusions and free of hallucinations   Suicidal ideations none   Homicidal ideations none   Mood:  depressed   Affect:  constricted   Memory recent  adequate   Memory remote:  adequate   Concentration:  adequate   Abstraction:  concrete   Insight:  limited   Reliability poor   Judgment:  poor       Assessement & Diagnoses: This is a 78-year-old, homosexual, Afro-American male with history of depression, anxiety, PTSD, alcohol use disorder-in remission and tobacco use disorder and severe medical problems, was seen today to establish his mental health treatment here. Patient appears to be mildly depressed and anxious and probably has significant personality issues and may be antisocial.  Patient is very reluctant to start any medications and does not appear to be interested in any kind of psychiatric treatment. He reluctantly agreed to start low-dose of SSRI. He also agreed to start psychotherapy with a counselor in the community. Primary diagnosis:  Depression with Anxiety, Tobacco use d/o, Alcohol use d/o - in remission    Secondary diagnosis:  R/o Personality d/o ? antisocial    Tertiary diagnosis: Multiple medical problems as noted above    Strength & Weaknesses: Supportive sister, good medical treatment. Treatment Plan:   1. Medication: begin low-dose Zoloft titrated slowly  2.  Discussed: the potential medication side effects  GI disturbance, libido decreased, somnolence  patient given opportunity to ask questions  3. Psychotherapy: Patient was given a list of counselors in the community and was recommended to choose 1 of them and to start supportive counseling  4. Medical: Continue with PCP and cardiology  5. Education: Patient was educated on my clinical diagnosis, treatment plan and prognosis. 6. Return to Clinic: 2 months    The risk versus benefits of treatment were discussed and side effects explained. Patient agreed with plan. Patient instructed to call with any side effects.      Time spent with Patient:  30 to 74 minutes    Miriam Irwin MD  3/20/2019

## 2019-03-20 NOTE — PROGRESS NOTES
Pt had order for 48 hour holter explained test to the pt and how to use the monitor, noting if he felt like he was having a medical emergency to call 911 and we want to make sure he is taken care of and will worry about the box later. Instructions on monitor use and extra supplies given. Holter placed. Noting how to use diary and to remove box on Friday and return before three oclock on Friday. Pt had no further questions noted, had another drs appt today and will wait in lobby for that appt.

## 2019-03-20 NOTE — TELEPHONE ENCOUNTER
----- Message from YOUSUF Segura sent at 3/20/2019 10:13 AM EDT -----  Labs stable  Continue Current plan  Thank you    I called patient and reviewed all lab results,  He states understanding.

## 2019-03-22 ENCOUNTER — TELEPHONE (OUTPATIENT)
Dept: CARDIOLOGY CLINIC | Age: 39
End: 2019-03-22

## 2019-04-04 ENCOUNTER — HOSPITAL ENCOUNTER (OUTPATIENT)
Dept: SLEEP MEDICINE | Age: 39
Discharge: HOME | End: 2019-04-04
Payer: MEDICAID

## 2019-04-04 VITALS
WEIGHT: 259.6 LBS | OXYGEN SATURATION: 96 % | HEIGHT: 75 IN | DIASTOLIC BLOOD PRESSURE: 77 MMHG | HEART RATE: 77 BPM | BODY MASS INDEX: 32.28 KG/M2 | SYSTOLIC BLOOD PRESSURE: 111 MMHG

## 2019-04-04 DIAGNOSIS — G47.33 OSA (OBSTRUCTIVE SLEEP APNEA): ICD-10-CM

## 2019-04-04 PROCEDURE — 95810 POLYSOM 6/> YRS 4/> PARAM: CPT | Performed by: SPECIALIST

## 2019-04-05 ENCOUNTER — TELEPHONE (OUTPATIENT)
Dept: SLEEP MEDICINE | Age: 39
End: 2019-04-05

## 2019-04-13 NOTE — TELEPHONE ENCOUNTER
Nocturnal polysomnographic recording is performed. 443 minutes recorded of which 343 minutes spent asleep with a sleep efficiency of 77.4%. Sleep onset at 45.2 minutes; REM onset occurred early at 26 minutes with total REM representing 31.9% of sleep time. All sleep stages were observed. 10 respiratory events occurred (3 hypopnea, 7 apnea). The overall AHI was normal at 1.7/h. Minimal SaO2 91%. Impression: This nocturnal polysomnogram does not sleep disordered breathing. He does not have a history of non-restorative sleep or frequent awakening. Perley sleepiness scale: 5. At present, further polysomnographic sleep evaluation not indicated. Chief technologist: Please review the sleep study with the patient.

## 2019-04-18 ENCOUNTER — OFFICE VISIT (OUTPATIENT)
Dept: FAMILY MEDICINE CLINIC | Age: 39
End: 2019-04-18

## 2019-04-18 VITALS
BODY MASS INDEX: 32.87 KG/M2 | RESPIRATION RATE: 20 BRPM | OXYGEN SATURATION: 97 % | DIASTOLIC BLOOD PRESSURE: 68 MMHG | HEIGHT: 75 IN | SYSTOLIC BLOOD PRESSURE: 103 MMHG | WEIGHT: 264.4 LBS | HEART RATE: 71 BPM | TEMPERATURE: 96.8 F

## 2019-04-18 DIAGNOSIS — I11.0 HYPERTENSIVE HEART DISEASE WITH COMBINED SYSTOLIC AND DIASTOLIC CONGESTIVE HEART FAILURE, UNSPECIFIED HF CHRONICITY (HCC): ICD-10-CM

## 2019-04-18 DIAGNOSIS — Z79.01 ANTICOAGULATED: ICD-10-CM

## 2019-04-18 DIAGNOSIS — I40.0 CHRONIC VIRAL MYOCARDITIS: ICD-10-CM

## 2019-04-18 DIAGNOSIS — F41.8 DEPRESSION WITH ANXIETY: ICD-10-CM

## 2019-04-18 DIAGNOSIS — I48.0 PAF (PAROXYSMAL ATRIAL FIBRILLATION) (HCC): ICD-10-CM

## 2019-04-18 DIAGNOSIS — I50.40 COMBINED SYSTOLIC AND DIASTOLIC HEART FAILURE, UNSPECIFIED HF CHRONICITY (HCC): Primary | ICD-10-CM

## 2019-04-18 DIAGNOSIS — F17.200 TOBACCO USE DISORDER: ICD-10-CM

## 2019-04-18 DIAGNOSIS — K04.7 DENTAL ABSCESS: ICD-10-CM

## 2019-04-18 DIAGNOSIS — I10 ESSENTIAL HYPERTENSION: ICD-10-CM

## 2019-04-18 DIAGNOSIS — J90 PLEURAL EFFUSION, RIGHT: ICD-10-CM

## 2019-04-18 DIAGNOSIS — I42.8 NICM (NONISCHEMIC CARDIOMYOPATHY) (HCC): ICD-10-CM

## 2019-04-18 DIAGNOSIS — I50.40 HYPERTENSIVE HEART DISEASE WITH COMBINED SYSTOLIC AND DIASTOLIC CONGESTIVE HEART FAILURE, UNSPECIFIED HF CHRONICITY (HCC): ICD-10-CM

## 2019-04-18 DIAGNOSIS — E66.09 CLASS 1 OBESITY DUE TO EXCESS CALORIES WITH SERIOUS COMORBIDITY AND BODY MASS INDEX (BMI) OF 32.0 TO 32.9 IN ADULT: ICD-10-CM

## 2019-04-18 RX ORDER — AMOXICILLIN 500 MG/1
500 CAPSULE ORAL 3 TIMES DAILY
Qty: 30 CAP | Refills: 0 | Status: SHIPPED | OUTPATIENT
Start: 2019-04-18 | End: 2019-04-28

## 2019-04-18 RX ORDER — SERTRALINE HYDROCHLORIDE 50 MG/1
50 TABLET, FILM COATED ORAL DAILY
Qty: 30 TAB | Refills: 1
Start: 2019-04-18 | End: 2019-05-23 | Stop reason: SDUPTHER

## 2019-04-18 RX ORDER — AMIODARONE HYDROCHLORIDE 200 MG/1
200 TABLET ORAL DAILY
Qty: 30 TAB | Refills: 12 | Status: SHIPPED | OUTPATIENT
Start: 2019-04-18 | End: 2019-10-12

## 2019-04-18 RX ORDER — CARVEDILOL 12.5 MG/1
12.5 TABLET ORAL 2 TIMES DAILY WITH MEALS
Qty: 60 TAB | Refills: 2 | Status: SHIPPED | OUTPATIENT
Start: 2019-04-18 | End: 2019-05-23 | Stop reason: SDUPTHER

## 2019-04-18 NOTE — PATIENT INSTRUCTIONS
Body Mass Index: Care Instructions  Your Care Instructions    Body mass index (BMI) can help you see if your weight is raising your risk for health problems. It uses a formula to compare how much you weigh with how tall you are. · A BMI lower than 18.5 is considered underweight. · A BMI between 18.5 and 24.9 is considered healthy. · A BMI between 25 and 29.9 is considered overweight. A BMI of 30 or higher is considered obese. If your BMI is in the normal range, it means that you have a lower risk for weight-related health problems. If your BMI is in the overweight or obese range, you may be at increased risk for weight-related health problems, such as high blood pressure, heart disease, stroke, arthritis or joint pain, and diabetes. If your BMI is in the underweight range, you may be at increased risk for health problems such as fatigue, lower protection (immunity) against illness, muscle loss, bone loss, hair loss, and hormone problems. BMI is just one measure of your risk for weight-related health problems. You may be at higher risk for health problems if you are not active, you eat an unhealthy diet, or you drink too much alcohol or use tobacco products. Follow-up care is a key part of your treatment and safety. Be sure to make and go to all appointments, and call your doctor if you are having problems. It's also a good idea to know your test results and keep a list of the medicines you take. How can you care for yourself at home? · Practice healthy eating habits. This includes eating plenty of fruits, vegetables, whole grains, lean protein, and low-fat dairy. · If your doctor recommends it, get more exercise. Walking is a good choice. Bit by bit, increase the amount you walk every day. Try for at least 30 minutes on most days of the week. · Do not smoke. Smoking can increase your risk for health problems. If you need help quitting, talk to your doctor about stop-smoking programs and medicines. These can increase your chances of quitting for good. · Limit alcohol to 2 drinks a day for men and 1 drink a day for women. Too much alcohol can cause health problems. If you have a BMI higher than 25  · Your doctor may do other tests to check your risk for weight-related health problems. This may include measuring the distance around your waist. A waist measurement of more than 40 inches in men or 35 inches in women can increase the risk of weight-related health problems. · Talk with your doctor about steps you can take to stay healthy or improve your health. You may need to make lifestyle changes to lose weight and stay healthy, such as changing your diet and getting regular exercise. If you have a BMI lower than 18.5  · Your doctor may do other tests to check your risk for health problems. · Talk with your doctor about steps you can take to stay healthy or improve your health. You may need to make lifestyle changes to gain or maintain weight and stay healthy, such as getting more healthy foods in your diet and doing exercises to build muscle. Where can you learn more? Go to http://eleanor-walter.info/. Enter S176 in the search box to lear     Heart Failure: Care Instructions  Your Care Instructions    Heart failure occurs when your heart does not pump as much blood as the body needs. Failure does not mean that the heart has stopped pumping but rather that it is not pumping as well as it should. Over time, this causes fluid buildup in your lungs and other parts of your body. Fluid buildup can cause shortness of breath, fatigue, swollen ankles, and other problems. By taking medicines regularly, reducing sodium (salt) in your diet, checking your weight every day, and making lifestyle changes, you can feel better and live longer. Follow-up care is a key part of your treatment and safety. Be sure to make and go to all appointments, and call your doctor if you are having problems.  It's also a good idea to know your test results and keep a list of the medicines you take. How can you care for yourself at home? Medicines    · Be safe with medicines. Take your medicines exactly as prescribed. Call your doctor if you think you are having a problem with your medicine.     · Do not take any vitamins, over-the-counter medicine, or herbal products without talking to your doctor first. Jose Barron not take ibuprofen (Advil or Motrin) and naproxen (Aleve) without talking to your doctor first. They could make your heart failure worse.     · You may be taking some of the following medicine. ? Beta-blockers can slow heart rate, decrease blood pressure, and improve your condition. Taking a beta-blocker may lower your chance of needing to be hospitalized. ? Angiotensin-converting enzyme inhibitors (ACEIs) reduce the heart's workload, lower blood pressure, and reduce swelling. Taking an ACEI may lower your chance of needing to be hospitalized again. ? Angiotensin II receptor blockers (ARBs) work like ACEIs. Your doctor may prescribe them instead of ACEIs. ? Diuretics, also called water pills, reduce swelling. ? Potassium supplements replace this important mineral, which is sometimes lost with diuretics. ? Aspirin and other blood thinners prevent blood clots, which can cause a stroke or heart attack.    You will get more details on the specific medicines your doctor prescribes. Diet    · Your doctor may suggest that you limit sodium to 2,000 milligrams (mg) a day or less. That is less than 1 teaspoon of salt a day, including all the salt you eat in cooking or in packaged foods. People get most of their sodium from processed foods. Fast food and restaurant meals also tend to be very high in sodium.     · Ask your doctor how much liquid you can drink each day. You may have to limit liquids.    Weight    · Weigh yourself without clothing at the same time each day. Record your weight.  Call your doctor if you have a sudden weight gain, such as more than 2 to 3 pounds in a day or 5 pounds in a week. (Your doctor may suggest a different range of weight gain.) A sudden weight gain may mean that your heart failure is getting worse.    Activity level    · Start light exercise (if your doctor says it is okay). Even if you can only do a small amount, exercise will help you get stronger, have more energy, and manage your weight and your stress. Walking is an easy way to get exercise. Start out by walking a little more than you did before. Bit by bit, increase the amount you walk.     · When you exercise, watch for signs that your heart is working too hard. You are pushing yourself too hard if you cannot talk while you are exercising. If you become short of breath or dizzy or have chest pain, stop, sit down, and rest.     · If you feel \"wiped out\" the day after you exercise, walk slower or for a shorter distance until you can work up to a better pace.     · Get enough rest at night. Sleeping with 1 or 2 pillows under your upper body and head may help you breathe easier.    Lifestyle changes    · Do not smoke. Smoking can make a heart condition worse. If you need help quitting, talk to your doctor about stop-smoking programs and medicines. These can increase your chances of quitting for good. Quitting smoking may be the most important step you can take to protect your heart.     · Limit alcohol to 2 drinks a day for men and 1 drink a day for women. Too much alcohol can cause health problems.     · Avoid getting sick from colds and the flu. Get a pneumococcal vaccine shot. If you have had one before, ask your doctor whether you need another dose. Get a flu shot each year. If you must be around people with colds or the flu, wash your hands often. When should you call for help?   Call 911 if you have symptoms of sudden heart failure such as:    · You have severe trouble breathing.     · You cough up pink, foamy mucus.     · You have a new irregular or rapid heartbeat.    Call your doctor now or seek immediate medical care if:    · You have new or increased shortness of breath.     · You are dizzy or lightheaded, or you feel like you may faint.     · You have sudden weight gain, such as more than 2 to 3 pounds in a day or 5 pounds in a week. (Your doctor may suggest a different range of weight gain.)     · You have increased swelling in your legs, ankles, or feet.     · You are suddenly so tired or weak that you cannot do your usual activities.    Watch closely for changes in your health, and be sure to contact your doctor if you develop new symptoms. Where can you learn more? Go to http://eleanor-walter.info/. Enter N457 in the search box to learn more about \"Heart Failure: Care Instructions. \"  Current as of: July 22, 2018  Content Version: 11.9  © 1781-6258 [employer]. Care instructions adapted under license by [employer] (which disclaims liability or warranty for this information). If you have questions about a medical condition or this instruction, always ask your healthcare professional. [place] [address] any warranty or liability for your use of this information. n more about \"Body Mass Index: Care Instructions. \"  Current as of: October 13, 2016  Content Version: 11.4  © 5045-7614 [employer]. Care instructions adapted under license by [employer] (which disclaims liability or warranty for this information). If you have questions about a medical condition or this instruction, always ask your healthcare professional. Prashant Long di     DASH Diet: Care Instructions  Your Care Instructions    The DASH diet is an eating plan that can help lower your blood pressure. DASH stands for Dietary Approaches to Stop Hypertension. Hypertension is high blood pressure. The DASH diet focuses on eating foods that are high in calcium, potassium, and magnesium. These nutrients can lower blood pressure. The foods that are highest in these nutrients are fruits, vegetables, low-fat dairy products, nuts, seeds, and legumes. But taking calcium, potassium, and magnesium supplements instead of eating foods that are high in those nutrients does not have the same effect. The DASH diet also includes whole grains, fish, and poultry. The DASH diet is one of several lifestyle changes your doctor may recommend to lower your high blood pressure. Your doctor may also want you to decrease the amount of sodium in your diet. Lowering sodium while following the DASH diet can lower blood pressure even further than just the DASH diet alone. Follow-up care is a key part of your treatment and safety. Be sure to make and go to all appointments, and call your doctor if you are having problems. It's also a good idea to know your test results and keep a list of the medicines you take. How can you care for yourself at home? Following the DASH diet  · Eat 4 to 5 servings of fruit each day. A serving is 1 medium-sized piece of fruit, ½ cup chopped or canned fruit, 1/4 cup dried fruit, or 4 ounces (½ cup) of fruit juice. Choose fruit more often than fruit juice. · Eat 4 to 5 servings of vegetables each day. A serving is 1 cup of lettuce or raw leafy vegetables, ½ cup of chopped or cooked vegetables, or 4 ounces (½ cup) of vegetable juice. Choose vegetables more often than vegetable juice. · Get 2 to 3 servings of low-fat and fat-free dairy each day. A serving is 8 ounces of milk, 1 cup of yogurt, or 1 ½ ounces of cheese. · Eat 6 to 8 servings of grains each day. A serving is 1 slice of bread, 1 ounce of dry cereal, or ½ cup of cooked rice, pasta, or cooked cereal. Try to choose whole-grain products as much as possible. · Limit lean meat, poultry, and fish to 2 servings each day. A serving is 3 ounces, about the size of a deck of cards.   · Eat 4 to 5 servings of nuts, seeds, and legumes (cooked dried beans, lentils, and split peas) each week. A serving is 1/3 cup of nuts, 2 tablespoons of seeds, or ½ cup of cooked beans or peas. · Limit fats and oils to 2 to 3 servings each day. A serving is 1 teaspoon of vegetable oil or 2 tablespoons of salad dressing. · Limit sweets and added sugars to 5 servings or less a week. A serving is 1 tablespoon jelly or jam, ½ cup sorbet, or 1 cup of lemonade. · Eat less than 2,300 milligrams (mg) of sodium a day. If you limit your sodium to 1,500 mg a day, you can lower your blood pressure even more. Tips for success  · Start small. Do not try to make dramatic changes to your diet all at once. You might feel that you are missing out on your favorite foods and then be more likely to not follow the plan. Make small changes, and stick with them. Once those changes become habit, add a few more changes. · Try some of the following:  ? Make it a goal to eat a fruit or vegetable at every meal and at snacks. This will make it easy to get the recommended amount of fruits and vegetables each day. ? Try yogurt topped with fruit and nuts for a snack or healthy dessert. ? Add lettuce, tomato, cucumber, and onion to sandwiches. ? Combine a ready-made pizza crust with low-fat mozzarella cheese and lots of vegetable toppings. Try using tomatoes, squash, spinach, broccoli, carrots, cauliflower, and onions. ? Have a variety of cut-up vegetables with a low-fat dip as an appetizer instead of chips and dip. ? Sprinkle sunflower seeds or chopped almonds over salads. Or try adding chopped walnuts or almonds to cooked vegetables. ? Try some vegetarian meals using beans and peas. Add garbanzo or kidney beans to salads. Make burritos and tacos with mashed malik beans or black beans. Where can you learn more? Go to http://eleanor-walter.info/. Enter U837 in the search box to learn more about \"DASH Diet: Care Instructions. \"  Current as of: July 22, 2018  Content Version: 11.9  © [id number] [employer]. Care instructions adapted under license by [employer] (which disclaims liability or warranty for this information). If you have questions about a medical condition or this instruction, always ask your healthcare professional. [address] any warranty or liability for your use of this information. sclaims any warranty or liability for your use of this information.

## 2019-04-18 NOTE — PROGRESS NOTES
Name and  verified        Chief Complaint   Patient presents with   [address] Patient     Patient stated Cardiologist evaluates warfarin. Patient stated has never seen Dr. Moises Dutton.  He stated has   Never had a PCP in the past.

## 2019-04-18 NOTE — PROGRESS NOTES
HISTORY OF PRESENT ILLNESS  Asad Evangelista is a 43 y.o. male. HPI patient presents as a new patient for first visit with significant comorbid medical history combined systolic diastolic heart failure right-sided pleural effusion atrial fibrillation with RVR hypercoagulable state,  nonischemic cardiomyopathy nonrheumatic mitral valve regurgitation class I obesity state hypertension depression with anxiety disorder, alcohol abuse in remission at this time stating that all came from coxsackievirus attack and consequently myocarditis there is secured in July 2018 patient states that he caught it late, patient denied any rash to the hand-foot-and-mouth at this time stating that he getting his strength back months after months he needs refill for his medication today stating that he still has shortness of breath and he still is not fully recovered in addition he states that he is not from here he is from [place] and he has been living in the state [facility] for a few months , at this time patient denies any nausea vomiting having no abdominal pain denies any chest pain having shortness of breath on exertion but none at rest denies any dizziness unfortunately he still likes his cigarette after what he went through,  In addition patient complained of a toothache on the left side having two tooth which pulled out in the past unfortunately now with swelling deformity and painful to touch and painful to cold and hot water with decreased chewing ability    Current Outpatient Medications   Medication Sig Dispense Refill    sertraline (ZOLOFT) 50 mg tablet Take 1 Tab by mouth daily. Start 1/2 tablet, PO,  AM after breakfast x 2 weeks >> 1 tablet , PO , AM after breakfast 30 Tab 1    multivitamin (ONE A DAY) tablet Take 1 Tab by mouth daily.  carvedilol (COREG) 12.5 mg tablet Take 1 Tab by mouth two (2) times daily (with meals). 60 Tab 2    spironolactone (ALDACTONE) 25 mg tablet Take 1 Tab by mouth daily.  30 Tab 3  warfarin (COUMADIN) 6 mg tablet Take 1 Tab by mouth daily. 30 Tab 3    Cholecalciferol, Vitamin D3, (VITAMIN D3) 2,000 unit cap capsule Take 2,000 Units by mouth daily. 30 Cap 6    sacubitril-valsartan (ENTRESTO) 97 mg/103 mg tablet Take 1 Tab by mouth two (2) times a day. Indications: chronic heart failure 180 Tab 3    amiodarone (CORDARONE) 200 mg tablet Take 1 Tab by mouth daily. 30 Tab 12    magnesium oxide (MAG-OX) 400 mg tablet Take 1 Tab by mouth daily. 30 Tab 1    amLODIPine (NORVASC) 5 mg tablet Take 1 Tab by mouth daily for 360 days. 30 Tab 3     No Known Allergies  Past Medical History:   Diagnosis Date    Anxiety     Class 1 obesity due to excess calories with serious comorbidity and body mass index (BMI) of 32.0 to 32.9 in adult 2/27/2019    Combined systolic and diastolic heart failure ([facility] [address] 8/8/2018    ECHO 7/17/18:mild-mod LVD, EF 10-20%, severe DHK, mild LVH, DD, RVD, mild- mod LAE, mod RAY, severe MR, mod-severe TR, RVSP 40, dilated RV outflow tract  ROBERTO 7/20/18: LVD, EF 10-15%, severe DHK, mild RVD, reduced RVEF, mod ANNAMARIE, no CELINA thrombus, no PFO, mod MR, mild TR  RHC 7/20/18: RA:12, RV 34/6/14.  PA 30/20/26, PCWP 18, [place] 6.33/2.74 TD: 5.37/2.32    CARDIAC MRI: 7/20/18: mod LVD (LVDD 66), mil    Congestive heart failure ([address]     Essential hypertension 3/12/2019    Hypertensive heart disease with combined systolic and diastolic congestive heart failure ([address] 3/12/2019    ECHO 7/17/18:mild-mod LVD, EF 10-20%, severe DHK, mild LVH, DD, RVD, mild- mod LAE, mod RAY, severe MR, mod-severe TR, RVSP 40, dilated RV outflow tract ECHO 10/26/2018: mild LVD, EF 45%, G3/4 DD, mod ant leaflet thickening, mild MR, dil RVOT        Long term current use of anticoagulant therapy     NICM (nonischemic cardiomyopathy) ([facility] [address] 8/8/2018    Non-rheumatic mitral regurgitation 8/8/2018    ECHO 7/17/18:mild-mod LVD, EF 10-20%, severe DHK, mild LVH, DD, RVD, mild- mod LAE, mod RAY, severe MR, mod-severe TR, RVSP 40, dilated RV outflow tract  ROBERTO 7/20/18: LVD, EF 10-15%, severe DHK, mild RVD, reduced RVEF, mod ANNAMARIE, no CELINA thrombus, no PFO, mod MR, mild TR      PAF (paroxysmal atrial fibrillation) ([facility] [address] 8/8/2018    ROBERTO 7/20/18: LVD, EF 10-15%, severe DHK, mild RVD, reduced RVEF, mod ANNAMARIE, no CELINA thrombus, no PFO, mod MR, mild TR    DCCV 7/20/18       Rapid atrial fibrillation ([facility] [address] 7/17/2018    Tobacco use disorder 8/8/2018    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    Vitamin D deficiency 9/19/2018    Level 12 8/2018     Past Surgical History:   Procedure Laterality Date    CARDIOVERSION, ELECTIVE;EXTERN  7/20/2018         HX HEART CATHETERIZATION       Family History   Problem Relation Age of Onset    Diabetes Mother     Heart Failure Mother     No Known Problems Father     Hypertension Sister     Hypertension Sister      Social History     Tobacco Use    Smoking status: Current Some Day Smoker     Types: Cigarettes    Smokeless tobacco: Never Used    Tobacco comment: 3-4 cigarettes vaughn   Substance Use Topics    Alcohol use: No      Lab Results   Component Value Date/Time    WBC 6.0 07/26/2018 05:12 AM    HGB 14.6 07/26/2018 05:12 AM    HCT 45.7 07/26/2018 05:12 AM    PLATELET 575 29/30/3403 05:12 AM    MCV 98.1 07/26/2018 05:12 AM     Lab Results   Component Value Date/Time    GFR est non-AA 84 03/13/2019 11:52 AM    GFR est AA 97 03/13/2019 11:52 AM    Creatinine 1.10 03/13/2019 11:52 AM    BUN 13 03/13/2019 11:52 AM    Sodium 141 03/13/2019 11:52 AM    Potassium 4.5 03/13/2019 11:52 AM    Chloride 106 03/13/2019 11:52 AM    CO2 20 03/13/2019 11:52 AM    Magnesium 1.9 10/24/2018 09:18 AM    Albumin, urine 23.1 07/25/2018 02:55 PM        Review of Systems   Constitutional: Negative for chills and fever. HENT: Negative for ear pain and nosebleeds. Eyes: Negative for blurred vision, pain and discharge. Respiratory: Negative for shortness of breath.     Cardiovascular: Negative for chest pain and leg swelling. Gastrointestinal: Negative for constipation, diarrhea, nausea and vomiting. Genitourinary: Negative for frequency. Musculoskeletal: Negative for joint pain. Skin: Negative for itching and rash. Neurological: Negative for headaches. Psychiatric/Behavioral: Negative for depression. The patient is not nervous/anxious. Physical Exam   Constitutional: He is oriented to person, place, and time. He appears well-developed and well-nourished. HENT:   Head: Normocephalic and atraumatic. Mouth/Throat: No oropharyngeal exudate. Eyes: Conjunctivae and EOM are normal.   Neck: Normal range of motion. Neck supple. Cardiovascular: Normal rate, regular rhythm and normal heart sounds. No murmur heard. Pulmonary/Chest: Effort normal and breath sounds normal. No respiratory distress. Abdominal: Soft. Bowel sounds are normal. He exhibits no distension. There is no rebound. Musculoskeletal: He exhibits no edema or tenderness. Neurological: He is alert and oriented to person, place, and time. Skin: Skin is warm. No erythema. Psychiatric: He has a normal mood and affect. His behavior is normal.   Nursing note and vitals reviewed. ASSESSMENT and PLAN  Diagnoses and all orders for this visit:    1. Combined systolic and diastolic heart failure, unspecified HF chronicity (HCC)  -     CBC W/O DIFF  -     HDL CHOLESTEROL  -     CHOLESTEROL, TOTAL  -     TSH 3RD GENERATION  -     VITAMIN B12 & FOLATE  -     VITAMIN B6  -     VITAMIN D, 25 HYDROXY  -     METABOLIC PANEL, COMPREHENSIVE  -     amiodarone (CORDARONE) 200 mg tablet; Take 1 Tab by mouth daily. -     carvedilol (COREG) 12.5 mg tablet; Take 1 Tab by mouth two (2) times daily (with meals). Indications: high blood pressure  -     sertraline (ZOLOFT) 50 mg tablet; Take 1 Tab by mouth daily. Start 1/2 tablet, PO,  AM after breakfast x 2 weeks >> 1 tablet , PO , AM after breakfast    2.  NICM (nonischemic cardiomyopathy) ([facility]  -     CBC W/O DIFF  -     HDL CHOLESTEROL  -     CHOLESTEROL, TOTAL  -     TSH 3RD GENERATION  -     VITAMIN B12 & FOLATE  -     VITAMIN B6  -     VITAMIN D, 25 HYDROXY  -     METABOLIC PANEL, COMPREHENSIVE  -     amiodarone (CORDARONE) 200 mg tablet; Take 1 Tab by mouth daily. -     carvedilol (COREG) 12.5 mg tablet; Take 1 Tab by mouth two (2) times daily (with meals). Indications: high blood pressure    3. PAF (paroxysmal atrial fibrillation) ([facility])  -     CBC W/O DIFF  -     HDL CHOLESTEROL  -     CHOLESTEROL, TOTAL  -     TSH 3RD GENERATION  -     VITAMIN B12 & FOLATE  -     VITAMIN B6  -     VITAMIN D, 25 HYDROXY  -     METABOLIC PANEL, COMPREHENSIVE  -     amiodarone (CORDARONE) 200 mg tablet; Take 1 Tab by mouth daily. 4. Dental abscess  -     REFERRAL TO DENTISTRY  -     amoxicillin (AMOXIL) 500 mg capsule; Take 1 Cap by mouth three (3) times daily for 10 days. 5. Hypertensive heart disease with combined systolic and diastolic congestive heart failure, unspecified HF chronicity ([facility])  -     CBC W/O DIFF  -     HDL CHOLESTEROL  -     CHOLESTEROL, TOTAL  -     TSH 3RD GENERATION  -     VITAMIN B12 & FOLATE  -     VITAMIN B6  -     VITAMIN D, 25 HYDROXY  -     METABOLIC PANEL, COMPREHENSIVE  -     sacubitril-valsartan (ENTRESTO) 97 mg/103 mg tablet; Take 1 Tab by mouth two (2) times a day. Indications: chronic heart failure  -     amiodarone (CORDARONE) 200 mg tablet; Take 1 Tab by mouth daily. -     carvedilol (COREG) 12.5 mg tablet; Take 1 Tab by mouth two (2) times daily (with meals). Indications: high blood pressure  -     sertraline (ZOLOFT) 50 mg tablet; Take 1 Tab by mouth daily. Start 1/2 tablet, PO,  AM after breakfast x 2 weeks >> 1 tablet , PO , AM after breakfast  -     REFERRAL TO DENTISTRY  -     amoxicillin (AMOXIL) 500 mg capsule; Take 1 Cap by mouth three (3) times daily for 10 days. 6. Depression with anxiety    7.  Chronic viral myocarditis  -     CBC W/O DIFF  -     HDL CHOLESTEROL  -     CHOLESTEROL, TOTAL  -     TSH 3RD GENERATION  -     VITAMIN B12 & FOLATE  -     VITAMIN B6  -     VITAMIN D, 25 HYDROXY  -     METABOLIC PANEL, COMPREHENSIVE  -     sacubitril-valsartan (ENTRESTO) 97 mg/103 mg tablet; Take 1 Tab by mouth two (2) times a day. Indications: chronic heart failure  -     amiodarone (CORDARONE) 200 mg tablet; Take 1 Tab by mouth daily. -     carvedilol (COREG) 12.5 mg tablet; Take 1 Tab by mouth two (2) times daily (with meals). Indications: high blood pressure  -     sertraline (ZOLOFT) 50 mg tablet; Take 1 Tab by mouth daily. Start 1/2 tablet, PO,  AM after breakfast x 2 weeks >> 1 tablet , PO , AM after breakfast  -     REFERRAL TO DENTISTRY  -     amoxicillin (AMOXIL) 500 mg capsule; Take 1 Cap by mouth three (3) times daily for 10 days. 8. Anticoagulated    9. Pleural effusion, right  -     CBC W/O DIFF  -     HDL CHOLESTEROL  -     CHOLESTEROL, TOTAL  -     TSH 3RD GENERATION  -     VITAMIN B12 & FOLATE  -     VITAMIN B6  -     VITAMIN D, 25 HYDROXY  -     METABOLIC PANEL, COMPREHENSIVE  -     sacubitril-valsartan (ENTRESTO) 97 mg/103 mg tablet; Take 1 Tab by mouth two (2) times a day. Indications: chronic heart failure  -     amiodarone (CORDARONE) 200 mg tablet; Take 1 Tab by mouth daily. -     carvedilol (COREG) 12.5 mg tablet; Take 1 Tab by mouth two (2) times daily (with meals). Indications: high blood pressure  -     sertraline (ZOLOFT) 50 mg tablet; Take 1 Tab by mouth daily. Start 1/2 tablet, PO,  AM after breakfast x 2 weeks >> 1 tablet , PO , AM after breakfast  -     REFERRAL TO DENTISTRY  -     amoxicillin (AMOXIL) 500 mg capsule; Take 1 Cap by mouth three (3) times daily for 10 days. 10. Tobacco use disorder    11.  Essential hypertension  -     CBC W/O DIFF  -     HDL CHOLESTEROL  -     CHOLESTEROL, TOTAL  -     TSH 3RD GENERATION  -     VITAMIN B12 & FOLATE  -     VITAMIN B6  -     VITAMIN D, 25 HYDROXY  -     METABOLIC PANEL, COMPREHENSIVE  -     sacubitril-valsartan (ENTRESTO) 97 mg/103 mg tablet; Take 1 Tab by mouth two (2) times a day. Indications: chronic heart failure  -     amiodarone (CORDARONE) 200 mg tablet; Take 1 Tab by mouth daily. -     carvedilol (COREG) 12.5 mg tablet; Take 1 Tab by mouth two (2) times daily (with meals). Indications: high blood pressure  -     sertraline (ZOLOFT) 50 mg tablet; Take 1 Tab by mouth daily. Start 1/2 tablet, PO,  AM after breakfast x 2 weeks >> 1 tablet , PO , AM after breakfast  -     REFERRAL TO DENTISTRY  -     amoxicillin (AMOXIL) 500 mg capsule; Take 1 Cap by mouth three (3) times daily for 10 days. 12. Class 1 obesity due to excess calories with serious comorbidity and body mass index (BMI) of 32.0 to 32.9 in adult  -     CBC W/O DIFF  -     HDL CHOLESTEROL  -     CHOLESTEROL, TOTAL  -     TSH 3RD GENERATION  -     VITAMIN B12 & FOLATE  -     VITAMIN B6  -     VITAMIN D, 25 HYDROXY  -     METABOLIC PANEL, COMPREHENSIVE  -     sacubitril-valsartan (ENTRESTO) 97 mg/103 mg tablet; Take 1 Tab by mouth two (2) times a day. Indications: chronic heart failure  -     amiodarone (CORDARONE) 200 mg tablet; Take 1 Tab by mouth daily. -     carvedilol (COREG) 12.5 mg tablet; Take 1 Tab by mouth two (2) times daily (with meals). Indications: high blood pressure  -     sertraline (ZOLOFT) 50 mg tablet; Take 1 Tab by mouth daily. Start 1/2 tablet, PO,  AM after breakfast x 2 weeks >> 1 tablet , PO , AM after breakfast  -     REFERRAL TO DENTISTRY  -     amoxicillin (AMOXIL) 500 mg capsule; Take 1 Cap by mouth three (3) times daily for 10 days.

## 2019-04-19 ENCOUNTER — TELEPHONE (OUTPATIENT)
Dept: FAMILY MEDICINE CLINIC | Age: 39
End: 2019-04-19

## 2019-04-19 NOTE — TELEPHONE ENCOUNTER
Please contact the patient's insurance to initiate a prior [facility] for [facility].      [address]  ID: [id number]  Group: [id number]  [phone]

## 2019-04-23 LAB
25(OH)D3+25(OH)D2 SERPL-MCNC: 28.9 NG/ML (ref 30–100)
ALBUMIN SERPL-MCNC: 4.2 G/DL (ref 3.5–5.5)
ALBUMIN/GLOB SERPL: 1.4 {RATIO} (ref 1.2–2.2)
ALP SERPL-CCNC: 107 IU/L (ref 39–117)
ALT SERPL-CCNC: 16 IU/L (ref 0–44)
AST SERPL-CCNC: 20 IU/L (ref 0–40)
BILIRUB SERPL-MCNC: 0.4 MG/DL (ref 0–1.2)
BUN SERPL-MCNC: 11 MG/DL (ref 6–20)
BUN/CREAT SERPL: 11 (ref 9–20)
CALCIUM SERPL-MCNC: 9.3 MG/DL (ref 8.7–10.2)
CHLORIDE SERPL-SCNC: 104 MMOL/L (ref 96–106)
CHOLEST SERPL-MCNC: 179 MG/DL (ref 100–199)
CO2 SERPL-SCNC: 21 MMOL/L (ref 20–29)
CREAT SERPL-MCNC: 0.98 MG/DL (ref 0.76–1.27)
ERYTHROCYTE [DISTWIDTH] IN BLOOD BY AUTOMATED COUNT: 13.4 % (ref 12.3–15.4)
FOLATE SERPL-MCNC: 10.5 NG/ML
GLOBULIN SER CALC-MCNC: 2.9 G/DL (ref 1.5–4.5)
GLUCOSE SERPL-MCNC: 78 MG/DL (ref 65–99)
HCT VFR BLD AUTO: 47.1 % (ref 37.5–51)
HDLC SERPL-MCNC: 50 MG/DL
HGB BLD-MCNC: 15.8 G/DL (ref 13–17.7)
MCH RBC QN AUTO: 32.7 PG (ref 26.6–33)
MCHC RBC AUTO-ENTMCNC: 33.5 G/DL (ref 31.5–35.7)
MCV RBC AUTO: 98 FL (ref 79–97)
PLATELET # BLD AUTO: 269 X10E3/UL (ref 150–379)
POTASSIUM SERPL-SCNC: 4 MMOL/L (ref 3.5–5.2)
PROT SERPL-MCNC: 7.1 G/DL (ref 6–8.5)
RBC # BLD AUTO: 4.83 X10E6/UL (ref 4.14–5.8)
SODIUM SERPL-SCNC: 138 MMOL/L (ref 134–144)
TSH SERPL DL<=0.005 MIU/L-ACNC: 1.49 UIU/ML (ref 0.45–4.5)
VIT B12 SERPL-MCNC: 534 PG/ML (ref 232–1245)
VIT B6 SERPL-MCNC: 9.5 UG/L (ref 5.3–46.7)
WBC # BLD AUTO: 7.6 X10E3/UL (ref 3.4–10.8)

## 2019-04-23 NOTE — PROGRESS NOTES
Cardiology Attending note:    I participated in interview and exam and patient education. This patient requires redundant patient teaching at every available occasion with redirection towards proper self care priorities.      Marylen Peasant MD [facility]

## 2019-04-24 ENCOUNTER — DOCUMENTATION ONLY (OUTPATIENT)
Dept: SLEEP MEDICINE | Age: 39
End: 2019-04-24

## 2019-04-24 ENCOUNTER — TELEPHONE (OUTPATIENT)
Dept: SLEEP MEDICINE | Age: 39
End: 2019-04-24

## 2019-04-24 NOTE — TELEPHONE ENCOUNTER
Patient returned technologists call regarding results. It is okay to leave a detailed message regarding results.

## 2019-04-25 NOTE — PATIENT INSTRUCTIONS
The 48-hour Holter as the next step. If you are not ever going back into atrial fibrillation we can stop the blood thinner and the frequent blood tests. I would like you to stay on the present medication until I contact you about the test results. Because of the difficulty reentering the workplace despite excellent physical improvement, I have set up a referral to behavioral health for treatment of depression and possible PTSD. Please continue to follow closely with this office.

## 2019-05-03 ENCOUNTER — OFFICE VISIT (OUTPATIENT)
Dept: CARDIOLOGY CLINIC | Age: 39
End: 2019-05-03

## 2019-05-03 VITALS
SYSTOLIC BLOOD PRESSURE: 107 MMHG | DIASTOLIC BLOOD PRESSURE: 70 MMHG | HEIGHT: 75 IN | BODY MASS INDEX: 32.83 KG/M2 | RESPIRATION RATE: 18 BRPM | WEIGHT: 264 LBS | OXYGEN SATURATION: 98 % | HEART RATE: 65 BPM

## 2019-05-03 DIAGNOSIS — Z79.01 WARFARIN ANTICOAGULATION: Primary | ICD-10-CM

## 2019-05-03 LAB
INR BLD: 2.3
PT POC: 27.8 SECONDS
VALID INTERNAL CONTROL?: YES

## 2019-05-03 NOTE — PROGRESS NOTES
Chief Complaint   Patient presents with    Follow-up    Results     Holter Monitor    Hypertension    Irregular Heart Beat         1. Have you been to the ER, urgent care clinic since your last visit? Hospitalized since your last visit? No    2. Have you seen or consulted any other health care providers outside of the [facility] [place] since your last visit? Include any pap smears or colon screening.  No

## 2019-05-03 NOTE — PROGRESS NOTES
Moe Shepherd is a 43year old male, presently recovering from viral myocarditis with severe congestive heart failure. He has had a significant improvement in ventricular function and he no longer needs to wear the LifeVest, and he will not need a device implant at this time. Since the last visit 48-hour Holter monitoring showed 0 burden of atrial fibrillation, so anticoagulation can be stopped. His disability claim has not been verified so far and on discussion of the matter he states that he feels emotionally unable to return to work even though physical capabilities are subjectively improving. Many nightmares and sleep disturbance. Frequent nocturnal awakenings. He snores. Dozes off as passenger. Has cataplectic intrusions during daytime, but polysom was low grade, no Rx indicated. He not entirely quit smoking. Currently 5/day. Holter: no A fib. Can DC coumadin. Off potassium, back on sipro    His most recent labs are as follows:  Results for Isabelle Staley (MRN [id number]) as of 5/28/2019 14:29   Ref.  Range 4/18/2019 11:50   WBC Latest Ref Range: 3.4 - 10.8 x10E3/uL 7.6   RBC Latest Ref Range: 4.14 - 5.80 x10E6/uL 4.83   HGB Latest Ref Range: 13.0 - 17.7 g/dL 15.8   HCT Latest Ref Range: 37.5 - 51.0 % 47.1   MCV Latest Ref Range: 79 - 97 fL 98 (H)   MCH Latest Ref Range: 26.6 - 33.0 pg 32.7   MCHC Latest Ref Range: 31.5 - 35.7 g/dL 33.5   RDW Latest Ref Range: 12.3 - 15.4 % 13.4   PLATELET Latest Ref Range: 150 - 379 x10E3/uL 269   Sodium Latest Ref Range: 134 - 144 mmol/L 138   Potassium Latest Ref Range: 3.5 - 5.2 mmol/L 4.0   Chloride Latest Ref Range: 96 - 106 mmol/L 104   CO2 Latest Ref Range: 20 - 29 mmol/L 21   Glucose Latest Ref Range: 65 - 99 mg/dL 78   BUN Latest Ref Range: 6 - 20 mg/dL 11   Creatinine Latest Ref Range: 0.76 - 1.27 mg/dL 0.98   BUN/Creatinine ratio Latest Ref Range: 9 - 20  11   Calcium Latest Ref Range: 8.7 - 10.2 mg/dL 9.3   GFR est non-AA Latest Ref Range: >59 mL/min/1.73 97   GFR est AA Latest Ref Range: >59 mL/min/1.73 112   Bilirubin, total Latest Ref Range: 0.0 - 1.2 mg/dL 0.4   Protein, total Latest Ref Range: 6.0 - 8.5 g/dL 7.1   Albumin Latest Ref Range: 3.5 - 5.5 g/dL 4.2   A-G Ratio Latest Ref Range: 1.2 - 2.2  1.4   ALT (SGPT) Latest Ref Range: 0 - 44 IU/L 16   AST Latest Ref Range: 0 - 40 IU/L 20   Alk. phosphatase Latest Ref Range: 39 - 117 IU/L 107   Cholesterol, total Latest Ref Range: 100 - 199 mg/dL 179   HDL Cholesterol Latest Ref Range: >39 mg/dL 50   Vitamin B12 Latest Ref Range: 232 - 1,245 pg/mL 534   Folate Latest Ref Range: >3.0 ng/mL 10.5   TSH Latest Ref Range: 0.450 - 4.500 uIU/mL 1.490       Ironically, INR is therapeutic today at 2.3 after many months of unreliable behavior with the Coumadin. On examination today, blood pressure is 107/70. Weight is 264 pounds with body mass index of 32.79. Height is 6 feet 3 inches, respiratory rate is 18 without distress, room air SPO2 is 98%. Lung auscultation is normal.  Jugular veins are flat, carotids are silent. Cardiac auscultation shows regular rhythm with normal quality S1 and S2, with no murmur, no gallop, no rub. Abdomen is nontender without palpable spleen, normal liver, no pulsation, no bruit, no mass. Peripheral pulses are intact, there is no edema, there is no sign of DVT. Twelve-lead EKG recorded today shows sinus rhythm, left axis deviation, otherwise completely normal tracing.     Impression: PTSD with severe anxiety disorder    Recovering from viral myocarditis now with only minimally subnormal LV function    Complete resolution of atrial fibrillation with no sign of recurrence    Treated hypertension    Per Dr. Deborah Mayfield:     Depression with anxiety     Tobacco abuse     History of alcoholism     Antisocial personality disorder     Patient started on Zoloft with slow titration    Plan:  Continue existing cardiac medications    Strongly recommend establishment of care with a primary physician    Strongly encouraged to continue with psychotherapy and with psychiatric follow-up    I made it clear to the patient that he no longer has any form of disabling circumstance in terms of cardiac function    Tito Almazan MD, Fiona Bates

## 2019-05-03 NOTE — PATIENT INSTRUCTIONS
Your Holter shows no recurrence of atrial fibrillation. You can stop the coumadin. You don't need the repeated blood tests any more. I want to work to continue your progress.  Please keep all of your other medications the same and come back in one months

## 2019-05-23 ENCOUNTER — OFFICE VISIT (OUTPATIENT)
Dept: FAMILY MEDICINE CLINIC | Age: 39
End: 2019-05-23

## 2019-05-23 VITALS
DIASTOLIC BLOOD PRESSURE: 60 MMHG | HEIGHT: 75 IN | OXYGEN SATURATION: 97 % | WEIGHT: 262.3 LBS | TEMPERATURE: 97.4 F | SYSTOLIC BLOOD PRESSURE: 97 MMHG | RESPIRATION RATE: 20 BRPM | BODY MASS INDEX: 32.61 KG/M2 | HEART RATE: 75 BPM

## 2019-05-23 DIAGNOSIS — I10 ESSENTIAL HYPERTENSION: ICD-10-CM

## 2019-05-23 DIAGNOSIS — I50.40 COMBINED SYSTOLIC AND DIASTOLIC HEART FAILURE, UNSPECIFIED HF CHRONICITY (HCC): ICD-10-CM

## 2019-05-23 DIAGNOSIS — I11.0 HYPERTENSIVE HEART DISEASE WITH COMBINED SYSTOLIC AND DIASTOLIC CONGESTIVE HEART FAILURE, UNSPECIFIED HF CHRONICITY (HCC): ICD-10-CM

## 2019-05-23 DIAGNOSIS — I42.8 NICM (NONISCHEMIC CARDIOMYOPATHY) (HCC): ICD-10-CM

## 2019-05-23 DIAGNOSIS — J90 PLEURAL EFFUSION, RIGHT: ICD-10-CM

## 2019-05-23 DIAGNOSIS — E66.09 CLASS 1 OBESITY DUE TO EXCESS CALORIES WITH SERIOUS COMORBIDITY AND BODY MASS INDEX (BMI) OF 32.0 TO 32.9 IN ADULT: ICD-10-CM

## 2019-05-23 DIAGNOSIS — I50.40 HYPERTENSIVE HEART DISEASE WITH COMBINED SYSTOLIC AND DIASTOLIC CONGESTIVE HEART FAILURE, UNSPECIFIED HF CHRONICITY (HCC): ICD-10-CM

## 2019-05-23 DIAGNOSIS — T78.01XD PEANUT-INDUCED ANAPHYLAXIS, SUBSEQUENT ENCOUNTER: Primary | ICD-10-CM

## 2019-05-23 DIAGNOSIS — I40.0 CHRONIC VIRAL MYOCARDITIS: ICD-10-CM

## 2019-05-23 DIAGNOSIS — F33.9 MAJOR DEPRESSION, RECURRENT, CHRONIC (HCC): ICD-10-CM

## 2019-05-23 RX ORDER — AMLODIPINE BESYLATE 5 MG/1
5 TABLET ORAL DAILY
Qty: 90 TAB | Refills: 3 | Status: SHIPPED | OUTPATIENT
Start: 2019-05-23 | End: 2019-10-08 | Stop reason: ALTCHOICE

## 2019-05-23 RX ORDER — EPINEPHRINE 0.3 MG/.3ML
0.3 INJECTION SUBCUTANEOUS
Qty: 2 SYRINGE | Refills: 6 | Status: SHIPPED | OUTPATIENT
Start: 2019-05-23 | End: 2020-09-11

## 2019-05-23 RX ORDER — CARVEDILOL 12.5 MG/1
12.5 TABLET ORAL 2 TIMES DAILY WITH MEALS
Qty: 180 TAB | Refills: 2 | Status: SHIPPED | OUTPATIENT
Start: 2019-05-23 | End: 2019-10-08 | Stop reason: DRUGHIGH

## 2019-05-23 RX ORDER — SPIRONOLACTONE 25 MG/1
25 TABLET ORAL DAILY
Qty: 90 TAB | Refills: 3 | Status: SHIPPED | OUTPATIENT
Start: 2019-05-23 | End: 2019-08-29 | Stop reason: SDUPTHER

## 2019-05-23 RX ORDER — SERTRALINE HYDROCHLORIDE 50 MG/1
100 TABLET, FILM COATED ORAL DAILY
Qty: 180 TAB | Refills: 1 | Status: SHIPPED | OUTPATIENT
Start: 2019-05-23 | End: 2019-08-29 | Stop reason: SDUPTHER

## 2019-05-23 NOTE — PATIENT INSTRUCTIONS
Learning About Benefits From Quitting Smoking  How does quitting smoking make you healthier? If you're thinking about quitting smoking, you may have a few reasons to be smoke-free. Your health may be one of them. · When you quit smoking, you lower your risks for cancer, lung disease, heart attack, stroke, blood vessel disease, and blindness from macular degeneration. · When you're smoke-free, you get sick less often, and you heal faster. You are less likely to get colds, flu, bronchitis, and pneumonia. · As a nonsmoker, you may find that your mood is better and you are less stressed. When and how will you feel healthier? Quitting has real health benefits that start from day 1 of being smoke-free. And the longer you stay smoke-free, the healthier you get and the better you feel. The first hours  · After just 20 minutes, your blood pressure and heart rate go down. That means there's less stress on your heart and blood vessels. · Within 12 hours, the level of carbon monoxide in your blood drops back to normal. That makes room for more oxygen. With more oxygen in your body, you may notice that you have more energy than when you smoked. After 2 weeks  · Your lungs start to work better. · Your risk of heart attack starts to drop. After 1 month  · When your lungs are clear, you cough less and breathe deeper, so it's easier to be active. · Your sense of taste and smell return. That means you can enjoy food more than you have since you started smoking. Over the years  · After 1 year, your risk of heart disease is half what it would be if you kept smoking. · After 5 years, your risk of stroke starts to shrink. Within a few years after that, it's about the same as if you'd never smoked. · After 10 years, your risk of dying from lung cancer is cut by about half. And your risk for many other types of cancer is lower too. How would quitting help others in your life?   When you quit smoking, you improve the health of everyone who now breathes in your smoke. · Their heart, lung, and cancer risks drop, much like yours. · They are sick less. For babies and small children, living smoke-free means they're less likely to have ear infections, pneumonia, and bronchitis. · If you're a woman who is or will be pregnant someday, quitting smoking means a healthier . · Children who are close to you are less likely to become adult smokers. Where can you learn more? Go to http://eleanor-walter.info/. Enter 052 [id number] [id number] 11 in the search box to learn more about \"Learning About Benefits From Quitting Smoking. \"  Current as of: 2018  Content Version: 11.9  © 5733-4912 [employer]. Care instructions adapted under license by [employer] (which disclaims liability or warranty for this information). If you have questions about a medical condition or this instruction, always ask your healthcare professional. [place] [address] any warranty or liability for your use of this information. Peanut Allergy: Care Instructions  Your Care Instructions    When you have a peanut allergy and you eat peanuts, your body reacts as if the peanuts are trying to cause harm. It fights back by setting off an allergic reaction. A mild reaction may include a few raised, red, itchy patches of skin (called hives). A severe reaction may cause hives all over, swelling in the throat, trouble breathing, nausea or vomiting, or fainting. This is called anaphylaxis (say \"[id number]-LAK-[id number]\"). It can be deadly. A good way to prevent an allergic reaction is to avoid the foods that cause it. Peanuts might be found in chili and vegetable oils. An allergy doctor or a dietitian may be able to help you understand which foods might be okay and what to avoid. Learn what to do if you have a reaction. Follow-up care is a key part of your treatment and safety.  Be sure to make and go to all appointments, and call your doctor if you are having problems. It's also a good idea to know your test results and keep a list of the medicines you take. How can you care for yourself at home? During a mild reaction  · Take an over-the-counter antihistamine, such as diphenhydramine (Benadryl) or loratadine (Claritin), as your doctor recommends. If you have a severe reaction, you also might be given one of these antihistamines. During a severe reaction  · Call for emergency help. A serious reaction is an emergency. · Give yourself an epinephrine shot. Make sure it is with you at all times. To prevent future reactions  · Avoid the foods that cause problems. And try not to use utensils or cookware that may have been in contact with food that you are allergic to. · Teach your family members, coworkers, and friends what to do if you have a severe reaction to a food that you are allergic to. · Wear medical alert jewelry that lists your allergies. You can buy this at most drugstores. When should you call for help? Give an epinephrine shot if:    · You think you are having a severe allergic reaction.    After you give an epinephrine shot, call 911, even if you feel better.   [id number] anytime you think you may need emergency care. For example, call if:    · You have symptoms of a severe allergic reaction. These may include:  ? Sudden raised, red areas (hives) all over your body. ? Swelling of the throat, mouth, lips, or tongue. ? Trouble breathing. ? Passing out (losing consciousness). Or you may feel very lightheaded or suddenly feel weak, confused, or restless.     · You have been given an epinephrine shot, even if you feel better.    Call your doctor now or seek immediate medical care if:    · You have symptoms of an allergic reaction, such as:  ? A rash or hives (raised, red areas on the skin). ? Itching. ? Swelling. ?  Belly pain, nausea, or vomiting.    Watch closely for changes in your health, and be sure to contact your doctor if:    · You do not get better as expected. Where can you learn more? Go to http://eleanor-walter.info/. Enter P200 in the search box to learn more about \"Peanut Allergy: Care Instructions. \"  Current as of: June 27, 2018  Content Version: 11.9  © 3977-1710 [email or web address]. Care instructions adapted under license by [employer] (which disclaims liability or warranty for this information). If you have questions about a medical condition or this instruction, always ask your healthcare professional. [address] any warranty or liability for your use of this information.

## 2019-05-23 NOTE — PROGRESS NOTES
Name and  verified      Chief Complaint   Patient presents with    Results     PATIENT WOULD LIKE TO BE TESTED FOR PEANUT ALLERGY BECAUSE HE FELT HIS THROAT CLOSING AFTER EATING PEANUTS. Health Maintenance reviewed-discussed with patient. 1. Have you been to the ER, urgent care clinic since your last visit? Hospitalized since your last visit? Yes, Cardiology office visit on 5/3/2019.    2. Have you seen or consulted any other health care providers outside of the [address] [place] [address] since your last visit? Include any pap smears or colon screening.  no

## 2019-05-23 NOTE — PROGRESS NOTES
HISTORY OF PRESENT ILLNESS  Mariana Benjamin is a 43 y.o. male. 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, 3-D LVEF 17%  Patient present with history of coronary artery disease,  currently on no anticoagulative medication ,  he has been feeling better less fatigue and less tiredness , fortunately has gone back to work, patient has been able to do 2 flights of steps patient also is not on statin therapy he has no dry cough not noticed any swelling patient has been stable since the second episode denies any complaint at this time, on Entresto at this time,  Now with b12 vitamin D Def   Currently on oral tablet daily, less fatigued but also less active, no metformin no etoh intake, on no multivitamin daily  Depression with the anxiety and panic states of mind    Patient stated that his condition not controlled with the current meds, able to tolerate the SSRI but feeling like things are not getting better feeling more anxious no guilty feeling no hopelessness denies any suicidal homicidal ideation has no delusion no hallucination less ability to sleep and less ability to concentrate at this time and all together a safe feeling at home and at work        Current Outpatient Medications   Medication Sig Dispense Refill    sacubitril-valsartan (ENTRESTO) 97 mg/103 mg tablet Take 1 Tab by mouth two (2) times a day. Indications: chronic heart failure 180 Tab 3    amiodarone (CORDARONE) 200 mg tablet Take 1 Tab by mouth daily. 30 Tab 12    carvedilol (COREG) 12.5 mg tablet Take 1 Tab by mouth two (2) times daily (with meals). Indications: high blood pressure 60 Tab 2    sertraline (ZOLOFT) 50 mg tablet Take 1 Tab by mouth daily. Start 1/2 tablet, PO,  AM after breakfast x 2 weeks >> 1 tablet , PO , AM after breakfast 30 Tab 1    amLODIPine (NORVASC) 5 mg tablet Take 1 Tab by mouth daily for 360 days. 30 Tab 3    multivitamin (ONE A DAY) tablet Take 1 Tab by mouth daily.       spironolactone (ALDACTONE) 25 mg tablet Take 1 Tab by mouth daily. 30 Tab 3    Cholecalciferol, Vitamin D3, (VITAMIN D3) 2,000 unit cap capsule Take 2,000 Units by mouth daily. 30 Cap 6    magnesium oxide (MAG-OX) 400 mg tablet Take 1 Tab by mouth daily. 30 Tab 1     Not on File  Past Medical History:   Diagnosis Date    Anxiety     Class 1 obesity due to excess calories with serious comorbidity and body mass index (BMI) of 32.0 to 32.9 in adult 2/27/2019    Combined systolic and diastolic heart failure ([address] 8/8/2018    ECHO 7/17/18:mild-mod LVD, EF 10-20%, severe DHK, mild LVH, DD, RVD, mild- mod LAE, mod RAY, severe MR, mod-severe TR, RVSP 40, dilated RV outflow tract  ROBERTO 7/20/18: LVD, EF 10-15%, severe DHK, mild RVD, reduced RVEF, mod ANNAMARIE, no CELINA thrombus, no PFO, mod MR, mild TR  RHC 7/20/18: RA:12, RV 34/6/14.  PA 30/20/26, PCWP 18, [place] 6.33/2.74 TD: 5.37/2.32    CARDIAC MRI: 7/20/18: mod LVD (LVDD 66), mil    Congestive heart failure ([address]     Essential hypertension 3/12/2019    Hypertensive heart disease with combined systolic and diastolic congestive heart failure ([address] 3/12/2019    ECHO 7/17/18:mild-mod LVD, EF 10-20%, severe DHK, mild LVH, DD, RVD, mild- mod LAE, mod RAY, severe MR, mod-severe TR, RVSP 40, dilated RV outflow tract ECHO 10/26/2018: mild LVD, EF 45%, G3/4 DD, mod ant leaflet thickening, mild MR, dil RVOT        Long term current use of anticoagulant therapy     NICM (nonischemic cardiomyopathy) ([address] 8/8/2018    Non-rheumatic mitral regurgitation 8/8/2018    ECHO 7/17/18:mild-mod LVD, EF 10-20%, severe DHK, mild LVH, DD, RVD, mild- mod LAE, mod RAY, severe MR, mod-severe TR, RVSP 40, dilated RV outflow tract  ROBERTO 7/20/18: LVD, EF 10-15%, severe DHK, mild RVD, reduced RVEF, mod ANNAMARIE, no CELINA thrombus, no PFO, mod MR, mild TR      PAF (paroxysmal atrial fibrillation) ([facility] 8/8/2018    ROBERTO 7/20/18: LVD, EF 10-15%, severe DHK, mild RVD, reduced RVEF, mod ANNAMARIE, no CELINA thrombus, no PFO, mod MR, mild TR    DCCV 7/20/18  Rapid atrial fibrillation ([facility] 7/17/2018    Tobacco use disorder 8/8/2018    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    Vitamin D deficiency 9/19/2018    Level 12 8/2018     Past Surgical History:   Procedure Laterality Date    CARDIOVERSION, ELECTIVE;EXTERN  7/20/2018         HX HEART CATHETERIZATION       Family History   Problem Relation Age of Onset    Diabetes Mother     Heart Failure Mother     No Known Problems Father     Hypertension Sister     Hypertension Sister      Social History     Tobacco Use    Smoking status: Current Some Day Smoker     Types: Cigarettes    Smokeless tobacco: Never Used    Tobacco comment: 3-4 cigarettes vaughn   Substance Use Topics    Alcohol use: No      Lab Results   Component Value Date/Time    WBC 7.6 04/18/2019 11:50 AM    HGB 15.8 04/18/2019 11:50 AM    HCT 47.1 04/18/2019 11:50 AM    PLATELET 293 68/82/2800 11:50 AM    MCV 98 (H) 04/18/2019 11:50 AM     Lab Results   Component Value Date/Time    Glucose 78 04/18/2019 11:50 AM    Creatinine 0.98 04/18/2019 11:50 AM      Lab Results   Component Value Date/Time    Cholesterol, total 179 04/18/2019 11:50 AM    HDL Cholesterol 50 04/18/2019 11:50 AM     Lab Results   Component Value Date/Time    ALT (SGPT) 16 04/18/2019 11:50 AM    AST (SGOT) 20 04/18/2019 11:50 AM    Alk. phosphatase 107 04/18/2019 11:50 AM    Bilirubin, total 0.4 04/18/2019 11:50 AM    Albumin 4.2 04/18/2019 11:50 AM    Protein, total 7.1 04/18/2019 11:50 AM    INR 1.3 (H) 07/26/2018 05:12 AM    INR POC 2.3 05/03/2019 11:45 AM    Prothrombin time 13.4 (H) 07/26/2018 05:12 AM    PLATELET 607 53/21/7949 11:50 AM        Review of Systems   Constitutional: Positive for malaise/fatigue. Negative for chills and fever. HENT: Negative for ear pain and nosebleeds. Eyes: Negative for blurred vision, pain and discharge. Respiratory: Negative for shortness of breath. Cardiovascular: Negative for chest pain and leg swelling. Gastrointestinal: Negative for constipation, diarrhea, nausea and vomiting. Genitourinary: Negative for frequency. Musculoskeletal: Negative for joint pain. Skin: Negative for itching and rash. Neurological: Negative for headaches. Psychiatric/Behavioral: Positive for depression. The patient has insomnia. The patient is not nervous/anxious. Physical Exam   Constitutional: He is oriented to person, place, and time. He appears well-developed and well-nourished. HENT:   Head: Normocephalic and atraumatic. Mouth/Throat: No oropharyngeal exudate. Eyes: Conjunctivae and EOM are normal.   Neck: Normal range of motion. Neck supple. Cardiovascular: Normal rate, regular rhythm and normal heart sounds. No murmur heard. Pulmonary/Chest: Effort normal and breath sounds normal. No respiratory distress. Abdominal: Soft. Bowel sounds are normal. He exhibits no distension. There is no rebound. Musculoskeletal: He exhibits no edema or tenderness. Neurological: He is alert and oriented to person, place, and time. Skin: Skin is warm. No erythema. Psychiatric: He has a normal mood and affect. His behavior is normal.   Nursing note and vitals reviewed. ASSESSMENT and PLAN  Diagnoses and all orders for this visit:    1. Peanut-induced anaphylaxis, subsequent encounter  -     FOOD ALLERGY PROFILE    2. NICM (nonischemic cardiomyopathy) ([facility])  -     carvedilol (COREG) 12.5 mg tablet; Take 1 Tab by mouth two (2) times daily (with meals). Indications: high blood pressure  -     spironolactone (ALDACTONE) 25 mg tablet; Take 1 Tab by mouth daily. 3. Combined systolic and diastolic heart failure, unspecified HF chronicity ([facility])  -     carvedilol (COREG) 12.5 mg tablet; Take 1 Tab by mouth two (2) times daily (with meals). Indications: high blood pressure  -     spironolactone (ALDACTONE) 25 mg tablet; Take 1 Tab by mouth daily. -     sertraline (ZOLOFT) 50 mg tablet;  Take 2 Tabs by mouth daily.    4. Hypertensive heart disease with combined systolic and diastolic congestive heart failure, unspecified HF chronicity (HCC)  -     carvedilol (COREG) 12.5 mg tablet; Take 1 Tab by mouth two (2) times daily (with meals). Indications: high blood pressure  -     sertraline (ZOLOFT) 50 mg tablet; Take 2 Tabs by mouth daily. 5. Chronic viral myocarditis  -     carvedilol (COREG) 12.5 mg tablet; Take 1 Tab by mouth two (2) times daily (with meals). Indications: high blood pressure  -     sertraline (ZOLOFT) 50 mg tablet; Take 2 Tabs by mouth daily. 6. Pleural effusion, right  -     carvedilol (COREG) 12.5 mg tablet; Take 1 Tab by mouth two (2) times daily (with meals). Indications: high blood pressure  -     sertraline (ZOLOFT) 50 mg tablet; Take 2 Tabs by mouth daily. 7. Essential hypertension  -     carvedilol (COREG) 12.5 mg tablet; Take 1 Tab by mouth two (2) times daily (with meals). Indications: high blood pressure  -     amLODIPine (NORVASC) 5 mg tablet; Take 1 Tab by mouth daily for 360 days. -     sertraline (ZOLOFT) 50 mg tablet; Take 2 Tabs by mouth daily. 8. Class 1 obesity due to excess calories with serious comorbidity and body mass index (BMI) of 32.0 to 32.9 in adult  -     carvedilol (COREG) 12.5 mg tablet; Take 1 Tab by mouth two (2) times daily (with meals). Indications: high blood pressure  -     sertraline (ZOLOFT) 50 mg tablet; Take 2 Tabs by mouth daily. 9. Major depression, recurrent, chronic (HCC)  -     REFERRAL TO PSYCHOLOGY    Other orders  -     EPINEPHrine (EPIPEN) 0.3 mg/0.3 mL injection; 0.3 mL by IntraMUSCular route once as needed for Anaphylaxis or Allergic Response for up to 1 dose.

## 2019-05-29 LAB
CLAM IGE QN: <0.1 KU/L
CODFISH IGE QN: <0.1 KU/L
CORN IGE QN: <0.1 KU/L
COW MILK IGE QN: 0.13 KU/L
EGG WHITE IGE QN: <0.1 KU/L
Lab: ABNORMAL
PEANUT IGE QN: <0.1 KU/L
SCALLOP IGE QN: <0.1 KU/L
SESAME SEED IGE QN: <0.1 KU/L
SHRIMP IGE QN: 0.33 KU/L
SOYBEAN IGE QN: <0.1 KU/L
WALNUT IGE QN: <0.1 KU/L
WHEAT IGE QN: <0.1 KU/L

## 2019-06-05 ENCOUNTER — OFFICE VISIT (OUTPATIENT)
Dept: BEHAVIORAL/MENTAL HEALTH CLINIC | Age: 39
End: 2019-06-05

## 2019-06-05 ENCOUNTER — OFFICE VISIT (OUTPATIENT)
Dept: CARDIOLOGY CLINIC | Age: 39
End: 2019-06-05

## 2019-06-05 VITALS
RESPIRATION RATE: 18 BRPM | HEIGHT: 75 IN | SYSTOLIC BLOOD PRESSURE: 95 MMHG | WEIGHT: 258 LBS | BODY MASS INDEX: 32.08 KG/M2 | DIASTOLIC BLOOD PRESSURE: 75 MMHG | OXYGEN SATURATION: 97 % | HEART RATE: 73 BPM

## 2019-06-05 VITALS
DIASTOLIC BLOOD PRESSURE: 73 MMHG | BODY MASS INDEX: 32.3 KG/M2 | HEART RATE: 70 BPM | HEIGHT: 75 IN | SYSTOLIC BLOOD PRESSURE: 104 MMHG | WEIGHT: 259.8 LBS

## 2019-06-05 DIAGNOSIS — F60.9 PERSONALITY DISORDER (HCC): ICD-10-CM

## 2019-06-05 DIAGNOSIS — F10.21 ALCOHOL USE DISORDER, MODERATE, IN SUSTAINED REMISSION (HCC): ICD-10-CM

## 2019-06-05 DIAGNOSIS — I42.8 NICM (NONISCHEMIC CARDIOMYOPATHY) (HCC): ICD-10-CM

## 2019-06-05 DIAGNOSIS — R06.02 SOB (SHORTNESS OF BREATH): ICD-10-CM

## 2019-06-05 DIAGNOSIS — I48.0 PAF (PAROXYSMAL ATRIAL FIBRILLATION) (HCC): ICD-10-CM

## 2019-06-05 DIAGNOSIS — I50.42 CHRONIC COMBINED SYSTOLIC AND DIASTOLIC HEART FAILURE (HCC): Primary | ICD-10-CM

## 2019-06-05 DIAGNOSIS — F41.8 DEPRESSION WITH ANXIETY: Primary | ICD-10-CM

## 2019-06-05 DIAGNOSIS — F17.200 TOBACCO USE DISORDER: ICD-10-CM

## 2019-06-05 DIAGNOSIS — I10 ESSENTIAL HYPERTENSION: ICD-10-CM

## 2019-06-05 RX ORDER — EPINEPHRINE 0.3 MG/.3ML
INJECTION SUBCUTANEOUS
Refills: 6 | COMMUNITY
Start: 2019-05-29 | End: 2019-10-08 | Stop reason: SDUPTHER

## 2019-06-05 NOTE — LETTER
6/5/2019 2:02 PM 
 
Mr. Fabiana CAVAZOS [facility] [address] [id number] To Whom It May Concern, 
 
Mr. Leung He is cleared for dental work on Rachel 10, 2019. He has no indications for prophylactic antibiotics prior to his procedure. Thank you for the opportunity to participate in Mr. Kelvin zheng. Sincerely, 
 
 
 
Tigist Myers PA-C for Dani Flores MD

## 2019-06-05 NOTE — PROGRESS NOTES
Psychiatric Outpatient Progress Note    Account Number:  [de-identified]  Name: Merlin Bowers    SUBJECTIVE:   CHIEF COMPLAINT:  Merlin Bowers is a 43 y.o. Single, [place], male and was seen today for his first follow-up of psychiatric condition and psychotropic medication management. HPI:    Oksana Yanez reports the following psychiatric symptoms:  depression, anxiety and nightmars, anger. The symptoms have been present for yeats and are of moderate severity. The symptoms occur daily. Pt was seen alone. He was calm and cooperative. Minimally verbal. Reported that nothing has changed. He does not feel any different on Zoloft, which was increased by his PCP on 5/23/2019. He was mildly frustrated to see so many doctors. He reported that his nightmares and poor sleep continues. He also has constant headaches. Denies any psychosis or stepan. Pt reported that he has appointment with a therapist this month. Pt has lost 2 lbs. BMI= 32. Today, BP/HR is WNL. He reported stability in his cardiac status. Initial Assessement & Diagnoses( 3/20/2019): This is a 67-year-old, homosexual, Afro-American male with history of depression, anxiety, PTSD, alcohol use disorder-in remission and tobacco use disorder and severe medical problems, was seen today to establish his mental health treatment here. Patient appears to be mildly depressed and anxious and probably has significant personality issues and may be antisocial.  Patient is very reluctant to start any medications and does not appear to be interested in any kind of psychiatric treatment. He reluctantly agreed to start low-dose of SSRI. He also agreed to start psychotherapy with a counselor in the community.   Primary diagnosis:  Depression with Anxiety, Tobacco use d/o, Alcohol use d/o - in remission  Secondary diagnosis:  R/o Personality d/o ? antisocial  Tertiary diagnosis: Multiple medical problems as noted above  Strength & Weaknesses: Supportive sister, good medical treatment. Treatment Plan:  Medication: begin low-dose Zoloft titrated slowly   Psychotherapy: Patient was given a list of counselors in the community and was recommended to choose 1 of them and to start supportive counseling    Contributing factors include: significant medical issues    Patient denies SI/HI/SIB. Side Effects:  headache      Fam/Soc Hx (from [place] with updates):    Patient is single and goss. He currently lives with his sister. Patient has 11th grade education. He was held back 1 year in high school because of his behaviors. Patient has not worked since 2006. Patient reported that he has been incarcerated more than 10 times for alcohol-related crimes and assault and battery. He spent more than 5 years in the correction system. She denied any history of childhood abuse. He reported that his niece has a schizophrenia and sister has bipolar.     REVIEW OF SYSTEMS:  Constitutional: positive for fatigue and weight loss  Eyes: positive for contacts/glasses  Ears, nose, mouth, throat, and face: negative for hearing loss  Respiratory: negative for cough or wheezing  Cardiovascular: positive for irregular heart beats, fatigue  Gastrointestinal: negative for reflux symptoms and constipation  Genitourinary:negative for frequency and urinary incontinence  Musculoskeletal:positive for arthralgias  Neurological: positive for memory problems  Behavioral/Psych: positive for anxiety, behavior problems, depression and sleep disturbance, negative for SI or HI    SCALES:( 3/20/19)  PHQ-9: 25 - severe depression  HAM-A: 48 - severe anxiety  MDQ: ++     Visit Vitals  /73   Pulse 70   Ht 6' 3\" (1.905 m)   Wt 117.8 kg (259 lb 12.8 oz)   BMI 32.47 kg/m²       OBJECTIVE:                 Mental Status exam: WNL except for      Sensorium  oriented to time, place and person   Relations cooperative and passive    Eye Contact    appropriate   Appearance:  age appropriate, casually dressed and poor hygiene   Motor Behavior/Gait:  within normal limits   Speech:  normal pitch and normal volume   Thought Process: goal directed and logical   Thought Content free of delusions and free of hallucinations   Suicidal ideations none   Homicidal ideations none   Mood:  euthymic   Affect:  anxious   Memory recent  adequate   Memory remote:  adequate   Concentration:  adequate   Abstraction:  concrete   Insight:  limited   Reliability poor   Judgment:  limited       MEDICAL DECISION MAKING  Data: pertinent labs, imaging, medical records and diagnostic tests reviewed and incorporated in diagnosis and treatment plan    Allergies   Allergen Reactions    Peanut Anaphylaxis        Current Outpatient Medications   Medication Sig Dispense Refill    EPINEPHrine (EPIPEN) 0.3 mg/0.3 mL injection INJECT CONTENTS OF 1 PEN AS NEEDED FOR ALLERGIC REACTION  6    carvedilol (COREG) 12.5 mg tablet Take 1 Tab by mouth two (2) times daily (with meals). Indications: high blood pressure 180 Tab 2    amLODIPine (NORVASC) 5 mg tablet Take 1 Tab by mouth daily for 360 days. 90 Tab 3    spironolactone (ALDACTONE) 25 mg tablet Take 1 Tab by mouth daily. 90 Tab 3    sertraline (ZOLOFT) 50 mg tablet Take 2 Tabs by mouth daily. 180 Tab 1    sacubitril-valsartan (ENTRESTO) 97 mg/103 mg tablet Take 1 Tab by mouth two (2) times a day. Indications: chronic heart failure 180 Tab 3    amiodarone (CORDARONE) 200 mg tablet Take 1 Tab by mouth daily. 30 Tab 12    multivitamin (ONE A DAY) tablet Take 1 Tab by mouth daily.  Cholecalciferol, Vitamin D3, (VITAMIN D3) 2,000 unit cap capsule Take 2,000 Units by mouth daily. 30 Cap 6    magnesium oxide (MAG-OX) 400 mg tablet Take 1 Tab by mouth daily. 30 Tab 1          Problems addressed today:    ICD-10-CM ICD-9-CM    1. Depression with anxiety F41.8 300.4    2. Tobacco use disorder F17.200 305.1    3. Alcohol use disorder, moderate, in sustained remission (HCC) F10.21 305.03    4.  Personality disorder ([facility] F60.9 301.9        Assessment:   Evelyn Bustos  is a 43 y.o.  male  is not responding to treatment. Symptoms are unchanged. Patient denies SI/HI/SIB. No evidence of AH/VH or delusions. Risk Scoring- chronic illnesses and prescription drug management    Treatment Plan:  1. Medications:          Medication Changes/Adjustments: Continue Zoloft as per PCP instructions. Current Outpatient Medications   Medication Sig Dispense Refill    EPINEPHrine (EPIPEN) 0.3 mg/0.3 mL injection INJECT CONTENTS OF 1 PEN AS NEEDED FOR ALLERGIC REACTION  6    carvedilol (COREG) 12.5 mg tablet Take 1 Tab by mouth two (2) times daily (with meals). Indications: high blood pressure 180 Tab 2    amLODIPine (NORVASC) 5 mg tablet Take 1 Tab by mouth daily for 360 days. 90 Tab 3    spironolactone (ALDACTONE) 25 mg tablet Take 1 Tab by mouth daily. 90 Tab 3    sertraline (ZOLOFT) 50 mg tablet Take 2 Tabs by mouth daily. 180 Tab 1    sacubitril-valsartan (ENTRESTO) 97 mg/103 mg tablet Take 1 Tab by mouth two (2) times a day. Indications: chronic heart failure 180 Tab 3    amiodarone (CORDARONE) 200 mg tablet Take 1 Tab by mouth daily. 30 Tab 12    multivitamin (ONE A DAY) tablet Take 1 Tab by mouth daily.  Cholecalciferol, Vitamin D3, (VITAMIN D3) 2,000 unit cap capsule Take 2,000 Units by mouth daily. 30 Cap 6    magnesium oxide (MAG-OX) 400 mg tablet Take 1 Tab by mouth daily. 30 Tab 1                  The following regarding medications was addressed:    (The risks and benefits of the proposed medication; the potential medication side effects ie    dry mouth, weight gain, GI upset, headache; patient given opportunity to ask questions)       2. Counseling and coordination of care including instructions for treatment, risks/benefits, risk factor reduction and patient/family education. He agrees with the plan. Patient instructed to call with any side effects, questions or issues.      3.  Pt was provided supportive counseling for his stress of medical and psychiatric issues. We agreed to d/c his psychiatric medications FU with me as his Zoloft can be prescribed by his PCP. He will start his psychotherapy with a new counselor this month. We will continue his care on PRN basis in this office. PSYCHOTHERAPY:  approx 20 minutes  Type:  Supportive/Solution Focused psychotherapy provided  Focus:     Current problems:   Housing issues   Occupational issues   Medical issues    Psychoeducation provided:  Psych medications. Treatment plan reviewed with patient-including diagnosis and medications    Amanda Paredes is not progressing. Follow-up and Dispositions    · Return if symptoms worsen or fail to improve.        Ricky Antunez MD  6/5/2019

## 2019-06-05 NOTE — PROGRESS NOTES
[facility]   [address] [facility], [address] [place] [address]                                          NEW PATIENT HPI/FOLLOW-UP      NAME:  Pam Estrada   :   1980   MRN:   [id number]   PCP:  Fernando Saavedra MD           Subjective: The patient is a 43y.o. year old male with h/o NICM, chronic systolic CHF (EF 51%), PAF (recent monitor showed zero burden), SOB, tobacco use disorder, and HTN who returns for a routine follow-up. Since the last visit, patient reports mild left sided HA that he thinks is related to left lower jaw pain/swelling. He has dental appt to address this next week. Occasional wheezing when laying supine. Sleeps with two pillows, his baseline, or on his side which relieves the wheezing. No cough or PND. Denies change in exercise tolerance, chest pain, edema, medication intolerance, palpitations, shortness of breath, syncope, dizziness or light headedness. Doing satisfactorily. Review of Systems  General: Pt denies excessive weight gain or loss. Pt is able to conduct ADL's. Respiratory: +wheezing Denies shortness of breath, FLOWERS, or stridor.   Cardiovascular: Denies precordial pain, palpitations, edema or PND  Gastrointestinal: Denies poor appetite, indigestion, abdominal pain or blood in stool  Peripheral vascular: Denies claudication, leg cramps  Neuropsychiatric: +HA Denies paresthesias,tingling,numbness,anxiety,depression,fatigue  Musculoskeletal: Denies pain,tenderness, soreness,swelling      Past Medical History:   Diagnosis Date    Anxiety     Class 1 obesity due to excess calories with serious comorbidity and body mass index (BMI) of 32.0 to 32.9 in adult 2019    Combined systolic and diastolic heart failure ([facility] 2018    ECHO 18:mild-mod LVD, EF 10-20%, severe DHK, mild LVH, DD, RVD, mild- mod LAE, mod RAY, severe MR, mod-severe TR, RVSP 40, dilated RV outflow tract  ROBERTO 18: LVD, EF 10-15%, severe DHK, mild RVD, reduced RVEF, mod ANNAMARIE, no CELINA thrombus, no PFO, mod MR, mild TR  RHC 7/20/18: RA:12, RV 34/6/14.  PA 30/20/26, PCWP 18, [place] 6.33/2.74 TD: 5.37/2.32    CARDIAC MRI: 7/20/18: mod LVD (LVDD 66), mil    Congestive heart failure ([address]     Essential hypertension 3/12/2019    Hypertensive heart disease with combined systolic and diastolic congestive heart failure (HCC) 3/12/2019    ECHO 7/17/18:mild-mod LVD, EF 10-20%, severe DHK, mild LVH, DD, RVD, mild- mod LAE, mod RAY, severe MR, mod-severe TR, RVSP 40, dilated RV outflow tract ECHO 10/26/2018: mild LVD, EF 45%, G3/4 DD, mod ant leaflet thickening, mild MR, dil RVOT        Long term current use of anticoagulant therapy     NICM (nonischemic cardiomyopathy) ([address] 8/8/2018    Non-rheumatic mitral regurgitation 8/8/2018    ECHO 7/17/18:mild-mod LVD, EF 10-20%, severe DHK, mild LVH, DD, RVD, mild- mod LAE, mod RAY, severe MR, mod-severe TR, RVSP 40, dilated RV outflow tract  ROBERTO 7/20/18: LVD, EF 10-15%, severe DHK, mild RVD, reduced RVEF, mod ANNAMARIE, no CELINA thrombus, no PFO, mod MR, mild TR      PAF (paroxysmal atrial fibrillation) ([address] 8/8/2018    ROBERTO 7/20/18: LVD, EF 10-15%, severe DHK, mild RVD, reduced RVEF, mod ANNAMARIE, no CELINA thrombus, no PFO, mod MR, mild TR    DCCV 7/20/18       Rapid atrial fibrillation ([address] 7/17/2018    Tobacco use disorder 8/8/2018    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    Vitamin D deficiency 9/19/2018    Level 12 8/2018     Patient Active Problem List    Diagnosis Date Noted    Depression with anxiety 03/20/2019    Personality disorder ([address] 03/20/2019    Alcohol use disorder, moderate, in sustained remission ([facility] 03/20/2019    Essential hypertension 03/12/2019    Hypertensive heart disease with combined systolic and diastolic congestive heart failure ([facility] 03/12/2019    Class 1 obesity due to excess calories with serious comorbidity and body mass index (BMI) of 32.0 to 32.9 in adult 02/27/2019    Vitamin D deficiency 09/19/2018    SOB (shortness of breath) 08/09/2018    Snoring 08/09/2018    NICM (nonischemic cardiomyopathy) ([facility] 08/08/2018    Combined systolic and diastolic heart failure ([facility] 08/08/2018    Viral myocarditis 08/08/2018    Tobacco use disorder 08/08/2018    PA[facility]oxysmal [facility] fibrillation) ([facility] 08/08/2018    Non-rheumatic mitral regurgitation 08/08/2018    Anticoagulated 34/41/2056    Acute systolic heart failure ([facility] 07/19/2018    Pleural effusion, right 07/19/2018    Atrial fibrillation with RVR ([facility] 07/19/2018      Past Surgical History:   Procedure Laterality Date    CARDIOVERSION, ELECTIVE;EXTERN  7/20/2018         HX HEART CATHETERIZATION       Allergies   Allergen Reactions    Peanut Anaphylaxis      Family History   Problem Relation Age of Onset    Diabetes Mother     Heart Failure Mother     No Known Problems Father     Hypertension Sister     Hypertension Sister       Social History     Socioeconomic History    Marital status: SINGLE     Spouse name: Not on file    Number of children: Not on file    Years of education: Not on file    Highest education level: Not on file   Occupational History    Not on file   Social Needs    Financial resource strain: Not on file    Food insecurity:     Worry: Not on file     Inability: Not on file    Transportation needs:     Medical: Not on file     Non-medical: Not on file   Tobacco Use    Smoking status: Current Some Day Smoker     Types: Cigarettes    Smokeless tobacco: Never Used    Tobacco comment: 3-4 cigarettes vaughn   Substance and Sexual Activity    Alcohol use: No    Drug use: No    Sexual activity: Not Currently   Lifestyle    Physical activity:     Days per week: Not on file     Minutes per session: Not on file    Stress: Not on file   Relationships    Social connections:     Talks on phone: Not on file     Gets together: Not on file     Attends Druze service: Not on file     Active member of club or organization: Not on file     Attends meetings of clubs or organizations: Not on file     Relationship status: Not on file    Intimate partner violence:     Fear of current or ex partner: Not on file     Emotionally abused: Not on file     Physically abused: Not on file     Forced sexual activity: Not on file   Other Topics Concern     Service Not Asked    Blood Transfusions Not Asked    Caffeine Concern Not Asked    Occupational Exposure Not Asked   [employer] Hazards Not Asked    Sleep Concern Not Asked    Stress Concern Not Asked    Weight Concern Not Asked    Special Diet Not Asked    Back Care Not Asked    Exercise Not Asked    Bike Helmet Not Asked   [address] Not Asked    Self-Exams Not Asked   Social History Narrative    Not on file      Current Outpatient Medications   Medication Sig    EPINEPHrine (EPIPEN) 0.3 mg/0.3 mL injection INJECT CONTENTS OF 1 PEN AS NEEDED FOR ALLERGIC REACTION    carvedilol (COREG) 12.5 mg tablet Take 1 Tab by mouth two (2) times daily (with meals). Indications: high blood pressure    amLODIPine (NORVASC) 5 mg tablet Take 1 Tab by mouth daily for 360 days.  spironolactone (ALDACTONE) 25 mg tablet Take 1 Tab by mouth daily.  sertraline (ZOLOFT) 50 mg tablet Take 2 Tabs by mouth daily.  sacubitril-valsartan (ENTRESTO) 97 mg/103 mg tablet Take 1 Tab by mouth two (2) times a day. Indications: chronic heart failure    amiodarone (CORDARONE) 200 mg tablet Take 1 Tab by mouth daily.  multivitamin (ONE A DAY) tablet Take 1 Tab by mouth daily.  Cholecalciferol, Vitamin D3, (VITAMIN D3) 2,000 unit cap capsule Take 2,000 Units by mouth daily.  magnesium oxide (MAG-OX) 400 mg tablet Take 1 Tab by mouth daily. No current facility-administered medications for this visit. I have reviewed the nurses notes, vitals, problem list, allergy list, medical history, family medical, social history and medications.         Objective:     Physical Exam:     Vitals:    19 1255   BP: 95/75   Pulse: 73   Resp: 18   SpO2: 97%   Weight: 258 lb (117 kg)   Height: 6' 3\" (1.905 m)    Body mass index is 32.25 kg/m². General: WDWN adult male, in no acute distress. Pleasant affect. HEENT: No carotid bruits, no JVD, trach is midline. +TTP no fluctuant mass left lower gumline at tooth #18; decayed teeth to root #19  Heart:  Normal S1/S2 negative S3 or S4. RRR, no murmur, gallop or rub.   Respiratory: Clear bilaterally, no wheezing or rales  Abdomen:   Soft, non-tender, bowel sounds are active.   Extremities:  No edema, normal cap refill, no cyanosis. Neuro: A&Ox3, speech clear, gait stable. Skin: Skin color is normal. No rashes or lesions. No diaphoresis. Vascular: 2+ pulses symmetric in all extremities        Data Review:       Cardiographics:    EKG interpretation:  Rhythm: normal sinus rhythm; and regular . Rate (approx.): 63; Axis: left axis deviation; P wave: normal; QRS interval: normal ; ST/T wave: normal; Baseline artifact. This EKG was interpreted by Gregorio Davis PA-C. Cardiology Labs:    Results for orders placed or performed during the hospital encounter of 19   [facility], [address] [address], [address]                                         Test Date:    2019  Roselle Medico Name:     Jessie Buchanan             Department:     Patient ID:   [id number]                Room:           Gender:       Male                   Technician:     :          1980               Requested By: Dr. Rodolfo Kauffman Number:                          Reading MD:   Salomón Maldonado MD                    Interpretive Statements  Stacy Renner was in Normal Sinus.     The average heart rate, excluding ectopy, was 75 BPM with a minimum of 54 BPM   at  10:54 D1 and a maximum of 112 BPM at  17:36 D2. Heart beats, including ectopy, totaled 050182 beats. VENTRICULAR ECTOPICS totaled 57  averaging  1.2 per hour  with 40 single, 4   paired, 13 trigeminy and 0 R on T.    SUPRAVENTRICULAR ECTOPICS totaled 4  ,with 4 single and 0 paired beats. There is no recurrence of atrial fibrillation. Full report is in Shahla Sams MD [facility]    Electronically signed by Helga Colindres MD on Mar 31 2019  8:12PM CDT   Results for orders placed or performed during the hospital encounter of 07/17/18   EKG, 12 LEAD, INITIAL   Result Value Ref Range    Ventricular Rate 173 BPM    Atrial Rate 192 BPM    QRS Duration 90 ms    Q-T Interval 256 ms    QTC Calculation (Bezet) 434 ms    Calculated R Axis -70 degrees    Calculated T Axis 17 degrees    Diagnosis       Atrial fibrillation with rapid ventricular response with premature   ventricular or aberrantly conducted complexes  Left anterior fascicular block  Possible Anterior infarct , age undetermined  Abnormal ECG  No previous ECGs available  Confirmed by Howard Underwood MD, Danika Wolfe ([id number]) on 7/18/2018 8:24:03 PM         Lab Results   Component Value Date/Time    Cholesterol, total 179 04/18/2019 11:50 AM    HDL Cholesterol 50 04/18/2019 11:50 AM       Lab Results   Component Value Date/Time    Sodium 138 04/18/2019 11:50 AM    Potassium 4.0 04/18/2019 11:50 AM    Chloride 104 04/18/2019 11:50 AM    CO2 21 04/18/2019 11:50 AM    Anion gap 9 10/24/2018 09:18 AM    Glucose 78 04/18/2019 11:50 AM    BUN 11 04/18/2019 11:50 AM    Creatinine 0.98 04/18/2019 11:50 AM    BUN/Creatinine ratio 11 04/18/2019 11:50 AM    GFR est  04/18/2019 11:50 AM    GFR est non-AA 97 04/18/2019 11:50 AM    Calcium 9.3 04/18/2019 11:50 AM    Bilirubin, total 0.4 04/18/2019 11:50 AM    AST (SGOT) 20 04/18/2019 11:50 AM    Alk.  phosphatase 107 04/18/2019 11:50 AM    Protein, total 7.1 04/18/2019 11:50 AM    Albumin 4.2 04/18/2019 11:50 AM    Globulin 4.4 (H) 07/26/2018 05:12 AM A-G Ratio 1.4 04/18/2019 11:50 AM    ALT (SGPT) 16 04/18/2019 11:50 AM          Assessment:       ICD-10-CM ICD-9-CM    1. Chronic combined systolic and diastolic heart failure (HCC) I50.42 428.42    2. NICM (nonischemic cardiomyopathy) ([facility] I42.8 425.4    3. PAF (paroxysmal atrial fibrillation) ([facility]) I48.0 427.31    4. SOB (shortness of breath) R06.02 786.05    5. Essential hypertension I10 401.9    6. Tobacco use disorder F17.200 305.1          Discussion: Patient presents at this time stable from a cardiac perspective. BP was well controlled. Wheezing with supine position but not when he sleeps on 2 pillows or lays on his side. Lungs clear on exam today. Pt is a longtime smoker. Consider COPD, asthma, allergies/bronchospasm, GERD, orthopnea 2/2 CHF. Was recently prescribed EpiPen for peanut allergy. Pt is non-edematous but his weight has been steadily increasing over past 7 months:  CHF 11/26/2018 11/28/2018 2/15/2019 2/28/2019 3/13/2019   Wt (Lbs.) 235 235 257 258 256     CHF 3/20/2019 4/4/2019 4/18/2019 5/3/2019 5/23/2019 6/5/2019   Wt (Lbs.) 261.8 259.6 264.4 264 262.3 258     CHF 6/5/2019   Wt (Lbs.) 259.8     Concerned that BP will not support additional diuresis. Encouraged pt to continue to watch fluid and sodium intake as instructed by Marine Sanches at [address] [place] [address]. If wheezing continues pt should follow up with PCP as well. Pt is cleared for his dental work and does not need ABX prophylaxis. Pleased with present status. The patient was counseled on the dangers of tobacco use, and was advised to quit. Reviewed strategies to maximize success, including removing cigarettes and smoking materials from environment, support of family/friends and potential medication help from medical provider. Discussed with Dr. Dominic Lawrence: 1. Continue same meds.      2.Encouraged to exercise to tolerance, lose weight, stop smoking and follow low fat, low cholesterol, low sodium predominantly Plant-based (consider Mediterranean) diet. 3.Follow up: 3 months   --  Call with questions or concerns. I have discussed the diagnosis with the patient and the intended plan as seen in the above orders. The patient has received an after-visit summary and questions were answered concerning future plans. I have discussed any concerning medication side effects and warnings with the patient as well.     Emery Lane PA-C  6/5/2019

## 2019-06-05 NOTE — PROGRESS NOTES
Chief Complaint   Patient presents with    Follow-up    Irregular Heart Beat    Hypertension     1. Have you been to the ER, urgent care clinic since your last visit? Hospitalized since your last visit? No    2. Have you seen or consulted any other health care providers outside of the [address] [place] [address] since your last visit? Include any pap smears or colon screening.  No

## 2019-08-01 ENCOUNTER — OFFICE VISIT (OUTPATIENT)
Dept: FAMILY MEDICINE CLINIC | Age: 39
End: 2019-08-01

## 2019-08-01 VITALS
TEMPERATURE: 97.6 F | RESPIRATION RATE: 12 BRPM | BODY MASS INDEX: 32.07 KG/M2 | SYSTOLIC BLOOD PRESSURE: 92 MMHG | HEIGHT: 75 IN | WEIGHT: 257.9 LBS | DIASTOLIC BLOOD PRESSURE: 60 MMHG | HEART RATE: 75 BPM | OXYGEN SATURATION: 97 %

## 2019-08-01 DIAGNOSIS — I50.40 HYPERTENSIVE HEART DISEASE WITH COMBINED SYSTOLIC AND DIASTOLIC CONGESTIVE HEART FAILURE, UNSPECIFIED HF CHRONICITY (HCC): ICD-10-CM

## 2019-08-01 DIAGNOSIS — I48.91 ATRIAL FIBRILLATION WITH RVR (HCC): ICD-10-CM

## 2019-08-01 DIAGNOSIS — I10 ESSENTIAL HYPERTENSION: ICD-10-CM

## 2019-08-01 DIAGNOSIS — J90 PLEURAL EFFUSION, RIGHT: ICD-10-CM

## 2019-08-01 DIAGNOSIS — I11.0 HYPERTENSIVE HEART DISEASE WITH COMBINED SYSTOLIC AND DIASTOLIC CONGESTIVE HEART FAILURE, UNSPECIFIED HF CHRONICITY (HCC): ICD-10-CM

## 2019-08-01 DIAGNOSIS — E66.09 CLASS 1 OBESITY DUE TO EXCESS CALORIES WITH SERIOUS COMORBIDITY AND BODY MASS INDEX (BMI) OF 32.0 TO 32.9 IN ADULT: ICD-10-CM

## 2019-08-01 DIAGNOSIS — F41.8 DEPRESSION WITH ANXIETY: ICD-10-CM

## 2019-08-01 DIAGNOSIS — I40.0 CHRONIC VIRAL MYOCARDITIS: ICD-10-CM

## 2019-08-01 DIAGNOSIS — I42.8 NICM (NONISCHEMIC CARDIOMYOPATHY) (HCC): ICD-10-CM

## 2019-08-01 DIAGNOSIS — F60.9 PERSONALITY DISORDER (HCC): ICD-10-CM

## 2019-08-01 DIAGNOSIS — I50.42 CHRONIC COMBINED SYSTOLIC AND DIASTOLIC HEART FAILURE (HCC): Primary | Chronic | ICD-10-CM

## 2019-08-01 DIAGNOSIS — I50.40 COMBINED SYSTOLIC AND DIASTOLIC HEART FAILURE, UNSPECIFIED HF CHRONICITY (HCC): ICD-10-CM

## 2019-08-01 DIAGNOSIS — I48.0 PAF (PAROXYSMAL ATRIAL FIBRILLATION) (HCC): ICD-10-CM

## 2019-08-01 NOTE — PROGRESS NOTES
HISTORY OF PRESENT ILLNESS  Josie Almanza is a 35 y.o. male. HPI patient presents stating that his psychiatrist cardiologist currently retired need to be seen by the new psychiatrist and cardiologist in addition patient states that he needs refill for his medication meanwhile he has couple of form to be completed want for [facility] and the other one is information release  Depression with the anxiety and panic states of mind  nicley controlled with the current meds,    Patient state that it is getting better: pt states and reports of feeling less anxius, less guilty feeling,  less Hoplessness,ns/nh,ni,nh, less trouble with weight gain or loss, less tendency of etoh or illicit drug use, more ability of sleep, more ablitiy  to concentrate at home with the current medications,and all together a safe feeling at home    Current Outpatient Medications   Medication Sig Dispense Refill    EPINEPHrine (EPIPEN) 0.3 mg/0.3 mL injection INJECT CONTENTS OF 1 PEN AS NEEDED FOR ALLERGIC REACTION  6    carvedilol (COREG) 12.5 mg tablet Take 1 Tab by mouth two (2) times daily (with meals). Indications: high blood pressure 180 Tab 2    amLODIPine (NORVASC) 5 mg tablet Take 1 Tab by mouth daily for 360 days. 90 Tab 3    spironolactone (ALDACTONE) 25 mg tablet Take 1 Tab by mouth daily. 90 Tab 3    sertraline (ZOLOFT) 50 mg tablet Take 2 Tabs by mouth daily. 180 Tab 1    sacubitril-valsartan (ENTRESTO) 97 mg/103 mg tablet Take 1 Tab by mouth two (2) times a day. Indications: chronic heart failure 180 Tab 3    amiodarone (CORDARONE) 200 mg tablet Take 1 Tab by mouth daily. 30 Tab 12    multivitamin (ONE A DAY) tablet Take 1 Tab by mouth daily.  Cholecalciferol, Vitamin D3, (VITAMIN D3) 2,000 unit cap capsule Take 2,000 Units by mouth daily. 30 Cap 6    magnesium oxide (MAG-OX) 400 mg tablet Take 1 Tab by mouth daily.  30 Tab 1     Allergies   Allergen Reactions    Peanut Anaphylaxis     Past Medical History: Diagnosis Date    Anxiety     Class 1 obesity due to excess calories with serious comorbidity and body mass index (BMI) of 32.0 to 32.9 in adult 2/27/2019    Combined systolic and diastolic heart failure ([address] 8/8/2018    ECHO 7/17/18:mild-mod LVD, EF 10-20%, severe DHK, mild LVH, DD, RVD, mild- mod LAE, mod RAY, severe MR, mod-severe TR, RVSP 40, dilated RV outflow tract  ROBERTO 7/20/18: LVD, EF 10-15%, severe DHK, mild RVD, reduced RVEF, mod ANNAMARIE, no CELINA thrombus, no PFO, mod MR, mild TR  RHC 7/20/18: RA:12, RV 34/6/14.  PA 30/20/26, PCWP 18, [place] 6.33/2.74 TD: 5.37/2.32    CARDIAC MRI: 7/20/18: mod LVD (LVDD 66), mil    Congestive heart failure ([address]     Essential hypertension 3/12/2019    Hypertensive heart disease with combined systolic and diastolic congestive heart failure (HCC) 3/12/2019    ECHO 7/17/18:mild-mod LVD, EF 10-20%, severe DHK, mild LVH, DD, RVD, mild- mod LAE, mod RAY, severe MR, mod-severe TR, RVSP 40, dilated RV outflow tract ECHO 10/26/2018: mild LVD, EF 45%, G3/4 DD, mod ant leaflet thickening, mild MR, dil RVOT        Long term current use of anticoagulant therapy     NICM (nonischemic cardiomyopathy) ([address] 8/8/2018    Non-rheumatic mitral regurgitation 8/8/2018    ECHO 7/17/18:mild-mod LVD, EF 10-20%, severe DHK, mild LVH, DD, RVD, mild- mod LAE, mod RAY, severe MR, mod-severe TR, RVSP 40, dilated RV outflow tract  ROBERTO 7/20/18: LVD, EF 10-15%, severe DHK, mild RVD, reduced RVEF, mod ANNAMARIE, no CELINA thrombus, no PFO, mod MR, mild TR      PAF (paroxysmal atrial fibrillation) ([address] 8/8/2018    ROBERTO 7/20/18: LVD, EF 10-15%, severe DHK, mild RVD, reduced RVEF, mod ANNAMARIE, no CELINA thrombus, no PFO, mod MR, mild TR    DCCV 7/20/18       Rapid atrial fibrillation ([address] 7/17/2018    Tobacco use disorder 8/8/2018    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    Vitamin D deficiency 9/19/2018    Level 12 8/2018     Past Surgical History:   Procedure Laterality Date    CARDIOVERSION, ELECTIVE;EXTERN  7/20/2018         HX HEART CATHETERIZATION       Family History   Problem Relation Age of Onset    Diabetes Mother     Heart Failure Mother     No Known Problems Father     Hypertension Sister     Hypertension Sister      Social History     Tobacco Use    Smoking status: Current Some Day Smoker     Types: Cigarettes    Smokeless tobacco: Never Used    Tobacco comment: 3-4 cigarettes vaughn   Substance Use Topics    Alcohol use: No      Lab Results   Component Value Date/Time    Glucose 78 04/18/2019 11:50 AM    Creatinine 0.98 04/18/2019 11:50 AM      Lab Results   Component Value Date/Time    GFR est non-AA 97 04/18/2019 11:50 AM    GFR est  04/18/2019 11:50 AM    Creatinine 0.98 04/18/2019 11:50 AM    BUN 11 04/18/2019 11:50 AM    Sodium 138 04/18/2019 11:50 AM    Potassium 4.0 04/18/2019 11:50 AM    Chloride 104 04/18/2019 11:50 AM    CO2 21 04/18/2019 11:50 AM    Magnesium 1.9 10/24/2018 09:18 AM    Albumin, urine 23.1 07/25/2018 02:55 PM        Review of Systems   Constitutional: Negative for chills and fever. HENT: Negative for ear pain and nosebleeds. Eyes: Negative for blurred vision, pain and discharge. Respiratory: Negative for shortness of breath. Cardiovascular: Negative for chest pain and leg swelling. Gastrointestinal: Negative for constipation, diarrhea, nausea and vomiting. Genitourinary: Negative for frequency. Musculoskeletal: Negative for joint pain. Skin: Negative for itching and rash. Neurological: Negative for headaches. Psychiatric/Behavioral: Negative for depression. The patient is not nervous/anxious. Physical Exam   Constitutional: He is oriented to person, place, and time. He appears well-developed and well-nourished. HENT:   Head: Normocephalic and atraumatic. Mouth/Throat: No oropharyngeal exudate. Eyes: Conjunctivae and EOM are normal.   Neck: Normal range of motion. Neck supple.    Cardiovascular: Normal rate, regular rhythm and normal heart sounds. No murmur heard. Pulmonary/Chest: Effort normal and breath sounds normal. No respiratory distress. Abdominal: Soft. Bowel sounds are normal. He exhibits no distension. There is no rebound. Musculoskeletal: He exhibits no edema or tenderness. Neurological: He is alert and oriented to person, place, and time. Skin: Skin is warm. No erythema. Psychiatric: He has a normal mood and affect. His behavior is normal.   Nursing note and vitals reviewed. ASSESSMENT and PLAN  Diagnoses and all orders for this visit:    1. Chronic combined systolic and diastolic heart failure ([facility])  -     REFERRAL TO PSYCHIATRY  -     REFERRAL TO CARDIOLOGY    2. Atrial fibrillation with RVR ([facility])  -     REFERRAL TO PSYCHIATRY  -     REFERRAL TO CARDIOLOGY    3. PAF (paroxysmal atrial fibrillation) ([facility])  -     REFERRAL TO PSYCHIATRY  -     REFERRAL TO CARDIOLOGY    4. Class 1 obesity due to excess calories with serious comorbidity and body mass index (BMI) of 32.0 to 32.9 in adult  -     REFERRAL TO PSYCHIATRY  -     REFERRAL TO CARDIOLOGY  -     sertraline (ZOLOFT) 50 mg tablet; Take 2 Tabs by mouth daily. -     sacubitril-valsartan (ENTRESTO) 97 mg/103 mg tablet; Take 1 Tab by mouth two (2) times a day. Indications: chronic heart failure    5. Depression with anxiety  -     REFERRAL TO PSYCHOLOGY    6. Personality disorder ([facility])  -     REFERRAL TO PSYCHOLOGY    7. NICM (nonischemic cardiomyopathy) ([facility])  -     spironolactone (ALDACTONE) 25 mg tablet; Take 1 Tab by mouth daily. 8. Combined systolic and diastolic heart failure, unspecified HF chronicity ([facility])  -     spironolactone (ALDACTONE) 25 mg tablet; Take 1 Tab by mouth daily. -     sertraline (ZOLOFT) 50 mg tablet; Take 2 Tabs by mouth daily. 9. Hypertensive heart disease with combined systolic and diastolic congestive heart failure, unspecified HF chronicity ([facility])  -     sertraline (ZOLOFT) 50 mg tablet;  Take 2 Tabs by mouth daily.  -     sacubitril-valsartan (ENTRESTO) 97 mg/103 mg tablet; Take 1 Tab by mouth two (2) times a day. Indications: chronic heart failure    10. Chronic viral myocarditis  -     sertraline (ZOLOFT) 50 mg tablet; Take 2 Tabs by mouth daily. -     sacubitril-valsartan (ENTRESTO) 97 mg/103 mg tablet; Take 1 Tab by mouth two (2) times a day. Indications: chronic heart failure    11. Pleural effusion, right  -     sertraline (ZOLOFT) 50 mg tablet; Take 2 Tabs by mouth daily. -     sacubitril-valsartan (ENTRESTO) 97 mg/103 mg tablet; Take 1 Tab by mouth two (2) times a day. Indications: chronic heart failure    12. Essential hypertension  -     sertraline (ZOLOFT) 50 mg tablet; Take 2 Tabs by mouth daily. -     sacubitril-valsartan (ENTRESTO) 97 mg/103 mg tablet; Take 1 Tab by mouth two (2) times a day.  Indications: chronic heart failure

## 2019-08-01 NOTE — PROGRESS NOTES
Name and  verified      Chief Complaint   Patient presents with   [place] Results         Health Maintenance reviewed-discussed with patient. 1. Have you been to the ER, urgent care clinic since your last visit? Hospitalized since your last visit? no    2. Have you seen or consulted any other health care providers outside of the [address] [place] [address] since your last visit? Include any pap smears or colon screening.  no

## 2019-08-06 ENCOUNTER — TELEPHONE (OUTPATIENT)
Dept: CARDIOLOGY CLINIC | Age: 39
End: 2019-08-06

## 2019-08-06 NOTE — TELEPHONE ENCOUNTER
Telephone Call RE:  Appointment reminder     Outcome:     [x] Patient confirmed appointment   [] Patient rescheduled appointment for    [] Unable to reach   [] Left message              [] Other:       Ancel Fulling

## 2019-08-25 NOTE — PROGRESS NOTES
Ambulatory cardiology attending physician:    Den Martinez is a 70-year-old male with recovering nonischemic postviral congestive cardiomyopathy. I participated in the interview and examination and subsequent full discussion of findings and plan of care with MEG Teixeira.     Bobbi Soriano MD, [facility]

## 2019-08-28 ENCOUNTER — TELEPHONE (OUTPATIENT)
Dept: CARDIOLOGY CLINIC | Age: 39
End: 2019-08-28

## 2019-08-28 DIAGNOSIS — I10 ESSENTIAL HYPERTENSION: ICD-10-CM

## 2019-08-28 DIAGNOSIS — I40.0 CHRONIC VIRAL MYOCARDITIS: ICD-10-CM

## 2019-08-28 DIAGNOSIS — I50.40 HYPERTENSIVE HEART DISEASE WITH COMBINED SYSTOLIC AND DIASTOLIC CONGESTIVE HEART FAILURE, UNSPECIFIED HF CHRONICITY (HCC): ICD-10-CM

## 2019-08-28 DIAGNOSIS — I11.0 HYPERTENSIVE HEART DISEASE WITH COMBINED SYSTOLIC AND DIASTOLIC CONGESTIVE HEART FAILURE, UNSPECIFIED HF CHRONICITY (HCC): ICD-10-CM

## 2019-08-28 DIAGNOSIS — E66.09 CLASS 1 OBESITY DUE TO EXCESS CALORIES WITH SERIOUS COMORBIDITY AND BODY MASS INDEX (BMI) OF 32.0 TO 32.9 IN ADULT: ICD-10-CM

## 2019-08-28 DIAGNOSIS — J90 PLEURAL EFFUSION, RIGHT: ICD-10-CM

## 2019-08-28 NOTE — TELEPHONE ENCOUNTER
Patient called. Stating that [employer] is requesting information from us. His number is [phone]. He provided the number to [employer]   [phone]

## 2019-08-29 RX ORDER — SPIRONOLACTONE 25 MG/1
25 TABLET ORAL DAILY
Qty: 90 TAB | Refills: 3
Start: 2019-08-29 | End: 2019-11-07 | Stop reason: SDUPTHER

## 2019-08-29 RX ORDER — SERTRALINE HYDROCHLORIDE 50 MG/1
100 TABLET, FILM COATED ORAL DAILY
Qty: 180 TAB | Refills: 1
Start: 2019-08-29 | End: 2019-10-08 | Stop reason: ALTCHOICE

## 2019-08-29 NOTE — PATIENT INSTRUCTIONS
Heart Failure: Care Instructions  Your Care Instructions    Heart failure occurs when your heart does not pump as much blood as the body needs. Failure does not mean that the heart has stopped pumping but rather that it is not pumping as well as it should. Over time, this causes fluid buildup in your lungs and other parts of your body. Fluid buildup can cause shortness of breath, fatigue, swollen ankles, and other problems. By taking medicines regularly, reducing sodium (salt) in your diet, checking your weight every day, and making lifestyle changes, you can feel better and live longer. Follow-up care is a key part of your treatment and safety. Be sure to make and go to all appointments, and call your doctor if you are having problems. It's also a good idea to know your test results and keep a list of the medicines you take. How can you care for yourself at home? Medicines    · Be safe with medicines. Take your medicines exactly as prescribed. Call your doctor if you think you are having a problem with your medicine.     · Do not take any vitamins, over-the-counter medicine, or herbal products without talking to your doctor first. Equilla Ort not take ibuprofen (Advil or Motrin) and naproxen (Aleve) without talking to your doctor first. They could make your heart failure worse.     · You may be taking some of the following medicine. ? Beta-blockers can slow heart rate, decrease blood pressure, and improve your condition. Taking a beta-blocker may lower your chance of needing to be hospitalized. ? Angiotensin-converting enzyme inhibitors (ACEIs) reduce the heart's workload, lower blood pressure, and reduce swelling. Taking an ACEI may lower your chance of needing to be hospitalized again. ? Angiotensin II receptor blockers (ARBs) work like ACEIs. Your doctor may prescribe them instead of ACEIs. ? Diuretics, also called water pills, reduce swelling. ?  Potassium supplements replace this important mineral, which is sometimes lost with diuretics. ? Aspirin and other blood thinners prevent blood clots, which can cause a stroke or heart attack.    You will get more details on the specific medicines your doctor prescribes. Diet    · Your doctor may suggest that you limit sodium to 2,000 milligrams (mg) a day or less. That is less than 1 teaspoon of salt a day, including all the salt you eat in cooking or in packaged foods. People get most of their sodium from processed foods. Fast food and restaurant meals also tend to be very high in sodium.     · Ask your doctor how much liquid you can drink each day. You may have to limit liquids.    Weight    · Weigh yourself without clothing at the same time each day. Record your weight. Call your doctor if you have a sudden weight gain, such as more than 2 to 3 pounds in a day or 5 pounds in a week. (Your doctor may suggest a different range of weight gain.) A sudden weight gain may mean that your heart failure is getting worse.    Activity level    · Start light exercise (if your doctor says it is okay). Even if you can only do a small amount, exercise will help you get stronger, have more energy, and manage your weight and your stress. Walking is an easy way to get exercise. Start out by walking a little more than you did before. Bit by bit, increase the amount you walk.     · When you exercise, watch for signs that your heart is working too hard. You are pushing yourself too hard if you cannot talk while you are exercising. If you become short of breath or dizzy or have chest pain, stop, sit down, and rest.     · If you feel \"wiped out\" the day after you exercise, walk slower or for a shorter distance until you can work up to a better pace.     · Get enough rest at night. Sleeping with 1 or 2 pillows under your upper body and head may help you breathe easier.    Lifestyle changes    · Do not smoke. Smoking can make a heart condition worse.  If you need help quitting, talk to your doctor about stop-smoking programs and medicines. These can increase your chances of quitting for good. Quitting smoking may be the most important step you can take to protect your heart.     · Limit alcohol to 2 drinks a day for men and 1 drink a day for women. Too much alcohol can cause health problems.     · Avoid getting sick from colds and the flu. Get a pneumococcal vaccine shot. If you have had one before, ask your doctor whether you need another dose. Get a flu shot each year. If you must be around people with colds or the flu, wash your hands often. When should you call for help? Call 911 if you have symptoms of sudden heart failure such as:    · You have severe trouble breathing.     · You cough up pink, foamy mucus.     · You have a new irregular or rapid heartbeat.    Call your doctor now or seek immediate medical care if:    · You have new or increased shortness of breath.     · You are dizzy or lightheaded, or you feel like you may faint.     · You have sudden weight gain, such as more than 2 to 3 pounds in a day or 5 pounds in a week. (Your doctor may suggest a different range of weight gain.)     · You have increased swelling in your legs, ankles, or feet.     · You are suddenly so tired or weak that you cannot do your usual activities.    Watch closely for changes in your health, and be sure to contact your doctor if you develop new symptoms. Where can you learn more? Go to http://eleanor-walter.info/. Enter N322 in the search box to learn more about \"Heart Failure: Care Instructions. \"  Current as of: July 22, 2018  Content Version: 12.1  © 9441-6366 Healthwise, Incorporated. Care instructions adapted under license by [employer] (which disclaims liability or warranty for this information).  If you have questions about a medical condition or this instruction, always ask your healthcare professional. [address] any warranty or liability for your use of this information. Recovering From Depression: Care Instructions  Your Care Instructions    Taking good care of yourself is important as you recover from depression. In time, your symptoms will fade as your treatment takes hold. Do not give up. Instead, focus your energy on getting better. Your mood will improve. It just takes some time. Focus on things that can help you feel better, such as being with friends and family, eating well, and getting enough rest. But take things slowly. Do not do too much too soon. You will begin to feel better gradually. Follow-up care is a key part of your treatment and safety. Be sure to make and go to all appointments, and call your doctor if you are having problems. It's also a good idea to know your test results and keep a list of the medicines you take. How can you care for yourself at home? Be realistic  · If you have a large task to do, break it up into smaller steps you can handle, and just do what you can. · You may want to put off important decisions until your depression has lifted. If you have plans that will have a major impact on your life, such as marriage, divorce, or a job change, try to wait a bit. Talk it over with friends and loved ones who can help you look at the overall picture first.  · Reaching out to people for help is important. Do not isolate yourself. Let your family and friends help you. Find someone you can trust and confide in, and talk to that person. · Be patient, and be kind to yourself. Remember that depression is not your fault and is not something you can overcome with willpower alone. Treatment is necessary for depression, just like for any other illness. Feeling better takes time, and your mood will improve little by little. Stay active  · Stay busy and get outside. Take a walk, or try some other light exercise. · Talk with your doctor about an exercise program. Exercise can help with mild depression.   · Go to a movie or concert. Take part in a Christian activity or other social gathering. Go to a [employer] game. · Ask a friend to have dinner with you. Take care of yourself  · Eat a balanced diet with plenty of fresh fruits and vegetables, whole grains, and lean protein. If you have lost your appetite, eat small snacks rather than large meals. · Avoid drinking alcohol or using illegal drugs. Do not take medicines that have not been prescribed for you. They may interfere with medicines you may be taking for depression, or they may make your depression worse. · Take your medicines exactly as they are prescribed. You may start to feel better within 1 to 3 weeks of taking antidepressant medicine. But it can take as many as 6 to 8 weeks to see more improvement. If you have questions or concerns about your medicines, or if you do not notice any improvement by 3 weeks, talk to your doctor. · If you have any side effects from your medicine, tell your doctor. Antidepressants can make you feel tired, dizzy, or nervous. Some people have dry mouth, constipation, headaches, sexual problems, or diarrhea. Many of these side effects are mild and will go away on their own after you have been taking the medicine for a few weeks. Some may last longer. Talk to your doctor if side effects are bothering you too much. You might be able to try a different medicine. · Get enough sleep. If you have problems sleeping:  ? Go to bed at the same time every night, and get up at the same time every morning. ? Keep your bedroom dark and quiet. ? Do not exercise after 5:00 p.m.  ? Avoid drinks with caffeine after 5:00 p.m. · Avoid sleeping pills unless they are prescribed by the doctor treating your depression. Sleeping pills may make you groggy during the day, and they may interact with other medicine you are taking. · If you have any other illnesses, such as diabetes, heart disease, or high blood pressure, make sure to continue with your treatment.  Tell your doctor about all of the medicines you take, including those with or without a prescription. · Keep the numbers for these national suicide hotlines: 4-693-783-TALK (1-198.689.4602) and 8-360-OVGKVDQ (0-742.104.4391). If you or someone you know talks about suicide or feeling hopeless, get help right away. When should you call for help? Call 911 anytime you think you may need emergency care. For example, call if:    · You feel like hurting yourself or someone else.     · Someone you know has depression and is about to attempt or is attempting suicide.   [facility] your doctor now or seek immediate medical care if:    · You hear voices.     · Someone you know has depression and:  ? Starts to give away his or her possessions. ? Uses illegal drugs or drinks alcohol heavily. ? Talks or writes about death, including writing suicide notes or talking about guns, knives, or pills. ? Starts to spend a lot of time alone. ? Acts very aggressively or suddenly appears calm.    Watch closely for changes in your health, and be sure to contact your doctor if:    · You do not get better as expected. Where can you learn more? Go to http://eleanor-walter.info/. Enter P353 in the search box to learn more about \"Recovering From Depression: Care Instructions. \"  Current as of: September 11, 2018  Content Version: 12.1  © 1710-1052 Healthwise, Incorporated. Care instructions adapted under license by [employer] (which disclaims liability or warranty for this information). If you have questions about a medical condition or this instruction, always ask your healthcare professional. [address] any warranty or liability for your use of this information.

## 2019-08-29 NOTE — TELEPHONE ENCOUNTER
Requested Prescriptions     Signed Prescriptions Disp Refills    sacubitril-valsartan (ENTRESTO) 97 mg/103 mg tablet 60 Tab 0     Sig: Take 1 Tab by mouth two (2) times a day. Indications: chronic heart failure     Authorizing Provider: Vineet Mitchell     Ordering User: Manisha Rod     Reviewed with Caitlyn Carolina LCSW who reported patient now has Medicaid and is no longer eligible for patient assistance through [employer]. Contacted patient to notify. He requested new refill be sent for him. Refill sent to [address] [place] [address] Contacted pharmacy who stated prior authorization required. Prior authorization sent via [employer]. Will await determination response. Contacted patient to update. He verbalized understanding and had no further questions. Varun Lu RN.

## 2019-08-29 NOTE — TELEPHONE ENCOUNTER
Spoke with patient who reported [employer] patient assistance contacted him and informed him to contact [facility] as paperwork needed for continued coverage of Entresto. Informed patient will contact [employer]. Will keep patient updated. He verbalized understanding and had no further questions. Contacted [employer] patient assistance. They stated patient does not currently have active coverage and is needing refills. They stated they need enrollment paperwork resubmitted. They provided fax number [phone]. Will speak with Sayra Hernandez LCSW.

## 2019-09-06 NOTE — TELEPHONE ENCOUNTER
Prior authorization for entresto received. Copay of $0 verified with the pharmacy. They stated medication must be ordered and will not be ready today, but may be ready Monday. Contacted patient to notify. He verbalized understanding and had no further questions. Pito Byrd RN.

## 2019-09-12 ENCOUNTER — OFFICE VISIT (OUTPATIENT)
Dept: CARDIOLOGY CLINIC | Age: 39
End: 2019-09-12

## 2019-09-12 VITALS
HEART RATE: 74 BPM | SYSTOLIC BLOOD PRESSURE: 97 MMHG | BODY MASS INDEX: 31.58 KG/M2 | HEIGHT: 75 IN | OXYGEN SATURATION: 98 % | RESPIRATION RATE: 16 BRPM | DIASTOLIC BLOOD PRESSURE: 64 MMHG | WEIGHT: 254 LBS

## 2019-09-12 DIAGNOSIS — I50.42 CHRONIC COMBINED SYSTOLIC AND DIASTOLIC HEART FAILURE (HCC): ICD-10-CM

## 2019-09-12 DIAGNOSIS — I42.8 NICM (NONISCHEMIC CARDIOMYOPATHY) (HCC): ICD-10-CM

## 2019-09-12 DIAGNOSIS — I48.0 PAROXYSMAL ATRIAL FIBRILLATION (HCC): ICD-10-CM

## 2019-09-12 DIAGNOSIS — I48.91 ATRIAL FIBRILLATION WITH RVR (HCC): Primary | ICD-10-CM

## 2019-09-12 NOTE — PROGRESS NOTES
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 He has made a remarkable recovery with near normal ejection fraction. It became possible to abandon the LifeVest without placement of a permanent implantable device. Initially he had persistent atrial fibrillation but this has converted so that he has been off anticoagulant now for some time. Each of the above therapies were difficult because he seems to participate in the somewhat course of relationships and when he was supposed to be wearing the LifeVest he often came in carrying it in a little gym bag instead of wearing it. Warfarin compliance was very difficult and at times he appeared almost frivolous regarding medication compliance. Therefore, the more we can simplify his regimen the safer he will be. At present he states he is \"doing OK\", btreathing well. 2 pillows but no orthopnea. He denies PND as well as chest pain, palpitations, lightheadedness, and syncope. Activity tolerance is hard to  because he seems to be intentionally fairly inactive. He is Still seeing Dr. Harry Cotto, he is Still on amiodarone    On examination, he is 6 feet 3 inches tall, he weighs 254 pounds. Respiratory rate is 16 without distress, room air SPO2 is 98%. Resting pulse is 74 and regular. Blood pressure is 97/64 in a seated position. Jugular veins are flat, carotids are silent. There is no stridor. Oral pharyngeal anatomy is not obstructive. Neck circumference is proportionate. Lungs are clear. Cardiac auscultation shows regular rhythm with normal quality S1 and S2. There is no audible murmur, rub, or gallop. There is no edema, peripheral pulses are intact, there is no clubbing or cyanosis. There is no sign of DVT. Abdomen is nontender with normal liver, nonpalpable spleen, no pulsation, no bruit.     BP low, but no postural lightheadedness    EKG: Tracing performed today shows sinus rhythm, minimal left atrial conduction delay, borderline normal MS interval, mild changes of LVH, no other findings. QT interval is normal.    Impression: Remarkable recovery from viral myocarditis    No further manifestations of CHF    Apparently stable rhythm    Needs updated labs, including thyroids because of amiodarone use, and amiodarone drug levels.     Plan:  Updated echo    Measure amiodarone and metabolite    Update chemistries    Continue existing medications for now    New behavioral health referral, possibly Latia Cerda PhD    Sanket Mitchell MD, [facility] [place]

## 2019-09-12 NOTE — PATIENT INSTRUCTIONS
Please try to make an appointment to see Lucas Houston PhD. She is a psychotherapist with an office in [address] [place] [address] will give you the contact information. Your heart seems to be just about back to normal. I want to continue the existing medications, but it may be appropriate to start getting you off the Amiodarone. I ordered a blood test for this purpose that you can get done today.

## 2019-09-12 NOTE — PROGRESS NOTES
Chief Complaint   Patient presents with    Follow-up    Irregular Heart Beat    CHF    Hypertension     1. Have you been to the ER, urgent care clinic since your last visit? Hospitalized since your last visit? No    2. Have you seen or consulted any other health care providers outside of the [address] [place] [address] since your last visit? Include any pap smears or colon screening.  No

## 2019-09-17 LAB
AMIODARONE SERPL-MCNC: 1.6 UG/ML (ref 1–2.5)
DESETHYLAMIODARONE SERPL-MCNC: 0.8 UG/ML (ref 1–2.5)

## 2019-10-08 ENCOUNTER — OFFICE VISIT (OUTPATIENT)
Dept: CARDIOLOGY CLINIC | Age: 39
End: 2019-10-08

## 2019-10-08 VITALS
HEIGHT: 75 IN | OXYGEN SATURATION: 99 % | WEIGHT: 254.6 LBS | TEMPERATURE: 98.6 F | SYSTOLIC BLOOD PRESSURE: 110 MMHG | HEART RATE: 82 BPM | RESPIRATION RATE: 20 BRPM | DIASTOLIC BLOOD PRESSURE: 76 MMHG | BODY MASS INDEX: 31.65 KG/M2

## 2019-10-08 DIAGNOSIS — Z79.899 HIGH RISK MEDICATION USE: ICD-10-CM

## 2019-10-08 DIAGNOSIS — R06.02 SOB (SHORTNESS OF BREATH): ICD-10-CM

## 2019-10-08 DIAGNOSIS — I50.42 CHRONIC COMBINED SYSTOLIC AND DIASTOLIC HEART FAILURE (HCC): Primary | Chronic | ICD-10-CM

## 2019-10-08 DIAGNOSIS — I42.8 NICM (NONISCHEMIC CARDIOMYOPATHY) (HCC): ICD-10-CM

## 2019-10-08 DIAGNOSIS — Z23 ENCOUNTER FOR IMMUNIZATION: ICD-10-CM

## 2019-10-08 RX ORDER — CARVEDILOL 25 MG/1
25 TABLET ORAL 2 TIMES DAILY WITH MEALS
Qty: 180 TAB | Refills: 2 | Status: SHIPPED | OUTPATIENT
Start: 2019-10-08 | End: 2019-11-07 | Stop reason: SDUPTHER

## 2019-10-08 NOTE — LETTER
10/8/2019 2:37 PM 
 
Patient:  German Kelsey YOB: 1980 Date of Visit: 10/8/2019 Dear Joni Cedillo MD 
[address] [id number] VIA In Basket Deangelo Woodson PA-C 
[address] [phone] VIA In Basket 
 : Thank you for referring Mr. Nahun Guerra to me for evaluation/treatment. Below are the relevant portions of my assessment and plan of care. If you have questions, please do not hesitate to call me. I look forward to following Mr. Jose Wall along with you. Sincerely, Erin Cabello MD

## 2019-10-08 NOTE — PROGRESS NOTES
Juvenal Salas is a 35 y.o. male who presents for routine immunizations (Prevnar 13 and Influenza vaccine). He denies any symptoms , reactions or allergies that would exclude them from being immunized today. Risks and adverse reactions were discussed and the VIS was given to them. All questions were addressed. He was observed for 15 min post injection. There were no reactions observed.     Dontae Ruiz

## 2019-10-08 NOTE — PROGRESS NOTES
[facility] Note      DOS:   10/8/2019  NAME:  Reji Kay   MRN:   [id number]     REFERRING PROVIDER:  Dr. Johann Dudley: Jerardo Reyna MD  PRIMARY CARDIOLOGIST: Dr. Margarita Morton  Sleep: Dr. Rajwinder Coates      IMPRESSION/PLAN:    Heart Failure Status: NYHA Class II    NICM 2/2 viral myocarditis Stage C, NYHA Class II - EF improved from 10-20% to 45%   Coxsackie A panel positive, CMV IgG positive, HHV-6 positive    On entresto, carvedilol, spironolactone    HTN  - well controlled     Discontinue amlodipine 5mg daily    Continue entresto 97/103 mg, 1 tablet twice daily   Increase coreg 25 mg BID    Continue aldactone 25 mg daily   Counseled on smoking cessation   Follow up in the [facility] in 3 months    HFrEF, NYHA II LVEF 10-20%, RVEF 22%- EF now improved 45%-euvolemic    Conntinue entresto 97/103 mg BID ([employer] providing)   Continue coreg 12.5  mg BID     Continue spironolactone 25 mg daily   No SDB on PSG   Encouraged to exercise regularly    Recommend annual influenza vaccine and pneumovax every 5 years       Paroxysmal Afib w/ RVR S/p Cardioversion- Remains in Sinus   [id number] = 2   Amio  per EP    Check TFTs and LFTs today   Warfarin discontinued by Dr. Elizabeth Anderson MR- improved with improved EF        Obesity- BMI 31    Recommend he limit portion and become more aware of his diet   Encouraged to exercise regularly     Tobacco Use disorder            Smoking cessation counseling            Start Wellbutrin  mg daily         Depression   Start Wellbutrin  mg daily   Discontinue Zoloft    Prophylaxis   Influenza and pneumovax today            Thank you for letting us see him with you,      Keturah Mccann MD, [facility]  Chief of Cardiology, [facility] Director  [facility]  [facility], [address] [place] [address], [address] [place] [address]  Office [phone]  Fax [phone]      Chief Complaint:  Chief Complaint   Patient presents with   Beka Rodney is a delightful  35y.o. year old [address] male with recent hx of HFrEF (10-20%) in the setting of viral myocarditis (Human Herpes Virus 6)  Complicated by new onset PAF with RVR s/p DCCV. Deb Andrews He was first diagnosed 8/2018 when he presented to [facility]  with ADHF found to be in afib with RVR. CTA (-) for PE, but showed a large R pleural effusion. He underwent a thoracentesis for ~ 1 L, and  ROBERTO and DCCV. Right and left heart cath showed no epicardial disease and slightly elevated filling pressures. Cardiac MRI showed LVEF 17%, RVEF 20% confirmed viral myocarditis and lab work was positive for  Coxsackie pane, CMV IgG positive. HHV-6 positive. Recent Echo  10/26/18: showed EF improved to 45% with grade 3/4 diastolic dysfunction and improved MR now to mild    He presents for follow up of his systolic heart failure and denies FLOWERS, orthopnea, edema. Not eating salt or prepackaged foods. Stamina improved on meds. Stable pattern of FLOWERS with walking fast, and moderate exertion, unchanged. Still smoking  ~ 3-4  Cigarettes/day, no etoh beverages. Receiving Entresto from [employer]. Darius Kessler lives with his  sister in their new home. 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 He smoked 15 years ~ 1/2 ppd. 2 etoh drinks each day of the  weekends No additional substances. Some college courses. Review of Systems:     Constitutional:   improvement in his stamina, negative, feels well today   HEENT:    Negative. Denies angioedema sx    Respiratory:  Negative for cough. Cardiovascular:  Denies exertional chest pain, palpitations, lightheadedness, syncope, near syncope, bleeding. Gastrointestinal:  Negative. Genitourinary:  Denies dysuria, frequency    Musculoskeletal:  Negative. Skin:    Negative. Neurological:  Negative.   Psy:    Positive for anxiety- improved         CARDIAC EVALUATION   ECHO 7/17/18:mild-mod LVD, EF 10-20%, severe DHK, mild LVH, DD, RVD, mild- mod LAE, mod RAY, severe MR, mod-severe TR, RVSP 40, dilated RV outflow tract  ECHO 10/26/2018: mild LVD, EF 45%, G3/4 DD, mod ant leaflet thickening, mild MR, dil RVOT     ROBERTO 7/20/18: LVD, EF 10-15%, severe DHK, mild RVD, reduced RVEF, mod ANNAMARIE, no CELINA thrombus, no PFO, mod MR, mild TR      CATH 7/20/18: no epicardial disease, LVEP 1     RHC 7/20/18: RA:12, RV 34/6/14. PA 30/20/26, PCWP 18, [place] 6.33/2.74 TD: 5.37/2.32     DCCV 7/20/18    CARDIAC MRI: 7/20/18: mod LVD (LVDD 66), mild LVH (13mm), LVEF 17%, severe GHK, mild RVD, RVEF 20%, mod GHK, mod MR, mild TR. Distinct mid wall stripe enhancement of the basal septal wall along with RV insertion point enhancement sparing the subendocardium. viral myocarditis of HHV-6 viral strain. NO features of giant cell or lymphocytic myocarditis. , infiltrative Sarcoidosis, amyloidosis, old MI    UPEP, SPEP,  heavy metals, FLC (-) HIV ( NR), Legionella (-) Ferritin WNL     History:  Past Medical History:   Diagnosis Date    Anxiety     Class 1 obesity due to excess calories with serious comorbidity and body mass index (BMI) of 32.0 to 32.9 in adult 2/27/2019    Combined systolic and diastolic heart failure ([address] 8/8/2018    ECHO 7/17/18:mild-mod LVD, EF 10-20%, severe DHK, mild LVH, DD, RVD, mild- mod LAE, mod RAY, severe MR, mod-severe TR, RVSP 40, dilated RV outflow tract  ROBERTO 7/20/18: LVD, EF 10-15%, severe DHK, mild RVD, reduced RVEF, mod ANNAMARIE, no CELINA thrombus, no PFO, mod MR, mild TR  RHC 7/20/18: RA:12, RV 34/6/14.  PA 30/20/26, PCWP 18, [place] 6.33/2.74 TD: 5.37/2.32    CARDIAC MRI: 7/20/18: mod LVD (LVDD 66), mil    Congestive heart failure ([address]     Essential hypertension 3/12/2019    Hypertensive heart disease with combined systolic and diastolic congestive heart failure ([address] 3/12/2019    ECHO 7/17/18:mild-mod LVD, EF 10-20%, severe DHK, mild LVH, DD, RVD, mild- mod LAE, mod RAY, severe MR, mod-severe TR, RVSP 40, dilated RV outflow tract ECHO 10/26/2018: mild LVD, EF 45%, G3/4 DD, mod ant leaflet thickening, mild MR, dil RVOT        Long term current use of anticoagulant therapy     NICM (nonischemic cardiomyopathy) ([address] 8/8/2018    Non-rheumatic mitral regurgitation 8/8/2018    ECHO 7/17/18:mild-mod LVD, EF 10-20%, severe DHK, mild LVH, DD, RVD, mild- mod LAE, mod RAY, severe MR, mod-severe TR, RVSP 40, dilated RV outflow tract  ROBERTO 7/20/18: LVD, EF 10-15%, severe DHK, mild RVD, reduced RVEF, mod ANNAMARIE, no CELINA thrombus, no PFO, mod MR, mild TR      PAF (paroxysmal atrial fibrillation) [facility] 8/8/2018    ROBERTO 7/20/18: LVD, EF 10-15%, severe DHK, mild RVD, reduced RVEF, mod ANNAMARIE, no CELINA thrombus, no PFO, mod MR, mild TR    DCCV 7/20/18       Rapid atrial fibrillation ([facility] 7/17/2018    Tobacco use disorder 8/8/2018    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    Vitamin D deficiency 9/19/2018    Level 12 8/2018     Past Surgical History:   Procedure Laterality Date    CARDIOVERSION, ELECTIVE;EXTERN  7/20/2018         HX HEART CATHETERIZATION       Social History     Socioeconomic History    Marital status: SINGLE     Spouse name: Not on file    Number of children: Not on file    Years of education: Not on file    Highest education level: Not on file   Occupational History    Not on file   Social Needs    Financial resource strain: Not on file    Food insecurity:     Worry: Not on file     Inability: Not on file    Transportation needs:     Medical: Not on file     Non-medical: Not on file   Tobacco Use    Smoking status: Current Some Day Smoker     Types: Cigarettes    Smokeless tobacco: Never Used    Tobacco comment: 3-4 cigarettes vaughn   Substance and Sexual Activity    Alcohol use: No    Drug use: No    Sexual activity: Not Currently   Lifestyle    Physical activity:     Days per week: Not on file     Minutes per session: Not on file    Stress: Not on file   Relationships    Social connections:     Talks on phone: Not on file     Gets together: Not on file     Attends [employer] service: Not on file     Active member of club or organization: Not on file     Attends meetings of clubs or organizations: Not on file     Relationship status: Not on file    Intimate partner violence:     Fear of current or ex partner: Not on file     Emotionally abused: Not on file     Physically abused: Not on file     Forced sexual activity: Not on file   Other Topics Concern     Service Not Asked    Blood Transfusions Not Asked    Caffeine Concern Not Asked    Occupational Exposure Not Asked   [place] Hazards Not Asked    Sleep Concern Not Asked    Stress Concern Not Asked    Weight Concern Not Asked    Special Diet Not Asked    Back Care Not Asked    Exercise Not Asked    Bike Helmet Not Asked   [address] Not Asked    Self-Exams Not Asked   Social History Narrative    Not on file     Family History   Problem Relation Age of Onset    Diabetes Mother     Heart Failure Mother     No Known Problems Father     Hypertension Sister     Hypertension Sister        Current Medications:   Current Outpatient Medications   Medication Sig Dispense Refill    carvedilol (COREG) 25 mg tablet Take 1 Tab by mouth two (2) times daily (with meals). 180 Tab 2    spironolactone (ALDACTONE) 25 mg tablet Take 1 Tab by mouth daily. 90 Tab 3    sacubitril-valsartan (ENTRESTO) 97 mg/103 mg tablet Take 1 Tab by mouth two (2) times a day. Indications: chronic heart failure 60 Tab 0    EPINEPHrine (EPIPEN) 0.3 mg/0.3 mL injection 0.3 mL by IntraMUSCular route once as needed for Anaphylaxis or Allergic Response for up to 1 dose. 2 Syringe 6    amiodarone (CORDARONE) 200 mg tablet Take 1 Tab by mouth daily. 30 Tab 12    multivitamin (ONE A DAY) tablet Take 1 Tab by mouth daily.       Cholecalciferol, Vitamin D3, (VITAMIN D3) 2,000 unit cap capsule Take 2,000 Units by mouth daily. 30 Cap 6    magnesium oxide (MAG-OX) 400 mg tablet Take 1 Tab by mouth daily. 30 Tab 1       Allergies: Allergies   Allergen Reactions    Peanut Anaphylaxis    Milk Other (comments)    Shrimp Other (comments)       Vitals:   Visit Vitals  /76 (BP 1 Location: Left arm, BP Patient Position: Sitting)   Pulse 82   Temp 98.6 °F (37 °C) (Oral)   Resp 20   Ht 6' 3\" (1.905 m)   Wt 254 lb 9.6 oz (115.5 kg)   SpO2 99%   BMI 31.82 kg/m²       Physical Exam:   Constitutional:   Well developed AA male, cooperative, pleasant, in NAD  HEENT:   Normocephalic, atraumatic, sclera anicteric, multiple gold teeth   Neck:    Normal range of motion. Neck supple. Pulmonary/Chest:  Effort normal and breath sounds normal.    Cardiovascular:    PMI:diminished, RRR,  RRR,  S1, S2 normal, - S3,  Intermittent S4 JVP  10 cm (-)  HJR   Abdominal:   Soft. Non tender, non distended,  Bowel sounds are normal. No hepatomegally   Musculoskeletal:    no edema, calf tenderness, cyanosis or clubbing   Neurological:  A& O x3, no focal deficits, ambulates w/o difficulty    Extremities:            Intact distal pulses. Skin:    Skin is warm and dry.    Psy:    appears calm, not agitated     Recent Labs:   Lab Results   Component Value Date/Time    WBC 7.6 04/18/2019 11:50 AM    HGB 15.8 04/18/2019 11:50 AM    HCT 47.1 04/18/2019 11:50 AM    PLATELET 814 25/96/4366 11:50 AM    MCV 98 (H) 04/18/2019 11:50 AM     Lab Results   Component Value Date/Time    TSH 1.490 04/18/2019 11:50 AM    T4, Free 1.69 02/28/2019 12:52 PM      Lab Results   Component Value Date/Time    CK 91 07/17/2018 02:00 PM    CK - MB 1.6 07/17/2018 02:00 PM    CK-MB Index 1.8 07/17/2018 02:00 PM      Lab Results   Component Value Date/Time    NT pro-BNP 50 10/24/2018 09:18 AM    NT pro-BNP 1,981 (H) 07/26/2018 05:12 AM    NT pro-BNP 2,507 (H) 07/25/2018 04:08 AM    NT pro-BNP 2,972 (H) 07/24/2018 04:19 AM    NT pro-BNP 2,629 (H) 07/23/2018 04:09 AM    PROBNP 17 03/13/2019 11:52 AM    PROBNP 17 02/28/2019 12:51 PM    PROBNP 477 (H) 09/20/2018 12:25 PM    PROBNP 940 (H) 09/04/2018 12:00 AM    PROBNP 1,486 (H) 08/09/2018 12:00 AM      Lab Results   Component Value Date/Time    Sodium 138 04/18/2019 11:50 AM    Potassium 4.0 04/18/2019 11:50 AM    Chloride 104 04/18/2019 11:50 AM    CO2 21 04/18/2019 11:50 AM    Anion gap 9 10/24/2018 09:18 AM    Glucose 78 04/18/2019 11:50 AM    BUN 11 04/18/2019 11:50 AM    Creatinine 0.98 04/18/2019 11:50 AM    BUN/Creatinine ratio 11 04/18/2019 11:50 AM    GFR est  04/18/2019 11:50 AM    GFR est non-AA 97 04/18/2019 11:50 AM    Calcium 9.3 04/18/2019 11:50 AM    Bilirubin, total 0.4 04/18/2019 11:50 AM    ALT (SGPT) 16 04/18/2019 11:50 AM    AST (SGOT) 20 04/18/2019 11:50 AM    Alk. phosphatase 107 04/18/2019 11:50 AM    Protein, total 7.1 04/18/2019 11:50 AM    Albumin 4.2 04/18/2019 11:50 AM    Globulin 4.4 (H) 07/26/2018 05:12 AM    A-G Ratio 1.4 04/18/2019 11:50 AM      No results found for: HBA1C, [id number], HGBE8, [id number], GAO4NDLN, [id number]     CTA Results (most recent): 7/17/18:       Impression IMPRESSION: Moderate right pleural effusion with right middle and right lower  lobe infiltrates. Left lower lobe atelectasis. No evidence of pulmonary  embolism. Patient seen and examined. Data and note reviewed. I have discussed and agree with the plans as noted. Thank you for allowing us to participate in your patient's care. Keturah Smith MD, [facility] [place]  Chief of Cardiology, [address] [facility] Director  [address] [place], [place] [address]bet  [address] [facility], [address] [place] [address] [place]  Office [phone]  Fax [phone]

## 2019-10-08 NOTE — PATIENT INSTRUCTIONS
Medication changes:    STOP amlodipine (Norvasc)    INCREASE carvedilol (Coreg) to 25mg: Take one 25mg tablet by mouth twice daily. You may take two of the 12.5mg tablets twice daily until you run out. The new prescription will be for one 25mg tablet by mouth daily    You may feel tired for 7-10 days after increasing this medication. Ensure you eat a meal with a good protein source prior to taking this medication. STOP sertraline (Zoloft)     START Bupropion XL (Wellbutrin) 150mg- Take one tablet by mouth once daily for smoking cessation     Testing Ordered:    Lab work drawn today. Our office will contact you with any abnormal results. EKG done in clinic. Flu and Pneumonia shots given in clinic today. Follow up 3 months with Jere Orosco [id number] with NP or MD      Please monitor your blood pressures daily prior to medications and 2 hours after taking medications. Bring a written record of your blood pressures to your next appointment. Please monitor your weights daily upon waking and after using the bathroom. Keep a written records of your weights and bring to your next appointment. If you have a weight gain of 3 or more pounds overnight OR 5 or more pounds in one week, please contact our office. Thank you for allowing us the privilege of being a part of your healthcare team! Please do not hesitate to contact our office at [phone] with any questions or concerns. Stopping Smoking: Care Instructions  Your Care Instructions  Cigarette smokers crave the nicotine in cigarettes. Giving it up is much harder than simply changing a habit. Your body has to stop craving the nicotine. It is hard to quit, but you can do it. There are many tools that people use to quit smoking. You may find that combining tools works best for you. There are several steps to quitting. First you get ready to quit. Then you get support to help you.  After that, you learn new skills and behaviors to become a nonsmoker. For many people, a necessary step is getting and using medicine. Your doctor will help you set up the plan that best meets your needs. You may want to attend a smoking cessation program to help you quit smoking. When you choose a program, look for one that has proven success. Ask your doctor for ideas. You will greatly increase your chances of success if you take medicine as well as get counseling or join a cessation program.  Some of the changes you feel when you first quit tobacco are uncomfortable. Your body will miss the nicotine at first, and you may feel short-tempered and grumpy. You may have trouble sleeping or concentrating. Medicine can help you deal with these symptoms. You may struggle with changing your smoking habits and rituals. The last step is the tricky one: Be prepared for the smoking urge to continue for a time. This is a lot to deal with, but keep at it. You will feel better. Follow-up care is a key part of your treatment and safety. Be sure to make and go to all appointments, and call your doctor if you are having problems. It's also a good idea to know your test results and keep a list of the medicines you take. How can you care for yourself at home? · Ask your family, friends, and coworkers for support. You have a better chance of quitting if you have help and support. · Join a support group, such as Nicotine Anonymous, for people who are trying to quit smoking. · Consider signing up for a smoking cessation program, such as the American Lung Association's Freedom from Smoking program.  · Get text messaging support. Go to the website at www.smokefree. gov to sign up for the [facility] program.  · Set a quit date. Pick your date carefully so that it is not right in the middle of a big deadline or stressful time. Once you quit, do not even take a puff. Get rid of all ashtrays and lighters after your last cigarette.  Clean your house and your clothes so that they do not smell of smoke. · Learn how to be a nonsmoker. Think about ways you can avoid those things that make you reach for a cigarette. ? Avoid situations that put you at greatest risk for smoking. For some people, it is hard to have a drink with friends without smoking. For others, they might skip a coffee break with coworkers who smoke. ? Change your daily routine. Take a different route to work or eat a meal in a different place. · Cut down on stress. Calm yourself or release tension by doing an activity you enjoy, such as reading a book, taking a hot bath, or gardening. · Talk to your doctor or pharmacist about nicotine replacement therapy, which replaces the nicotine in your body. You still get nicotine but you do not use tobacco. Nicotine replacement products help you slowly reduce the amount of nicotine you need. These products come in several forms, many of them available over-the-counter:  ? Nicotine patches  ? Nicotine gum and lozenges  ? Nicotine inhaler  · Ask your doctor about bupropion (Wellbutrin) or varenicline (Chantix), which are prescription medicines. They do not contain nicotine. They help you by reducing withdrawal symptoms, such as stress and anxiety. · Some people find hypnosis, acupuncture, and massage helpful for ending the smoking habit. · Eat a healthy diet and get regular exercise. Having healthy habits will help your body move past its craving for nicotine. · Be prepared to keep trying. Most people are not successful the first few times they try to quit. Do not get mad at yourself if you smoke again. Make a list of things you learned and think about when you want to try again, such as next week, next month, or next year. Where can you learn more? Go to http://eleanor-walter.info/. Enter M100 in the search box to learn more about \"Stopping Smoking: Care Instructions. \"  Current as of: September 26, 2018  Content Version: 12.2  © 8904-5828 [employer], [employer]. Care instructions adapted under license by [employer] (which disclaims liability or warranty for this information). If you have questions about a medical condition or this instruction, always ask your healthcare professional. [address] any warranty or liability for your use of this information. Bupropion (By mouth)   Bupropion ([id number])  Treats depression and aids in quitting smoking. Also prevents depression caused by seasonal affective disorder (SAD). Brand Name(s): Aplenzin, Forfivo XL, Wellbutrin, Wellbutrin SR, Wellbutrin XL, Zyban   There may be other brand names for this medicine. When This Medicine Should Not Be Used: This medicine is not right for everyone. Do not use it if you had an allergic reaction to bupropion, or if you have seizures, anorexia, or bulimia. How to Use This Medicine:   Tablet, Long Acting Tablet  · Take your medicine as directed. Your dose may need to be changed several times to find what works best for you. · You may need to take Wellbutrin® for up to 4 weeks before you feel better. You may need to take Zyban® for 1 to 2 weeks before the date that you plan to stop smoking. · Swallow the extended-release tablet whole. Do not crush, break, or chew it. · It is best to take Aplenzin® in the morning. · Do not take Wellbutrin® or Zyban® close to bedtime if you have trouble sleeping. · Take it with food if it upsets your stomach or if you have nausea. · If you take the extended-release tablet, part of the tablet may pass into your stools. This is normal and is nothing to worry about. · This medicine should come with a Medication Guide. Ask your pharmacist for a copy if you do not have one. · Missed dose: Skip the missed dose and go back to your regular dosing schedule. Never take extra medicine to make up for a missed dose.   · Store the medicine in a closed container at room temperature, away from heat, moisture, and direct light.  Drugs and Foods to Avoid:   Ask your doctor or pharmacist before using any other medicine, including over-the-counter medicines, vitamins, and herbal products. · Do not use this medicine and an MAO inhibitor (MAOI) within 14 days of each other. Do not use Zyban® to quit smoking if you already take Aplenzin® or Wellbutrin® for depression, because they are the same medicine. · Tell your doctor if you take barbiturates, benzodiazepines, antiseizure medicine, or sedatives, or if you recently stopped taking them. · Some medicines can affect how bupropion works. Tell your doctor if you use any of the following:   ¨ Amantadine, carbamazepine, cimetidine, clopidogrel, cyclophosphamide, digoxin, efavirenz, levodopa, lopinavir, nelfinavir, nicotine patch, orphenadrine, phenobarbital, phenytoin, ritonavir, tamoxifen, theophylline, thiotepa, ticlopidine  ¨ Beta blocker medicine (including metoprolol)  ¨ A blood thinner (including warfarin)  ¨ Insulin or diabetes medicine  ¨ Medicine to treat depression (including desipramine, fluoxetine, imipramine, nortriptyline, paroxetine, sertraline, venlafaxine)  ¨ Medicine to treat heart rhythm problems (including flecainide, propafenone)  ¨ Medicine to treat mental illness (including haloperidol, risperidone, thioridazine)  ¨ Steroid medicine (including dexamethasone, hydrocortisone, methylprednisolone, prednisolone, prednisone)  · Do not drink alcohol while you are using this medicine. · Tell your doctor if you use anything else that makes you sleepy. Some examples are allergy medicine, narcotic pain medicine, and alcohol. Warnings While Using This Medicine:   · Tell your doctor if you are pregnant or breastfeeding, or if you have kidney disease, liver disease, heart disease, diabetes, glaucoma, mental illness (including bipolar disorder), or high blood pressure. Tell your doctor if you have a history of drug addiction or if you drink alcohol.   · For some children, teenagers, and young adults, this medicine may increase mental or emotional problems. This may lead to thoughts of suicide and violence. Talk with your doctor right away if you have any thoughts or behavior changes that concern you. Tell your doctor if you or anyone in your family has a history of bipolar disorder or suicide attempts. · This medicine may cause the following problems:  ¨ Increased risk of seizures  ¨ Changes in mood or behavior  ¨ High blood pressure  ¨ Serious skin reactions  · This medicine may make you dizzy or drowsy. Do not drive or do anything that could be dangerous until you know how this medicine affects you. · Zyban® is only part of a complete program to help you quit smoking. You may still want to smoke at times. Have a plan to cope with these situations. · Do not stop using this medicine suddenly. Your doctor will need to slowly decrease your dose before you stop it completely. · Tell any doctor or dentist who treats you that you are using this medicine. This medicine may affect certain medical test results. · Your doctor will check your progress and the effects of this medicine at regular visits. Keep all appointments. · Keep all medicine out of the reach of children. Never share your medicine with anyone.   Possible Side Effects While Using This Medicine:   Call your doctor right away if you notice any of these side effects:  · Allergic reaction: Itching or hives, swelling in your face or hands, swelling or tingling in your mouth or throat, chest tightness, trouble breathing  · Blistering, peeling, red skin rash  · Chest pain, trouble breathing, fast, slow, or uneven heartbeat  · Eye pain, vision changes, seeing halos around lights  · Muscle or joint pain, fever with rash  · Seeing or hearing things that are not there, feeling like people are against you  · Seizures  · Sudden increase in energy, racing thoughts, trouble sleeping  · Thoughts of hurting yourself, worsening depression, severe agitation or confusion  If you notice these less serious side effects, talk with your doctor:   · Dry mouth  · Headache, dizziness  · Nausea, vomiting, constipation, diarrhea, gas, stomach pain  · Weight gain or loss  If you notice other side effects that you think are caused by this medicine, tell your doctor. Call your doctor for medical advice about side effects. You may report side effects to FDA at 7-394-SMS-5346  © 2017 [facility] Information is for End User's use only and may not be sold, redistributed or otherwise used for commercial purposes. The above information is an  only. It is not intended as medical advice for individual conditions or treatments. Talk to your doctor, nurse or pharmacist before following any medical regimen to see if it is safe and effective for you. Vaccine Information Statement     Pneumococcal Conjugate Vaccine (PCV13): What You Need to Know    Many Vaccine Information Statements are available in [place] and other languages. See www.immunize.org/vis. Hojas de información Sobre Vacunas están disponibles en español y en muchos otros idiomas. Visite www.immunize.org/vis. 1. Why get vaccinated? Vaccination can protect both children and adults from pneumococcal disease. Pneumococcal disease is caused by bacteria that can spread from person to person through close contact. It can cause ear infections, and it can also lead to more serious infections of the:   Lungs (pneumonia),   Blood (bacteremia), and   Covering of the brain and spinal cord (meningitis). Pneumococcal pneumonia is most common among adults. Pneumococcal meningitis can cause deafness and brain damage, and it kills about 1 child in 10 who get it. Anyone can get pneumococcal disease, but children under 3years of age and adults 72 years and older, people with certain medical conditions, and cigarette smokers are at the highest risk.      Before there was a vaccine, the [facility] saw:   more than 700 cases of meningitis,   about 13,000 blood infections,   about 5 million ear infections, and   about 200 deaths  in children under 5 each year from pneumococcal disease. Since vaccine became available, severe pneumococcal disease in these children has fallen by 88%. About 18,000 older adults die of pneumococcal disease each year in the [place]. Treatment of pneumococcal infections with penicillin and other drugs is not as effective as it used to be, because some strains of the disease have become resistant to these drugs. This makes prevention of the disease, through vaccination, even more important. 2. PCV13 vaccine    Pneumococcal conjugate vaccine (called PCV13) protects against 13 types of pneumococcal bacteria. PCV13 is routinely given to children at 2, 4, 6, and 1515 months of age. It is also recommended for children and adults 3to 59years of age with certain health conditions, and for all adults 72years of age and older. Your doctor can give you details. 3. Some people should not get this vaccine    Anyone who has ever had a life-threatening allergic reaction to a dose of this vaccine, to an earlier pneumococcal vaccine called PCV7, or to any vaccine containing diphtheria toxoid (for example, DTaP), should not get PCV13. Anyone with a severe allergy to any component of PCV13 should not get the vaccine. Tell your doctor if the person being vaccinated has any severe allergies. If the person scheduled for vaccination is not feeling well, your healthcare provider might decide to reschedule the shot on another day. 4. Risks of a vaccine reaction    With any medicine, including vaccines, there is a chance of reactions. These are usually mild and go away on their own, but serious reactions are also possible. Problems reported following PCV13 varied by age and dose in the series.  The most common problems reported among children were:    About half became drowsy after the shot, had a temporary loss of appetite, or had redness or tenderness where the shot was given.  About 1 out of 3 had swelling where the shot was given.  About 1 out of 3 had a mild fever, and about 1 in 20 had a fever over 102.2°F.   Up to about 8 out of 10 became fussy or irritable. Adults have reported pain, redness, and swelling where the shot was given; also mild fever, fatigue, headache, chills, or muscle pain. Julieth Dieter children who get PCV13 along with inactivated flu vaccine at the same time may be at increased risk for seizures caused by fever. Ask your doctor for more information. Problems that could happen after any vaccine:     People sometimes faint after a medical procedure, including vaccination. Sitting or lying down for about 15 minutes can help prevent fainting, and injuries caused by a fall. Tell your doctor if you feel dizzy, or have vision changes or ringing in the ears.  Some older children and adults get severe pain in the shoulder and have difficulty moving the arm where a shot was given. This happens very rarely.  Any medication can cause a severe allergic reaction. Such reactions from a vaccine are very rare, estimated at about 1 in a million doses, and would happen within a few minutes to a few hours after the vaccination. As with any medicine, there is a very small chance of a vaccine causing a serious injury or death. The safety of vaccines is always being monitored. For more information, visit: www.cdc.gov/vaccinesafety/     5. What if there is a serious reaction? What should I look for?  Look for anything that concerns you, such as signs of a severe allergic reaction, very high fever, or unusual behavior.     Signs of a severe allergic reaction can include hives, swelling of the face and throat, difficulty breathing, a fast heartbeat, dizziness, and weakness - usually within a few minutes to a few hours after the vaccination. What should I do?  If you think it is a severe allergic reaction or other emergency that cant wait, call 9-1-1 or get the person to the nearest hospital. Otherwise, call your doctor. Reactions should be reported to the Vaccine Adverse Event Reporting System (VAERS). Your doctor should file this report, or you can do it yourself through the VAERS web site at www.vaers. [facility].gov, or by calling 7-229.688.9618. VAERS does not give medical advice. 6. The National Vaccine Injury Compensation Program    The [facility] Vaccine Injury Compensation Program (VICP) is a federal program that was created to compensate people who may have been injured by certain vaccines. Persons who believe they may have been injured by a vaccine can learn about the program and about filing a claim by calling 1-199.629.6146 or visiting the [employer] website at www.[facility].gov/vaccinecompensation. There is a time limit to file a claim for compensation. 7. How can I learn more?  Ask your healthcare provider. He or she can give you the vaccine package insert or suggest other sources of information.  Call your local or state health department.  Contact the Centers for Disease Control and Prevention (CDC):  - Call 9-597.796.7913 (1-800-CDC-INFO) or  - Visit CDCs website at www.cdc.gov/vaccines    Vaccine Information Statement   PCV13 Vaccine   11/5/2015   41 TANYA Leon 541IS-17    Department of Health and Human Services  Centers for Disease Control and Prevention    Office Use Only Patient

## 2019-10-09 LAB
ALBUMIN SERPL-MCNC: 4.2 G/DL (ref 3.5–5.5)
ALBUMIN/GLOB SERPL: 1.4 {RATIO} (ref 1.2–2.2)
ALP SERPL-CCNC: 116 IU/L (ref 39–117)
ALT SERPL-CCNC: 25 IU/L (ref 0–44)
AST SERPL-CCNC: 22 IU/L (ref 0–40)
BILIRUB SERPL-MCNC: 0.3 MG/DL (ref 0–1.2)
BUN SERPL-MCNC: 13 MG/DL (ref 6–24)
BUN/CREAT SERPL: 14 (ref 9–20)
CALCIUM SERPL-MCNC: 9.2 MG/DL (ref 8.7–10.2)
CHLORIDE SERPL-SCNC: 105 MMOL/L (ref 96–106)
CO2 SERPL-SCNC: 19 MMOL/L (ref 20–29)
CREAT SERPL-MCNC: 0.94 MG/DL (ref 0.76–1.27)
FT4I SERPL CALC-MCNC: 3.9 (ref 1.2–4.9)
GLOBULIN SER CALC-MCNC: 3 G/DL (ref 1.5–4.5)
GLUCOSE SERPL-MCNC: 84 MG/DL (ref 65–99)
MAGNESIUM SERPL-MCNC: 1.9 MG/DL (ref 1.6–2.3)
NT-PROBNP SERPL-MCNC: 17 PG/ML (ref 0–86)
POTASSIUM SERPL-SCNC: 4.2 MMOL/L (ref 3.5–5.2)
PROT SERPL-MCNC: 7.2 G/DL (ref 6–8.5)
SODIUM SERPL-SCNC: 141 MMOL/L (ref 134–144)
T3RU NFR SERPL: 39 % (ref 24–39)
T4 SERPL-MCNC: 10.1 UG/DL (ref 4.5–12)
TSH SERPL DL<=0.005 MIU/L-ACNC: 1.81 UIU/ML (ref 0.45–4.5)

## 2019-10-10 ENCOUNTER — TELEPHONE (OUTPATIENT)
Dept: CARDIOLOGY CLINIC | Age: 39
End: 2019-10-10

## 2019-10-10 NOTE — TELEPHONE ENCOUNTER
Dr. Jerry Ferrer was going to call him about Amiodrane, he was also referred to [facility],  not accepting new patients until June 2020.           Thanks

## 2019-10-12 ENCOUNTER — DOCUMENTATION ONLY (OUTPATIENT)
Dept: CARDIOLOGY CLINIC | Age: 39
End: 2019-10-12

## 2019-10-12 DIAGNOSIS — I50.40 HYPERTENSIVE HEART DISEASE WITH COMBINED SYSTOLIC AND DIASTOLIC CONGESTIVE HEART FAILURE, UNSPECIFIED HF CHRONICITY (HCC): ICD-10-CM

## 2019-10-12 DIAGNOSIS — I10 ESSENTIAL HYPERTENSION: ICD-10-CM

## 2019-10-12 DIAGNOSIS — I50.40 COMBINED SYSTOLIC AND DIASTOLIC HEART FAILURE, UNSPECIFIED HF CHRONICITY (HCC): ICD-10-CM

## 2019-10-12 DIAGNOSIS — I11.0 HYPERTENSIVE HEART DISEASE WITH COMBINED SYSTOLIC AND DIASTOLIC CONGESTIVE HEART FAILURE, UNSPECIFIED HF CHRONICITY (HCC): ICD-10-CM

## 2019-10-12 DIAGNOSIS — J90 PLEURAL EFFUSION, RIGHT: ICD-10-CM

## 2019-10-12 DIAGNOSIS — I40.0 CHRONIC VIRAL MYOCARDITIS: ICD-10-CM

## 2019-10-12 DIAGNOSIS — E66.09 CLASS 1 OBESITY DUE TO EXCESS CALORIES WITH SERIOUS COMORBIDITY AND BODY MASS INDEX (BMI) OF 32.0 TO 32.9 IN ADULT: ICD-10-CM

## 2019-10-12 DIAGNOSIS — I42.8 NICM (NONISCHEMIC CARDIOMYOPATHY) (HCC): ICD-10-CM

## 2019-10-12 DIAGNOSIS — I48.0 PAF (PAROXYSMAL ATRIAL FIBRILLATION) (HCC): ICD-10-CM

## 2019-10-12 RX ORDER — AMIODARONE HYDROCHLORIDE 100 MG/1
100 TABLET ORAL DAILY
Qty: 30 TAB | Refills: 6 | OUTPATIENT
Start: 2019-10-12 | End: 2019-11-07 | Stop reason: SDUPTHER

## 2019-10-12 NOTE — PROGRESS NOTES
Since the last visit Mr. Maksim Armenta underwent amiodarone levels and thyroid function testing:    Results for Ok Hewitt (MRN [id number]) as of 10/12/2019 16:25   Ref. Range 10/8/2019 00:00   T4, Total Latest Ref Range: 4.5 - 12.0 ug/dL 10.1   T3 Uptake Latest Ref Range: 24 - 39 % 39   TSH Latest Ref Range: 0.450 - 4.500 uIU/mL 1.810   Glucose Latest Ref Range: 65 - 99 mg/dL 84     9/17/2019  9:37 AM - Isak, [facility] Lab Results In     Component Value Flag Ref Range Units Status   Amiodarone 1.6   1.0 - 2.5 ug/mL Final   Comment: This test was developed and its performance characteristics   determined by [facility]. It has not been cleared or approved   by the Food and Drug Administration.                                   Detection Limit = 0.2    Noramiodarone 0.8  Low   1.0 - 2.5 ug/mL Final   Comment: This test was developed and its performance characteristics   determined by [facility]. It has not been cleared or approved   by the Food and Drug Administration.                                   Detection Limit = 0.2      A new echo had been ordered but has not been done yet. The patient was unable to obtain an appointment to see Dr. Juvencio Cohen for psychotherapy. Perhaps Dr. Lin Gastelum will be able to suggest an alternative. Based on the lab work above he will be cut to amiodarone 100 mg daily and observe closely for possible recurrence of atrial fibrillation. Next office visit should be within a month. The patient was contacted today by telephone and notified of this change. He points out that his carvedilol was recently adjusted as well. He still has brief episodes of lightheadedness.   I asked him to call and come in sooner if this does not get better with the reduction in amiodarone    Bob Wong MD, [facility]

## 2019-10-17 ENCOUNTER — TELEPHONE (OUTPATIENT)
Dept: CARDIOLOGY CLINIC | Age: 39
End: 2019-10-17

## 2019-10-17 DIAGNOSIS — I40.0 CHRONIC VIRAL MYOCARDITIS: ICD-10-CM

## 2019-10-17 DIAGNOSIS — I10 ESSENTIAL HYPERTENSION: ICD-10-CM

## 2019-10-17 DIAGNOSIS — J90 PLEURAL EFFUSION, RIGHT: ICD-10-CM

## 2019-10-17 DIAGNOSIS — I50.40 HYPERTENSIVE HEART DISEASE WITH COMBINED SYSTOLIC AND DIASTOLIC CONGESTIVE HEART FAILURE, UNSPECIFIED HF CHRONICITY (HCC): ICD-10-CM

## 2019-10-17 DIAGNOSIS — I11.0 HYPERTENSIVE HEART DISEASE WITH COMBINED SYSTOLIC AND DIASTOLIC CONGESTIVE HEART FAILURE, UNSPECIFIED HF CHRONICITY (HCC): ICD-10-CM

## 2019-10-17 DIAGNOSIS — E66.09 CLASS 1 OBESITY DUE TO EXCESS CALORIES WITH SERIOUS COMORBIDITY AND BODY MASS INDEX (BMI) OF 32.0 TO 32.9 IN ADULT: ICD-10-CM

## 2019-10-17 NOTE — TELEPHONE ENCOUNTER
----- Message from Juany Brody MD sent at 10/9/2019  5:52 PM EDT -----  Lexus Mancia,    Please let Mr. Bety Parikh know that his labs are normal.  ----- Message -----  From: [facility] Lab Results In  Sent: 10/9/2019   8:38 AM EDT  To: Juany Brody MD

## 2019-10-17 NOTE — TELEPHONE ENCOUNTER
Contacted patient to notify. He verbalized understanding and had no further questions. Alcira Renteria RN.

## 2019-10-17 NOTE — TELEPHONE ENCOUNTER
Voice message received from [facility] patient assistance regarding re-enrollment. There are looking for us to fax paperwork to # [phone]. Please call to discuss further.

## 2019-10-17 NOTE — TELEPHONE ENCOUNTER
Patient now has medicaid and does not qualify for patient assistance. Contacted patient to inquire if he would like refills sent to pharmacy. Patient stated he would like refill sent to [facility] on [address]. Refill sent. Patient had no other questions or concerns. Nick Covarrubias RN. Requested Prescriptions     Signed Prescriptions Disp Refills    sacubitril-valsartan (ENTRESTO) 97 mg/103 mg tablet 180 Tab 0     Sig: Take 1 Tab by mouth two (2) times a day.  Indications: chronic heart failure     Authorizing Provider: Elis Feliz     Ordering User: Asif Canales

## 2019-10-17 NOTE — TELEPHONE ENCOUNTER
[employer] notified patient assistance no longer required. Rep verbalized understanding and had no further questions. Alcira Renteria RN.

## 2019-10-21 ENCOUNTER — TELEPHONE (OUTPATIENT)
Dept: CARDIOLOGY CLINIC | Age: 39
End: 2019-10-21

## 2019-10-21 NOTE — TELEPHONE ENCOUNTER
Message received from [facility] requesting confirmation patient no longer needs patient assistance. Contacted [facility] to confirm patient now has medicaid and will no longer need [facility] patient assistance. Representative verbalized understanding and had no further questions. Reji Bess RN.

## 2019-10-25 NOTE — TELEPHONE ENCOUNTER
Spoke with patient  Verified patient with 2 patient identifiers    Informed per Dr Aj Cardona progress note dated 10/12/2019  take Amiodarone 100 mg daily. Also, informed patient medication was submitted to pharmacy. Patient verbalized understanding.

## 2019-11-07 ENCOUNTER — OFFICE VISIT (OUTPATIENT)
Dept: CARDIOLOGY CLINIC | Age: 39
End: 2019-11-07

## 2019-11-07 VITALS
WEIGHT: 261.6 LBS | BODY MASS INDEX: 32.53 KG/M2 | DIASTOLIC BLOOD PRESSURE: 80 MMHG | SYSTOLIC BLOOD PRESSURE: 104 MMHG | OXYGEN SATURATION: 97 % | HEART RATE: 73 BPM | HEIGHT: 75 IN | RESPIRATION RATE: 18 BRPM

## 2019-11-07 DIAGNOSIS — I50.40 COMBINED SYSTOLIC AND DIASTOLIC HEART FAILURE, UNSPECIFIED HF CHRONICITY (HCC): ICD-10-CM

## 2019-11-07 DIAGNOSIS — I42.8 NICM (NONISCHEMIC CARDIOMYOPATHY) (HCC): ICD-10-CM

## 2019-11-07 DIAGNOSIS — I11.0 HYPERTENSIVE HEART DISEASE WITH COMBINED SYSTOLIC AND DIASTOLIC CONGESTIVE HEART FAILURE, UNSPECIFIED HF CHRONICITY (HCC): ICD-10-CM

## 2019-11-07 DIAGNOSIS — I50.42 CHRONIC COMBINED SYSTOLIC AND DIASTOLIC HEART FAILURE (HCC): Chronic | ICD-10-CM

## 2019-11-07 DIAGNOSIS — E66.09 CLASS 1 OBESITY DUE TO EXCESS CALORIES WITH SERIOUS COMORBIDITY AND BODY MASS INDEX (BMI) OF 32.0 TO 32.9 IN ADULT: ICD-10-CM

## 2019-11-07 DIAGNOSIS — J90 PLEURAL EFFUSION, RIGHT: ICD-10-CM

## 2019-11-07 DIAGNOSIS — I10 ESSENTIAL HYPERTENSION: ICD-10-CM

## 2019-11-07 DIAGNOSIS — G25.81 RESTLESS LEG SYNDROME: ICD-10-CM

## 2019-11-07 DIAGNOSIS — I40.0 CHRONIC VIRAL MYOCARDITIS: ICD-10-CM

## 2019-11-07 DIAGNOSIS — B33.22 VIRAL MYOCARDITIS, UNSPECIFIED CHRONICITY: ICD-10-CM

## 2019-11-07 DIAGNOSIS — I48.0 PAF (PAROXYSMAL ATRIAL FIBRILLATION) (HCC): Primary | ICD-10-CM

## 2019-11-07 DIAGNOSIS — I50.40 HYPERTENSIVE HEART DISEASE WITH COMBINED SYSTOLIC AND DIASTOLIC CONGESTIVE HEART FAILURE, UNSPECIFIED HF CHRONICITY (HCC): ICD-10-CM

## 2019-11-07 RX ORDER — LANOLIN ALCOHOL/MO/W.PET/CERES
400 CREAM (GRAM) TOPICAL DAILY
Qty: 90 TAB | Refills: 2 | Status: SHIPPED | OUTPATIENT
Start: 2019-11-07 | End: 2020-10-21

## 2019-11-07 RX ORDER — AMIODARONE HYDROCHLORIDE 100 MG/1
100 TABLET ORAL DAILY
Qty: 30 TAB | Refills: 6 | Status: SHIPPED | OUTPATIENT
Start: 2019-11-07 | End: 2020-04-02

## 2019-11-07 RX ORDER — CARVEDILOL 25 MG/1
25 TABLET ORAL 2 TIMES DAILY WITH MEALS
Qty: 180 TAB | Refills: 2 | Status: SHIPPED | OUTPATIENT
Start: 2019-11-07 | End: 2020-12-01

## 2019-11-07 RX ORDER — SPIRONOLACTONE 25 MG/1
25 TABLET ORAL DAILY
Qty: 90 TAB | Refills: 3 | Status: SHIPPED | OUTPATIENT
Start: 2019-11-07 | End: 2020-12-09 | Stop reason: SDUPTHER

## 2019-11-07 NOTE — Clinical Note
Dr. Rosalinda Braxton note is fairly explicit regarding the need for ongoing psychotherapy and probably psychopharmacology. His sleep disturbance is not based on obstructive apnea and may respond to pharmacotherapy starting with treatment for restless leg. I am still waiting to obtain an echocardiogram to make a final decision about stopping amiodarone, but he should stay on the heart failure medications. Thank you.
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
## 2019-11-07 NOTE — PROGRESS NOTES
Chief Complaint   Patient presents with    Irregular Heart Beat     8 week f/u     1. Have you been to the ER, urgent care clinic since your last visit? Hospitalized since your last visit? No    2. Have you seen or consulted any other health care providers outside of the [address] [place] [address] since your last visit? Include any pap smears or colon screening.  No

## 2019-11-07 NOTE — PATIENT INSTRUCTIONS
As soon as the ECHOCARDIOGRAM is completed, please call so I can make a decision about the amiodarone; most likely we will stop it.

## 2019-11-15 ENCOUNTER — HOSPITAL ENCOUNTER (OUTPATIENT)
Dept: NON INVASIVE DIAGNOSTICS | Age: 39
Discharge: HOME | End: 2019-11-15
Attending: INTERNAL MEDICINE
Payer: MEDICAID

## 2019-11-15 DIAGNOSIS — I48.0 PAF (PAROXYSMAL ATRIAL FIBRILLATION) (HCC): ICD-10-CM

## 2019-11-15 DIAGNOSIS — B33.22 VIRAL MYOCARDITIS, UNSPECIFIED CHRONICITY: ICD-10-CM

## 2019-11-15 PROCEDURE — 93306 TTE W/DOPPLER COMPLETE: CPT

## 2019-11-18 ENCOUNTER — DOCUMENTATION ONLY (OUTPATIENT)
Dept: CARDIOLOGY CLINIC | Age: 39
End: 2019-11-18

## 2019-11-19 ENCOUNTER — DOCUMENTATION ONLY (OUTPATIENT)
Dept: CARDIOLOGY CLINIC | Age: 39
End: 2019-11-19

## 2019-11-19 NOTE — PROGRESS NOTES
Received approval on prior auth for Amiodarone 100 mg through 11/18/2020 ref [id number] [employer] notified , states will contact patient when medication is  available for .

## 2019-11-20 ENCOUNTER — TELEPHONE (OUTPATIENT)
Dept: CARDIOLOGY CLINIC | Age: 39
End: 2019-11-20

## 2019-11-20 NOTE — TELEPHONE ENCOUNTER
JESSI Murillo Waldron [place]au, LPN   Caller: Unspecified (Today, 11:12 AM)             Has not yet been read. ...completed 11/15/19    Previous Messages            Advice Only     Jeanine Teixeira PA-C  You 19 minutes ago (2:19 PM)      Has not yet been read. ...completed 11/15/19    Routing comment       You  Jeanine Teixeira PA-C 2 hours ago (11:42 AM)      Please advise, Patient request ECHO results? Thank You,   Marysol Salazar with patient  Verified patient with 2 patient identifiers  Pt informed . verbalized understanding. States will take last Amiodarone today and results will determine if medication is needed.

## 2019-11-24 LAB
ECHO AO ROOT DIAM: 2.78 CM
ECHO AV AREA PLAN: 4.8 CM2
ECHO LA AREA 4C: 13.1 CM2
ECHO LA MAJOR AXIS: 3.07 CM
ECHO LA TO AORTIC ROOT RATIO: 1.1
ECHO LA VOL 4C: 23.51 ML (ref 18–58)
ECHO LV EDV A4C: 143.1 ML
ECHO LV EDV TEICHHOLZ: 0.91 ML
ECHO LV EJECTION FRACTION A4C: 63 %
ECHO LV ESV A4C: 53.5 ML
ECHO LV ESV TEICHHOLZ: 0.41 ML
ECHO LV INTERNAL DIMENSION DIASTOLIC: 5.89 CM (ref 4.2–5.9)
ECHO LV INTERNAL DIMENSION SYSTOLIC: 4.19 CM
ECHO LV IVSD: 1.38 CM (ref 0.6–1)
ECHO LV MASS 2D: 364 G (ref 88–224)
ECHO LV POSTERIOR WALL DIASTOLIC: 1 CM (ref 0.6–1)
ECHO LVOT DIAM: 2.51 CM
ECHO LVOT PEAK GRADIENT: 2.8 MMHG
ECHO LVOT PEAK VELOCITY: 84.28 CM/S
ECHO MV A VELOCITY: 35.96 CM/S
ECHO MV AREA PHT: 2.8 CM2
ECHO MV AREA PLAN: 9 CM2
ECHO MV E DECELERATION TIME (DT): 157.4 MS
ECHO MV E VELOCITY: 40.01 CM/S
ECHO MV E/A RATIO: 1.11
ECHO MV MAX VELOCITY: 46.75 CM/S
ECHO MV MEAN GRADIENT: 0.4 MMHG
ECHO MV MEAN INFLOW VELOCITY: 0.3 M/S
ECHO MV PEAK GRADIENT: 0.9 MMHG
ECHO MV PRESSURE HALF TIME (PHT): 77.8 MS
ECHO MV VTI: 17.98 CM
ECHO PV MAX VELOCITY: 78.07 CM/S
ECHO PV PEAK GRADIENT: 2.4 MMHG
ECHO PV REGURGITANT MAX VELOCITY: 131.3 CM/S
ECHO RA AREA 4C: 13.64 CM2
LVFS 2D: 28.99 %
LVSV (MOD SINGLE 4C): 35.79 ML
LVSV (TEICH): 37.91 ML
MV DEC SLOPE: 2.57

## 2019-11-25 ENCOUNTER — DOCUMENTATION ONLY (OUTPATIENT)
Dept: CARDIOLOGY CLINIC | Age: 39
End: 2019-11-25

## 2019-11-25 NOTE — PROGRESS NOTES
The echocardiogram for Mr. Sharyle Franco shows borderline normal LV systolic function with no evidence of ongoing myocardial edema or infiltration. Ejection fraction is just over 50%. The pericardium is normal, atrial dimensions are normal with no sign of abnormal content within the limitations of a TTE. Accordingly, Mr. Sharyle Franco will be instructed to stop amiodarone and to come back to the office in 1 month for an electrocardiogram on general exam.  He will be admonished to continue all of his other medications without interruption until at time.     Impression: Transient congestive cardiomyopathy due to acute viral myocarditis    Satisfactory recovery on guideline medications    Sufficient myocardial recovery so that permanent device therapy was not indicated    No sign of recurrent atrial fibrillation on 48-hour Holter at low amiodarone levels    Normal atrial dimensions on echo without evidence of significant LV diastolic impairment    Recurrence of atrial fibrillation therefore appears unlikely, but the patient is almost pathologically nonchalant about his condition unless extreme so he will need ongoing surveillance    Sanket Mitchell MD, [facility] [place]

## 2019-11-25 NOTE — TELEPHONE ENCOUNTER
MD Wilder Phillips, SURAJ   Caller: Unspecified (5 days ago, 11:12 AM)             Please advise patient to stop amiodarone and make a follow up appointment in one month to check EKG. His echo result is excellent. He needs to continue all his other medications. Please document the phone encounter in the chart. [facility]        Spoke with patient  Verified patient with 2 patient identifiers  Pt informed ECHO excellent, can stop Amiodarone but continue all other medications. Patient verbalized understanding. Patient will callback to schedule 1 month follow up to check EKG.

## 2020-03-25 ENCOUNTER — VIRTUAL VISIT (OUTPATIENT)
Dept: FAMILY MEDICINE CLINIC | Age: 40
End: 2020-03-25

## 2020-03-25 VITALS — TEMPERATURE: 99.4 F

## 2020-04-02 ENCOUNTER — OFFICE VISIT (OUTPATIENT)
Dept: CARDIOLOGY CLINIC | Age: 40
End: 2020-04-02

## 2020-04-02 VITALS — SYSTOLIC BLOOD PRESSURE: 120 MMHG | DIASTOLIC BLOOD PRESSURE: 90 MMHG

## 2020-04-02 DIAGNOSIS — I48.0 PAF (PAROXYSMAL ATRIAL FIBRILLATION) (HCC): ICD-10-CM

## 2020-04-02 DIAGNOSIS — I10 ESSENTIAL HYPERTENSION: ICD-10-CM

## 2020-04-02 DIAGNOSIS — I42.8 NICM (NONISCHEMIC CARDIOMYOPATHY) (HCC): Primary | ICD-10-CM

## 2020-04-02 NOTE — PROGRESS NOTES
[facility] Card[facility] Telemedicine Encounter                                                         Pursuant to the emergency declaration under the [facility]5 waiver authority and the [employer] Act, this Virtual  Visit was conducted, with patient's consent, to reduce the patient's risk of exposure to COVID-19 and provide continuity of care for an established patient. Services were provided through a video synchronous discussion virtually to substitute for in-person clinic visit. ECHO 7/17/18:mild-mod LVD, EF 10-20%, severe DHK, mild LVH, DD, RVD, mild- mod LAE, mod RAY, severe MR, mod-severe TR, RVSP 40, dilated RV outflow tract  ECHO 10/26/2018: mild LVD, EF 45%, G3/4 DD, mod ant leaflet thickening, mild MR, dil RVOT     ROBERTO 7/20/18: LVD, EF 10-15%, severe DHK, mild RVD, reduced RVEF, mod ANNAMARIE, no CELINA thrombus, no PFO, mod MR, mild TR      CATH 7/20/18: no epicardial disease, LVEP 1     RHC 7/20/18: RA:12, RV 34/6/14. PA 30/20/26, PCWP 18, [place] 6.33/2.74 TD: 5.37/2.32     DCCV 7/20/18    CARDIAC MRI: 7/20/18: mod LVD (LVDD 66), mild LVH (13mm), LVEF 17%, severe GHK, mild RVD, RVEF 20%, mod GHK, mod MR, mild TR. Distinct mid wall stripe enhancement of the basal septal wall along with RV insertion point enhancement sparing the subendocardium. viral myocarditis of HHV-6 viral strain. NO features of giant cell or lymphocytic myocarditis. , infiltrative Sarcoidosis, amyloidosis, old MI    11/15/19 echo normal lv size with lvef 51-55%. Mild pul regurg. Subjective/HPI:   Carly Mathews is a 35 y.o. male who was seen by synchronous (real-time) audio-video technology on 4/2/2020. Active but not exercising. No problems with meds. blood pressure ok. No cardiac symptoms.      PCP Provider  Eamon Ray MD  Past Medical History:   Diagnosis Date    Anxiety     Class 1 obesity due to excess calories with serious comorbidity and body mass index (BMI) of 32.0 to 32.9 in adult 2/27/2019    Combined systolic and diastolic heart failure ([facility] [address] 8/8/2018    ECHO 7/17/18:mild-mod LVD, EF 10-20%, severe DHK, mild LVH, DD, RVD, mild- mod LAE, mod RAY, severe MR, mod-severe TR, RVSP 40, dilated RV outflow tract  ROBERTO 7/20/18: LVD, EF 10-15%, severe DHK, mild RVD, reduced RVEF, mod ANNAMARIE, no CELINA thrombus, no PFO, mod MR, mild TR  RHC 7/20/18: RA:12, RV 34/6/14.  PA 30/20/26, PCWP 18, [place] 6.33/2.74 TD: 5.37/2.32    CARDIAC MRI: 7/20/18: mod LVD (LVDD 66), mil    Congestive heart failure (HCC)     Essential hypertension 3/12/2019    Hypertensive heart disease with combined systolic and diastolic congestive heart failure (HCC) 3/12/2019    ECHO 7/17/18:mild-mod LVD, EF 10-20%, severe DHK, mild LVH, DD, RVD, mild- mod LAE, mod RAY, severe MR, mod-severe TR, RVSP 40, dilated RV outflow tract ECHO 10/26/2018: mild LVD, EF 45%, G3/4 DD, mod ant leaflet thickening, mild MR, dil RVOT        Long term current use of anticoagulant therapy     NICM (nonischemic cardiomyopathy) ([facility] [address] 8/8/2018    Non-rheumatic mitral regurgitation 8/8/2018    ECHO 7/17/18:mild-mod LVD, EF 10-20%, severe DHK, mild LVH, DD, RVD, mild- mod LAE, mod RAY, severe MR, mod-severe TR, RVSP 40, dilated RV outflow tract  ROBERTO 7/20/18: LVD, EF 10-15%, severe DHK, mild RVD, reduced RVEF, mod ANNAMARIE, no CELINA thrombus, no PFO, mod MR, mild TR      PAF (paroxysmal atrial fibrillation) ([address] 8/8/2018    ROBERTO 7/20/18: LVD, EF 10-15%, severe DHK, mild RVD, reduced RVEF, mod ANNAMARIE, no CELINA thrombus, no PFO, mod MR, mild TR    DCCV 7/20/18       Rapid atrial fibrillation ([address] 7/17/2018    Tobacco use disorder 8/8/2018    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    Vitamin D deficiency 9/19/2018    Level 12 8/2018      Past Surgical History:   Procedure Laterality Date    CARDIOVERSION, ELECTIVE;EXTERN  7/20/2018         HX HEART CATHETERIZATION       Allergies   Allergen Reactions    Peanut Anaphylaxis    Milk Other (comments)    Shrimp Other (comments)      Family History   Problem Relation Age of Onset    Diabetes Mother     Heart Failure Mother     No Known Problems Father     Hypertension Sister     Hypertension Sister       Current Outpatient Medications   Medication Sig    sacubitril-valsartan (ENTRESTO) 97 mg/103 mg tablet Take 1 Tab by mouth two (2) times a day. Indications: chronic heart failure    amiodarone (PACERONE) 100 mg tablet Take 1 Tab by mouth daily.  carvedilol (COREG) 25 mg tablet Take 1 Tab by mouth two (2) times daily (with meals).  spironolactone (ALDACTONE) 25 mg tablet Take 1 Tab by mouth daily.  magnesium oxide (MAG-OX) 400 mg tablet Take 1 Tab by mouth daily.  EPINEPHrine (EPIPEN) 0.3 mg/0.3 mL injection 0.3 mL by IntraMUSCular route once as needed for Anaphylaxis or Allergic Response for up to 1 dose.  multivitamin (ONE A DAY) tablet Take 1 Tab by mouth daily.  Cholecalciferol, Vitamin D3, (VITAMIN D3) 2,000 unit cap capsule Take 2,000 Units by mouth daily. No current facility-administered medications for this visit. There were no vitals filed for this visit.   Social History     Socioeconomic History    Marital status: SINGLE     Spouse name: Not on file    Number of children: Not on file    Years of education: Not on file    Highest education level: Not on file   Occupational History    Not on file   Social Needs    Financial resource strain: Not on file    Food insecurity     Worry: Not on file     Inability: Not on file    Transportation needs     Medical: Not on file     Non-medical: Not on file   Tobacco Use    Smoking status: Current Some Day Smoker     Types: Cigarettes    Smokeless tobacco: Never Used    Tobacco comment: 3-4 cigarettes vaughn   Substance and Sexual Activity    Alcohol use: No    Drug use: No    Sexual activity: Not Currently   Lifestyle    Physical activity     Days per week: Not on file     Minutes per session: Not on file    Stress: Not on file   Relationships    Social connections     Talks on phone: Not on file     Gets together: Not on file     Attends [employer] service: Not on file     Active member of club or organization: Not on file     Attends meetings of clubs or organizations: Not on file     Relationship status: Not on file    Intimate partner violence     Fear of current or ex partner: Not on file     Emotionally abused: Not on file     Physically abused: Not on file     Forced sexual activity: Not on file   Other Topics Concern     Service Not Asked    Blood Transfusions Not Asked    Caffeine Concern Not Asked    Occupational Exposure Not Asked   Eletha Mate Hazards Not Asked    Sleep Concern Not Asked    Stress Concern Not Asked    Weight Concern Not Asked    Special Diet Not Asked    Back Care Not Asked    Exercise Not Asked    Bike Helmet Not Asked   [place] Not Asked    Self-Exams Not Asked   Social History Narrative    Not on file       Review of Symptoms  11 systems reviewed, negative other than as stated in the HPI    Physical Exam:    Due to this being a TeleHealth evaluation, many elements of the physical examination are unable to be assessed. General: Well developed, in no acute distress, cooperative and alert  HEENT: Pupils equal/round. No marked JVD visible on video. Respiratory: No audible wheezing, no signs of respiratory distress, lips non cyanotic  Extremities:  No edema  Neuro: A&Ox3, speech clear, no facial droop, answering questions appropriately  Skin: Skin color is normal. No rashes or lesions.  Non diaphoretic on visible skin during exam      Cardiology Labs:  Lab Results   Component Value Date/Time    Cholesterol, total 179 04/18/2019 11:50 AM    HDL Cholesterol 50 04/18/2019 11:50 AM       Lab Results   Component Value Date/Time    Sodium 141 10/08/2019 12:00 AM    Potassium 4.2 10/08/2019 12:00 AM    Chloride 105 10/08/2019 12:00 AM    CO2 19 (L) 10/08/2019 12:00 AM    Anion gap 9 10/24/2018 09:18 AM    Glucose 84 10/08/2019 12:00 AM    BUN 13 10/08/2019 12:00 AM    Creatinine 0.94 10/08/2019 12:00 AM    BUN/Creatinine ratio 14 10/08/2019 12:00 AM    GFR est  10/08/2019 12:00 AM    GFR est non- 10/08/2019 12:00 AM    Calcium 9.2 10/08/2019 12:00 AM    Bilirubin, total 0.3 10/08/2019 12:00 AM    AST (SGOT) 22 10/08/2019 12:00 AM    Alk. phosphatase 116 10/08/2019 12:00 AM    Protein, total 7.2 10/08/2019 12:00 AM    Albumin 4.2 10/08/2019 12:00 AM    Globulin 4.4 (H) 07/26/2018 05:12 AM    A-G Ratio 1.4 10/08/2019 12:00 AM    ALT (SGPT) 25 10/08/2019 12:00 AM         Assessment:     Diagnoses and all orders for this visit:    1. NICM (nonischemic cardiomyopathy) ([facility] [address]    2. Essential hypertension    3. PAF (paroxysmal atrial fibrillation) ([facility] [address]        ICD-10-CM ICD-9-CM    1. NICM (nonischemic cardiomyopathy) (HCC) I42.8 425.4    2. Essential hypertension I10 401.9    3. PAF (paroxysmal atrial fibrillation) ([facility]) I48.0 427.31      No orders of the defined types were placed in this encounter. Plan:     He is stable and assymptomatic, on appropriate medical regimen and needs no cardiac testing at this time. We discussed the expected course, resolution and complications of the diagnosis(es) in detail. Medication risks, benefits, costs, interactions, and alternatives were discussed as indicated. I advised him to contact the office if his condition worsens, changes or fails to improve as anticipated. He expressed understanding with the diagnosis(es) and plan    Karsten Alamo MD    Greater than 20 minutes was spent in direct video patient care, planning and chart review. This visit was conducted using [employer] [facility].

## 2020-04-02 NOTE — PROGRESS NOTES
Med rec complete. Patient note taking amiodarone. Patient reported BP range, but unsure of HR or weight.

## 2020-04-24 ENCOUNTER — TELEPHONE (OUTPATIENT)
Dept: CARDIOLOGY CLINIC | Age: 40
End: 2020-04-24

## 2020-04-24 NOTE — TELEPHONE ENCOUNTER
Patient called regarding his appt next week, I advised he would be called regarding changing it to a virtual appt. He also states he has a bad tooth which will be pulled in about a week, he is taking \"a lot\" of aspirin, I advised he take tylenol, 2 extra strength every 6-8 hours. He states understanding.

## 2020-04-27 ENCOUNTER — TELEPHONE (OUTPATIENT)
Dept: CARDIOLOGY CLINIC | Age: 40
End: 2020-04-27

## 2020-04-27 NOTE — TELEPHONE ENCOUNTER
Patient contacted to reschedule upcoming appt to a virtual/telephone appt. Patient has been rescheduled to a virtual/Doxy appt. Patient will be using his camera enabled smartphone. Patient was instructed to take their weight, BP, HR and temp before the appt. and have medications/list available. I informed the patient they would receive a call from our office 15 min before the appt. to provide readings. The following was confirmed with the patient:    \"We want to confirm that, for purposes of billing, this is a virtual visit with your provider for which we will submit a claim for reimbursement with your insurance company. You will be responsible for any co pays, coinsurance, amounts or other amounts not covered by your insurance company. If you do not accept this, unfortunately we will not be able to schedule a virtual visit with the provider. Do you accept? \"    Patient does accept. Patient verbalized understanding and had no further questions.

## 2020-04-28 ENCOUNTER — VIRTUAL VISIT (OUTPATIENT)
Dept: CARDIOLOGY CLINIC | Age: 40
End: 2020-04-28

## 2020-04-28 ENCOUNTER — TELEPHONE (OUTPATIENT)
Dept: CARDIOLOGY CLINIC | Age: 40
End: 2020-04-28

## 2020-04-28 VITALS — BODY MASS INDEX: 31.46 KG/M2 | HEIGHT: 75 IN | WEIGHT: 253 LBS

## 2020-04-28 DIAGNOSIS — I34.0 NON-RHEUMATIC MITRAL REGURGITATION: ICD-10-CM

## 2020-04-28 DIAGNOSIS — I42.8 NICM (NONISCHEMIC CARDIOMYOPATHY) (HCC): Primary | ICD-10-CM

## 2020-04-28 DIAGNOSIS — I48.0 PAF (PAROXYSMAL ATRIAL FIBRILLATION) (HCC): ICD-10-CM

## 2020-04-28 DIAGNOSIS — I10 ESSENTIAL HYPERTENSION: ICD-10-CM

## 2020-04-28 DIAGNOSIS — E66.09 CLASS 1 OBESITY DUE TO EXCESS CALORIES WITH SERIOUS COMORBIDITY AND BODY MASS INDEX (BMI) OF 32.0 TO 32.9 IN ADULT: ICD-10-CM

## 2020-04-28 DIAGNOSIS — I50.40 HYPERTENSIVE HEART DISEASE WITH COMBINED SYSTOLIC AND DIASTOLIC CONGESTIVE HEART FAILURE, UNSPECIFIED HF CHRONICITY (HCC): ICD-10-CM

## 2020-04-28 DIAGNOSIS — R06.02 SOB (SHORTNESS OF BREATH): ICD-10-CM

## 2020-04-28 DIAGNOSIS — F17.200 TOBACCO USE DISORDER: ICD-10-CM

## 2020-04-28 DIAGNOSIS — I11.0 HYPERTENSIVE HEART DISEASE WITH COMBINED SYSTOLIC AND DIASTOLIC CONGESTIVE HEART FAILURE, UNSPECIFIED HF CHRONICITY (HCC): ICD-10-CM

## 2020-04-28 DIAGNOSIS — F10.21 ALCOHOL USE DISORDER, MODERATE, IN SUSTAINED REMISSION (HCC): ICD-10-CM

## 2020-04-28 DIAGNOSIS — B33.22 VIRAL MYOCARDITIS, UNSPECIFIED CHRONICITY: ICD-10-CM

## 2020-04-28 DIAGNOSIS — E55.9 VITAMIN D DEFICIENCY: Chronic | ICD-10-CM

## 2020-04-28 RX ORDER — BUPROPION HYDROCHLORIDE 150 MG/1
150 TABLET ORAL
Qty: 30 TAB | Refills: 0 | Status: SHIPPED | OUTPATIENT
Start: 2020-04-28 | End: 2020-10-21

## 2020-04-28 RX ORDER — AMOXICILLIN 500 MG/1
CAPSULE ORAL
COMMUNITY
Start: 2020-03-30 | End: 2020-10-21 | Stop reason: ALTCHOICE

## 2020-04-28 NOTE — PATIENT INSTRUCTIONS
Medication changes: 
Start Wellbutrin XL 150mg, take 1 tablet every morning to help with smoking cessation Please take this to your pharmacy to notify them of the change in medications. Testing Ordered: 
 
Labs- we will fax the lab orders to the [employer] [facility] 
We will notify you of abnormal results Other Recommendations:  
  
Ensure your drinking an adequate amount of water with a goal of 6-8 eight ounce glasses (1.5-2 liters) of fluid daily. Your urine should be clear and light yellow straw colored. Please come  your free blood pressure cuff from [facility] to help monitor your blood pressure and regulate your medications If your blood pressure begins to consistently run below 90/60 and/or you begin to experience dizziness or lightheadedness, please contact the Alok Siu at [phone]. Follow up in 4-6 weeks with NP/MD for virtual visit with Alok Hendrix Please monitor your blood pressures daily prior to medications and 2 hours after taking medications. Bring a written record of your blood pressures to your next appointment. Please monitor your weights daily upon waking and after using the bathroom. Keep a written records of your weights and bring to your next appointment. If you have a weight gain of 3 or more pounds overnight OR 5 or more pounds in one week please contact our office. Thank you for allowing us the privilege of being a part of your healthcare team! Please do not hesitate to contact our office at [phone] with any questions or concerns. Heart Failure Patients and COVID-19 March 31, 2020 in [address] A Statement from the [address] The Heart Failure Society of [facility]) wants to ensure that heart failure patients are appropriately informed during the novel Coronavirus COVID-19 pandemic. COVID-19 symptoms vary among infected people and can include cough, fever, shortness of breath, muscle aches, profound fatigue, loss of sense of smell and taste, and diarrhea. Not every infected person will have symptoms which is why social distancing is imperative. Most people have only mild symptoms, but others have severe symptoms that require hospitalization and occasionally intensive care. Because you are a patient with a heart failure, you are at higher risk of getting very sick if you contract Coronavirus and, therefore, it is important to practice good hand hygiene, social distancing, and staying at home as much as possible. If you are experiencing symptoms of COVID-19, please call your primary healthcare clinician. For your safety and the safety of others, and to reduce potential exposures to the Coronavirus, please do not go to an urgent care clinic or emergency room for non-urgent or non-emergency issues unless you have been instructed to do so by your primary healthcare clinician. However, if you have life-threatening symptoms like difficulty breathing, call 911. We want to reduce the spread of the Coronavirus as much as possible and make sure our healthcare resources are not overwhelmed with non-urgent cases. You may have noticed that your healthcare clinicians have converted your in-person appointments to telephone or video calls, and some hospitals are not allowing visitors. The goal is to keep patients from carolin the Coronavirus and to limit the number of healthcare workers in the hospital. If you are sick and need to be seen or get lab testing, you should still do so; otherwise, you can help by 1. Learning how to use your smartphone or computer to participate in telehealth video visits 2. Keeping track of missed visits and studies and working with your team to reschedule them once social distancing measures are relaxed Also, do not stop any of your medications unless instructed by your healthcare team to do so. It is important that you have an adequate supply of your heart failure medications and that you request extended duration of supplies and refills from your providers during these times. We have a lot to learn about COVID-19 and currently there are several ongoing clinical trials to discover therapies that might help treat the infection and vaccines that can prevent the infection. The [facility] and other scientific institutions are working hard, with our patients in mind, to get through this pandemic as quickly as possible. Finally, we recommend that you get your information from trusted, professional healthcare sources including national societies like the [employer], federal agencies like the [employer], and your local hospitals and health departments. Please access updated patient information on the [facility] [phone]) [facility]. We wish you safety and health during this challenging time. Learning About Heart Failure Zones What are heart failure zones? Heart failure zones give you an easy way to see changes in your heart failure symptoms. They also tell you when you need to get help. Check every day to see which zone you are in. Green zone. You are doing well. This is where you want to be. · Your weight is stable. This means it is not going up or down. · You breathe easily. · You are sleeping well. You are able to lie flat without shortness of breath. · You can do your usual activities. Yellow zone. Be careful. Your symptoms are changing. Call your doctor. · You have new or increased shortness of breath. · You are dizzy or lightheaded, or you feel like you may faint. · You have sudden weight gain, such as more than 2 to 3 pounds in a day or 5 pounds in a week. (Your doctor may suggest a different range of weight gain.) · You have increased swelling in your legs, ankles, or feet. · You are so tired or weak that you cannot do your usual activities. · You are not sleeping well. Shortness of breath wakes you up at night. You need extra pillows. Your doctor's name: ____________________________________________________________ Your doctor's contact information: _________________________________________________ Red zone. This is an emergency. Call  911. You have symptoms of sudden heart failure, such as: 
· You have severe trouble breathing. · You cough up pink, foamy mucus. · You have a new irregular or fast heartbeat. You have symptoms of a heart attack. These may include: · Chest pain or pressure, or a strange feeling in the chest. 
· Sweating. · Shortness of breath. · Nausea or vomiting. · Pain, pressure, or a strange feeling in the back, neck, jaw, or upper belly or in one or both shoulders or arms. · Lightheadedness or sudden weakness. · A fast or irregular heartbeat. If you have symptoms of a heart attack: After you call  911, the  may tell you to chew 1 adult-strength or 2 to 4 low-dose aspirin. Wait for an ambulance. Do not try to drive yourself. Follow-up care is a key part of your treatment and safety. Be sure to make and go to all appointments, and call your doctor if you are having problems. It's also a good idea to know your test results and keep a list of the medicines you take. Low Sodium Diet (2,000 Milligram): Care Instructions Your Care Instructions Too much sodium causes your body to hold on to extra water. This can raise your blood pressure and force your heart and kidneys to work harder. In very serious cases, this could cause you to be put in the hospital. It might even be life-threatening. By limiting sodium, you will feel better and lower your risk of serious problems. The most common source of sodium is salt.  People get most of the salt in their diet from canned, prepared, and packaged foods. Fast food and restaurant meals also are very high in sodium. Your doctor will probably limit your sodium to less than 2,000 milligrams (mg) a day. This limit counts all the sodium in prepared and packaged foods and any salt you add to your food. Follow-up care is a key part of your treatment and safety. Be sure to make and go to all appointments, and call your doctor if you are having problems. It's also a good idea to know your test results and keep a list of the medicines you take. How can you care for yourself at home? Read food labels · Read labels on cans and food packages. The labels tell you how much sodium is in each serving. Make sure that you look at the serving size. If you eat more than the serving size, you have eaten more sodium. · Food labels also tell you the Percent Daily Value for sodium. Choose products with low Percent Daily Values for sodium. · Be aware that sodium can come in forms other than salt, including monosodium glutamate (MSG), sodium citrate, and sodium bicarbonate (baking soda). MSG is often added to Asian food. When you eat out, you can sometimes ask for food without MSG or added salt. Buy low-sodium foods · Buy foods that are labeled \"unsalted\" (no salt added), \"sodium-free\" (less than 5 mg of sodium per serving), or \"low-sodium\" (less than 140 mg of sodium per serving). Foods labeled \"reduced-sodium\" and \"light sodium\" may still have too much sodium. Be sure to read the label to see how much sodium you are getting. · Buy fresh vegetables, or frozen vegetables without added sauces. Buy low-sodium versions of canned vegetables, soups, and other canned goods. Prepare low-sodium meals · Cut back on the amount of salt you use in cooking. This will help you adjust to the taste. Do not add salt after cooking. One teaspoon of salt has about 2,300 mg of sodium. · Take the salt shaker off the table. · Flavor your food with garlic, lemon juice, onion, vinegar, herbs, and spices. Do not use soy sauce, lite soy sauce, steak sauce, onion salt, garlic salt, celery salt, mustard, or ketchup on your food. · Use low-sodium salad dressings, sauces, and ketchup. Or make your own salad dressings and sauces without adding salt. · Use less salt (or none) when recipes call for it. You can often use half the salt a recipe calls for without losing flavor. Other foods such as rice, pasta, and grains do not need added salt. · Rinse canned vegetables, and cook them in fresh water. This removes somebut not allof the salt. · Avoid water that is naturally high in sodium or that has been treated with water softeners, which add sodium. Call your local water company to find out the sodium content of your water supply. If you buy bottled water, read the label and choose a sodium-free brand. Avoid high-sodium foods · Avoid eating: 
? Smoked, cured, salted, and canned meat, fish, and poultry. ? Ham, barragan, hot dogs, and luncheon meats. ? Regular, hard, and processed cheese and regular peanut butter. ? Crackers with salted tops, and other salted snack foods such as pretzels, chips, and salted popcorn. ? Frozen prepared meals, unless labeled low-sodium. ? Canned and dried soups, broths, and bouillon, unless labeled sodium-free or low-sodium. ? Canned vegetables, unless labeled sodium-free or low-sodium. ? [employer] fries, pizza, tacos, and other fast foods. ? Pickles, olives, ketchup, and other condiments, especially soy sauce, unless labeled sodium-free or low-sodium. Learning About Benefits From Quitting Smoking How does quitting smoking make you healthier? If you're thinking about quitting smoking, you may have a few reasons to be smoke-free. Your health may be one of them.  
· When you quit smoking, you lower your risks for cancer, lung disease, heart attack, stroke, blood vessel disease, and blindness from macular degeneration. · When you're smoke-free, you get sick less often, and you heal faster. You are less likely to get colds, flu, bronchitis, and pneumonia. · As a nonsmoker, you may find that your mood is better and you are less stressed. When and how will you feel healthier? Quitting has real health benefits that start from day 1 of being smoke-free. And the longer you stay smoke-free, the healthier you get and the better you feel. The first hours · After just 20 minutes, your blood pressure and heart rate go down. That means there's less stress on your heart and blood vessels. · Within 12 hours, the level of carbon monoxide in your blood drops back to normal. That makes room for more oxygen. With more oxygen in your body, you may notice that you have more energy than when you smoked. After 2 weeks · Your lungs start to work better. · Your risk of heart attack starts to drop. After 1 month · When your lungs are clear, you cough less and breathe deeper, so it's easier to be active. · Your sense of taste and smell return. That means you can enjoy food more than you have since you started smoking. Over the years · After 1 year, your risk of heart disease is half what it would be if you kept smoking. · After 5 years, your risk of stroke starts to shrink. Within a few years after that, it's about the same as if you'd never smoked. · After 10 years, your risk of dying from lung cancer is cut by about half. And your risk for many other types of cancer is lower too. How would quitting help others in your life? When you quit smoking, you improve the health of everyone who now breathes in your smoke. · Their heart, lung, and cancer risks drop, much like yours. · They are sick less. For babies and small children, living smoke-free means they're less likely to have ear infections, pneumonia, and bronchitis. · If you're a woman who is or will be pregnant someday, quitting smoking means a healthier . · Children who are close to you are less likely to become adult smokers. Where can you learn more? Go to http://eleanor-walter.info/ Enter 052 [id number] 72 11 in the search box to learn more about \"Learning About Benefits From Quitting Smoking. \" Current as of: July 4, 2019Content Version: 12.4 © 2635-4124 [employer]. Care instructions adapted under license by [employer] (which disclaims liability or warranty for this information). If you have questions about a medical condition or this instruction, always ask your healthcare professional. [place] [address] any warranty or liability for your use of this information.

## 2020-04-28 NOTE — PROGRESS NOTES
[facility] Virtual Note      DOS:   4/28/2020  NAME:  Ramu Dowd   MRN:   [id number]     REFERRING PROVIDER:  Dr. Rony Johnson: Cynthia Hwang MD  PRIMARY CARDIOLOGIST: Dr. Esvin Reno  Sleep: Dr. Rosemarie Valerio    Chief Complaint:  CHF       Chuy Quiñones is a  35y.o. year old AA male with hx of HFrEF (10-20%) in the setting of viral myocarditis (Human Herpes Virus 6)  Complicated by new onset PAF with RVR s/p DCCV. He was first diagnosed 8/2018 when he presented to [facility] with ADHF found to be in afib with RVR. CTA (-) for PE, but showed a large R pleural effusion. He underwent a thoracentesis for ~ 1 L, and  ROBERTO and DCCV. Right and left heart cath showed no epicardial disease and slightly elevated filling pressures. Cardiac MRI showed LVEF 17%, RVEF 20% confirmed viral myocarditis and lab work was positive for  Coxsackie pane, CMV IgG positive. HHV-6 positive. Echo 10/26/18: showed EF improved to 45% with grade 3/4 diastolic dysfunction and improved MR now to mild. Most recent Echo on 11/15/19 LVEF 51-55%, mild-mod ME but no pulm HTN. Today Mr. Mariah Beasley presents for virtual visit for his heart failure due to COVID-19. He is not engaged during this visit. Throughout the visit he is riding in a car or walking around, connection is lost multiple times, communication is difficult, and he is distracted. Mr. Mariah Beasley denies FLOWERS, SOB, CP, palpitations, dizziness, lightheadedness, orthopnea, PND, nocturia, or BLE/ABD edema. Sleeping well on 2 pillows. Not using a CPAP. He continues to smoke 4-5 cigarettes/day. Did not start the Wellbutrin previously recommended. Not eating salt or prepackaged foods. Receiving Entresto from [employer]. Ramu Dowd lives with his  sister in their new home. 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  He smoked 15 years ~ 1/2 ppd. 2 etoh drinks each day of the weekends No additional substances. Some college courses. IMPRESSION/PLAN:    Heart Failure Status: NYHA Class II    NICM 2/2 viral myocarditis Stage C, NYHA Class II - EF improved from 10-20% to 45%   Coxsackie A panel positive, CMV IgG positive, HHV-6 positive    On entresto, carvedilol, spironolactone    HTN  - well controlled   Obtain BP cuff to monitor BP at home, [facility] donating cuff to pt     Continue entresto 97/103 mg, 1 tablet twice daily   Continue coreg 25 mg BID    Continue aldactone 25 mg daily   Counseled on smoking cessation     HFrEF, NYHA II LVEF 10-20%, RVEF 22%- EF now improved 51-55%-euvolemic    Conntinue entresto 97/103 mg BID ([employer] providing)   Continue coreg 25mg BID     Continue spironolactone 25 mg daily   No SDB on PSG   Encouraged to exercise regularly   Daily weights, record weights in journal have for every visit   Low Na+ diet, less than 2000mg   Labs: CBC, CMP, NTproBNP, Mag, Vitamin D     Recommend annual influenza vaccine and pneumovax every 5 years   Follow up in the [facility] in 4-6 weeks for virtual visit    Paroxysmal Afib w/ RVR S/p Cardioversion- Remains in Sinus   [id number] = 2   Amio  per EP    Warfarin discontinued by Dr. Yulissa Epperson MR- improved with improved EF        Obesity-   Body mass index is 31.62 kg/m².    Recommend he limit portion and become more aware of his diet   Encouraged to exercise regularly     Tobacco Use disorder   Smoking 4-5 cigarettes daily             Smoking cessation counseling             Start Wellbutrin  mg daily         Depression   Start Wellbutrin  mg daily     Prophylaxis   Influenza and pneumovax up-to-date               CARDIAC EVALUATION   ECHO 7/17/18:mild-mod LVD, EF 10-20%, severe DHK, mild LVH, DD, RVD, mild- mod LAE, mod RAY, severe MR, mod-severe TR, RVSP 40, dilated RV outflow tract  ECHO 10/26/2018: mild LVD, EF 45%, G3/4 DD, mod ant leaflet thickening, mild MR, dil RVOT     ROBERTO 7/20/18: LVD, EF 10-15%, severe DHK, mild RVD, reduced RVEF, mod ANNAMARIE, no CELINA thrombus, no PFO, mod MR, mild TR      CATH 7/20/18: no epicardial disease, LVEP 1     RHC 7/20/18: RA:12, RV 34/6/14. PA 30/20/26, PCWP 18, [place] 6.33/2.74 TD: 5.37/2.32     DCCV 7/20/18    CARDIAC MRI: 7/20/18: mod LVD (LVDD 66), mild LVH (13mm), LVEF 17%, severe GHK, mild RVD, RVEF 20%, mod GHK, mod MR, mild TR. Distinct mid wall stripe enhancement of the basal septal wall along with RV insertion point enhancement sparing the subendocardium. viral myocarditis of HHV-6 viral strain. NO features of giant cell or lymphocytic myocarditis. , infiltrative Sarcoidosis, amyloidosis, old MI    11/15/19 echo normal lv size with lvef 51-55%. Mild pul regurg. UPEP, SPEP,  heavy metals, FLC (-) HIV ( NR), Legionella (-) Ferritin WNL     History:  Past Medical History:   Diagnosis Date    Anxiety     Class 1 obesity due to excess calories with serious comorbidity and body mass index (BMI) of 32.0 to 32.9 in adult 2/27/2019    Combined systolic and diastolic heart failure ([facility] 8/8/2018    ECHO 7/17/18:mild-mod LVD, EF 10-20%, severe DHK, mild LVH, DD, RVD, mild- mod LAE, mod RAY, severe MR, mod-severe TR, RVSP 40, dilated RV outflow tract  ROBERTO 7/20/18: LVD, EF 10-15%, severe DHK, mild RVD, reduced RVEF, mod ANNAMARIE, no CELINA thrombus, no PFO, mod MR, mild TR  RHC 7/20/18: RA:12, RV 34/6/14.  PA 30/20/26, PCWP 18, [place] 6.33/2.74 TD: 5.37/2.32    CARDIAC MRI: 7/20/18: mod LVD (LVDD 66), mil    Congestive heart failure ([facility] [address] [facility]     Essential hypertension 3/12/2019    Hypertensive heart disease with combined systolic and diastolic congestive heart failure ([facility] [address] 3/12/2019    ECHO 7/17/18:mild-mod LVD, EF 10-20%, severe DHK, mild LVH, DD, RVD, mild- mod LAE, mod RAY, severe MR, mod-severe TR, RVSP 40, dilated RV outflow tract ECHO 10/26/2018: mild LVD, EF 45%, G3/4 DD, mod ant leaflet thickening, mild MR, dil RVOT        Long term current use of anticoagulant therapy     [facility] 8/8/2018    Non-rheumatic mitral regurgitation 8/8/2018    ECHO 7/17/18:mild-mod LVD, EF 10-20%, severe DHK, mild LVH, DD, RVD, mild- mod LAE, mod RAY, severe MR, mod-severe TR, RVSP 40, dilated RV outflow tract  ROBERTO 7/20/18: LVD, EF 10-15%, severe DHK, mild RVD, reduced RVEF, mod ANNAMARIE, no CELINA thrombus, no PFO, mod MR, mild TR      PAF (paroxysmal [facility] fibrillation) [facility] 8/8/2018    ROBERTO 7/20/18: LVD, EF 10-15%, severe DHK, mild RVD, reduced RVEF, mod ANNAMARIE, no CELINA thrombus, no PFO, mod MR, mild TR    DCCV 7/20/18       Rapid [facility] fibrillation [facility] 7/17/2018    Tobacco use disorder 8/8/2018    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    Vitamin D deficiency 9/19/2018    Level 12 8/2018     Past Surgical History:   Procedure Laterality Date    CARDIOVERSION, ELECTIVE;EXTERN  7/20/2018         HX HEART CATHETERIZATION       Social History     Socioeconomic History    Marital status: SINGLE     Spouse name: Not on file    Number of children: Not on file    Years of education: Not on file    Highest education level: Not on file   Occupational History    Not on file   Social Needs    Financial resource strain: Not on file    Food insecurity     Worry: Not on file     Inability: Not on file    Transportation needs     Medical: Not on file     Non-medical: Not on file   Tobacco Use    Smoking status: Current Some Day Smoker     Types: Cigarettes    Smokeless tobacco: Never Used    Tobacco comment: 3-4 cigarettes vaughn   Substance and Sexual Activity    Alcohol use: No    Drug use: No    Sexual activity: Not Currently   Lifestyle    Physical activity     Days per week: Not on file     Minutes per session: Not on file    Stress: Not on file   Relationships    Social connections     Talks on phone: Not on file     Gets together: Not on file     Attends Faith service: Not on file     Active member of club or organization: Not on file     Attends meetings of clubs or organizations: Not on file     Relationship status: Not on file    Intimate partner violence     Fear of current or ex partner: Not on file     Emotionally abused: Not on file     Physically abused: Not on file     Forced sexual activity: Not on file   Other Topics Concern     Service Not Asked    Blood Transfusions Not Asked    Caffeine Concern Not Asked    Occupational Exposure Not Asked    Hobby Hazards Not Asked    Sleep Concern Not Asked    Stress Concern Not Asked    Weight Concern Not Asked    Special Diet Not Asked    Back Care Not Asked    Exercise Not Asked    Bike Helmet Not Asked   [address] Not Asked    Self-Exams Not Asked   Social History Narrative    Not on file     Family History   Problem Relation Age of Onset    Diabetes Mother     Heart Failure Mother     No Known Problems Father     Hypertension Sister     Hypertension Sister        Current Medications:   Current Outpatient Medications   Medication Sig Dispense Refill    amoxicillin (AMOXIL) 500 mg capsule TAKE 1 CAPSULE BY MOUTH EVERY 8 HOURS UNTIL GONE      sacubitril-valsartan (ENTRESTO) 97 mg/103 mg tablet Take 1 Tab by mouth two (2) times a day. Indications: chronic heart failure 180 Tab 2    carvedilol (COREG) 25 mg tablet Take 1 Tab by mouth two (2) times daily (with meals). 180 Tab 2    spironolactone (ALDACTONE) 25 mg tablet Take 1 Tab by mouth daily. 90 Tab 3    magnesium oxide (MAG-OX) 400 mg tablet Take 1 Tab by mouth daily. 90 Tab 2    EPINEPHrine (EPIPEN) 0.3 mg/0.3 mL injection 0.3 mL by IntraMUSCular route once as needed for Anaphylaxis or Allergic Response for up to 1 dose. 2 Syringe 6    multivitamin (ONE A DAY) tablet Take 1 Tab by mouth daily.  Cholecalciferol, Vitamin D3, (VITAMIN D3) 2,000 unit cap capsule Take 2,000 Units by mouth daily. 30 Cap 6       Allergies:    Allergies   Allergen Reactions    Peanut Anaphylaxis    Milk Other (comments)    Shrimp Other (comments)     Recent Labs:   Lab Results   Component Value Date/Time    WBC 7.6 04/18/2019 11:50 AM    HGB 15.8 04/18/2019 11:50 AM    HCT 47.1 04/18/2019 11:50 AM    PLATELET 375 74/12/0730 11:50 AM    MCV 98 (H) 04/18/2019 11:50 AM     Lab Results   Component Value Date/Time    TSH 1.810 10/08/2019 12:00 AM    T3 Uptake 39 10/08/2019 12:00 AM    T4, Free 1.69 02/28/2019 12:52 PM    T4, Total 10.1 10/08/2019 12:00 AM      Lab Results   Component Value Date/Time    CK 91 07/17/2018 02:00 PM    CK - MB 1.6 07/17/2018 02:00 PM    CK-MB Index 1.8 07/17/2018 02:00 PM      Lab Results   Component Value Date/Time    NT pro-BNP 50 10/24/2018 09:18 AM    NT pro-BNP 1,981 (H) 07/26/2018 05:12 AM    NT pro-BNP 2,507 (H) 07/25/2018 04:08 AM    NT pro-BNP 2,972 (H) 07/24/2018 04:19 AM    NT pro-BNP 2,629 (H) 07/23/2018 04:09 AM    PROBNP 17 10/08/2019 12:00 AM    PROBNP 17 03/13/2019 11:52 AM    PROBNP 17 02/28/2019 12:51 PM    PROBNP 477 (H) 09/20/2018 12:25 PM    PROBNP 940 (H) 09/04/2018 12:00 AM      Lab Results   Component Value Date/Time    Sodium 141 10/08/2019 12:00 AM    Potassium 4.2 10/08/2019 12:00 AM    Chloride 105 10/08/2019 12:00 AM    CO2 19 (L) 10/08/2019 12:00 AM    Anion gap 9 10/24/2018 09:18 AM    Glucose 84 10/08/2019 12:00 AM    BUN 13 10/08/2019 12:00 AM    Creatinine 0.94 10/08/2019 12:00 AM    BUN/Creatinine ratio 14 10/08/2019 12:00 AM    GFR est  10/08/2019 12:00 AM    GFR est non- 10/08/2019 12:00 AM    Calcium 9.2 10/08/2019 12:00 AM    Bilirubin, total 0.3 10/08/2019 12:00 AM    ALT (SGPT) 25 10/08/2019 12:00 AM    AST (SGOT) 22 10/08/2019 12:00 AM    Alk.  phosphatase 116 10/08/2019 12:00 AM    Protein, total 7.2 10/08/2019 12:00 AM    Albumin 4.2 10/08/2019 12:00 AM    Globulin 4.4 (H) 07/26/2018 05:12 AM    A-G Ratio 1.4 10/08/2019 12:00 AM      No results found for: HBA1C, ZNB7AUZI, HGBE8, GOB5PMGV, IJY6WXGX, JAK1KMUF     CTA Results (most recent): 7/17/18:       Impression IMPRESSION: Moderate right pleural effusion with right middle and right lower  lobe infiltrates. Left lower lobe atelectasis. No evidence of pulmonary  embolism. Patient seen and examined. Data and note reviewed. I have discussed and agree with the plans as noted. Thank you for allowing us to participate in your patient's care. Mayuri Thomason is a 35 y.o. male who was seen by synchronous (real-time) audio-video technology on 4/28/2020. Consent: Mayuri Thomason, who was seen by synchronous (real-time) audio-video technology, and/or his healthcare decision maker, is aware that this patient-initiated, Telehealth encounter on 4/28/2020 is a billable service, with coverage as determined by his insurance carrier. He is aware that he may receive a bill and has provided verbal consent to proceed: Yes. I spent at least 16 minutes on this visit with this established patient. [phone]      Review of Systems   Constitutional: Negative. HENT: Negative. Eyes: Negative. Respiratory: Negative. Cardiovascular: Negative. Gastrointestinal: Negative. Genitourinary: Negative. Musculoskeletal: Negative. Skin: Negative. Neurological: Negative. Endo/Heme/Allergies: Negative. Psychiatric/Behavioral: Negative.           Objective:   Patient reported, limited due to lack of BP cuff and thermometer  Visit Vitals  Ht 6' 3\" (1.905 m)   Wt 253 lb (114.8 kg)   BMI 31.62 kg/m²      General: alert, no distress, not engaged   Mental  status: normal mood, behavior, speech, dress, motor activity, and thought processes, able to follow commands   HENT: NCAT   Neck: no visualized mass   Resp: no respiratory distress   Neuro: no gross deficits   Skin: no discoloration or lesions of concern on visible areas   Psychiatric: normal affect, consistent with stated mood, no evidence of hallucinations     Additional exam findings: Entire virtual visit was conducted while patient was riding in a car and walking around outside. Internet connection was lost multiple times, communication with patient was difficult. Patient was distracted throughout visit. We discussed the expected course, resolution and complications of the diagnosis(es) in detail. Medication risks, benefits, costs, interactions, and alternatives were discussed as indicated. I advised him to contact the office if his condition worsens, changes or fails to improve as anticipated. He expressed understanding with the diagnosis(es) and plan. Ashli Junior is a 35 y.o. male who was evaluated by a video visit encounter for concerns as above. Patient identification was verified prior to start of the visit. A caregiver was present when appropriate. Due to this being a TeleHealth encounter (During IYWJX-27 public health emergency), evaluation of the following organ systems was limited: Vitals/Constitutional/EENT/Resp/CV/GI//MS/Neuro/Skin/Heme-Lymph-Imm. Pursuant to the emergency declaration under the [facility], [facility] waiver authority and the [employer] Act, this Virtual  Visit was conducted, with patient's (and/or legal guardian's) consent, to reduce the patient's risk of exposure to COVID-19 and provide necessary medical care. Services were provided through a video synchronous discussion virtually to substitute for in-person clinic visit. Patient was at home.       Sohan Jurado, MARY  [facility]  [address] [facility], [address] [place] [address], [address] [place] [address]  Office [phone]  Fax [phone]

## 2020-04-28 NOTE — TELEPHONE ENCOUNTER
----- Message from Christine Dickson NP sent at 4/28/2020  1:00 PM EDT -----  Regarding: AVS and Labs  Please send AVS to [facility] and fax labs to [employer] [facility] [employer]. He also needs a BP cuff. Thanks, Bigg marcum for patient to return my call-will have him come  all from office.

## 2020-05-07 NOTE — TELEPHONE ENCOUNTER
Patient called. Unable to  BP cuff at this time - wanted his lab requisition faxed to [facility] to [phone].

## 2020-05-11 ENCOUNTER — DOCUMENTATION ONLY (OUTPATIENT)
Dept: CARDIOLOGY CLINIC | Age: 40
End: 2020-05-11

## 2020-05-11 NOTE — PROGRESS NOTES
Tried to fax lab orders multiple times and fax continued to be busy. Left voice message that I could mail or he could .

## 2020-05-16 LAB
25(OH)D3+25(OH)D2 SERPL-MCNC: 31 NG/ML (ref 30–100)
ALBUMIN SERPL-MCNC: 4.1 G/DL (ref 4–5)
ALBUMIN/GLOB SERPL: 1.4 {RATIO} (ref 1.2–2.2)
ALP SERPL-CCNC: 94 IU/L (ref 39–117)
ALT SERPL-CCNC: 16 IU/L (ref 0–44)
AST SERPL-CCNC: 20 IU/L (ref 0–40)
BILIRUB SERPL-MCNC: 0.6 MG/DL (ref 0–1.2)
BUN SERPL-MCNC: 11 MG/DL (ref 6–24)
BUN/CREAT SERPL: 9 (ref 9–20)
CALCIUM SERPL-MCNC: 9.1 MG/DL (ref 8.7–10.2)
CHLORIDE SERPL-SCNC: 105 MMOL/L (ref 96–106)
CO2 SERPL-SCNC: 20 MMOL/L (ref 20–29)
CREAT SERPL-MCNC: 1.16 MG/DL (ref 0.76–1.27)
ERYTHROCYTE [DISTWIDTH] IN BLOOD BY AUTOMATED COUNT: 13.4 % (ref 11.6–15.4)
GLOBULIN SER CALC-MCNC: 2.9 G/DL (ref 1.5–4.5)
GLUCOSE SERPL-MCNC: 75 MG/DL (ref 65–99)
HCT VFR BLD AUTO: 43.7 % (ref 37.5–51)
HGB BLD-MCNC: 14.4 G/DL (ref 13–17.7)
MAGNESIUM SERPL-MCNC: 1.8 MG/DL (ref 1.6–2.3)
MCH RBC QN AUTO: 32 PG (ref 26.6–33)
MCHC RBC AUTO-ENTMCNC: 33 G/DL (ref 31.5–35.7)
MCV RBC AUTO: 97 FL (ref 79–97)
NT-PROBNP SERPL-MCNC: 21 PG/ML (ref 0–86)
PLATELET # BLD AUTO: 279 X10E3/UL (ref 150–450)
POTASSIUM SERPL-SCNC: 4.1 MMOL/L (ref 3.5–5.2)
PROT SERPL-MCNC: 7 G/DL (ref 6–8.5)
RBC # BLD AUTO: 4.5 X10E6/UL (ref 4.14–5.8)
SODIUM SERPL-SCNC: 138 MMOL/L (ref 134–144)
WBC # BLD AUTO: 7.4 X10E3/UL (ref 3.4–10.8)

## 2020-05-18 ENCOUNTER — TELEPHONE (OUTPATIENT)
Dept: CARDIOLOGY CLINIC | Age: 40
End: 2020-05-18

## 2020-05-18 RX ORDER — ERGOCALCIFEROL 1.25 MG/1
50000 CAPSULE ORAL
Qty: 12 CAP | Refills: 0 | Status: SHIPPED | OUTPATIENT
Start: 2020-05-18 | End: 2020-12-17 | Stop reason: DRUGHIGH

## 2020-05-18 NOTE — TELEPHONE ENCOUNTER
Vitamin D level low at 31, start ergocalciferol [id number] once a week for 12 weeks, rest of labs stable. Did he ever  a BP cuff?

## 2020-05-18 NOTE — TELEPHONE ENCOUNTER
Left message for patient to return call with message per XAVIER Long. Ordered Vitamin D prescription. I called patient, reviewed labs with him, he states understanding. He states he will  scale and BP cuff by Friday.

## 2020-07-30 ENCOUNTER — TELEPHONE (OUTPATIENT)
Dept: CARDIOLOGY CLINIC | Age: 40
End: 2020-07-30

## 2020-07-30 NOTE — TELEPHONE ENCOUNTER
Voice message left on Wednesday regarding scripts that patient needs. Returned call today and left a vm.

## 2020-07-30 NOTE — TELEPHONE ENCOUNTER
Patient called stating he would like to go out of town to [facility] next week for about 2 weeks. He would like to know if it is ok for him to go. I advised I would ask provider but I advised he is at much higher risk with COVID and heart failure. He states he is using social distancing and using mask. He also would like to know about medications, I advised he ask pharmacy if he has refills. He states he will. He is also due for office visit. I called patient and advised him per XAVIER Zaragoza:  He is due for a clinic visit and needs to schedule. Jovana Sauceda is at an increased risk with HF for COVID-19 and needs practice social distancing, crowded areas, wearing face mask, and hand hygiene. He states understanding. I tried to transfer him to schedule him but he hung up.

## 2020-08-05 ENCOUNTER — TELEPHONE (OUTPATIENT)
Dept: FAMILY MEDICINE CLINIC | Age: 40
End: 2020-08-05

## 2020-08-05 ENCOUNTER — TELEPHONE (OUTPATIENT)
Dept: CARDIOLOGY CLINIC | Age: 40
End: 2020-08-05

## 2020-08-05 NOTE — TELEPHONE ENCOUNTER
Returned call to pt, requesting appt for rash to both legs, has not tried anything otc,  appt scheduled for [email or web address]

## 2020-08-05 NOTE — TELEPHONE ENCOUNTER
Patient called asking for something for a rash that he believes is due to Herpes virus. I notified Enedina Bonilla, who states he needs to call PCP. Patient states understanding, will call now.

## 2020-08-05 NOTE — TELEPHONE ENCOUNTER
Pt has a few questions     Has a rash and is going out of town     NIKE number to reach him is [phone]

## 2020-08-06 ENCOUNTER — VIRTUAL VISIT (OUTPATIENT)
Dept: FAMILY MEDICINE CLINIC | Age: 40
End: 2020-08-06

## 2020-08-26 ENCOUNTER — TELEPHONE (OUTPATIENT)
Dept: CARDIOLOGY CLINIC | Age: 40
End: 2020-08-26

## 2020-08-26 NOTE — TELEPHONE ENCOUNTER
Prior auth completed for entresto via covermymeds, awaiting response.      Prior auth not needed, I called pharmacy, they state they filled medication, copay $0.

## 2020-10-09 ENCOUNTER — TELEPHONE (OUTPATIENT)
Dept: CARDIOLOGY CLINIC | Age: 40
End: 2020-10-09

## 2020-10-09 NOTE — TELEPHONE ENCOUNTER
Placed call to pt. Two pt identifiers confirmed. Informed pt to contact PCP for concerns. Pt verbalized understanding of information discussed w/ no further questions at this time.

## 2020-10-09 NOTE — TELEPHONE ENCOUNTER
Rash in private area, herpes, and hemorrhoids,  requesting an appt. Forwarded call to nurse.         Thanks

## 2020-10-21 ENCOUNTER — OFFICE VISIT (OUTPATIENT)
Dept: CARDIOLOGY CLINIC | Age: 40
End: 2020-10-21
Payer: MEDICAID

## 2020-10-21 VITALS
SYSTOLIC BLOOD PRESSURE: 94 MMHG | OXYGEN SATURATION: 97 % | WEIGHT: 235.6 LBS | DIASTOLIC BLOOD PRESSURE: 60 MMHG | HEART RATE: 80 BPM | BODY MASS INDEX: 29.29 KG/M2 | TEMPERATURE: 97.2 F | HEIGHT: 75 IN | RESPIRATION RATE: 16 BRPM

## 2020-10-21 DIAGNOSIS — E55.9 VITAMIN D DEFICIENCY: ICD-10-CM

## 2020-10-21 DIAGNOSIS — I11.0 HYPERTENSIVE HEART DISEASE WITH COMBINED SYSTOLIC AND DIASTOLIC CONGESTIVE HEART FAILURE, UNSPECIFIED HF CHRONICITY (HCC): ICD-10-CM

## 2020-10-21 DIAGNOSIS — I10 ESSENTIAL HYPERTENSION: ICD-10-CM

## 2020-10-21 DIAGNOSIS — B33.22 VIRAL MYOCARDITIS, UNSPECIFIED CHRONICITY: ICD-10-CM

## 2020-10-21 DIAGNOSIS — Z23 NEEDS FLU SHOT: ICD-10-CM

## 2020-10-21 DIAGNOSIS — I50.40 HYPERTENSIVE HEART DISEASE WITH COMBINED SYSTOLIC AND DIASTOLIC CONGESTIVE HEART FAILURE, UNSPECIFIED HF CHRONICITY (HCC): ICD-10-CM

## 2020-10-21 DIAGNOSIS — Z23 ENCOUNTER FOR IMMUNIZATION: ICD-10-CM

## 2020-10-21 DIAGNOSIS — I42.8 NICM (NONISCHEMIC CARDIOMYOPATHY) (HCC): Primary | ICD-10-CM

## 2020-10-21 PROCEDURE — 90686 IIV4 VACC NO PRSV 0.5 ML IM: CPT

## 2020-10-21 PROCEDURE — 99000 SPECIMEN HANDLING OFFICE-LAB: CPT | Performed by: NURSE PRACTITIONER

## 2020-10-21 PROCEDURE — 90732 PPSV23 VACC 2 YRS+ SUBQ/IM: CPT

## 2020-10-21 PROCEDURE — 99214 OFFICE O/P EST MOD 30 MIN: CPT | Performed by: NURSE PRACTITIONER

## 2020-10-21 RX ORDER — ZINC GLUCONATE 10 MG
LOZENGE ORAL
COMMUNITY

## 2020-10-21 RX ORDER — CLOTRIMAZOLE AND BETAMETHASONE DIPROPIONATE 10; .64 MG/G; MG/G
CREAM TOPICAL
Qty: 15 G | Refills: 1 | Status: SHIPPED | OUTPATIENT
Start: 2020-10-21 | End: 2021-05-20 | Stop reason: SDUPTHER

## 2020-10-21 RX ORDER — IBUPROFEN 200 MG
1 TABLET ORAL EVERY 24 HOURS
Qty: 30 PATCH | Refills: 0 | Status: SHIPPED | OUTPATIENT
Start: 2020-10-21 | End: 2020-11-30

## 2020-10-21 NOTE — PROGRESS NOTES
Carly Mathews is a 38 y.o. male who presents for routine immunizations. He denies any symptoms , reactions or allergies that would exclude them from being immunized today. Risks and adverse reactions were discussed and the VIS was given to them. All questions were addressed. He was observed for 15 min post injection. There were no reactions observed.     Jordan Reyes RN

## 2020-10-21 NOTE — PROGRESS NOTES
[facility] Note      DOS:   10/21/2020  NAME:  Donovan Jaime   MRN:   [id number]     REFERRING PROVIDER:  Dr. Vianney Canchola: Michele Quiñones MD  PRIMARY CARDIOLOGIST: Dr. Dorian Jurado  Sleep: Dr. Sidney Wang    Chief Complaint:  Chief Complaint   Patient presents with   Antonietta Rodriguez is a  35y.o. year old AA male with hx of HFrEF (10-20%) in the setting of viral myocarditis (Human Herpes Virus 6)  Complicated by new onset PAF with RVR s/p DCCV. He was first diagnosed 8/2018 when he presented to [facility] with ADHF found to be in afib with RVR. CTA (-) for PE, but showed a large R pleural effusion. He underwent a thoracentesis for ~ 1 L, and  ROBERTO and DCCV. Right and left heart cath showed no epicardial disease and slightly elevated filling pressures. Cardiac MRI showed LVEF 17%, RVEF 20% confirmed viral myocarditis and lab work was positive for  Coxsackie pane, CMV IgG positive. HHV-6 positive. Echo 10/26/18: showed EF improved to 45% with grade 3/4 diastolic dysfunction and improved MR now to mild. Most recent Echo on 11/15/19 LVEF 51-55%, mild-mod AZ but no pulm HTN. Mr. Daylin South presents today for HF follow up, overall he feels his baseline, he has has lost 8 pounds since April, he is able to do his normal activities. He is compliant with his medications. He tries to follow a low sodium diet. He has a rash since June that he has not been able to address. No acute HF compliants. Receiving Entresto from [facility]. Donovan Jaime lives with his  sister in their new home. 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 He smoked 15 years ~ 1/2 ppd. 2 etoh drinks each day of the  weekends No additional substances. Some college courses.          IMPRESSION/PLAN:    Heart Failure Status: NYHA Class II    NICM 2/2 viral myocarditis Stage C, NYHA Class II - EF improved from 10-20% to 50%   Coxsackie A panel positive, CMV IgG positive, HHV-6 positive    On entresto, carvedilol, spironolactone    HTN  - well controlled   AHFC donating cuff to pt     Continue entresto 97/103 mg, 1 tablet twice daily   Continue coreg 25 mg BID    Continue aldactone 25 mg daily   Counseled on smoking cessation     HFrEF, NYHA II LVEF 10-20%, RVEF 22%- EF now improved 51-55%-euvolemic    Conntinue entresto 97/103 mg BID ([employer] providing)   Continue coreg 25mg BID     Continue spironolactone 25 mg daily   No SDB on PSG   Encouraged to exercise regularly   Daily weights, record weights in journal have for every visit   Low Na+ diet, less than 2000mg   Recommend annual influenza vaccine and pneumovax every 5 years   Follow up in the [facility] in 6 months   TTE in November   Labs today      Paroxysmal Afib w/ RVR    S/p Cardioversion- Remains in Sinus   [id number] = 2   Amio  per EP    Warfarin discontinued by Dr. Marisol Meyer MR- improved with improved EF        Obesity-   Improved    Body mass index is 29.45 kg/m². Recommend he limit portion and become more aware of his diet   Encouraged to exercise regularly     Tobacco Use disorder   Smoking 4-5 cigarettes daily             Smoking cessation counseling               Depression   Did not tolerate Wellbutrin- will need to follow up with PCP     Prophylaxis   pneumovax up-to-date           CARDIAC EVALUATION   ECHO 7/17/18:mild-mod LVD, EF 10-20%, severe DHK, mild LVH, DD, RVD, mild- mod LAE, mod RAY, severe MR, mod-severe TR, RVSP 40, dilated RV outflow tract  ECHO 10/26/2018: mild LVD, EF 45%, G3/4 DD, mod ant leaflet thickening, mild MR, dil RVOT     ROBERTO 7/20/18: LVD, EF 10-15%, severe DHK, mild RVD, reduced RVEF, mod ANNAMARIE, no CELINA thrombus, no PFO, mod MR, mild TR      CATH 7/20/18: no epicardial disease, LVEP 1     RHC 7/20/18: RA:12, RV 34/6/14.  PA 30/20/26, PCWP 18, [place] 6.33/2.74 TD: 5.37/2.32     DCCV 7/20/18    CARDIAC MRI: 7/20/18: mod LVD (LVDD 66), mild LVH (13mm), LVEF 17%, severe GHK, mild RVD, RVEF 20%, mod GHK, mod MR, mild TR. Distinct mid wall stripe enhancement of the basal septal wall along with RV insertion point enhancement sparing the subendocardium. viral myocarditis of HHV-6 viral strain. NO features of giant cell or lymphocytic myocarditis. , infiltrative Sarcoidosis, amyloidosis, old MI    11/15/19 echo normal lv size with lvef 51-55%. Mild pul regurg. UPEP, SPEP,  heavy metals, FLC (-) HIV ( NR), Legionella (-) Ferritin WNL     History:  Past Medical History:   Diagnosis Date    Anxiety     Class 1 obesity due to excess calories with serious comorbidity and body mass index (BMI) of 32.0 to 32.9 in adult 2/27/2019    Combined systolic and diastolic heart failure ([address] 8/8/2018    ECHO 7/17/18:mild-mod LVD, EF 10-20%, severe DHK, mild LVH, DD, RVD, mild- mod LAE, mod RAY, severe MR, mod-severe TR, RVSP 40, dilated RV outflow tract  ROBERTO 7/20/18: LVD, EF 10-15%, severe DHK, mild RVD, reduced RVEF, mod ANNAMARIE, no CELINA thrombus, no PFO, mod MR, mild TR  RHC 7/20/18: RA:12, RV 34/6/14.  PA 30/20/26, PCWP 18, [place] 6.33/2.74 TD: 5.37/2.32    CARDIAC MRI: 7/20/18: mod LVD (LVDD 66), mil    Congestive heart failure ([address]     Essential hypertension 3/12/2019    Hypertensive heart disease with combined systolic and diastolic congestive heart failure ([address] 3/12/2019    ECHO 7/17/18:mild-mod LVD, EF 10-20%, severe DHK, mild LVH, DD, RVD, mild- mod LAE, mod RAY, severe MR, mod-severe TR, RVSP 40, dilated RV outflow tract ECHO 10/26/2018: mild LVD, EF 45%, G3/4 DD, mod ant leaflet thickening, mild MR, dil RVOT        Long term current use of anticoagulant therapy     NICM (nonischemic cardiomyopathy) ([address] 8/8/2018    Non-rheumatic mitral regurgitation 8/8/2018    ECHO 7/17/18:mild-mod LVD, EF 10-20%, severe DHK, mild LVH, DD, RVD, mild- mod LAE, mod RAY, severe MR, mod-severe TR, RVSP 40, dilated RV outflow tract  ROBERTO 7/20/18: LVD, EF 10-15%, severe [address], mild RVD, reduced RVEF, mod ANNAMARIE, no CELINA thrombus, no PFO, mod MR, mild TR      PAF (paroxysmal atrial fibrillation) ([address] 8/8/2018    ROBERTO 7/20/18: LVD, EF 10-15%, severe DHK, mild RVD, reduced RVEF, mod ANNAMARIE, no CELINA thrombus, no PFO, mod MR, mild TR    DCCV 7/20/18       Rapid atrial fibrillation [facility] 7/17/2018    Tobacco use disorder 8/8/2018    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    Vitamin D deficiency 9/19/2018    Level 12 8/2018     Past Surgical History:   Procedure Laterality Date    CARDIOVERSION, ELECTIVE;EXTERN  7/20/2018         HX HEART CATHETERIZATION       Social History     Socioeconomic History    Marital status: SINGLE     Spouse name: Not on file    Number of children: Not on file    Years of education: Not on file    Highest education level: Not on file   Occupational History    Not on file   Social Needs    Financial resource strain: Not on file    Food insecurity     Worry: Not on file     Inability: Not on file    Transportation needs     Medical: Not on file     Non-medical: Not on file   Tobacco Use    Smoking status: Current Some Day Smoker     Types: Cigarettes    Smokeless tobacco: Never Used    Tobacco comment: 3-4 cigarettes vaughn   Substance and Sexual Activity    Alcohol use: No    Drug use: No    Sexual activity: Not Currently   Lifestyle    Physical activity     Days per week: Not on file     Minutes per session: Not on file    Stress: Not on file   Relationships    Social connections     Talks on phone: Not on file     Gets together: Not on file     Attends Jewish service: Not on file     Active member of club or organization: Not on file     Attends meetings of clubs or organizations: Not on file     Relationship status: Not on file    Intimate partner violence     Fear of current or ex partner: Not on file     Emotionally abused: Not on file     Physically abused: Not on file     Forced sexual activity: Not on file   Other Topics Concern [address] Service Not Asked    Blood Transfusions Not Asked    Caffeine Concern Not Asked    Occupational Exposure Not Asked    Hobby Hazards Not Asked    Sleep Concern Not Asked    Stress Concern Not Asked    Weight Concern Not Asked    Special Diet Not Asked    Back Care Not Asked    Exercise Not Asked    Bike Helmet Not Asked   [address] Not Asked    Self-Exams Not Asked   Social History Narrative    Not on file     Family History   Problem Relation Age of Onset    Diabetes Mother     Heart Failure Mother     No Known Problems Father     Hypertension Sister     Hypertension Sister        Current Medications:   Current Outpatient Medications   Medication Sig Dispense Refill    magnesium 250 mg tab Take  by mouth. OTC: One tab am and 1/2 tab in pm      ergocalciferol (ERGOCALCIFEROL) 1,250 mcg (50,000 unit) capsule Take 1 Cap by mouth every seven (7) days. 12 Cap 0    sacubitril-valsartan (ENTRESTO) 97 mg/103 mg tablet Take 1 Tab by mouth two (2) times a day. Indications: chronic heart failure 180 Tab 2    carvedilol (COREG) 25 mg tablet Take 1 Tab by mouth two (2) times daily (with meals). 180 Tab 2    spironolactone (ALDACTONE) 25 mg tablet Take 1 Tab by mouth daily. 90 Tab 3    multivitamin (ONE A DAY) tablet Take 1 Tab by mouth daily.  EPINEPHrine (EPIPEN) 0.3 mg/0.3 mL injection 0.3 mL by IntraMUSCular route once as needed for Anaphylaxis or Allergic Response for up to 1 dose. 2 Syringe 6       Allergies:    Allergies   Allergen Reactions    Peanut Anaphylaxis    Milk Other (comments)    Shrimp Other (comments)     Recent Labs:   Lab Results   Component Value Date/Time    WBC 7.4 05/15/2020 01:15 PM    HGB 14.4 05/15/2020 01:15 PM    HCT 43.7 05/15/2020 01:15 PM    PLATELET 524 38/41/8051 01:15 PM    MCV 97 05/15/2020 01:15 PM     Lab Results   Component Value Date/Time    TSH 1.810 10/08/2019 12:00 AM    T3 Uptake 39 10/08/2019 12:00 AM    T4, Free 1.69 02/28/2019 12:52 PM T4, Total 10.1 10/08/2019 12:00 AM      Lab Results   Component Value Date/Time    CK 91 07/17/2018 02:00 PM    CK - MB 1.6 07/17/2018 02:00 PM    CK-MB Index 1.8 07/17/2018 02:00 PM      Lab Results   Component Value Date/Time    NT pro-BNP 50 10/24/2018 09:18 AM    NT pro-BNP 1,981 (H) 07/26/2018 05:12 AM    NT pro-BNP 2,507 (H) 07/25/2018 04:08 AM    NT pro-BNP 2,972 (H) 07/24/2018 04:19 AM    NT pro-BNP 2,629 (H) 07/23/2018 04:09 AM    PROBNP 21 05/15/2020 01:15 PM    PROBNP 17 10/08/2019 12:00 AM    PROBNP 17 03/13/2019 11:52 AM    PROBNP 17 02/28/2019 12:51 PM    PROBNP 477 (H) 09/20/2018 12:25 PM      Lab Results   Component Value Date/Time    Sodium 138 05/15/2020 01:15 PM    Potassium 4.1 05/15/2020 01:15 PM    Chloride 105 05/15/2020 01:15 PM    CO2 20 05/15/2020 01:15 PM    Anion gap 9 10/24/2018 09:18 AM    Glucose 75 05/15/2020 01:15 PM    BUN 11 05/15/2020 01:15 PM    Creatinine 1.16 05/15/2020 01:15 PM    BUN/Creatinine ratio 9 05/15/2020 01:15 PM    GFR est AA 91 05/15/2020 01:15 PM    GFR est non-AA 78 05/15/2020 01:15 PM    Calcium 9.1 05/15/2020 01:15 PM    Bilirubin, total 0.6 05/15/2020 01:15 PM    ALT (SGPT) 16 05/15/2020 01:15 PM    Alk. phosphatase 94 05/15/2020 01:15 PM    Protein, total 7.0 05/15/2020 01:15 PM    Albumin 4.1 05/15/2020 01:15 PM    Globulin 4.4 (H) 07/26/2018 05:12 AM    A-G Ratio 1.4 05/15/2020 01:15 PM      No results found for: HBA1C, RYD0FINN, HGBE8, EEG5OAUS, SIN5DFRZ, JNJ6QPNT       CT Results (most recent):  Results from [place] encounter on 07/17/18   CTA CHEST W OR W WO CONT    Narrative EXAM:  CTA CHEST W OR W WO CONT    INDICATION:   Irregular heartbeat, pleural effusion seen on chest radiograph    COMPARISON: Chest radiograph performed earlier the same day. TECHNIQUE:   Precontrast  images were obtained to localize the volume for acquisition.   Multislice helical CT arteriography was performed from the diaphragm to the  thoracic inlet during uneventful rapid bolus of 100 cc Isovue-370. Lung and soft  tissue windows were generated. Coronal and sagittal images were generated and  3D post processing consisting of coronal maximum intensity images was performed. CT dose reduction was achieved through use of a standardized protocol tailored  for this examination and automatic exposure control for dose modulation. FINDINGS:  CHEST:  THYROID: No nodule. MEDIASTINUM: No mass or lymphadenopathy. GRAYSON: No mass or lymphadenopathy. THORACIC AORTA: No aneurysm. Not well opacified. MAIN PULMONARY ARTERY: There is no evidence of pulmonary embolism. TRACHEA/BRONCHI: Patent. ESOPHAGUS: No wall thickening or dilatation. HEART: Dilatation of the left atrium and left ventricle is noted. PLEURA: There is a moderate right pleural effusion  LUNGS: Right middle and right lower lobe infiltrate is noted. Atelectasis is  seen in the left lower lobe. INCIDENTALLY IMAGED UPPER ABDOMEN: No focal abnormality. BONES: No destructive bone lesion. Impression IMPRESSION: Moderate right pleural effusion with right middle and right lower  lobe infiltrates. Left lower lobe atelectasis. No evidence of pulmonary  embolism. Review of Systems   Constitutional: Negative. Negative for chills, fever and malaise/fatigue. HENT: Negative. Eyes: Negative. Respiratory: Negative. Negative for cough and shortness of breath. Cardiovascular: Negative. Negative for chest pain, palpitations, orthopnea and leg swelling. Gastrointestinal: Negative. Negative for heartburn and nausea. Genitourinary: Negative. Musculoskeletal: Negative. Negative for myalgias. Skin: Negative. Neurological: Negative. Negative for dizziness, weakness and headaches. Endo/Heme/Allergies: Negative. Psychiatric/Behavioral: Negative.           Objective:     Visit Vitals  BP 94/60 (BP 1 Location: Left arm, BP Patient Position: Sitting)   Pulse 80   Temp 97.2 °F (36.2 °C) (Oral)   Resp 16   Ht 6' 3\" (1.905 m)   Wt 235 lb 9.6 oz (106.9 kg)   SpO2 97%   BMI 29.45 kg/m²      Physical Exam   Constitutional: He is oriented to person, place, and time. He appears well-developed and well-nourished. No distress. HENT:   Head: Normocephalic. Eyes: Pupils are equal, round, and reactive to light. Neck: Normal range of motion. Neck supple. No JVD present. Cardiovascular: Normal rate, regular rhythm, normal heart sounds and intact distal pulses. Pulmonary/Chest: Effort normal and breath sounds normal. No respiratory distress. Abdominal: Soft. Bowel sounds are normal. He exhibits no distension. Musculoskeletal: Normal range of motion. General: No edema. Neurological: He is alert and oriented to person, place, and time. Skin: Skin is warm and dry. Psychiatric: He has a normal mood and affect. Vitals reviewed.            Joel Mcginnis NP  [address], [address] [place] [address], [address]  Office [phone]  Fax [phone]

## 2020-10-21 NOTE — PATIENT INSTRUCTIONS
Medication changes:    None today     Please take this to your pharmacy to notify them of the change in medications. Testing Ordered: An order for ECHO has been placed to be done next month. You will be receiving an automated call from Coordination of Care to schedule this test. If you are unavailable to receive the call or would like to contact coordination of care yourself you may contact [phone] to schedule. Blood work today    Other Recommendations:      Ensure your drinking an adequate amount of water with a goal of 6-8 eight ounce glasses (1.5-2 liters) of fluid daily. Your urine should be clear and light yellow straw colored. If your blood pressure begins to consistently run below 90/60 and/or you begin to experience dizziness or lightheadedness, please contact the [facility] at [phone]. Follow up in 6 months with [facility]      Please monitor your blood pressures daily prior to medications and 2 hours after taking medications. Bring a written record of your blood pressures to your next appointment. Please monitor your weights daily upon waking and after using the bathroom. Keep a written records of your weights and bring to your next appointment. If you have a weight gain of 3 or more pounds overnight OR 5 or more pounds in one week please contact our office. Thank you for allowing us the privilege of being a part of your healthcare team! Please do not hesitate to contact our office at [phone] with any questions or concerns. Vaccine Information Statement    Influenza (Flu) Vaccine (Inactivated or Recombinant): What You Need to Know    Many Vaccine Information Statements are available in [place]ian and other languages. See www.immunize.org/vis  Hojas de información sobre vacunas están disponibles en español y en muchos otros idiomas. Visite www.immunize.org/vis    1. Why get vaccinated?     Influenza vaccine can prevent influenza (flu). Flu is a contagious disease that spreads around the [place] every year, usually between October and May. Anyone can get the flu, but it is more dangerous for some people. Infants and young children, people 72years of age and older, pregnant women, and people with certain health conditions or a weakened immune system are at greatest risk of flu complications. Pneumonia, bronchitis, sinus infections and ear infections are examples of flu-related complications. If you have a medical condition, such as heart disease, cancer or diabetes, flu can make it worse. Flu can cause fever and chills, sore throat, muscle aches, fatigue, cough, headache, and runny or stuffy nose. Some people may have vomiting and diarrhea, though this is more common in children than adults. Each year thousands of people in the [facility] die from flu, and many more are hospitalized. Flu vaccine prevents millions of illnesses and flu-related visits to the doctor each year. 2. Influenza vaccines     CDC recommends everyone 10months of age and older get vaccinated every flu season. Children 6 months through 6years of age may need 2 doses during a single flu season. Everyone else needs only 1 dose each flu season. It takes about 2 weeks for protection to develop after vaccination. There are many flu viruses, and they are always changing. Each year a new flu vaccine is made to protect against three or four viruses that are likely to cause disease in the upcoming flu season. Even when the vaccine doesnt exactly match these viruses, it may still provide some protection. Influenza vaccine does not cause flu. Influenza vaccine may be given at the same time as other vaccines.     3. Talk with your health care provider    Tell your vaccine provider if the person getting the vaccine:   Has had an allergic reaction after a previous dose of influenza vaccine, or has any severe, life-threatening allergies.  Has ever had Guillain-Barré Syndrome (also called GBS). In some cases, your health care provider may decide to postpone influenza vaccination to a future visit. People with minor illnesses, such as a cold, may be vaccinated. People who are moderately or severely ill should usually wait until they recover before getting influenza vaccine. Your health care provider can give you more information. 4. Risks of a reaction     Soreness, redness, and swelling where shot is given, fever, muscle aches, and headache can happen after influenza vaccine.  There may be a very small increased risk of Guillain-Barré Syndrome (GBS) after inactivated influenza vaccine (the flu shot). Keiry Moya children who get the flu shot along with pneumococcal vaccine (PCV13), and/or DTaP vaccine at the same time might be slightly more likely to have a seizure caused by fever. Tell your health care provider if a child who is getting flu vaccine has ever had a seizure. People sometimes faint after medical procedures, including vaccination. Tell your provider if you feel dizzy or have vision changes or ringing in the ears. As with any medicine, there is a very remote chance of a vaccine causing a severe allergic reaction, other serious injury, or death. 5. What if there is a serious problem? An allergic reaction could occur after the vaccinated person leaves the clinic. If you see signs of a severe allergic reaction (hives, swelling of the face and throat, difficulty breathing, a fast heartbeat, dizziness, or weakness), call 9-1-1 and get the person to the nearest hospital.    For other signs that concern you, call your health care provider. Adverse reactions should be reported to the Vaccine Adverse Event Reporting System (VAERS). Your health care provider will usually file this report, or you can do it yourself. Visit the VAERS website at www.vaers. hhs.gov or call 2-317.254.8491.   [facility] is only for reporting reactions, and [facility] staff do not give medical advice. 6. The National Vaccine Injury Compensation Program    The [facility] Vaccine Injury Compensation Program (VICP) is a federal program that was created to compensate people who may have been injured by certain vaccines. Visit the VICP website at www.[facility].gov/vaccinecompensation or call 5-424.302.9734 to learn about the program and about filing a claim. There is a time limit to file a claim for compensation. 7. How can I learn more?  Ask your health care provider.  Call your local or state health department.  Contact the Centers for Disease Control and Prevention (CDC):  - Call 9-405.268.3810 (1-945-JKJ-INFO) or  - Visit CDCs influenza website at www.cdc.gov/flu    Vaccine Information Statement (Interim)  Inactivated Influenza Vaccine   8/15/2019  [address] 101AW-68   Department of Health and Human Services  Centers for Disease Control and Prevention    Office Use Only      Vaccine Information Statement    Pneumococcal Polysaccharide Vaccine (PPSV23): What You Need to Know    Many Vaccine Information Statements are available in [place] and other languages. See www.immunize.org/vis  Hojas de información sobre vacunas están disponibles en español y en muchos otros idiomas. Visite www.immunize.org/vis    1. Why get vaccinated? Pneumococcal polysaccharide vaccine (PPSV23) can prevent pneumococcal disease. Pneumococcal disease refers to any illness caused by pneumococcal bacteria. These bacteria can cause many types of illnesses, including pneumonia, which is an infection of the lungs. Pneumococcal bacteria are one of the most common causes of pneumonia.       Besides pneumonia, pneumococcal bacteria can also cause:   Ear infections   Sinus infections   Meningitis (infection of the tissue covering the brain and spinal cord)   Bacteremia (bloodstream infection)    Anyone can get pneumococcal disease, but children under 3years of age, people with certain medical conditions, adults 72 years or older, and cigarette smokers are at the highest risk. Most pneumococcal infections are mild. However, some can result in long-term problems, such as brain damage or hearing loss. Meningitis, bacteremia, and pneumonia caused by pneumococcal disease can be fatal.     2. PPSV23     PPSV23 protects against 23 types of bacteria that cause pneumococcal disease. PPSV23 is recommended for:   All adults 72 years or older,   Anyone 2 years or older with certain medical conditions that can lead to an increased risk for pneumococcal disease. Most people need only one dose of PPSV23. A second dose of PPSV23, and another type of pneumococcal vaccine called PCV13, are recommended for certain high-risk groups. Your health care provider can give you more information. People 65 years or older should get a dose of PPSV23 even if they have already gotten one or more doses of the vaccine before they turned 72.    3. Talk with your health care provider    Tell your vaccine provider if the person getting the vaccine:   Has had an allergic reaction after a previous dose of PPSV23, or has any severe, life-threatening allergies. In some cases, your health care provider may decide to postpone PPSV23 vaccination to a future visit. People with minor illnesses, such as a cold, may be vaccinated. People who are moderately or severely ill should usually wait until they recover before getting PPSV23. Your health care provider can give you more information. 4. Risks of a vaccine reaction     Redness or pain where the shot is given, feeling tired, fever, or muscle aches can happen after PPSV23. People sometimes faint after medical procedures, including vaccination. Tell your provider if you feel dizzy or have vision changes or ringing in the ears.     As with any medicine, there is a very remote chance of a vaccine causing a severe allergic reaction, other serious injury, or death. 5. What if there is a serious problem? An allergic reaction could occur after the vaccinated person leaves the clinic. If you see signs of a severe allergic reaction (hives, swelling of the face and throat, difficulty breathing, a fast heartbeat, dizziness, or weakness), call 9-1-1 and get the person to the nearest hospital.    For other signs that concern you, call your health care provider. Adverse reactions should be reported to the Vaccine Adverse Event Reporting System (VAERS). Your health care provider will usually file this report, or you can do it yourself. Visit the VAERS website at www.vaers. [facility].gov or call 6-450.932.7777. VAERS is only for reporting reactions, and VAERS staff do not give medical advice. 6. How can I learn more?  Ask your health care provider.  Call your local or state health department.    Contact the Centers for Disease Control and Prevention (CDC):  - Call 2-612.192.6482 (1-800-CDC-INFO) or  - Visit CDCs website at www.cdc.gov/vaccines    Vaccine Information Statement   PPSV23   10/30/2019    [facility] for Disease Control and Prevention    Office Use Only

## 2020-10-22 ENCOUNTER — TELEPHONE (OUTPATIENT)
Dept: CARDIOLOGY CLINIC | Age: 40
End: 2020-10-22

## 2020-10-22 DIAGNOSIS — R79.89 LOW TSH LEVEL: Primary | ICD-10-CM

## 2020-10-22 LAB
25(OH)D3+25(OH)D2 SERPL-MCNC: 55 NG/ML (ref 30–100)
ALBUMIN SERPL-MCNC: 3.4 G/DL (ref 4–5)
ALBUMIN/GLOB SERPL: 1.1 {RATIO} (ref 1.2–2.2)
ALP SERPL-CCNC: 103 IU/L (ref 39–117)
ALT SERPL-CCNC: 34 IU/L (ref 0–44)
AST SERPL-CCNC: 39 IU/L (ref 0–40)
BILIRUB SERPL-MCNC: 0.6 MG/DL (ref 0–1.2)
BUN SERPL-MCNC: 8 MG/DL (ref 6–24)
BUN/CREAT SERPL: 10 (ref 9–20)
CALCIUM SERPL-MCNC: 9.9 MG/DL (ref 8.7–10.2)
CHLORIDE SERPL-SCNC: 105 MMOL/L (ref 96–106)
CO2 SERPL-SCNC: 21 MMOL/L (ref 20–29)
CREAT SERPL-MCNC: 0.77 MG/DL (ref 0.76–1.27)
GLOBULIN SER CALC-MCNC: 3 G/DL (ref 1.5–4.5)
GLUCOSE SERPL-MCNC: 98 MG/DL (ref 65–99)
MAGNESIUM SERPL-MCNC: 1.7 MG/DL (ref 1.6–2.3)
NT-PROBNP SERPL-MCNC: 21 PG/ML (ref 0–86)
POTASSIUM SERPL-SCNC: 4.7 MMOL/L (ref 3.5–5.2)
PROT SERPL-MCNC: 6.4 G/DL (ref 6–8.5)
SODIUM SERPL-SCNC: 138 MMOL/L (ref 134–144)
T4 FREE SERPL-MCNC: 2.91 NG/DL (ref 0.82–1.77)
TSH SERPL DL<=0.005 MIU/L-ACNC: <0.005 UIU/ML (ref 0.45–4.5)

## 2020-10-22 NOTE — TELEPHONE ENCOUNTER
----- Message from Charles Mcelroy NP sent at 10/22/2020 12:21 PM EDT -----  Please call Heather Howard to discuss their abnormal lab results. TSH is low, he does not take synthroid. He will need a referral to Endocrine please.

## 2020-10-22 NOTE — TELEPHONE ENCOUNTER
Referral placed and given to Jean Barillas MA for scheduling. Contacted patient to notify. Informed him he will be contacted for scheduling. Patient verbalized understanding and had no further questions. Magali Fernandez RN.

## 2020-10-22 NOTE — PROGRESS NOTES
Please call Jessi Coy to discuss their abnormal lab results. TSH is low, he does not take synthroid. He will need a referral to Endocrine please.

## 2020-10-26 ENCOUNTER — TELEPHONE (OUTPATIENT)
Dept: CARDIOLOGY CLINIC | Age: 40
End: 2020-10-26

## 2020-10-26 NOTE — TELEPHONE ENCOUNTER
Patient is scheduled for a virtual visit with   Dr. Isreal Thompson(endocrinology)  1-  11:40am  [phone]    This information was left on patient's voice mail.

## 2020-11-30 RX ORDER — IBUPROFEN 200 MG
TABLET ORAL
Qty: 30 PATCH | Refills: 0 | Status: SHIPPED | OUTPATIENT
Start: 2020-11-30

## 2020-12-09 ENCOUNTER — TELEPHONE (OUTPATIENT)
Dept: CARDIOLOGY CLINIC | Age: 40
End: 2020-12-09

## 2020-12-09 DIAGNOSIS — I42.8 NICM (NONISCHEMIC CARDIOMYOPATHY) (HCC): ICD-10-CM

## 2020-12-09 DIAGNOSIS — I50.40 COMBINED SYSTOLIC AND DIASTOLIC HEART FAILURE, UNSPECIFIED HF CHRONICITY (HCC): ICD-10-CM

## 2020-12-09 RX ORDER — SPIRONOLACTONE 25 MG/1
25 TABLET ORAL DAILY
Qty: 90 TAB | Refills: 1 | Status: SHIPPED | OUTPATIENT
Start: 2020-12-09 | End: 2021-06-01

## 2020-12-09 NOTE — TELEPHONE ENCOUNTER
Requested Prescriptions     Signed Prescriptions Disp Refills    spironolactone (ALDACTONE) 25 mg tablet 90 Tab 1     Sig: Take 1 Tab by mouth daily.      Authorizing Provider: Frank Valentin     Ordering User: Lenka Evangelista

## 2020-12-09 NOTE — TELEPHONE ENCOUNTER
Patient called to request a refill on his spironolactone. He states his pharmacy only gave him a one week refill. I also requested patient schedule next appointment. He is scheduled for 4/13/20 at 10am with Vicky Haynes.     Patient's call back number is [phone]

## 2020-12-17 DIAGNOSIS — R06.02 SHORTNESS OF BREATH: Primary | ICD-10-CM

## 2020-12-17 DIAGNOSIS — E55.9 VITAMIN D DEFICIENCY: ICD-10-CM

## 2020-12-17 RX ORDER — ACETAMINOPHEN 500 MG
2000 TABLET ORAL DAILY
Qty: 30 CAP | Refills: 2 | Status: SHIPPED | OUTPATIENT
Start: 2020-12-17 | End: 2022-07-27 | Stop reason: SDUPTHER

## 2020-12-17 NOTE — TELEPHONE ENCOUNTER
Refill request for vitamin D received via fax. Last vitamin D level 10/21/20 WNL at 54. Reviewed with Vera Hess NP who recommended V.O.R.B patient transition to vitamin D3 2,000 units daily. Pharmacy notified. Contacted patient to notify. He verbalized understanding and had no further questions. Bear Jaffe RN.

## 2021-01-22 ENCOUNTER — VIRTUAL VISIT (OUTPATIENT)
Dept: ENDOCRINOLOGY | Age: 41
End: 2021-01-22
Payer: MEDICAID

## 2021-01-22 DIAGNOSIS — R79.89 LOW TSH LEVEL: Primary | ICD-10-CM

## 2021-01-22 PROCEDURE — 99204 OFFICE O/P NEW MOD 45 MIN: CPT | Performed by: INTERNAL MEDICINE

## 2021-01-22 RX ORDER — ESCITALOPRAM OXALATE 10 MG/1
TABLET ORAL
COMMUNITY
Start: 2020-11-30 | End: 2021-05-20

## 2021-01-22 NOTE — PROGRESS NOTES
Chief Complaint   Patient presents with    New Patient     doxy: [phone]    Thyroid Problem    Other     [facility]       History of Present Illness: Kyle Resendiz is a 38 y.o. male with a past medical hx significant for combined systolic and diastolic heart failure, HTN, afib presenting in referral from Mary Kay Burdick MD for discussion related to a low TSH level. \"Mar-mars\"    Denies tremor/palpitations. Has lost 20 lbs via diet and exercise. Denies diarrhea, trouble sleeping or heat intolerance. Has not taken any amiodarone since April 2020. Does not take any supplements for the hair skin or nails. Past Medical History:   Diagnosis Date    Anxiety     Class 1 obesity due to excess calories with serious comorbidity and body mass index (BMI) of 32.0 to 32.9 in adult 2/27/2019    [facility] 8/8/2018    ECHO 7/17/18:mild-mod LVD, EF 10-20%, severe DHK, mild LVH, DD, RVD, mild- mod LAE, mod RAY, severe MR, mod-severe TR, RVSP 40, dilated RV outflow tract  ROBERTO 7/20/18: LVD, EF 10-15%, severe DHK, mild RVD, reduced RVEF, mod ANNAMARIE, no CELINA thrombus, no PFO, mod MR, mild TR  RHC 7/20/18: RA:12, RV 34/6/14.  PA 30/20/26, PCWP 18, [place] 6.33/2.74 TD: 5.37/2.32    CARDIAC MRI: 7/20/18: mod LVD (LVDD 66), mil    Congestive heart failure ([facility]     Essential hypertension 3/12/2019    Hypertensive heart disease with combined systolic and diastolic congestive heart failure ([facility] 3/12/2019    ECHO 7/17/18:mild-mod LVD, EF 10-20%, severe DHK, mild LVH, DD, RVD, mild- mod LAE, mod RAY, severe MR, mod-severe TR, RVSP 40, dilated RV outflow tract ECHO 10/26/2018: mild LVD, EF 45%, G3/4 DD, mod ant leaflet thickening, mild MR, dil RVOT        Long term current use of anticoagulant therapy     NICM (nonischemic cardiomyopathy) ([facility] 8/8/2018    Non-rheumatic mitral regurgitation 8/8/2018    ECHO 7/17/18:mild-mod LVD, EF 10-20%, severe DHK, mild LVH, DD, RVD, mild- mod LAE, mod RAY, severe MR, mod-severe TR, RVSP 40, dilated RV outflow tract  ROBERTO 7/20/18: LVD, EF 10-15%, severe DHK, mild RVD, reduced RVEF, mod ANNAMARIE, no CELINA thrombus, no PFO, mod MR, mild TR      PAF (paroxysmal atrial fibrillation) ([address] 8/8/2018    ROBERTO 7/20/18: LVD, EF 10-15%, severe DHK, mild RVD, reduced RVEF, mod ANNAMARIE, no CELINA thrombus, no PFO, mod MR, mild TR    DCCV 7/20/18       Rapid atrial fibrillation ([facility] 7/17/2018    Tobacco use disorder 8/8/2018    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  carvediloL (COREG) 25 mg tablet TAKE 1 TABLET BY MOUTH TWICE DAILY WITH MEALS    Entresto  mg tablet Take 1 tablet by mouth twice daily    nicotine (NICODERM CQ) 21 mg/24 hr APPLY 1 PATCH TOPICALLY EVERY 24 HOURS FOR 30 DAYS    magnesium 250 mg tab Take  by mouth. OTC: One tab am and 1/2 tab in pm    clotrimazole-betamethasone (LOTRISONE) topical cream Apply to affected area twice a day    multivitamin (ONE A DAY) tablet Take 1 Tab by mouth daily.  cholecalciferol (VITAMIN D3) (2,000 UNITS /50 MCG) cap capsule Take 2,000 Units by mouth daily.  EPINEPHrine (EPIPEN) 0.3 mg/0.3 mL injection 0.3 mL by IntraMUSCular route once as needed for Anaphylaxis or Allergic Response for up to 1 dose. No current facility-administered medications for this visit.         Allergies   Allergen Reactions    Peanut Anaphylaxis    Milk Other (comments)    Shrimp Other (comments)       Social Hx:[address] [place] [address] [facility]. [employer] drinker  Smokes 4 cigs per day    Family Hx:aunt and sister with thyroid dysfunction    Review of Systems:  - Constitutional Symptoms: no fevers, chills, weight loss  - Eyes: no blurry vision or double vision  - Cardiovascular: no chest pain or palpitations  - Respiratory: no cough or shortness of breath  - Gastrointestinal: no dysphagia or abdominal pain  - Musculoskeletal: no joint pains or weakness  - Integumentary: no rashes  - Neurological: no numbness, tingling, tremor, or or headaches  - Psychiatric: no depression or anxiety  - Endocrine: no heat or cold intolerance, no polyuria or polydipsia    - Physical Examination:  Wt Readings from Last 3 Encounters:   10/21/20 235 lb 9.6 oz (106.9 kg)   04/28/20 253 lb (114.8 kg)   11/07/19 261 lb 9.6 oz (118.7 kg)      - GENERAL: NCAT, Appears well nourished   - EYES: EOMI, non-icteric, no proptosis   - Ear/Nose/Throat: NCAT, no visible thyroid nodules  - CARDIOVASCULAR: no cyanosis, no visible JVD   - RESPIRATORY: respiratory effort normal without any distress or labored breathing   - MUSCULOSKELETAL: Normal ROM of neck and upper extremities observed   - SKIN: No rash on face  - NEUROLOGIC:  No facial asymmetry (Cranial nerve 7 motor function), No gaze palsy   - PSYCHIATRIC: Normal affect, Normal insight and judgement     Data Reviewed:         Assessment/Plan: This is a very pleasant 69-year-old gentleman with past medical history significant for cardiomyopathy previously on amiodarone presenting in referral from Zee Briscoe MD for discussion related to a low TSH in October 2020 with a concomitantly elevated free T4 level. We discussed the broad differential diagnosis for a low TSH in the setting of amiodarone use today. Specifically, will obtain TSI level to assess for Graves' disease although this is unlikely given his current clinical euthyroidism.   Will obtain repeat TSH, free T4, total T3 levels, thyroperoxidase antibodies, thyroglobulin antibodies to determine if he is in the hyperthyroid phase of Hashimoto's hypothyroidism. Obviously amiodarone induced hyperthyroidism is on the differential.  I expect the amiodarone to be completely out of his system by now as he has not taken it for over 9 months. A toxic thyroid nodule is also on the differential diagnosis. If TSH remains low, will obtain a thyroid ultrasound. 1. Low TSH level  -Currently clinically euthyroid  -Does not take biotin  -Has not taken amiodarone since April 2020  - TSH 3RD GENERATION  - T4, FREE  - T3 TOTAL  - THYROID STIMULATING IMMUNOGLOBULIN  - THYROID PEROXIDASE (TPO) AB  - THYROGLOBULIN AB  - CBC WITH AUTOMATED DIFF  - METABOLIC PANEL, COMPREHENSIVE    Return to Care: July 22nd at 11:10 or sooner if thyroid function tests are abnormal    Copy sent to:Ramu Hernandez MD      Aracelis Moody is a 38 y.o. male being evaluated by a Virtual Visit (video visit) encounter to address concerns as mentioned above. A caregiver was present when appropriate. Due to this being a TeleHealth encounter (During [facility]- public health emergency), evaluation of the following organ systems was limited:     Vitals/Constitutional/EENT/Resp/CV/GI//MS/Neuro/Skin/Heme-Lymph-Imm. Pursuant to the emergency declaration under the [facility], [address] [place] [address] authority and the [employer] General Act, this Virtual Visit was conducted with patient's (and/or legal guardian's) consent, to reduce the risk of exposure to COVID-19 and provide necessary medical care. Services were provided through a video synchronous discussion virtually to substitute for in-person encounter. --Rodríguez Rodriguez MD on 1/22/2021 at 8:18 AM    An electronic signature was used to authenticate this note.

## 2021-04-08 ENCOUNTER — TELEPHONE (OUTPATIENT)
Dept: CARDIOLOGY CLINIC | Age: 41
End: 2021-04-08

## 2021-04-08 NOTE — TELEPHONE ENCOUNTER
Called patient using two patient identifiers. Notified patient that he needs a an echo prior to appointment with [facility] on 4/14. Provided patient with care coordinations number to schedule echo. Patient stated understanding and had no further questions.   Micah Amin RN

## 2021-04-27 DIAGNOSIS — I42.8 NICM (NONISCHEMIC CARDIOMYOPATHY) (HCC): ICD-10-CM

## 2021-04-27 DIAGNOSIS — I50.42 CHRONIC COMBINED SYSTOLIC AND DIASTOLIC HEART FAILURE (HCC): Chronic | ICD-10-CM

## 2021-04-28 RX ORDER — CARVEDILOL 25 MG/1
TABLET ORAL
Qty: 60 TAB | Refills: 0 | Status: SHIPPED | OUTPATIENT
Start: 2021-04-28 | End: 2021-06-01

## 2021-05-12 ENCOUNTER — HOSPITAL ENCOUNTER (OUTPATIENT)
Dept: NON INVASIVE DIAGNOSTICS | Age: 41
Discharge: HOME | End: 2021-05-12
Attending: NURSE PRACTITIONER
Payer: MEDICAID

## 2021-05-12 VITALS
DIASTOLIC BLOOD PRESSURE: 60 MMHG | HEIGHT: 75 IN | WEIGHT: 235.67 LBS | SYSTOLIC BLOOD PRESSURE: 94 MMHG | BODY MASS INDEX: 29.3 KG/M2

## 2021-05-12 DIAGNOSIS — E55.9 VITAMIN D DEFICIENCY: ICD-10-CM

## 2021-05-12 DIAGNOSIS — I50.40 HYPERTENSIVE HEART DISEASE WITH COMBINED SYSTOLIC AND DIASTOLIC CONGESTIVE HEART FAILURE, UNSPECIFIED HF CHRONICITY (HCC): ICD-10-CM

## 2021-05-12 DIAGNOSIS — I10 ESSENTIAL HYPERTENSION: ICD-10-CM

## 2021-05-12 DIAGNOSIS — B33.22 VIRAL MYOCARDITIS, UNSPECIFIED CHRONICITY: ICD-10-CM

## 2021-05-12 DIAGNOSIS — I42.8 NICM (NONISCHEMIC CARDIOMYOPATHY) (HCC): ICD-10-CM

## 2021-05-12 DIAGNOSIS — I11.0 HYPERTENSIVE HEART DISEASE WITH COMBINED SYSTOLIC AND DIASTOLIC CONGESTIVE HEART FAILURE, UNSPECIFIED HF CHRONICITY (HCC): ICD-10-CM

## 2021-05-12 LAB
ECHO AV AREA PEAK VELOCITY: 3.56 CM2
ECHO AV AREA/BSA PEAK VELOCITY: 1.5 CM2/M2
ECHO AV PEAK GRADIENT: 4.07 MMHG
ECHO AV PEAK VELOCITY: 100.85 CM/S
ECHO LA AREA 4C: 9.84 CM2
ECHO LA VOL 4C: 18.25 ML (ref 18–58)
ECHO LA VOLUME INDEX A4C: 7.76 ML/M2 (ref 16–28)
ECHO LV EDV A2C: 86.88 ML
ECHO LV EDV A4C: 122.43 ML
ECHO LV EDV BP: 104.58 ML (ref 67–155)
ECHO LV EDV INDEX A4C: 52 ML/M2
ECHO LV EDV INDEX BP: 44.4 ML/M2
ECHO LV EDV NDEX A2C: 36.9 ML/M2
ECHO LV EJECTION FRACTION A2C: 43 PERCENT
ECHO LV EJECTION FRACTION A4C: 51 PERCENT
ECHO LV EJECTION FRACTION BIPLANE: 48.4 PERCENT (ref 55–100)
ECHO LV ESV A2C: 49.92 ML
ECHO LV ESV A4C: 59.59 ML
ECHO LV ESV BP: 53.95 ML (ref 22–58)
ECHO LV ESV INDEX A2C: 21.2 ML/M2
ECHO LV ESV INDEX A4C: 25.3 ML/M2
ECHO LV ESV INDEX BP: 22.9 ML/M2
ECHO LVOT DIAM: 2.39 CM
ECHO LVOT PEAK GRADIENT: 2.55 MMHG
ECHO LVOT PEAK VELOCITY: 79.89 CM/S
ECHO MV A VELOCITY: 41.23 CM/S
ECHO MV E VELOCITY: 54.49 CM/S
ECHO MV E/A RATIO: 1.32
ECHO RV INTERNAL DIMENSION: 3.59 CM
ECHO RV TAPSE: 2.95 CM (ref 1.5–2)

## 2021-05-12 PROCEDURE — 93306 TTE W/DOPPLER COMPLETE: CPT

## 2021-05-13 LAB
ALBUMIN SERPL-MCNC: 4.5 G/DL (ref 4–5)
ALBUMIN/GLOB SERPL: 1.6 {RATIO} (ref 1.2–2.2)
ALP SERPL-CCNC: 132 IU/L (ref 39–117)
ALT SERPL-CCNC: 21 IU/L (ref 0–44)
AST SERPL-CCNC: 29 IU/L (ref 0–40)
BASOPHILS # BLD AUTO: 0 X10E3/UL (ref 0–0.2)
BASOPHILS NFR BLD AUTO: 1 %
BILIRUB SERPL-MCNC: 0.6 MG/DL (ref 0–1.2)
BUN SERPL-MCNC: 8 MG/DL (ref 6–24)
BUN/CREAT SERPL: 8 (ref 9–20)
CALCIUM SERPL-MCNC: 9.8 MG/DL (ref 8.7–10.2)
CHLORIDE SERPL-SCNC: 103 MMOL/L (ref 96–106)
CO2 SERPL-SCNC: 21 MMOL/L (ref 20–29)
CREAT SERPL-MCNC: 0.98 MG/DL (ref 0.76–1.27)
EOSINOPHIL # BLD AUTO: 0.2 X10E3/UL (ref 0–0.4)
EOSINOPHIL NFR BLD AUTO: 2 %
ERYTHROCYTE [DISTWIDTH] IN BLOOD BY AUTOMATED COUNT: 12.8 % (ref 11.6–15.4)
GLOBULIN SER CALC-MCNC: 2.9 G/DL (ref 1.5–4.5)
GLUCOSE SERPL-MCNC: 104 MG/DL (ref 65–99)
HCT VFR BLD AUTO: 47.5 % (ref 37.5–51)
HGB BLD-MCNC: 16.7 G/DL (ref 13–17.7)
IMM GRANULOCYTES # BLD AUTO: 0 X10E3/UL (ref 0–0.1)
IMM GRANULOCYTES NFR BLD AUTO: 0 %
LYMPHOCYTES # BLD AUTO: 3.7 X10E3/UL (ref 0.7–3.1)
LYMPHOCYTES NFR BLD AUTO: 49 %
MCH RBC QN AUTO: 33.8 PG (ref 26.6–33)
MCHC RBC AUTO-ENTMCNC: 35.2 G/DL (ref 31.5–35.7)
MCV RBC AUTO: 96 FL (ref 79–97)
MONOCYTES # BLD AUTO: 0.5 X10E3/UL (ref 0.1–0.9)
MONOCYTES NFR BLD AUTO: 7 %
NEUTROPHILS # BLD AUTO: 3 X10E3/UL (ref 1.4–7)
NEUTROPHILS NFR BLD AUTO: 41 %
PLATELET # BLD AUTO: 303 X10E3/UL (ref 150–450)
POTASSIUM SERPL-SCNC: 4.4 MMOL/L (ref 3.5–5.2)
PROT SERPL-MCNC: 7.4 G/DL (ref 6–8.5)
RBC # BLD AUTO: 4.94 X10E6/UL (ref 4.14–5.8)
SODIUM SERPL-SCNC: 139 MMOL/L (ref 134–144)
T3 SERPL-MCNC: 94 NG/DL (ref 71–180)
T4 FREE SERPL-MCNC: 1.09 NG/DL (ref 0.82–1.77)
THYROGLOB AB SERPL-ACNC: <1 IU/ML (ref 0–0.9)
THYROPEROXIDASE AB SERPL-ACNC: 27 IU/ML (ref 0–34)
TSH SERPL DL<=0.005 MIU/L-ACNC: 1.32 UIU/ML (ref 0.45–4.5)
TSI SER-ACNC: <0.1 IU/L (ref 0–0.55)
WBC # BLD AUTO: 7.4 X10E3/UL (ref 3.4–10.8)

## 2021-05-16 ENCOUNTER — DOCUMENTATION ONLY (OUTPATIENT)
Dept: ENDOCRINOLOGY | Age: 41
End: 2021-05-16

## 2021-05-19 ENCOUNTER — TELEPHONE (OUTPATIENT)
Dept: CARDIOLOGY CLINIC | Age: 41
End: 2021-05-19

## 2021-05-20 ENCOUNTER — TELEPHONE (OUTPATIENT)
Dept: CARDIOLOGY CLINIC | Age: 41
End: 2021-05-20

## 2021-05-20 ENCOUNTER — OFFICE VISIT (OUTPATIENT)
Dept: CARDIOLOGY CLINIC | Age: 41
End: 2021-05-20
Payer: MEDICAID

## 2021-05-20 VITALS
RESPIRATION RATE: 18 BRPM | OXYGEN SATURATION: 97 % | BODY MASS INDEX: 29.12 KG/M2 | DIASTOLIC BLOOD PRESSURE: 68 MMHG | TEMPERATURE: 98.7 F | SYSTOLIC BLOOD PRESSURE: 106 MMHG | HEIGHT: 75 IN | HEART RATE: 76 BPM | WEIGHT: 234.2 LBS

## 2021-05-20 DIAGNOSIS — R06.02 SOB (SHORTNESS OF BREATH): ICD-10-CM

## 2021-05-20 DIAGNOSIS — I50.42 CHRONIC COMBINED SYSTOLIC AND DIASTOLIC HEART FAILURE (HCC): Primary | ICD-10-CM

## 2021-05-20 PROCEDURE — 93000 ELECTROCARDIOGRAM COMPLETE: CPT | Performed by: INTERNAL MEDICINE

## 2021-05-20 PROCEDURE — 99215 OFFICE O/P EST HI 40 MIN: CPT | Performed by: INTERNAL MEDICINE

## 2021-05-20 RX ORDER — CLOTRIMAZOLE AND BETAMETHASONE DIPROPIONATE 10; .64 MG/G; MG/G
CREAM TOPICAL
Qty: 15 G | Refills: 1 | Status: SHIPPED | OUTPATIENT
Start: 2021-05-20

## 2021-05-20 NOTE — PATIENT INSTRUCTIONS
Medication changes:    No medication changes    Please take this to your pharmacy to notify them of the change in medications. Testing Ordered:    Please have labs drawn in 3-4 months-lab slips provided    Other Recommendations:      Ensure your drinking an adequate amount of water with a goal of 6-8 eight ounce glasses (1.5-2 liters) of fluid daily. Your urine should be clear and light yellow straw colored. If your blood pressure begins to consistently run below 90/60 and/or you begin to experience dizziness or lightheadedness, please [facility] at [phone]. Follow up in 6 months with NP with [facility]      Please monitor your weights daily upon waking and after using the bathroom. Keep a written records of your weights and bring to your next appointment. If you have a weight gain of 3 or more pounds overnight OR 5 or more pounds in one week please contact our office. Thank you for allowing us the privilege of being a part of your healthcare team! Please do not hesitate to contact our office at [phone] with any questions or concerns. Virtual Heart Failure [address] invites you to learn more about heart failure and to share your questions, ideas, and experiences with others. Each month, the Heart Failure Support Group features a new educational topic and a guest speaker, followed by an interactive discussion. Our Heart Failure Nurse Navigator will moderate each session. You will be able to participate by phone, tablet or computer through [address] [place] [address]. This support group takes place on the 3rd Thursday of each month from 6:00-7:30PM. All individuals living with heart failure and their caregivers are welcome to join. If you are interested in participating, please contact us at [email or web address] and you will be sent the link to join the [email or web address].

## 2021-05-20 NOTE — PROGRESS NOTES
[address] [place] [address] in [place], [facility] Note    Patient name: Fabby Wesley  Patient : 1980  Patient MRN: [id number]  Date of service: 21    Primary care physician: Jarvis Grace MD  Primary general cardiologist:  Dr. Beatriz Cobian    Primary AHF cardiologist: Dee Dee Mclain MD    CHIEF COMPLAINT:  Chronic systolic heart failure    PLAN:  Continue current medical therapy for heart failure  Continue current dose of coreg 25mg twice daily  Continue current dose of 97/103mg twice daily  Continue current dose of spironolactone 25mg daily  Schedule echocardiogram and EKG annually, next in 2021  Routine HF labs: CBC, BMP, Mg, LFT, uric acid, pro-NT-BNP, iron profile with ferritin, TSH, vitamin D level, lipid profile, CPK, gammopathy profile  Discussed tobacco cessation and materials dispensed  Patient requested AHF to be primary cardiologist after Dr. Beatriz Cobian [facility]  Recommend flu, covid and pneumonia vaccinations  Provided patient education materials for COVID precautions  [facility] for [facility]  Return to [address] [place] [address] in 6 months with NP    IMPRESSION:  Fatigue  Shortness of breath  Volume overload  Chronic systolic heart failure   Stage C, NYHA class I symptoms   Non-ischemic cardiomyopathy with recovery of LV function   H/o viral myocarditis, LVEF improved from 20% to 51-55%   Positive titer of Coxsackie A and HHV6 antibodies   Normal coronaries by [facility] (2018)  Paroxysmal atrial fibrillation with RVR  · S/p DCCV; remains SR; Thabs5jvme4=0  · Does not require anticoagulation  Cardiac risk factors   HTN   HL   Tobacco abuse  Depression    CARDIAC IMAGING:  Echo (18) mild-mod LVD, EF 10-20%, severe DHK, mild LVH, DD, RVD, mild- mod LAE, mod RAY, severe MR, mod-severe TR, RVSP 40, dilated RV outflow tract  Echo (10/26/18) mild LVD, EF 45%, G3/4 DD, mod ant leaflet thickening, mild MR, dil RVOT   ROBERTO (7/20/18) LVD, EF 10-15%, severe DHK, mild RVD, reduced RVEF, mod ANNAMARIE, no CELINA thrombus, no PFO, mod MR, mild TR  LHC (7/20/18) no epicardial disease, LVEP 1   RHC 7/20/18: RA:12, RV 34/6/14. PA 30/20/26, PCWP 18, [place] 6.33/2.74 TD: 5.37/2.32   CARDIAC MRI: 7/20/18: mod LVD (LVDD 66), mild LVH (13mm), LVEF 17%, severe GHK, mild RVD, RVEF 20%, mod GHK, mod MR, mild TR. Distinct mid wall stripe enhancement of the basal septal wall along with RV insertion point enhancement sparing the subendocardium. viral myocarditis of HHV-6 viral strain. NO features of giant cell or lymphocytic myocarditis. , infiltrative Sarcoidosis, amyloidosis, old MI    11/15/19 echo normal lv size with lvef 51-55%. Mild pul regurg.     Echo (5/12/21) LV: Estimated LVEF is 50 - 55%. Normal cavity size, wall thickness and diastolic function. Low normal systolic function. Abnormal left ventricular strain. Global longitudinal strain is -16.4%.     HEMODYNAMICS:  RHC not done  CPEST not done  6MW not done    HISTORY OF PRESENT ILLNESS:  I had the pleasure of seeing Seven Rowell in [address] [place] [address] at [address] [place] [address] in [place]. Briefly, Seven Rowell is a 38 y.o. male with h/o HFrEF (10-20%) in the setting of viral myocarditis (Human Herpes Virus 6)  Complicated by new onset PAF with RVR s/p DCCV. He was first diagnosed 8/2018 when he presented to [address] [place] [address] with ADHF found to be in afib with RVR. CTA (-) for PE, but showed a large R pleural effusion. He underwent a thoracentesis for ~ 1 L, and  ROBERTO and DCCV. Right and left heart cath showed no epicardial disease and slightly elevated filling pressures. Cardiac MRI showed LVEF 17%, RVEF 20% confirmed viral myocarditis and lab work was positive for  Coxsackie pane, CMV IgG positive. HHV-6 positive. Echo 10/26/18: showed EF improved to 45% with grade 3/4 diastolic dysfunction and improved MR now to mild.   Most recent Echo on 11/15/19 LVEF 51-55%, mild-mod TN but no pulm HTN.       Receiving Entresto from [employer].     Patient lives with his  sister in their new home. 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 Camacho Ramirez is working on ST disability). He smoked 15 years ~ 1/2 ppd. 2 etoh drinks each day of the  weekends No additional substances. Some college courses. INTERVAL HISTORY:  Today, patient presents for routine clinic visit. Patient is doing very well. Patient walked to our clinic from parking garage without having to slow down or stop. Patient can walk more than one block without symptoms of fatigue or shortness of breath or chest pain. Patient can walk one flight of stairs without symptoms of fatigue or shortness of breath or chest pain. Patient can perform home activities without problem and routinely participates in daily walking for more than 15 minutes. Patient denies symptoms of volume overload or leg edema. Patient denies abdominal bloating or change of appetite. Patient's weight remained stable. Patient denies orthopnea, PND or nocturia. Patient denies irregular heart rate or palpitations. No presyncope or syncope. Patient denies other cardiac symptoms such as chest pain or leg pain with walking. Patient is compliant with fluid restriction and taking medications as prescribed. Patient manages his own medications. REVIEW OF SYSTEMS:  General: Denies fever, night sweats. Ear, nose and throat: Denies difficulty hearing, sinus problems, runny nose, post-nasal drip, ringing in ears, mouth sores, loose teeth, ear pain, nosebleeds, sore throate, facial pain or numbess  Cardiovascular: see above in the interval history  Respiratory: Denies cough, wheezing, sputum production, hemoptysis.   Gastrointestinal: Denies heartburn, constipation, diarrhea, abdominal pain, nausea, vomiting, difficulty swallowing, blood in stool  Kidney and bladder: Denies painful urination, frequent urination, urgency  Musculoskeletal: Denies joint pain, muscle weakness  Skin and hair: Denies change in existing skin lesions, hair loss or increase, breast changes    PHYSICAL EXAM:  Visit Vitals  /68 (BP 1 Location: Right arm, BP Patient Position: Sitting, BP Cuff Size: Large adult)   Pulse 76   Temp 98.7 °F (37.1 °C) (Oral)   Resp 18   Ht 6' 3\" (1.905 m)   Wt 234 lb 3.2 oz (106.2 kg)   SpO2 97%   BMI 29.27 kg/m²     General: Patient is well developed, well-nourished in no acute distress  HEENT: Normocephalic and atraumatic. No scleral icterus. Pupils are equal, round and reactive to light and accomodation. No conjunctival injection. Oropharynx is clear. Neck: Supple. No evidence of thyroid enlargements or lymphadenopathy. JVD: Cannot be appreciated   Lungs: Breath sounds are equal and clear bilaterally. No wheezes, rhonchi, or rales. Heart: Regular rate and rhythm with normal S1 and S2. No murmurs, gallops or rubs. Abdomen: Soft, no mass or tenderness. No organomegaly or hernia. Bowel sounds present. Genitourinary and rectal: deferred  Extremities: No cyanosis, clubbing, or edema. Neurologic: No focal sensory or motor deficits are noted. Grossly intact. Psychiatric: Awake, alert an doriented x 3. Appropriate mood and affect. Skin: Warm, dry and well perfused. No lesions, nodules or rashes are noted. PAST MEDICAL HISTORY:  Past Medical History:   Diagnosis Date    Anxiety     Class 1 obesity due to excess calories with serious comorbidity and body mass index (BMI) of 32.0 to 32.9 in adult 2/27/2019    Combined systolic and diastolic heart failure ([facility] [address] 8/8/2018    ECHO 7/17/18:mild-mod LVD, EF 10-20%, severe DHK, mild LVH, DD, RVD, mild- mod LAE, mod RAY, severe MR, mod-severe TR, RVSP 40, dilated RV outflow tract  ROBERTO 7/20/18: LVD, EF 10-15%, severe DHK, mild RVD, reduced RVEF, mod ANNAMARIE, no CELINA thrombus, no PFO, mod MR, mild TR  RHC 7/20/18: RA:12, RV 34/6/14.  PA 30/20/26, PCWP 18, Che[place] 6.33/2.74 TD: 5. 37/2.32    CARDIAC MRI: 7/20/18: mod LVD (LVDD 66), mil    Congestive heart failure ([address]     Essential hypertension 3/12/2019    Hypertensive heart disease with combined systolic and diastolic congestive heart failure (HCC) 3/12/2019    ECHO 7/17/18:mild-mod LVD, EF 10-20%, severe DHK, mild LVH, DD, RVD, mild- mod LAE, mod RAY, severe MR, mod-severe TR, RVSP 40, dilated RV outflow tract ECHO 10/26/2018: mild LVD, EF 45%, G3/4 DD, mod ant leaflet thickening, mild MR, dil RVOT        Long term current use of anticoagulant therapy     NICM (nonischemic cardiomyopathy) ([facility] [address] 8/8/2018    Non-rheumatic mitral regurgitation 8/8/2018    ECHO 7/17/18:mild-mod LVD, EF 10-20%, severe DHK, mild LVH, DD, RVD, mild- mod LAE, mod RAY, severe MR, mod-severe TR, RVSP 40, dilated RV outflow tract  ROBERTO 7/20/18: LVD, EF 10-15%, severe DHK, mild RVD, reduced RVEF, mod ANNAMARIE, no CELINA thrombus, no PFO, mod MR, mild TR      PAF (paroxysmal atrial fibrillation) ([address] 8/8/2018    ROBERTO 7/20/18: LVD, EF 10-15%, severe DHK, mild RVD, reduced RVEF, mod ANNAMARIE, no CELINA thrombus, no PFO, mod MR, mild TR    DCCV 7/20/18       Rapid atrial fibrillation ([address] 7/17/2018    Tobacco use disorder 8/8/2018    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    Vitamin D deficiency 9/19/2018    Level 12 8/2018       PAST SURGICAL HISTORY:  Past Surgical History:   Procedure Laterality Date    CARDIOVERSION, ELECTIVE;EXTERN  7/20/2018         HX HEART CATHETERIZATION         FAMILY HISTORY:  Family History   Problem Relation Age of Onset    Diabetes Mother     Heart Failure Mother     No Known Problems Father     Hypertension Sister     Hypertension Brother     Hypertension Sister        SOCIAL HISTORY:  Social History     Socioeconomic History    Marital status: SINGLE     Spouse name: Not on file    Number of children: Not on file    Years of education: Not on file    Highest education level: Not on file   Tobacco Use    Smoking status: Current Some Day Smoker     Types: Cigarettes    Smokeless tobacco: Never Used    Tobacco comment: 3-4 cigarettes vaughn   Vaping Use    Vaping Use: Former   Substance and Sexual Activity    Alcohol use: Yes     Alcohol/week: 1.0 standard drinks     Types: 1 Shots of liquor per week     Comment: rare    Drug use: No    Sexual activity: Not Currently   Other Topics Concern     Social Determinants of Health     Financial Resource Strain:     Difficulty of Paying Living Expenses:    Food Insecurity:     Worried About Running Out of Food in the Last Year:     Ran Out of Food in the Last Year:    Transportation Needs:     Lack of Transportation (Medical):      Lack of Transportation (Non-Medical):    Physical Activity:     Days of Exercise per Week:     Minutes of Exercise per Session:    Stress:     Feeling of Stress :    Social Connections:     Frequency of Communication with Friends and Family:     Frequency of Social Gatherings with Friends and Family:     Attends Buddhist Services:     Active Member of Clubs or Organizations:     Attends Club or Organization Meetings:     Marital Status:        LABORATORY RESULTS:  Labs Latest Ref Rng & Units 5/12/2021   WBC 3.4 - 10.8 x10E3/uL 7.4   RBC 4.14 - 5.80 x10E6/uL 4.94   Hemoglobin 13.0 - 17.7 g/dL 16.7   Hematocrit 37.5 - 51.0 % 47.5   MCV 79 - 97 fL 96   Platelets 656 - 201 [id number]/   Monocytes Not Estab. % 7   Eosinophils Not Estab. % 2   Basophils Not Estab. % 1   Albumin 4.0 - 5.0 g/dL 4.5   Calcium 8.7 - 10.2 mg/dL 9.8   Glucose 65 - 99 mg/dL 104(H)   BUN 6 - 24 mg/dL 8   Creatinine 0.76 - 1.27 mg/dL 0.98   Sodium 134 - 144 mmol/L 139   Potassium 3.5 - 5.2 mmol/L 4.4   TSH 0.450 - 4.500 uIU/mL 1.320   Some recent data might be hidden       ALLERGY:  Allergies   Allergen Reactions    Peanut Anaphylaxis    Milk Other (comments)    Shrimp Other (comments)        CURRENT MEDICATIONS:    Current Outpatient Medications:    clotrimazole-betamethasone (LOTRISONE) topical cream, Apply to affected area twice a day, Disp: 15 g, Rfl: 1    carvediloL (COREG) 25 mg tablet, TAKE 1 TABLET BY MOUTH TWICE DAILY WITH MEALS, Disp: 60 Tab, Rfl: 0    cholecalciferol (VITAMIN D3) (2,000 UNITS /50 MCG) cap capsule, Take 2,000 Units by mouth daily. , Disp: 30 Cap, Rfl: 2    spironolactone (ALDACTONE) 25 mg tablet, Take 1 Tab by mouth daily. , Disp: 90 Tab, Rfl: 1    Entresto  mg tablet, Take 1 tablet by mouth twice daily, Disp: 60 Tab, Rfl: 3    magnesium 250 mg tab, Take  by mouth. OTC: One tab am and 1/2 tab in pm, Disp: , Rfl:     multivitamin (ONE A DAY) tablet, Take 1 Tab by mouth daily. , Disp: , Rfl:     nicotine (NICODERM CQ) 21 mg/24 hr, APPLY 1 PATCH TOPICALLY EVERY 24 HOURS FOR 30 DAYS (Patient not taking: Reported on 5/20/2021), Disp: 30 Patch, Rfl: 0    EPINEPHrine (EPIPEN) 0.3 mg/0.3 mL injection, 0.3 mL by IntraMUSCular route once as needed for Anaphylaxis or Allergic Response for up to 1 dose., Disp: 2 Syringe, Rfl: 6    Thank you for your referral and allowing me to participate in this patient's care.     Annabella Iraheta MD PhD  [address] [place] [address], [address]  Phone: [phone]  Fax: [phone]    PATIENT CARE TEAM:  Patient Care Team:  Olya Huitron MD as PCP - General (Family Medicine)  Olya Huitron MD as PCP - [facility]  Andres Mcintosh MD as Physician (Cardiology)  Domitila Rosenberg (Physician Assistant)  YOUSUF Godinez as Consulting Provider (Nurse Practitioner)  Nanda Hernandez MD as Consulting Provider (Internal Medicine)     Total visit time: 40 minutes (> 50% spent face-to-face counseling)

## 2021-05-27 NOTE — PROGRESS NOTES
[facility] Progress Note      DOS:   7/21/2018  NAME:  Dora Ovalle   MRN:   [id number]     REFERRING PROVIDER:  Dr. Kevin Weldon: None  PRIMARY CARDIOLOGIST: Dr. Lidia Maloney      Chief Complaint: SOB    HPI: 43y.o. year old male with no significant past medication history except for an exploratory laparotomy for a ruptured viscus- organ unknown who presented to [facility] with complaints of SOB. In the ED he was found to be in Afib with RVR, failed cardizem conversion, CTA was negative for PE but did show a pleural effusion. He was admitted and seen by Dr. Lidia Maloney who initiated the myocarditis work up and contacted Dr Rosemry Kenney for assistance in managing this patient's heart failure. He was transferred to [facility] [place] last evening, seen by Dr. Marc Jo who plans a ROBERTO and cardioversion today. He endorses some anorexia and unintentional weight loss, he has been working but denies exposure to toxins. 24Hr Events  Cardioverted x 1  Auto diuresed    Impression / Plan:   Heart Failure Status: NYHA Class III    Acute HFrEF, NICM, EF 10-20%  Heavy metal screen pending  Coxsackie positive  CMV/EBV pending    Cardiac MRI scheduled for Tuesday 7/24 at [address]  Cont Coreg 3.125 BID  Start losartan 25mg daily today - watch renal function  If possible will start Entresto as OP  No indication for diuretics at this time  Trend PBNP- up slightly today  Consider LHC and RHC pre discharge  Transfer to stepdown       Afib w/ RVR  S/p Cardioversion  Remains in Sinus  Cont Lovenox for now       All other care per primary team     History:  No past medical history on file. Past Surgical History:   Procedure Laterality Date    CARDIOVERSION, ELECTIVE;EXTERN  7/20/2018          Social History     Social History    Marital status: SINGLE     Spouse name: N/A    Number of children: N/A    Years of education: N/A     Occupational History    Not on file.      Social History Main Topics    Smoking status: Current Every Day Smoker     Packs/day: 0.50    Smokeless tobacco: Not on file    Alcohol use Yes      Comment: \"on weekends\"    Drug use: No    Sexual activity: Yes     Other Topics Concern    Not on file     Social History Narrative     No family history on file. Current Medications:   Current Facility-Administered Medications   Medication Dose Route Frequency Provider Last Rate Last Dose    losartan (COZAAR) tablet 25 mg  25 mg Oral DAILY Ignacio Zarate NP        magnesium oxide (MAG-OX) tablet 400 mg  400 mg Oral DAILY Ignacio Zarate NP        potassium chloride SR (KLOR-CON 10) tablet 20 mEq  20 mEq Oral DAILY Ignacio Zarate NP   20 mEq at 07/20/18 1417    butamben-tetracaine-benzocaine (CETACAINE) 2 %-2 %-14 % (200 mg/sec) topical spray 1 Spray  1 Spray Topical QID PRN Allison Madera MD        benzocaine (HURRICANE) 20 % spray   Mucous Membrane PRN Allison Madera MD   2 Holder at 07/20/18 1153    enoxaparin (LOVENOX) injection 100 mg  100 mg SubCUTAneous Q12H Allison Madera MD   100 mg at 07/21/18 0050    carvedilol (COREG) tablet 3.125 mg  3.125 mg Oral BID WITH MEALS Allison Madera MD   3.125 mg at 07/20/18 1729    amiodarone (CORDARONE) tablet 400 mg  400 mg Oral BID Allison Madera MD   400 mg at 07/20/18 2146    sodium chloride (NS) flush 5-10 mL  5-10 mL IntraVENous Q8H Aaron Vegas MD   10 mL at 07/21/18 9491    sodium chloride (NS) flush 5-10 mL  5-10 mL IntraVENous PRN Aaron Vegas MD   10 mL at 07/20/18 2235    azithromycin (ZITHROMAX) 500 mg in 0.9% sodium chloride (MBP/ADV) 250 mL  500 mg IntraVENous Q24H Aaron Vegas  mL/hr at 07/20/18 2233 500 mg at 07/20/18 2233    cefTRIAXone (ROCEPHIN) 2 g in 0.9% sodium chloride (MBP/ADV) 50 mL  2 g IntraVENous Q24H Aaron Vegas  mL/hr at 07/20/18 1916 2 g at 07/20/18 1916       Allergies: No Known Allergies    ROS:    Constitutional: Positive for malaise/fatigue and weight loss. HENT: Negative.     Respiratory: Positive for FLOWERS,  Negative for cough. Cardiovascular:  Negative for chest pain, leg swelling and PND. Gastrointestinal: Negative. Genitourinary: Negative. Musculoskeletal: Negative. Skin: Negative. Neurological: Negative. Physical Exam:   Constitutional: NAD, in bed. HENT:   Head: Normocephalic. Eyes: Pupils are equal, round, and reactive to light. Neck: Normal range of motion. Neck supple. No JVD present. Cardiovascular: Normal heart sounds and intact distal pulses. Tachycardic but regular rhythm. Pulmonary/Chest: Effort normal and breath sounds normal.   Abdominal: Soft. Bowel sounds are normal. He exhibits no distension. Musculoskeletal: He exhibits no edema. Neurological: He is alert and oriented to person, place, and time. Skin: Skin is warm and dry. Nursing note and vitals reviewed. Vitals:   Visit Vitals    /75    Pulse 100    Temp 98.4 °F (36.9 °C)    Resp 24    Wt 231 lb 0.7 oz (104.8 kg)    SpO2 95%    BMI 28.88 kg/m2         Temp (24hrs), Av.4 °F (36.9 °C), Min:97.9 °F (36.6 °C), Max:98.6 °F (37 °C)      Admission Weight: Last Weight   Weight: 228 lb 2.8 oz (103.5 kg) Weight: 231 lb 0.7 oz (104.8 kg)     Intake / Output / Drain:  Last 24 hrs.:     Intake/Output Summary (Last 24 hours) at 18 0757  Last data filed at 18 0729   Gross per 24 hour   Intake           394.83 ml   Output             3175 ml   Net         -2780.17 ml     Oxygen Therapy:  Oxygen Therapy  O2 Sat (%): 95 % (18 0700)  Pulse via Oximetry: 93 beats per minute (18 1226)  O2 Device: Room air (18 0400)  O2 Flow Rate (L/min): 4 l/min (18 1226)      Recent Labs:   Labs Latest Ref Rng & Units 2018   WBC 4.1 - 11.1 K/uL 10.8 - 8.8 8.9 12. 7(H)   RBC 4.10 - 5.70 M/uL 4.99 - 4.91 5.19 5.22   Hemoglobin 12.1 - 17.0 g/dL 15.5 - 15.0 16.0 16.0   Hematocrit 36.6 - 50.3 % 48.3 - 46.7 48.4 49.8   MCV 80.0 - 99.0 FL 96.8 - 95.1 93.3 95.4   Platelets 231 - 447 K/uL 285 - 255 301 266   Lymphocytes 12 - 49 % 47 - 34 54(H) 27   Monocytes 5 - 13 % 6 - 7 8 10   Eosinophils 0 - 7 % 2 - 0 1 0   Basophils 0 - 1 % 0 - 0 0 0   Albumin 3.5 - 5.0 g/dL 2. 6(L) - - 2. 9(L) 3. 1(L)   Calcium 8.5 - 10.1 MG/DL 8.5 - 8.4(L) 8.8 8.8   SGOT 15 - 37 U/L 21 - - 33 25   Glucose 65 - 100 mg/dL 77 - 101(H) 98 112(H)   BUN 6 - 20 MG/DL 8 - 10 11 10   Creatinine 0.70 - 1.30 MG/DL 1.03 - 1.13 1.14 1.27   Sodium 136 - 145 mmol/L 142 138 138 135(L) 147(H)   Potassium 3.5 - 5.1 mmol/L 3.8 3.8 4.8 4.1 3.5   TSH 0.36 - 3.74 uIU/mL - - 0.80 - -     EKG:   EKG Results     None        Echocardiogram:   7/17/18  Left ventricle: The ventricle was mildly to moderately dilated. Systolic  function was markedly reduced by visual assessment. Ejection fraction was  estimated in the range of 10 % to 20 %, challenged determination in  presence of what appeared to be rapid atrial fibrillation. There was    Nuclear Studies:   7/17/18- Pending read    Cardiac Catheterizations: None    Radiology (CXR, CT scans):    CXR Results  (Last 48 hours)               07/19/18 2122  XR CHEST PORT Final result    Impression:  IMPRESSION:   Improved aeration right lung base as described above       Narrative:  INDICATION: Right pleural effusion       COMPARISON: 7/17/2018       A single frontal view was obtained. The time of this study is 2118 hours. There   is less opacification at the right hemithorax base. Some pleural effusion and   minimal consolidation persists. Left lung is clear. Cardiomegaly again noted. .                        CT Results (most recent):    Results from Hospital Encounter encounter on 07/17/18   CTA CHEST W OR W WO CONT   Narrative EXAM:  CTA CHEST W OR W WO CONT    INDICATION:   Irregular heartbeat, pleural effusion seen on chest radiograph    COMPARISON: Chest radiograph performed earlier the same day.     TECHNIQUE:   Precontrast  images were obtained to localize the volume for acquisition. Multislice helical CT arteriography was performed from the diaphragm to the  thoracic inlet during uneventful rapid bolus of 100 cc Isovue-370. Lung and soft  tissue windows were generated. Coronal and sagittal images were generated and  3D post processing consisting of coronal maximum intensity images was performed. CT dose reduction was achieved through use of a standardized protocol tailored  for this examination and automatic exposure control for dose modulation. FINDINGS:  CHEST:  THYROID: No nodule. MEDIASTINUM: No mass or lymphadenopathy. GRAYSON: No mass or lymphadenopathy. THORACIC AORTA: No aneurysm. Not well opacified. MAIN PULMONARY ARTERY: There is no evidence of pulmonary embolism. TRACHEA/BRONCHI: Patent. ESOPHAGUS: No wall thickening or dilatation. HEART: Dilatation of the left atrium and left ventricle is noted. PLEURA: There is a moderate right pleural effusion  LUNGS: Right middle and right lower lobe infiltrate is noted. Atelectasis is  seen in the left lower lobe. INCIDENTALLY IMAGED UPPER ABDOMEN: No focal abnormality. BONES: No destructive bone lesion. Impression IMPRESSION: Moderate right pleural effusion with right middle and right lower  lobe infiltrates. Left lower lobe atelectasis. No evidence of pulmonary  embolism.               Raquel Postal, NP  [address]  [facility], [address], [address] [place] [address] [place]  Office [phone]  Fax [phone]  24 hour VAD/HF Pager: [phone] No

## 2021-10-14 NOTE — PROGRESS NOTES
Anesthesia Pre Eval Note    Anesthesia ROS/Med Hx        Anesthetic Complication History:  Patient does not have a history of anesthetic complications      Pulmonary Review:  Patient does not have a pulmonary history      Neuro/Psych Review:  Patient does not have a neuro/psych history       Cardiovascular Review:  Patient does not have a cardiovascular history       GI/HEPATIC/RENAL Review:  Patient does not have a GI/hepatic/renalhistory       End/Other Review:  Patient does not have an endo/other history    Additional Results:     ALLERGIES:   -- Amoxicillin -- SWELLING    --  Swelling and rash   -- Cat Dander -- Other (See Comments)    --  Breathing hard   -- Dust -- Other (See Comments)    --  Breathing problems   -- Dust Mite Extract -- HIVES   -- Grass -- RASH   -- Lactose   (Food Or Med) -- VOMITING   -- Mold   (Environmental) -- HIVES   -- Silver Sulfadiazine -- SWELLING   -- Trees -- HIVES       Lab Results       Component                Value               Date                       WBC                      8.4                 03/13/2021                 WBC                      7.1                 12/16/2017                 RBC                      4.62                03/13/2021                 RBC                      4.45                12/16/2017                 HGB                      13.4                03/13/2021                 HGB                      12.4                12/16/2017                 HCT                      42.6                03/13/2021                 MCHC                     31.5 (L)            03/13/2021                 MCHC                     32.6                12/16/2017                 SODIUM                   137                 05/04/2021                 SODIUM                   137                 12/16/2017                 POTASSIUM                3.9                 05/04/2021                 POTASSIUM                4.4                 12/16/2017                  Cardiology:    Mr. Ralf Umana still does not have an available bed in [facility] so that he can be managed by the advanced heart failure department. He will most likely need early electrophysiology consultation for assistance in controlling his heart rate in atrial fibrillation. This aspect may make it necessary to consider indwelling device therapy earlier than usual.  He becomes quite hypotensive when he stands up and this has created some difficulty with use of the bathroom. For this reason he was briefly refusing medication convinced that lightheadedness when he stood up and abdominal discomfort during periods of low blood pressure were medication side effects. I spent some time with him today explaining that he has a tendency toward low blood pressure whenever he is not in the supine position but that the biggest issue is the extremely fast heart rates which further diminish the efficiency of an already very limited heart muscle. He accepted the first dose of digoxin this morning and we will repeat another 125 mcg 6 hours after the first dose. We administered another small fluid bolus this morning for hypotension. He has a recurrence of uniform ventricular ectopic pairs on telemetry and we will repeat 1 g of magnesium by slow IV bolus this afternoon. Lungs are clear without wheezing or rales. He has soft JVD in the supine position. Cardiac auscultation shows rapid irregular rhythm with a variable S3 gallop, no identifiable murmur. There is trivial leg edema, abdomen is nontender. Creatinine is stable at 1.13. Electrolytes are normal.  Hemogram is unremarkable. Heavy metal screen and coxsackie antibody titers are still pending.     Impression: Severe congestive cardiomyopathy with diffusely hypokinetic elevated    Secondary pulmonary hypertension    Sustained atrial fibrillation with high ventricular rate    Recurrent ventricular ectopy including any unimorphic pairs    Orthostatic CHLORIDE                 106                 05/04/2021                 CO2                      25                  05/04/2021                 CO2                      27                  12/16/2017                 GLUCOSE                  83                  05/04/2021                 GLUCOSE                  88                  12/16/2017                 BUN                      11                  05/04/2021                 BUN                      10                  12/16/2017                 CREATININE               0.96 (H)            05/04/2021                 CREATININE               0.73                12/16/2017                 GFRESTIMATE              86 (L)              05/04/2021                 GFRA                     Not calculated.     12/16/2017                 GFRNA                    Not calculated.     12/16/2017                 CALCIUM                  9.2                 05/04/2021                 PLT                      254                 03/13/2021                 PLT                      160                 12/16/2017             Past Medical History:  No date: ADHD  No date: Asthma      Comment:  age of 7  No date: Bipolar 1 disorder (CMS/HCC)  No date: Mitral valve prolapse  No date: PTSD (post-traumatic stress disorder)    Past Surgical History:  2005: Adenoidectomy  2005: Tonsillectomy  2017: Vulva surgery; N/A       Prior to Admission medications :  Medication Aurovela 24 FE 1-20 MG-MCG(24) tablet, Sig TAKE 1 TABLET BY MOUTH DAILY, Start Date 5/24/21, End Date , Taking? , Authorizing Provider Kelin Heard MD    Medication Cariprazine HCl (Vraylar) 1.5 MG capsule, Sig Take 3 mg by mouth daily. , Start Date , End Date , Taking? , Authorizing Provider Outside Provider    Medication TRAZODONE HCL PO, Sig Take 50 mg by mouth at bedtime as needed. 4/10/21 , Start Date , End Date , Taking? , Authorizing Provider Outside Provider    Medication QUEtiapine (SEROquel) 25 MG tablet, Sig TK  hypotension    Significant anxiety related to his condition    Plan:  Very slow introduction of digoxin for heart rate control    Transfer to [address] for advanced heart failure care as soon as available    Repeat magnesium bolus IV for arrhythmias    Patient reassured at length about his medications and goals of treatment    Alexandra Garcia MD 1- 2 TS PO QHS, Start Date 10/14/20, End Date , Taking? , Authorizing Provider Outside Provider    Medication naproxen (NAPROSYN) 500 MG tablet, Sig Take 1 tablet by mouth 2 times daily as needed for Pain., Start Date 11/6/20, End Date , Taking? , Authorizing Provider Kelin Heard MD    Medication Vyvanse 50 MG capsule, Sig TK 1 C PO IN THE MORNING, Start Date 9/21/20, End Date , Taking? , Authorizing Provider Outside Provider    Medication glycopyrrolate (ROBINUL) 2 MG tablet, Sig Take 1 tablet by mouth 2 times daily., Start Date 10/8/20, End Date , Taking? , Authorizing Provider Darcie Duron MD    Medication lamoTRIgine (LAMICTAL) 100 MG tablet, Sig Take 150 mg by mouth at bedtime. , Start Date , End Date , Taking? , Authorizing Provider Outside Provider    Medication albuterol 108 (90 Base) MCG/ACT inhaler, Sig Inhale 2 puffs into the lungs., Start Date , End Date , Taking? , Authorizing Provider Outside Provider    Medication montelukast (SINGULAIR) 10 MG tablet, Sig Take 1 tablet by mouth nightly., Start Date 3/24/20, End Date , Taking? , Authorizing Provider Darcie Duron MD    Medication albuterol (PROAIR RESPICLICK) 108 (90 Base) MCG/ACT inhaler, Sig Inhale 2 puffs into the lungs every 6 hours as needed for Shortness of Breath or Wheezing., Start Date 3/24/20, End Date , Taking? , Authorizing Provider Darcie Duron MD    Medication lamoTRIgine (LAMICTAL) 100 MG tablet, Sig 100 mg daily. , Start Date 12/22/19, End Date , Taking? , Authorizing Provider Outside Provider    Medication loratadine (CLARITIN) 10 MG tablet, Sig Take 1 tablet by mouth daily., Start Date 12/31/19, End Date , Taking? , Authorizing Provider Gaviota Brown MD         Patient Vitals in the past 24 hrs:      Relevant Problems   No relevant active problems       Physical Exam     Airway   Mallampati: II  TM Distance: >3 FB  Neck ROM: Full  Neck: Non-tender and Able to place in sniff position  TMJ Mobility:  Good    Cardiovascular  Cardiovascular exam normal  Cardio Rhythm: Regular  Cardio Rate: Normal    Head Assessment  Head assessment: Normocephalic and Atraumatic    General Assessment  General Assessment: Alert and oriented and No acute distress    Dental Exam  Dental exam normal    Pulmonary Exam  Pulmonary exam normal  Breath sounds clear to auscultation:  Yes    Abdominal Exam  Abdominal exam normal      Anesthesia Plan:    ASA Status: 1  Anesthesia Type: MAC    Induction: Intravenous  Maintenance: TIVA    Post-op Pain Management: Per Surgeon      Checklist  Reviewed: NPO Status, Allergies, Medications, Problem list, Past Med History and Patient Summary  Consent/Risks Discussed Statement:  The proposed anesthetic plan, including its risks and benefits, have been discussed with the Patient along with the risks and benefits of alternatives. Questions were encouraged and answered and the patient and/or representative understands and agrees to proceed.        I discussed with the patient (and/or patient's legal representative) the risks and benefits of the proposed anesthesia plan, MAC, which may include services performed by other anesthesia providers.    Alternative anesthesia plans, if available, were reviewed with the patient (and/or patient's legal representative). Discussion has been held with the patient (and/or patient's legal representative) regarding risks of anesthesia, which include Intra-operative Awareness and emergent situations that may require change in anesthesia plan.    The patient (and/or patient's legal representative) has indicated understanding, his/her questions have been answered, and he/she wishes to proceed with the planned anesthetic.    Blood Products: Not Anticipated

## 2021-10-21 ENCOUNTER — DOCUMENTATION ONLY (OUTPATIENT)
Dept: CARDIOLOGY CLINIC | Age: 41
End: 2021-10-21

## 2021-12-06 ENCOUNTER — TELEPHONE (OUTPATIENT)
Dept: CARDIOLOGY CLINIC | Age: 41
End: 2021-12-06

## 2021-12-06 NOTE — TELEPHONE ENCOUNTER
Left voicemail requesting call back to schedule follow up appt in order to get more refills per Dr. Fela Cornelius

## 2022-01-10 DIAGNOSIS — I50.42 CHRONIC COMBINED SYSTOLIC AND DIASTOLIC HEART FAILURE (HCC): Chronic | ICD-10-CM

## 2022-01-10 DIAGNOSIS — I42.8 NICM (NONISCHEMIC CARDIOMYOPATHY) (HCC): ICD-10-CM

## 2022-01-10 DIAGNOSIS — I50.40 COMBINED SYSTOLIC AND DIASTOLIC HEART FAILURE, UNSPECIFIED HF CHRONICITY (HCC): ICD-10-CM

## 2022-01-10 RX ORDER — SPIRONOLACTONE 25 MG/1
TABLET ORAL
Qty: 90 TABLET | Refills: 0 | Status: SHIPPED | OUTPATIENT
Start: 2022-01-10 | End: 2022-04-06 | Stop reason: SDUPTHER

## 2022-01-10 RX ORDER — CARVEDILOL 25 MG/1
TABLET ORAL
Qty: 60 TABLET | Refills: 0 | Status: SHIPPED | OUTPATIENT
Start: 2022-01-10 | End: 2022-03-07

## 2022-03-07 ENCOUNTER — TELEPHONE (OUTPATIENT)
Dept: CARDIOLOGY CLINIC | Age: 42
End: 2022-03-07

## 2022-03-07 DIAGNOSIS — I10 ESSENTIAL HYPERTENSION: ICD-10-CM

## 2022-03-07 DIAGNOSIS — J90 PLEURAL EFFUSION, RIGHT: ICD-10-CM

## 2022-03-07 DIAGNOSIS — I50.42 CHRONIC COMBINED SYSTOLIC AND DIASTOLIC HEART FAILURE (HCC): Chronic | ICD-10-CM

## 2022-03-07 DIAGNOSIS — I50.40 HYPERTENSIVE HEART DISEASE WITH COMBINED SYSTOLIC AND DIASTOLIC CONGESTIVE HEART FAILURE, UNSPECIFIED HF CHRONICITY (HCC): ICD-10-CM

## 2022-03-07 DIAGNOSIS — I11.0 HYPERTENSIVE HEART DISEASE WITH COMBINED SYSTOLIC AND DIASTOLIC CONGESTIVE HEART FAILURE, UNSPECIFIED HF CHRONICITY (HCC): ICD-10-CM

## 2022-03-07 DIAGNOSIS — E66.09 CLASS 1 OBESITY DUE TO EXCESS CALORIES WITH SERIOUS COMORBIDITY AND BODY MASS INDEX (BMI) OF 32.0 TO 32.9 IN ADULT: ICD-10-CM

## 2022-03-07 DIAGNOSIS — I42.8 NICM (NONISCHEMIC CARDIOMYOPATHY) (HCC): ICD-10-CM

## 2022-03-07 DIAGNOSIS — I40.0 CHRONIC VIRAL MYOCARDITIS: ICD-10-CM

## 2022-03-07 RX ORDER — SACUBITRIL AND VALSARTAN 97; 103 MG/1; MG/1
TABLET, FILM COATED ORAL
Qty: 32 TABLET | Refills: 0 | Status: SHIPPED | OUTPATIENT
Start: 2022-03-07 | End: 2022-04-06 | Stop reason: SDUPTHER

## 2022-03-07 RX ORDER — CARVEDILOL 25 MG/1
TABLET ORAL
Qty: 32 TABLET | Refills: 0 | Status: SHIPPED | OUTPATIENT
Start: 2022-03-07 | End: 2022-04-06 | Stop reason: SDUPTHER

## 2022-03-07 NOTE — TELEPHONE ENCOUNTER
Requested Prescriptions     Pending Prescriptions Disp Refills    Entresto  mg tablet [Pharmacy Med Name: Entresto  MG Oral Tablet] 32 Tablet 0     Sig: TAKE 1 TABLET BY MOUTH TWICE DAILY .  APPOINTMENT REQUIRED FOR FUTURE REFILLS    carvediloL (COREG) 25 mg tablet [Pharmacy Med Name: Carvedilol 25 MG Oral Tablet] 32 Tablet 0     Sig: TAKE 1 TABLET BY MOUTH TWICE DAILY WITH MEALS     Patient notified and also notified that he must come to appt for further refills

## 2022-03-07 NOTE — TELEPHONE ENCOUNTER
Voice mail left by patient requesting refills on Coreg and Entresto. Patient needed a follow up scheduled since he hasn't been here since last year    Appointment scheduled for Wednesday March 23rd at 9:30am w/J. Buddie Paget, NP     Nursing aware of patient needing refills on these medications

## 2022-03-18 PROBLEM — E66.09 CLASS 1 OBESITY DUE TO EXCESS CALORIES WITH SERIOUS COMORBIDITY AND BODY MASS INDEX (BMI) OF 32.0 TO 32.9 IN ADULT: Status: ACTIVE | Noted: 2019-02-27

## 2022-03-18 PROBLEM — E66.811 CLASS 1 OBESITY DUE TO EXCESS CALORIES WITH SERIOUS COMORBIDITY AND BODY MASS INDEX (BMI) OF 32.0 TO 32.9 IN ADULT: Status: ACTIVE | Noted: 2019-02-27

## 2022-03-19 PROBLEM — F17.200 TOBACCO USE DISORDER: Status: ACTIVE | Noted: 2018-08-08

## 2022-03-19 PROBLEM — I48.91 ATRIAL FIBRILLATION WITH RVR (HCC): Status: ACTIVE | Noted: 2018-07-19

## 2022-03-19 PROBLEM — I11.0 HYPERTENSIVE HEART DISEASE WITH COMBINED SYSTOLIC AND DIASTOLIC CONGESTIVE HEART FAILURE (HCC): Status: ACTIVE | Noted: 2019-03-12

## 2022-03-19 PROBLEM — R06.02 SOB (SHORTNESS OF BREATH): Status: ACTIVE | Noted: 2018-08-09

## 2022-03-19 PROBLEM — I48.0 PAF (PAROXYSMAL ATRIAL FIBRILLATION) (HCC): Status: ACTIVE | Noted: 2018-08-08

## 2022-03-19 PROBLEM — I34.0 NON-RHEUMATIC MITRAL REGURGITATION: Status: ACTIVE | Noted: 2018-08-08

## 2022-03-19 PROBLEM — J90 PLEURAL EFFUSION, RIGHT: Status: ACTIVE | Noted: 2018-07-19

## 2022-03-19 PROBLEM — I50.21 ACUTE SYSTOLIC HEART FAILURE (HCC): Status: ACTIVE | Noted: 2018-07-19

## 2022-03-19 PROBLEM — B33.22 VIRAL MYOCARDITIS: Status: ACTIVE | Noted: 2018-08-08

## 2022-03-19 PROBLEM — F41.8 DEPRESSION WITH ANXIETY: Status: ACTIVE | Noted: 2019-03-20

## 2022-03-19 PROBLEM — Z79.01 ANTICOAGULATED: Status: ACTIVE | Noted: 2018-07-27

## 2022-03-19 PROBLEM — R06.83 SNORING: Status: ACTIVE | Noted: 2018-08-09

## 2022-03-19 PROBLEM — I50.40 COMBINED SYSTOLIC AND DIASTOLIC HEART FAILURE (HCC): Status: ACTIVE | Noted: 2018-08-08

## 2022-03-19 PROBLEM — I50.40 HYPERTENSIVE HEART DISEASE WITH COMBINED SYSTOLIC AND DIASTOLIC CONGESTIVE HEART FAILURE (HCC): Status: ACTIVE | Noted: 2019-03-12

## 2022-03-20 PROBLEM — I42.8 NICM (NONISCHEMIC CARDIOMYOPATHY) (HCC): Status: ACTIVE | Noted: 2018-08-08

## 2022-03-20 PROBLEM — F10.21 ALCOHOL USE DISORDER, MODERATE, IN SUSTAINED REMISSION (HCC): Status: ACTIVE | Noted: 2019-03-20

## 2022-03-20 PROBLEM — I10 ESSENTIAL HYPERTENSION: Status: ACTIVE | Noted: 2019-03-12

## 2022-03-20 PROBLEM — E55.9 VITAMIN D DEFICIENCY: Status: ACTIVE | Noted: 2018-09-19

## 2022-03-20 PROBLEM — F60.9 PERSONALITY DISORDER (HCC): Status: ACTIVE | Noted: 2019-03-20

## 2022-03-23 ENCOUNTER — TELEPHONE (OUTPATIENT)
Dept: CARDIOLOGY CLINIC | Age: 42
End: 2022-03-23

## 2022-03-23 NOTE — TELEPHONE ENCOUNTER
Called patient to let him know that he needs to reschedule today appointment due to being late. Our PSR will call him later to get him rescheduled.

## 2022-03-24 ENCOUNTER — TELEPHONE (OUTPATIENT)
Dept: CARDIOLOGY CLINIC | Age: 42
End: 2022-03-24

## 2022-03-30 ENCOUNTER — OFFICE VISIT (OUTPATIENT)
Dept: CARDIOLOGY CLINIC | Age: 42
End: 2022-03-30
Payer: MEDICAID

## 2022-03-30 VITALS
SYSTOLIC BLOOD PRESSURE: 106 MMHG | HEIGHT: 75 IN | BODY MASS INDEX: 28.85 KG/M2 | DIASTOLIC BLOOD PRESSURE: 76 MMHG | OXYGEN SATURATION: 96 % | RESPIRATION RATE: 12 BRPM | HEART RATE: 82 BPM | WEIGHT: 232 LBS | TEMPERATURE: 98.2 F

## 2022-03-30 DIAGNOSIS — I50.42 CHRONIC COMBINED SYSTOLIC AND DIASTOLIC HEART FAILURE (HCC): Primary | ICD-10-CM

## 2022-03-30 DIAGNOSIS — R06.02 SOB (SHORTNESS OF BREATH): ICD-10-CM

## 2022-03-30 DIAGNOSIS — R06.02 SHORTNESS OF BREATH: ICD-10-CM

## 2022-03-30 DIAGNOSIS — E55.9 VITAMIN D DEFICIENCY: ICD-10-CM

## 2022-03-30 DIAGNOSIS — E61.1 IRON DEFICIENCY: ICD-10-CM

## 2022-03-30 DIAGNOSIS — R53.83 FATIGUE, UNSPECIFIED TYPE: ICD-10-CM

## 2022-03-30 DIAGNOSIS — R79.89 LOW TSH LEVEL: ICD-10-CM

## 2022-03-30 PROCEDURE — 93000 ELECTROCARDIOGRAM COMPLETE: CPT | Performed by: INTERNAL MEDICINE

## 2022-03-30 PROCEDURE — 99215 OFFICE O/P EST HI 40 MIN: CPT | Performed by: INTERNAL MEDICINE

## 2022-03-30 NOTE — PATIENT INSTRUCTIONS
Medication changes:    No medication changes    Testing Ordered: An order for echo has been placed to be done as soon as possible. You will be receiving an automated call from Coordination of Care to schedule this test. If you are unavailable to receive the call or would like to contact coordination of care yourself you may contact [phone] to schedule. You will need to contact coordination of care yourself if you miss their calls as they will only make 3 attempts to reach you. Labs have been drawn today in clinic. We will call you with any abnormal results that require a change in mediation regimen. EKG done today in clinic. Other Recommendations:      Ensure your drinking an adequate amount of water with a goal of 6-8 eight ounce glasses (1.5-2 liters) of fluid daily. Your urine should be clear and light yellow straw colored. If your blood pressure begins to consistently run below 90/60 and/or you begin to experience dizziness or lightheadedness, please [facility] at [phone]. Follow up 3-4 months with [facility]      Please monitor your weights daily upon waking and after using the bathroom. Keep a written records of your weights and bring to your next appointment. If you have a weight gain of 3 or more pounds overnight OR 5 or more pounds in one week please contact our office. Thank you for allowing us the privilege of being a part of your healthcare team! Please do not hesitate to contact our office at [phone] with any questions or concerns. Virtual Heart Failure [address] invites you to learn more about heart failure and to share your questions, ideas, and experiences with others. Each month, the Heart Failure Support Group features a new educational topic and a guest speaker, followed by an interactive discussion. Our Heart Failure Nurse Navigator will moderate each session.  You will be able to participate by phone, tablet or computer through [address] [place] [address]. This support group takes place on the 3rd Thursday of each month from 6:00-7:30PM. All individuals living with heart failure and their caregivers are welcome to join. If you are interested in participating, please contact us at [email or web address] and you will be sent the link to join the [employer].

## 2022-03-31 LAB
25(OH)D3 SERPL-MCNC: 40.6 NG/ML (ref 30–100)
ALBUMIN SERPL-MCNC: 3.6 G/DL (ref 3.5–5)
ALBUMIN/GLOB SERPL: 1.1 {RATIO} (ref 1.1–2.2)
ALP SERPL-CCNC: 109 U/L (ref 45–117)
ALT SERPL-CCNC: 22 U/L (ref 12–78)
ANION GAP SERPL CALC-SCNC: 4 MMOL/L (ref 5–15)
AST SERPL-CCNC: 16 U/L (ref 15–37)
BILIRUB SERPL-MCNC: 0.6 MG/DL (ref 0.2–1)
BNP SERPL-MCNC: 7 PG/ML
BUN SERPL-MCNC: 12 MG/DL (ref 6–20)
BUN/CREAT SERPL: 13 (ref 12–20)
CALCIUM SERPL-MCNC: 8.8 MG/DL (ref 8.5–10.1)
CHLORIDE SERPL-SCNC: 111 MMOL/L (ref 97–108)
CO2 SERPL-SCNC: 23 MMOL/L (ref 21–32)
CREAT SERPL-MCNC: 0.95 MG/DL (ref 0.7–1.3)
ERYTHROCYTE [DISTWIDTH] IN BLOOD BY AUTOMATED COUNT: 12.6 % (ref 11.5–14.5)
FERRITIN SERPL-MCNC: 269 NG/ML (ref 26–388)
GLOBULIN SER CALC-MCNC: 3.3 G/DL (ref 2–4)
GLUCOSE SERPL-MCNC: 100 MG/DL (ref 65–100)
HCT VFR BLD AUTO: 48.8 % (ref 36.6–50.3)
HGB BLD-MCNC: 15.6 G/DL (ref 12.1–17)
IRON SATN MFR SERPL: 32 % (ref 20–50)
IRON SERPL-MCNC: 105 UG/DL (ref 35–150)
MAGNESIUM SERPL-MCNC: 1.9 MG/DL (ref 1.6–2.4)
MCH RBC QN AUTO: 32.2 PG (ref 26–34)
MCHC RBC AUTO-ENTMCNC: 32 G/DL (ref 30–36.5)
MCV RBC AUTO: 100.8 FL (ref 80–99)
NRBC # BLD: 0 K/UL (ref 0–0.01)
NRBC BLD-RTO: 0 PER 100 WBC
PLATELET # BLD AUTO: 264 K/UL (ref 150–400)
PMV BLD AUTO: 10 FL (ref 8.9–12.9)
POTASSIUM SERPL-SCNC: 4.1 MMOL/L (ref 3.5–5.1)
PROT SERPL-MCNC: 6.9 G/DL (ref 6.4–8.2)
RBC # BLD AUTO: 4.84 M/UL (ref 4.1–5.7)
SODIUM SERPL-SCNC: 138 MMOL/L (ref 136–145)
T4 FREE SERPL-MCNC: 0.9 NG/DL (ref 0.8–1.5)
TIBC SERPL-MCNC: 325 UG/DL (ref 250–450)
TSH SERPL DL<=0.05 MIU/L-ACNC: 0.75 UIU/ML (ref 0.36–3.74)
WBC # BLD AUTO: 6.6 K/UL (ref 4.1–11.1)

## 2022-04-06 DIAGNOSIS — E66.09 CLASS 1 OBESITY DUE TO EXCESS CALORIES WITH SERIOUS COMORBIDITY AND BODY MASS INDEX (BMI) OF 32.0 TO 32.9 IN ADULT: ICD-10-CM

## 2022-04-06 DIAGNOSIS — I10 ESSENTIAL HYPERTENSION: ICD-10-CM

## 2022-04-06 DIAGNOSIS — J90 PLEURAL EFFUSION, RIGHT: ICD-10-CM

## 2022-04-06 DIAGNOSIS — I11.0 HYPERTENSIVE HEART DISEASE WITH COMBINED SYSTOLIC AND DIASTOLIC CONGESTIVE HEART FAILURE, UNSPECIFIED HF CHRONICITY (HCC): ICD-10-CM

## 2022-04-06 DIAGNOSIS — I40.0 CHRONIC VIRAL MYOCARDITIS: ICD-10-CM

## 2022-04-06 DIAGNOSIS — I42.8 NICM (NONISCHEMIC CARDIOMYOPATHY) (HCC): ICD-10-CM

## 2022-04-06 DIAGNOSIS — I50.40 HYPERTENSIVE HEART DISEASE WITH COMBINED SYSTOLIC AND DIASTOLIC CONGESTIVE HEART FAILURE, UNSPECIFIED HF CHRONICITY (HCC): ICD-10-CM

## 2022-04-06 DIAGNOSIS — I50.42 CHRONIC COMBINED SYSTOLIC AND DIASTOLIC HEART FAILURE (HCC): Chronic | ICD-10-CM

## 2022-04-06 DIAGNOSIS — I50.40 COMBINED SYSTOLIC AND DIASTOLIC HEART FAILURE, UNSPECIFIED HF CHRONICITY (HCC): ICD-10-CM

## 2022-04-06 RX ORDER — CARVEDILOL 25 MG/1
25 TABLET ORAL 2 TIMES DAILY WITH MEALS
Qty: 60 TABLET | Refills: 2 | Status: SHIPPED | OUTPATIENT
Start: 2022-04-06 | End: 2022-07-27 | Stop reason: SDUPTHER

## 2022-04-06 RX ORDER — SACUBITRIL AND VALSARTAN 97; 103 MG/1; MG/1
1 TABLET, FILM COATED ORAL 2 TIMES DAILY
Qty: 60 TABLET | Refills: 2 | Status: SHIPPED | OUTPATIENT
Start: 2022-04-06 | End: 2022-07-27 | Stop reason: SDUPTHER

## 2022-04-06 RX ORDER — SPIRONOLACTONE 25 MG/1
25 TABLET ORAL DAILY
Qty: 90 TABLET | Refills: 1 | Status: SHIPPED | OUTPATIENT
Start: 2022-04-06 | End: 2022-07-27 | Stop reason: SDUPTHER

## 2022-04-06 NOTE — TELEPHONE ENCOUNTER
Requested Prescriptions     Signed Prescriptions Disp Refills    carvediloL (COREG) 25 mg tablet 60 Tablet 2     Sig: Take 1 Tablet by mouth two (2) times daily (with meals). Authorizing Provider: Kevin Elder     Ordering User: [place]  sacubitriL-valsartan [address]iza Ld)  mg tablet 60 Tablet 2     Sig: Take 1 Tablet by mouth two (2) times a day. Authorizing Provider: Kevin Elder     Ordering User: [place] spironolactone (ALDACTONE) 25 mg tablet 90 Tablet 1     Sig: Take 1 Tablet by mouth daily.      Authorizing Provider: Kevin Elder     Ordering User: Bartolo Her

## 2022-04-08 ENCOUNTER — HOSPITAL ENCOUNTER (OUTPATIENT)
Dept: NON INVASIVE DIAGNOSTICS | Age: 42
Discharge: HOME | End: 2022-04-08
Attending: INTERNAL MEDICINE
Payer: MEDICAID

## 2022-04-08 DIAGNOSIS — I50.42 CHRONIC COMBINED SYSTOLIC AND DIASTOLIC HEART FAILURE (HCC): ICD-10-CM

## 2022-04-08 DIAGNOSIS — R06.02 SOB (SHORTNESS OF BREATH): ICD-10-CM

## 2022-04-08 PROCEDURE — 93306 TTE W/DOPPLER COMPLETE: CPT

## 2022-04-11 LAB
ECHO AO ROOT DIAM: 2.6 CM
ECHO AV AREA PLAN: 4 CM2
ECHO EST RA PRESSURE: 5 MMHG
ECHO LA DIAMETER: 2.6 CM
ECHO LA TO AORTIC ROOT RATIO: 1
ECHO LA VOL 4C: 27 ML (ref 18–58)
ECHO LV EDV A4C: 137 ML
ECHO LV EJECTION FRACTION A4C: 53 %
ECHO LV ESV A4C: 65 ML
ECHO LV FRACTIONAL SHORTENING: 33 % (ref 28–44)
ECHO LV INTERNAL DIMENSION DIASTOLIC: 5.8 CM (ref 4.2–5.9)
ECHO LV INTERNAL DIMENSION SYSTOLIC: 3.9 CM
ECHO LV IVSD: 0.7 CM (ref 0.6–1)
ECHO LV MASS 2D: 203.5 G (ref 88–224)
ECHO LV POSTERIOR WALL DIASTOLIC: 1.1 CM (ref 0.6–1)
ECHO LV RELATIVE WALL THICKNESS RATIO: 0.38
ECHO LVOT AREA: 5.3 CM2
ECHO LVOT DIAM: 2.6 CM
ECHO LVOT PEAK GRADIENT: 2 MMHG
ECHO LVOT PEAK VELOCITY: 0.8 M/S
ECHO MV A VELOCITY: 0.48 M/S
ECHO MV AREA PHT: 3.7 CM2
ECHO MV AREA PLAN: 5.5 CM2
ECHO MV E DECELERATION TIME (DT): 110.1 MS
ECHO MV E VELOCITY: 0.55 M/S
ECHO MV E/A RATIO: 1.15
ECHO MV MAX VELOCITY: 0.7 M/S
ECHO MV MEAN GRADIENT: 1 MMHG
ECHO MV MEAN VELOCITY: 0.5 M/S
ECHO MV PEAK GRADIENT: 2 MMHG
ECHO MV PRESSURE HALF TIME (PHT): 59 MS
ECHO MV VTI: 21.3 CM
ECHO PULMONARY ARTERY END DIASTOLIC PRESSURE: 9 MMHG
ECHO PV MAX VELOCITY: 0.6 M/S
ECHO PV PEAK GRADIENT: 1 MMHG
ECHO RIGHT VENTRICULAR SYSTOLIC PRESSURE (RVSP): 30 MMHG
ECHO TV REGURGITANT MAX VELOCITY: 2.49 M/S
ECHO TV REGURGITANT PEAK GRADIENT: 25 MMHG

## 2022-04-11 PROCEDURE — 93306 TTE W/DOPPLER COMPLETE: CPT | Performed by: INTERNAL MEDICINE

## 2022-07-27 ENCOUNTER — OFFICE VISIT (OUTPATIENT)
Dept: CARDIOLOGY CLINIC | Age: 42
End: 2022-07-27
Payer: MEDICAID

## 2022-07-27 ENCOUNTER — TELEPHONE (OUTPATIENT)
Dept: CARDIOLOGY CLINIC | Age: 42
End: 2022-07-27

## 2022-07-27 DIAGNOSIS — I50.42 CHRONIC COMBINED SYSTOLIC AND DIASTOLIC HEART FAILURE (HCC): Chronic | ICD-10-CM

## 2022-07-27 DIAGNOSIS — I10 ESSENTIAL HYPERTENSION: ICD-10-CM

## 2022-07-27 DIAGNOSIS — E55.9 VITAMIN D DEFICIENCY: ICD-10-CM

## 2022-07-27 DIAGNOSIS — J90 PLEURAL EFFUSION, RIGHT: ICD-10-CM

## 2022-07-27 DIAGNOSIS — I50.40 HYPERTENSIVE HEART DISEASE WITH COMBINED SYSTOLIC AND DIASTOLIC CONGESTIVE HEART FAILURE, UNSPECIFIED HF CHRONICITY (HCC): ICD-10-CM

## 2022-07-27 DIAGNOSIS — I42.8 NICM (NONISCHEMIC CARDIOMYOPATHY) (HCC): Primary | ICD-10-CM

## 2022-07-27 DIAGNOSIS — I50.40 COMBINED SYSTOLIC AND DIASTOLIC HEART FAILURE, UNSPECIFIED HF CHRONICITY (HCC): ICD-10-CM

## 2022-07-27 DIAGNOSIS — I40.0 CHRONIC VIRAL MYOCARDITIS: ICD-10-CM

## 2022-07-27 DIAGNOSIS — I11.0 HYPERTENSIVE HEART DISEASE WITH COMBINED SYSTOLIC AND DIASTOLIC CONGESTIVE HEART FAILURE, UNSPECIFIED HF CHRONICITY (HCC): ICD-10-CM

## 2022-07-27 DIAGNOSIS — E66.09 CLASS 1 OBESITY DUE TO EXCESS CALORIES WITH SERIOUS COMORBIDITY AND BODY MASS INDEX (BMI) OF 32.0 TO 32.9 IN ADULT: ICD-10-CM

## 2022-07-27 PROCEDURE — 99214 OFFICE O/P EST MOD 30 MIN: CPT | Performed by: NURSE PRACTITIONER

## 2022-07-27 RX ORDER — SACUBITRIL AND VALSARTAN 97; 103 MG/1; MG/1
1 TABLET, FILM COATED ORAL 2 TIMES DAILY
Qty: 180 TABLET | Refills: 3 | Status: SHIPPED | OUTPATIENT
Start: 2022-07-27

## 2022-07-27 RX ORDER — CARVEDILOL 25 MG/1
25 TABLET ORAL 2 TIMES DAILY WITH MEALS
Qty: 180 TABLET | Refills: 3 | Status: SHIPPED | OUTPATIENT
Start: 2022-07-27

## 2022-07-27 RX ORDER — ACETAMINOPHEN 500 MG
2000 TABLET ORAL DAILY
Qty: 90 CAPSULE | Refills: 1 | Status: SHIPPED | OUTPATIENT
Start: 2022-07-27

## 2022-07-27 RX ORDER — SPIRONOLACTONE 25 MG/1
25 TABLET ORAL DAILY
Qty: 90 TABLET | Refills: 3 | Status: SHIPPED | OUTPATIENT
Start: 2022-07-27

## 2022-07-27 NOTE — PROGRESS NOTES
[facility], [address] [place] [address] Note    Patient name: Stanley Estrdaa  Patient : 1980  Patient MRN: [id number]  Date of service: 22        CHIEF COMPLAINT:    Chief Complaint   Patient presents with    CHF    Palpitations          PLAN OF CARE:   NICM d/t viral myocarditis with recovery of EF to 55-60% (echo 2022), maintained now for >1 year. On GDMT, well compensated. NYHA Class I.      RECOMMENDATIONS:  Beta-blocker: continue coreg 25mg BID  ACE/ARB/ARNi: continue ENtresto 97/103mg BID  MRA: continue spironolactone 25mg daily  SGLT2 inhibitor: no indication with normal EF  Diuretic: does not require diuretics; appears euvolemic in clinic today  Echocardiogram annually, due again 2023  EKG annually  Reviewed labs from March-  and no changes in meds, no indication to repeat now  Reinforced heart healthy, low salt diet  Reinforced appropriate fluid intake of 6 x 8oz glasses of water per day  Monitor and record daily weights and BPs  Referral for new primary cardiologist   Follow-up with PCP  Recommend flu, pneumonia, and COVID vaccinations    Return to [facility] in 6 mos with NP/MD, if remains stable and has established care with new general cardiologist, then can see annually or prn        IMPRESSION:  NICM, with recovery of EF to normal >1 year  Stage C, NYHA class I symptoms  Non-ischemic cardiomyopathy with recovery of LV function  H/o viral myocarditis, LVEF improved from 20% to 55-60%  Positive titer of Coxsackie A and HHV6 antibodies  Normal coronaries by [facility] (2018)  Paroxysmal atrial fibrillation with RVR  S/p DCCV; remains SR; [id number]=1  Does not require anticoagulation  Cardiac risk factors  HTN  HL  Tobacco abuse  Depression      CARDIAC IMAGING AND DIAGNOSTICS REVIEWED:  Echo (22)    Left Ventricle: Left ventricle size is normal. Normal wall thickness. Normal wall motion.  Normal left ventricular systolic function with a visually estimated EF of 55 - 60%. Normal diastolic function. Echo (5/12/21)  LV: Estimated LVEF is 50 - 55%. Normal cavity size, wall thickness and diastolic function. Low normal systolic function. Abnormal left ventricular strain. Global longitudinal strain is -16.4%. Echo (11/15/19)  Left Ventricle: Normal cavity size, wall thickness, systolic function (ejection fraction normal) and diastolic function. No evidence of left ventricular obstruction. Estimated left ventricular ejection fraction is 51 - 55%. Visually measured ejection fraction. No regional wall motion abnormality noted. Normal left ventricular strain. No ventricular septal defect present in the left ventricle. Pulmonic Valve: Pulmonic valve has a dilated annulus. Mild to moderate pulmonic valve regurgitation is present. Echo (7/17/18) mild-mod LVD, EF 10-20%, severe DHK, mild LVH, DD, RVD, mild- mod LAE, mod RAY, severe MR, mod-severe TR, RVSP 40, dilated RV outflow tract    Echo (10/26/18) mild LVD, EF 45%, G3/4 DD, mod ant leaflet thickening, mild MR, dil RVOT    ROBERTO (7/20/18) LVD, EF 10-15%, severe DHK, mild RVD, reduced RVEF, mod ANNAMARIE, no CELINA thrombus, no PFO, mod MR, mild TR    LHC (7/20/18) no epicardial disease, LVEP 1    RHC 7/20/18: RA:12, RV 34/6/14. PA 30/20/26, PCWP 18, [place] 6.33/2.74 TD: 5.37/2.32    CARDIAC MRI: 7/20/18: mod LVD (LVDD 66), mild LVH (13mm), LVEF 17%, severe GHK, mild RVD, RVEF 20%, mod GHK, mod MR, mild TR. Distinct mid wall stripe enhancement of the basal septal wall along with RV insertion point enhancement sparing the subendocardium. viral myocarditis of HHV-6 viral strain. NO features of giant cell or lymphocytic myocarditis. , infiltrative Sarcoidosis, amyloidosis, old MI      HISTORY OF PRESENT ILLNESS:  I had the pleasure of seeing Saundra Wasserman in [facility] in [address]     Briefly, Saundra Wasserman is a 36 y.o. male with h/o HFrEF (10-20%) in the setting of viral myocarditis (Human Herpes Virus 6)  Complicated by new onset PAF with RVR s/p DCCV. He was first diagnosed 8/2018 when he presented to [facility] with ADHF found to be in afib with RVR. CTA (-) for PE, but showed a large R pleural effusion. He underwent a thoracentesis for ~ 1 L, and  ROBERTO and DCCV. Right and left heart cath showed no epicardial disease and slightly elevated filling pressures. Cardiac MRI showed LVEF 17%, RVEF 20% confirmed viral myocarditis and lab work was positive for  Coxsackie pane, CMV IgG positive. HHV-6 positive. Echo 10/26/18: showed EF improved to 45% with grade 3/4 diastolic dysfunction and improved MR now to mild. Most recent Echo in APril 2022 with LVEF 55-60%. INTERVAL HISTORY:  Today, patient presents for routine clinic visit. he reports doing well.  he is able to walk around and do activity as he wants without limitation. he walked to our clinic from parking garage without having to slow down or stop. he can walk more than one block without symptoms of fatigue or shortness of breath or chest pain. he can walk one flight of stairs without symptoms of fatigue or shortness of breath or chest pain. Patient can perform home activities without problem. he denies other cardiac symptoms such as chest pain or leg pain with walking. Patient denies symptoms of volume overload or leg edema. Reports a normal appetite. he denies abdominal bloating, nausea or early satiety. Patient's weight remained stable. he denies orthopnea, PND or nocturia. Denies irregular heart rate or palpitations. No presyncope or syncope. he is compliant with fluid restriction and taking medications as prescribed. Patient manages his own medications. REVIEW OF SYSTEMS:  Review of Systems   Constitutional:  Negative for chills, fever and weight loss. Respiratory:  Negative for cough and shortness of breath.     Cardiovascular:  Negative for chest pain, palpitations, orthopnea and leg swelling. Gastrointestinal:  Negative for abdominal pain, diarrhea, heartburn, nausea and vomiting. Neurological:  Negative for dizziness and weakness. PHYSICAL EXAM:  Visit Vitals  BP (!) (P) 132/94 (BP 1 Location: Left upper arm)   Pulse (P) 66   Temp (P) 97.6 °F (36.4 °C) (Oral)   Ht (P) 6' 2\" (1.88 m)   Wt (P) 230 lb 12.8 oz (104.7 kg)   SpO2 (P) 98%   BMI (P) 29.63 kg/m²     Physical Exam  Vitals reviewed. Constitutional:       General: He is not in acute distress. Appearance: Normal appearance. Neck:      Vascular: No hepatojugular reflux or JVD. Cardiovascular:      Rate and Rhythm: Normal rate and regular rhythm. Pulses: Normal pulses. Heart sounds: Normal heart sounds. No murmur heard. No friction rub. No gallop. Pulmonary:      Effort: Pulmonary effort is normal. No respiratory distress. Breath sounds: Normal breath sounds. Abdominal:      General: Bowel sounds are normal. There is no distension. Palpations: Abdomen is soft. Tenderness: There is no abdominal tenderness. Skin:     General: Skin is warm and dry. Capillary Refill: Capillary refill takes less than 2 seconds. Neurological:      General: No focal deficit present. Mental Status: He is alert and oriented to person, place, and time. Psychiatric:         Mood and Affect: Mood normal.         Behavior: Behavior normal.         Thought Content:  Thought content normal.         Judgment: Judgment normal.        PAST MEDICAL HISTORY:  Past Medical History:   Diagnosis Date    Anxiety     Class 1 obesity due to excess calories with serious comorbidity and body mass index (BMI) of 32.0 to 32.9 in adult 2/27/2019    Combined systolic and diastolic heart failure ([facility] [address] 8/8/2018    ECHO 7/17/18:mild-mod LVD, EF 10-20%, severe DHK, mild LVH, DD, RVD, mild- mod LAE, mod RAY, severe MR, mod-severe TR, RVSP 40, dilated RV outflow tract  ROBERTO 7/20/18: LVD, EF 10-15%, severe DHK, mild RVD, reduced RVEF, mod ANNAMARIE, no CELINA thrombus, no PFO, mod MR, mild TR  RHC 7/20/18: RA:12, RV 34/6/14.  PA 30/20/26, PCWP 18, [place] 6.33/2.74 TD: 5.37/2.32    CARDIAC MRI: 7/20/18: mod LVD (LVDD 66), mil    Congestive heart failure (HCC)     Essential hypertension 3/12/2019    Hypertensive heart disease with combined systolic and diastolic congestive heart failure (HCC) 3/12/2019    ECHO 7/17/18:mild-mod LVD, EF 10-20%, severe DHK, mild LVH, DD, RVD, mild- mod LAE, mod RAY, severe MR, mod-severe TR, RVSP 40, dilated RV outflow tract ECHO 10/26/2018: mild LVD, EF 45%, G3/4 DD, mod ant leaflet thickening, mild MR, dil RVOT        Long term current use of anticoagulant therapy     NICM (nonischemic cardiomyopathy) ([address] 8/8/2018    Non-rheumatic mitral regurgitation 8/8/2018    ECHO 7/17/18:mild-mod LVD, EF 10-20%, severe DHK, mild LVH, DD, RVD, mild- mod LAE, mod RAY, severe MR, mod-severe TR, RVSP 40, dilated RV outflow tract  ROBERTO 7/20/18: LVD, EF 10-15%, severe DHK, mild RVD, reduced RVEF, mod ANNAMARIE, no CELINA thrombus, no PFO, mod MR, mild TR      PAF (paroxysmal atrial fibrillation) ([address] 8/8/2018    ROBERTO 7/20/18: LVD, EF 10-15%, severe DHK, mild RVD, reduced RVEF, mod ANNAMARIE, no CELINA thrombus, no PFO, mod MR, mild TR    DCCV 7/20/18       Rapid atrial fibrillation ([address] 7/17/2018    Tobacco use disorder 8/8/2018    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    Vitamin D deficiency 9/19/2018    Level 12 8/2018       PAST SURGICAL HISTORY:  Past Surgical History:   Procedure Laterality Date    CARDIOVERSION, ELECTIVE;EXTERN  7/20/2018         HX HEART CATHETERIZATION         FAMILY HISTORY:  Family History   Problem Relation Age of Onset    Diabetes Mother     Heart Failure Mother     No Known Problems Father     Hypertension Sister     Hypertension Brother     Hypertension Sister        SOCIAL HISTORY:  Social History     Socioeconomic History    Marital status: SINGLE   Tobacco Use Smoking status: Every Day     Packs/day: 0.25     Types: Cigarettes    Smokeless tobacco: Never    Tobacco comments:     3-4 cigarettes vaughn   Vaping Use    Vaping Use: Former   Substance and Sexual Activity    Alcohol use: Yes     Alcohol/week: 1.0 standard drink     Types: 1 Shots of liquor per week     Comment: every two weeks    Drug use: No    Sexual activity: Not Currently       LABORATORY RESULTS:  No flowsheet data found. ALLERGY:  Allergies   Allergen Reactions    Peanut Anaphylaxis    Milk Other (comments)    Shrimp Other (comments)        CURRENT MEDICATIONS:    Current Outpatient Medications:     carvediloL (COREG) 25 mg tablet, Take 1 Tablet by mouth two (2) times daily (with meals). , Disp: 180 Tablet, Rfl: 3    sacubitriL-valsartan (Entresto)  mg tablet, Take 1 Tablet by mouth two (2) times a day., Disp: 180 Tablet, Rfl: 3    spironolactone (ALDACTONE) 25 mg tablet, Take 1 Tablet by mouth in the morning., Disp: 90 Tablet, Rfl: 3    cholecalciferol (VITAMIN D3) (2,000 UNITS /50 MCG) cap capsule, Take 1 Capsule by mouth in the morning., Disp: 90 Capsule, Rfl: 1    magnesium 250 mg tab, Take  by mouth. OTC: One tab am and 1/2 tab in pm, Disp: , Rfl:     multivitamin (ONE A DAY) tablet, Take 1 Tab by mouth daily. , Disp: , Rfl:     clotrimazole-betamethasone (LOTRISONE) topical cream, Apply to affected area twice a day (Patient not taking: Reported on 7/27/2022), Disp: 15 g, Rfl: 1    nicotine (NICODERM CQ) 21 mg/24 hr, APPLY 1 PATCH TOPICALLY EVERY 24 HOURS FOR 30 DAYS (Patient not taking: No sig reported), Disp: 30 Patch, Rfl: 0    EPINEPHrine (EPIPEN) 0.3 mg/0.3 mL injection, 0.3 mL by IntraMUSCular route once as needed for Anaphylaxis or Allergic Response for up to 1 dose., Disp: 2 Syringe, Rfl: 6      Nohemi Serrano, MSN, AGACNP-BC  [address], [address]  Phone: [phone]  Fax: [phone] TEAM:  Patient Care Team:  Mary Kay Burdick MD as PCP - General (Family Medicine)  Mary Kay Burdick MD as PCP - [facility] Empaneled Provider  Vel Lambert MD as Physician (Cardiovascular Disease Physician)  Timo Underwood (Physician Assistant)  YOUSUF Bullock as Consulting Provider (Nurse Practitioner)  Layla Thurman MD as Consulting Provider (Internal Medicine Physician)

## 2022-07-27 NOTE — TELEPHONE ENCOUNTER
Patient is scheduled to see   Dr. Laila Felix   8-22-22   10:00 am (arrival time 9:45 am)  [address]    This information was given to patient. He stated understanding and had no questions.

## 2022-07-27 NOTE — PATIENT INSTRUCTIONS
Medication changes:    No medication changes     Please take this to your pharmacy to notify them of the change in medications. Testing Ordered: Other Recommendations:     A referral has been sent to primary cardiology. You will be contacted for scheduling. An order for echo has been placed to be done June 2023 You will be receiving an automated call from [address] [place] [address] to schedule this test. If you are unavailable to receive the call or would like to contact coordination of care yourself you may contact [phone] to schedule. You will need to contact coordination of care yourself if you miss their calls as they will only make 3 attempts to reach you. Ensure your drinking an adequate amount of water with a goal of 6-8 eight ounce glasses (1.5-2 liters) of fluid daily. Your urine should be clear and light yellow straw colored. If your blood pressure begins to consistently run below 90/60 and/or you begin to experience dizziness or lightheadedness, please contact the [place] [address] at [phone]. Follow up 1 year  with [facility]      Please monitor your weights daily upon waking and after using the bathroom. Keep a written records of your weights and bring to your next appointment. If you have a weight gain of 3 or more pounds overnight OR 5 or more pounds in one week please contact our office. Thank you for allowing us the privilege of being a part of your healthcare team! Please do not hesitate to contact our office at [phone] with any questions or concerns. Virtual Heart Failure [address] invites you to learn more about heart failure and to share your questions, ideas, and experiences with others. Each month, the Heart Failure Support Group features a new educational topic and a guest speaker, followed by an interactive discussion. Our Heart Failure Nurse Navigator will moderate each session. You will be able to participate by phone, tablet or computer through [address] [place] [address]. This support group takes place on the 3rd Thursday of each month from 6:00-7:30PM. All individuals living with heart failure and their caregivers are welcome to join. If you are interested in participating, please contact us at [email or web address] and you will be sent the link to join the [employer].

## 2022-08-04 NOTE — PATIENT INSTRUCTIONS
You are doing well! START norvasc (amlodipine) 5 mg by mouth daily    CONTINUE CURRENT medications     Take twice a day medications 12 hours apart with food    Labs today BMP, proBNP     See sleep medicine and Dr. Sid Gutierrez   Date Time Provider Dougie Moore   3/20/2019  1:00 PM Neo Lechuga MD [facility]   4/4/2019  9:30 PM BEDROOM 4 [facility] [address] [place] [address] SLEEP LAB [place]   4/18/2019 10:15 AM Marley Ley MD [address]     Stop smoking and alcohol consumption ! ! Limit WHITE foods ( flour, sugar, pasta, rice, potatoes)    Walk more and increase activity    Keep a positive attitude     Follow up with [facility] in 2 weeks. HF Education: Continue daily weights (in the morning, after voiding). Notify HF team of overnight weight gains > 2 lbs or weekly >5 lbs or if any of the following Sx. Continue to limit sodium intake & monitor your fluid intake .
 used

## 2023-03-16 ENCOUNTER — TELEPHONE (OUTPATIENT)
Dept: FAMILY MEDICINE CLINIC | Age: 43
End: 2023-03-16

## 2023-03-16 NOTE — TELEPHONE ENCOUNTER
Returned call to pt, informed he will need an appointment. Pt has not been seen by a provider in this office.   Appt scheduled for 3/31/23@ 3p with Dr Morena Matos

## 2023-03-16 NOTE — TELEPHONE ENCOUNTER
----- Message from Davion Wheatley sent at 3/16/2023 10:59 AM EDT -----  Subject: Referral Request    Reason for referral request? Pt would like a referral or information on   behavioral health, please contact Pt with info on who provider would   recommend. He has Medicaid for his insurance. Provider patient wants to be referred to(if known):     Provider Phone Number(if known):     Additional Information for Provider?   ---------------------------------------------------------------------------  --------------  [id number]    [phone]; OK to leave message on voicemail  ---------------------------------------------------------------------------  --------------

## 2023-03-28 ENCOUNTER — OFFICE VISIT (OUTPATIENT)
Dept: CARDIOLOGY CLINIC | Age: 43
End: 2023-03-28
Payer: MEDICAID

## 2023-03-28 VITALS
RESPIRATION RATE: 18 BRPM | HEIGHT: 74 IN | WEIGHT: 245 LBS | DIASTOLIC BLOOD PRESSURE: 86 MMHG | BODY MASS INDEX: 31.44 KG/M2 | OXYGEN SATURATION: 98 % | SYSTOLIC BLOOD PRESSURE: 118 MMHG | HEART RATE: 78 BPM

## 2023-03-28 DIAGNOSIS — I34.0 NON-RHEUMATIC MITRAL REGURGITATION: ICD-10-CM

## 2023-03-28 DIAGNOSIS — I48.0 PAF (PAROXYSMAL ATRIAL FIBRILLATION) (HCC): ICD-10-CM

## 2023-03-28 DIAGNOSIS — I42.8 NICM (NONISCHEMIC CARDIOMYOPATHY) (HCC): Primary | ICD-10-CM

## 2023-03-28 DIAGNOSIS — I10 ESSENTIAL HYPERTENSION: ICD-10-CM

## 2023-03-28 DIAGNOSIS — R06.02 SHORTNESS OF BREATH: ICD-10-CM

## 2023-03-28 PROCEDURE — 3074F SYST BP LT 130 MM HG: CPT | Performed by: SPECIALIST

## 2023-03-28 PROCEDURE — 99214 OFFICE O/P EST MOD 30 MIN: CPT | Performed by: SPECIALIST

## 2023-03-28 PROCEDURE — 3079F DIAST BP 80-89 MM HG: CPT | Performed by: SPECIALIST

## 2023-03-28 NOTE — PROGRESS NOTES
HISTORY OF PRESENT ILLNESS  Jacob Garcia is a 36 y.o. male     SUMMARY:   Problem List  Date Reviewed: 3/28/2023            Codes Class Noted    Depression with anxiety ICD-10-CM: F41.8  ICD-9-CM: 300.4  3/20/2019        Personality disorder ([facility] ICD-10-CM: F60.9  ICD-9-CM: 301.9  3/20/2019    Overview Signed 3/20/2019  1:08 PM by Panfilo Meier MD     R/o antisocial             Alcohol use disorder, moderate, in sustained remission ([facility] ICD-10-CM: F10.21  ICD-9-CM: 303.93  3/20/2019        Essential hypertension ICD-10-CM: I10  ICD-9-CM: 401.9  3/12/2019        Hypertensive heart disease with combined systolic and diastolic congestive heart failure ([facility] ICD-10-CM: I11.0, I50.40  ICD-9-CM: 402.91, 428.40  3/12/2019    Overview Signed 3/12/2019 12:24 PM by Lyndon Ashford     ECHO 7/17/18:mild-mod LVD, EF 10-20%, severe DHK, mild LVH, DD, RVD, mild- mod LAE, mod RAY, severe MR, mod-severe TR, RVSP 40, dilated RV outflow tract  ECHO 10/26/2018: mild LVD, EF 45%, G3/4 DD, mod ant leaflet thickening, mild MR, dil RVOT                  Class 1 obesity due to excess calories with serious comorbidity and body mass index (BMI) of 32.0 to 32.9 in adult ICD-10-CM: E66.09, Z68.32  ICD-9-CM: 278.00, V85.32  2/27/2019        Vitamin D deficiency (Chronic) ICD-10-CM: E55.9  ICD-9-CM: 268.9  9/19/2018    Overview Signed 9/19/2018  1:43 PM by Geoffrey Huntley 12 8/2018             SOB (shortness of breath) ICD-10-CM: R06.02  ICD-9-CM: 786.05  8/9/2018        Snoring ICD-10-CM: R06.83  ICD-9-CM: 786.09  8/9/2018        NICM (nonischemic cardiomyopathy) ([facility] ICD-10-CM: I42.8  ICD-9-CM: 425.4  8/8/2018        Combined systolic and diastolic heart failure (HCC) (Chronic) ICD-10-CM: I50.40  ICD-9-CM: 428.40  8/8/2018    Overview Signed 8/8/2018  8:53 AM by Geoffrey RUIZ     ECHO 7/17/18:mild-mod LVD, EF 10-20%, severe DHK, mild LVH, DD, RVD, mild- mod LAE, mod RAY, severe MR, mod-severe TR, RVSP 40, dilated RV outflow tract    ROBERTO 7/20/18: LVD, EF 10-15%, severe DHK, mild RVD, reduced RVEF, mod ANNAMARIE, no CELINA thrombus, no PFO, mod MR, mild TR    RHC 7/20/18: RA:12, RV 34/6/14. PA 30/20/26, PCWP 18, [place] 6.33/2.74 TD: 5.37/2.32       CARDIAC MRI: 7/20/18: mod LVD (LVDD 66), mild LVH (13mm), LVEF 17%, severe GHK, mild RVD, RVEF 20%, mod GHK, mod MR, mild TR   Distinct mid wall stripe enhancement of the basal septal wall along with RV insertion point enhancement sparing the subendocardium. viral myocarditis of HHV-6  viral strain. NO features of giant cell or lymphocytic myocarditis. ,  infiltrative  Sarcoidosis, amyloidosis, old MI               Viral myocarditis ICD-10-CM: B33.22  ICD-9-CM: 422.91  8/8/2018    Overview Signed 8/8/2018  8:54 AM by Sandrine RUIZ     Human herpes virus 6             Tobacco use disorder ICD-10-CM: F17.200  ICD-9-CM: 305.1  8/8/2018        PAF (paroxysmal atrial fibrillation) ([facility] ICD-10-CM: I48.0  ICD-9-CM: 427.31  8/8/2018    Overview Signed 8/8/2018  8:55 AM by Jeremiah Thrasher     ROBERTO 7/20/18: LVD, EF 10-15%, severe DHK, mild RVD, reduced RVEF, mod ANNAMARIE, no CELINA thrombus, no PFO, mod MR, mild TR       DCCV 7/20/18                  Non-rheumatic mitral regurgitation ICD-10-CM: I34.0  ICD-9-CM: 424.0  8/8/2018    Overview Signed 8/8/2018  8:55 AM by Jeremiah Thrasher     ECHO 7/17/18:mild-mod LVD, EF 10-20%, severe DHK, mild LVH, DD, RVD, mild- mod LAE, mod RAY, severe MR, mod-severe TR, RVSP 40, dilated RV outflow tract    ROBERTO 7/20/18: LVD, EF 10-15%, severe DHK, mild RVD, reduced RVEF, mod ANNAMARIE, no CELINA thrombus, no PFO, mod MR, mild TR                Anticoagulated ICD-10-CM: Z79.01  ICD-9-CM: V58.61  7/27/2018    Overview Signed 7/27/2018 10:07 AM by Erik Guerrero PA-C     Warfarin with Goal INR 2-3             Acute systolic heart failure (HCC) ICD-10-CM: I50.21  ICD-9-CM: 428.21  7/19/2018        Pleural effusion, right ICD-10-CM: J90  ICD-9-CM: 511.9  7/19/2018        Atrial fibrillation with RVR [facility]) ICD-10-CM: I48.91  ICD-9-CM: 427.31  7/19/2018           Current Outpatient Medications on File Prior to Visit   Medication Sig    carvediloL (COREG) 25 mg tablet Take 1 Tablet by mouth two (2) times daily (with meals). sacubitriL-valsartan (Entresto)  mg tablet Take 1 Tablet by mouth two (2) times a day. spironolactone (ALDACTONE) 25 mg tablet Take 1 Tablet by mouth in the morning. clotrimazole-betamethasone (LOTRISONE) topical cream Apply to affected area twice a day    nicotine (NICODERM CQ) 21 mg/24 hr APPLY 1 PATCH TOPICALLY EVERY 24 HOURS FOR 30 DAYS    magnesium 250 mg tab Take  by mouth. OTC: One tab am and 1/2 tab in pm    EPINEPHrine (EPIPEN) 0.3 mg/0.3 mL injection 0.3 mL by IntraMUSCular route once as needed for Anaphylaxis or Allergic Response for up to 1 dose. multivitamin (ONE A DAY) tablet Take 1 Tab by mouth daily. cholecalciferol (VITAMIN D3) (2,000 UNITS /50 MCG) cap capsule Take 1 Capsule by mouth in the morning. (Patient not taking: Reported on 3/28/2023)     No current facility-administered medications on file prior to visit. CARDIOLOGY STUDIES TO DATE:  ECHO 7/17/18:mild-mod LVD, EF 10-20%, severe DHK, mild LVH, DD, RVD, mild- mod LAE, mod RAY, severe MR, mod-severe TR, RVSP 40, dilated RV outflow tract  ECHO 10/26/2018: mild LVD, EF 45%, G3/4 DD, mod ant leaflet thickening, mild MR, dil RVOT     ROBERTO 7/20/18: LVD, EF 10-15%, severe DHK, mild RVD, reduced RVEF, mod ANNAMARIE, no CELINA thrombus, no PFO, mod MR, mild TR      CATH 7/20/18: no epicardial disease, LVEP 1     RHC 7/20/18: RA:12, RV 34/6/14. PA 30/20/26, PCWP 18, [place] 6.33/2.74 TD: 5.37/2.32     DCCV 7/20/18    CARDIAC MRI: 7/20/18: mod LVD (LVDD 66), mild LVH (13mm), LVEF 17%, severe GHK, mild RVD, RVEF 20%, mod GHK, mod MR, mild TR. Distinct mid wall stripe enhancement of the basal septal wall along with RV insertion point enhancement sparing the subendocardium.  viral myocarditis of HHV-6 viral strain. NO features of giant cell or lymphocytic myocarditis. , infiltrative Sarcoidosis, amyloidosis, old MI    11/15/19 echo normal lv size with lvef 51-55%. Mild pul regurg. Chief Complaint   Patient presents with    Chest Pain    Shortness of Breath     Patient states he has had episodes of SOB associated with Chest pains that have happened a few times      HPI :  He is referred back from advanced heart failure for ongoing cardiac concerns. I met him about 2 years ago and at that time he was doing quite well. He started out with a nonischemic cardiomyopathy, and over the years his ejection fraction has improved and his studies have shown no evidence of infiltrative or restrictive process. He is asymptomatic from a cardiac standpoint. He does enjoy dancing and is active but does not go to the gym and exercise. Unfortunately he started smoking again about 5 cigarettes a day. Minimal amounts of alcohol. Has occasional epigastric discomfort which will come on maybe once a month and last for a day or so without any other symptoms. He also once or twice a month notices that if he turns over in bed at times he will feel like he cannot move for 30 to 45 seconds.   No other symptoms and he has had a previous neck  negative for chest pain, dyspnea, palpitations, orthopnea, paroxysmal nocturnal dyspnea, exertional chest pressure/discomfort, claudication, lower extremity edema      Family History   Problem Relation Age of Onset    Diabetes Mother     Heart Failure Mother     No Known Problems Father     Hypertension Sister     Hypertension Brother     Hypertension Sister        Past Medical History:   Diagnosis Date    Anxiety     Class 1 obesity due to excess calories with serious comorbidity and body mass index (BMI) of 32.0 to 32.9 in adult 2/27/2019    Combined systolic and diastolic heart failure ([address] 8/8/2018    ECHO 7/17/18:mild-mod LVD, EF 10-20%, severe DHK, mild LVH, DD, RVD, mild- mod LAE, mod RAY, severe MR, mod-severe TR, RVSP 40, dilated RV outflow tract  ROBERTO 7/20/18: LVD, EF 10-15%, severe DHK, mild RVD, reduced RVEF, mod ANNAMARIE, no CELINA thrombus, no PFO, mod MR, mild TR  RHC 7/20/18: RA:12, RV 34/6/14. PA 30/20/26, PCWP 18, [place] 6.33/2.74 TD: 5.37/2.32    CARDIAC MRI: 7/20/18: mod LVD (LVDD 66), mil    Congestive heart failure (HCC)     Essential hypertension 3/12/2019    Hypertensive heart disease with combined systolic and diastolic congestive heart failure (HCC) 3/12/2019    ECHO 7/17/18:mild-mod LVD, EF 10-20%, severe DHK, mild LVH, DD, RVD, mild- mod LAE, mod RAY, severe MR, mod-severe TR, RVSP 40, dilated RV outflow tract ECHO 10/26/2018: mild LVD, EF 45%, G3/4 DD, mod ant leaflet thickening, mild MR, dil RVOT        Long term current use of anticoagulant therapy     NICM (nonischemic cardiomyopathy) ([address] 8/8/2018    Non-rheumatic mitral regurgitation 8/8/2018    ECHO 7/17/18:mild-mod LVD, EF 10-20%, severe DHK, mild LVH, DD, RVD, mild- mod LAE, mod RAY, severe MR, mod-severe TR, RVSP 40, dilated RV outflow tract  ROBERTO 7/20/18: LVD, EF 10-15%, severe DHK, mild RVD, reduced RVEF, mod ANNAMARIE, no CELINA thrombus, no PFO, mod MR, mild TR      PAF (paroxysmal atrial fibrillation) ([address] 8/8/2018    ROBERTO 7/20/18: LVD, EF 10-15%, severe DHK, mild RVD, reduced RVEF, mod ANNAMARIE, no CELINA thrombus, no PFO, mod MR, mild TR    DCCV 7/20/18       Rapid atrial fibrillation ([address] 7/17/2018    Tobacco use disorder 8/8/2018    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    Vitamin D deficiency 9/19/2018    Level 12 8/2018       GENERAL ROS:  A comprehensive review of systems was negative except for that written in the HPI.     Visit Vitals  /86 (BP 1 Location: Left upper arm, BP Patient Position: Sitting, BP Cuff Size: Large adult)   Pulse 78   Resp 18   Ht 6' 2\" (1.88 m)   Wt 245 lb (111.1 kg)   SpO2 98%   BMI 31.46 kg/m²       Wt Readings from Last 3 Encounters:   03/28/23 245 lb (111.1 kg)   07/27/22 (P) 230 lb 12.8 oz (104.7 kg)   03/30/22 232 lb (105.2 kg)            BP Readings from Last 3 Encounters:   03/28/23 118/86   07/27/22 (!) (P) 132/94   03/30/22 106/76       PHYSICAL EXAM  General appearance: alert, cooperative, no distress, appears stated age  Neurologic: Alert and oriented X 3  Neck: supple, symmetrical, trachea midline, no adenopathy, no carotid bruit, and no JVD  Lungs: clear to auscultation bilaterally  Heart: regular rate and rhythm, S1, S2 normal, no murmur, click, rub or gallop  Extremities: extremities normal, atraumatic, no cyanosis or edema    Lab Results   Component Value Date/Time    Cholesterol, total 179 04/18/2019 11:50 AM    HDL Cholesterol 50 04/18/2019 11:50 AM     ASSESSMENT :      He is stable asymptomatic and well compensated on appropriate medical regimen. Working to repeat his echo since its been about a year. current treatment plan is effective, no change in therapy  lab results and schedule of future lab studies reviewed with patient  reviewed diet, exercise and weight control  very strongly urged to quit smoking to reduce cardiovascular risk    Encounter Diagnoses   Name Primary? NICM (nonischemic cardiomyopathy) ([address] Yes    Essential hypertension     Shortness of breath     Non-rheumatic mitral regurgitation     PAF (paroxysmal atrial fibrillation) ([facility])      No orders of the defined types were placed in this encounter. Follow-up and Dispositions    Return in about 6 months (around 9/28/2023). Mercy Way MD  3/28/2023  Please note that this dictation was completed with [employer], the computer voice recognition software. Quite often unanticipated grammatical, syntax, homophones, and other interpretive errors are inadvertently transcribed by the computer software. Please disregard these errors. Please excuse any errors that have escaped final proofreading. Thank you.

## 2023-04-19 ENCOUNTER — ANCILLARY PROCEDURE (OUTPATIENT)
Dept: CARDIOLOGY CLINIC | Age: 43
End: 2023-04-19
Payer: MEDICAID

## 2023-04-19 VITALS — WEIGHT: 245 LBS | HEIGHT: 74 IN | BODY MASS INDEX: 31.44 KG/M2

## 2023-04-19 DIAGNOSIS — I48.0 PAF (PAROXYSMAL ATRIAL FIBRILLATION) (HCC): ICD-10-CM

## 2023-04-19 DIAGNOSIS — I34.0 NON-RHEUMATIC MITRAL REGURGITATION: ICD-10-CM

## 2023-04-19 DIAGNOSIS — I42.8 NICM (NONISCHEMIC CARDIOMYOPATHY) (HCC): ICD-10-CM

## 2023-04-19 DIAGNOSIS — I10 ESSENTIAL HYPERTENSION: ICD-10-CM

## 2023-04-19 DIAGNOSIS — R06.02 SHORTNESS OF BREATH: ICD-10-CM

## 2023-04-19 PROCEDURE — 93306 TTE W/DOPPLER COMPLETE: CPT | Performed by: SPECIALIST

## 2023-04-20 LAB
ECHO AO ASC DIAM: 3.5 CM
ECHO AO ASCENDING AORTA INDEX: 1.48 CM/M2
ECHO AO ROOT DIAM: 3.9 CM
ECHO AO ROOT INDEX: 1.65 CM/M2
ECHO AV AREA PEAK VELOCITY: 3.6 CM2
ECHO AV AREA VTI: 4.1 CM2
ECHO AV AREA/BSA PEAK VELOCITY: 1.5 CM2/M2
ECHO AV AREA/BSA VTI: 1.7 CM2/M2
ECHO AV MEAN GRADIENT: 2 MMHG
ECHO AV MEAN VELOCITY: 0.7 M/S
ECHO AV PEAK GRADIENT: 4 MMHG
ECHO AV PEAK VELOCITY: 1 M/S
ECHO AV VELOCITY RATIO: 0.8
ECHO AV VTI: 19.1 CM
ECHO LA DIAMETER INDEX: 1.6 CM/M2
ECHO LA DIAMETER: 3.8 CM
ECHO LA TO AORTIC ROOT RATIO: 0.97
ECHO LA VOL 2C: 62 ML (ref 18–58)
ECHO LA VOL 4C: 36 ML (ref 18–58)
ECHO LA VOL BP: 47 ML (ref 18–58)
ECHO LA VOL/BSA BIPLANE: 20 ML/M2 (ref 16–34)
ECHO LA VOLUME AREA LENGTH: 53 ML
ECHO LA VOLUME INDEX A2C: 26 ML/M2 (ref 16–34)
ECHO LA VOLUME INDEX A4C: 15 ML/M2 (ref 16–34)
ECHO LA VOLUME INDEX AREA LENGTH: 22 ML/M2 (ref 16–34)
ECHO LV E' LATERAL VELOCITY: 13 CM/S
ECHO LV E' SEPTAL VELOCITY: 8 CM/S
ECHO LV EDV A2C: 155 ML
ECHO LV EDV A4C: 170 ML
ECHO LV EDV BP: 166 ML (ref 67–155)
ECHO LV EDV INDEX A4C: 72 ML/M2
ECHO LV EDV INDEX BP: 70 ML/M2
ECHO LV EDV NDEX A2C: 65 ML/M2
ECHO LV EJECTION FRACTION A2C: 55 %
ECHO LV EJECTION FRACTION A4C: 52 %
ECHO LV EJECTION FRACTION BIPLANE: 54 % (ref 55–100)
ECHO LV ESV A2C: 70 ML
ECHO LV ESV A4C: 81 ML
ECHO LV ESV BP: 77 ML (ref 22–58)
ECHO LV ESV INDEX A2C: 30 ML/M2
ECHO LV ESV INDEX A4C: 34 ML/M2
ECHO LV ESV INDEX BP: 32 ML/M2
ECHO LV FRACTIONAL SHORTENING: 30 % (ref 28–44)
ECHO LV INTERNAL DIMENSION DIASTOLE INDEX: 2.24 CM/M2
ECHO LV INTERNAL DIMENSION DIASTOLIC: 5.3 CM (ref 4.2–5.9)
ECHO LV INTERNAL DIMENSION SYSTOLIC INDEX: 1.56 CM/M2
ECHO LV INTERNAL DIMENSION SYSTOLIC: 3.7 CM
ECHO LV IVSD: 1.2 CM (ref 0.6–1)
ECHO LV MASS 2D: 286.9 G (ref 88–224)
ECHO LV MASS INDEX 2D: 121.1 G/M2 (ref 49–115)
ECHO LV POSTERIOR WALL DIASTOLIC: 1.4 CM (ref 0.6–1)
ECHO LV RELATIVE WALL THICKNESS RATIO: 0.53
ECHO LVOT AREA: 4.9 CM2
ECHO LVOT AV VTI INDEX: 0.85
ECHO LVOT DIAM: 2.5 CM
ECHO LVOT MEAN GRADIENT: 1 MMHG
ECHO LVOT PEAK GRADIENT: 2 MMHG
ECHO LVOT PEAK VELOCITY: 0.8 M/S
ECHO LVOT STROKE VOLUME INDEX: 33.5 ML/M2
ECHO LVOT SV: 79.5 ML
ECHO LVOT VTI: 16.2 CM
ECHO MV A VELOCITY: 0.46 M/S
ECHO MV AREA PHT: 3.1 CM2
ECHO MV E DECELERATION TIME (DT): 245 MS
ECHO MV E VELOCITY: 0.63 M/S
ECHO MV E/A RATIO: 1.37
ECHO MV E/E' LATERAL: 4.85
ECHO MV E/E' RATIO (AVERAGED): 6.36
ECHO MV E/E' SEPTAL: 7.88
ECHO MV PRESSURE HALF TIME (PHT): 71.1 MS
ECHO RV INTERNAL DIMENSION: 3.4 CM
ECHO RV TAPSE: 2 CM (ref 1.7–?)

## 2023-04-21 ENCOUNTER — TELEPHONE (OUTPATIENT)
Dept: CARDIOLOGY CLINIC | Age: 43
End: 2023-04-21

## 2023-04-21 NOTE — TELEPHONE ENCOUNTER
----- Message from Fabián Hodge MD sent at 4/21/2023  2:36 PM EDT -----  Heart muscle is strong and valves all ok.  Looks great

## 2023-04-21 NOTE — TELEPHONE ENCOUNTER
Called pt. Verified patient's identity with two identifiers. Notified pt of results and Dr. Juan R Prince message. Patient verbalized understanding and denied further questions or concerns.

## 2023-09-01 ENCOUNTER — TELEPHONE (OUTPATIENT)
Age: 43
End: 2023-09-01

## 2023-09-01 NOTE — TELEPHONE ENCOUNTER
Pt called to get meds refilled. He was last seen by us in July of 2022. Per Bebeto, who left him a message and wrote him in my chart, he can refill his meds with Dr. Carie Guerrero, whom he has seen more recently or schedule an appt with us.   He is going to call Dr. Carie Guerrero first.

## 2023-09-13 DIAGNOSIS — I42.8 OTHER CARDIOMYOPATHIES (HCC): Primary | ICD-10-CM

## 2023-09-13 RX ORDER — SPIRONOLACTONE 25 MG/1
25 TABLET ORAL DAILY
Qty: 30 TABLET | Refills: 0 | Status: SHIPPED | OUTPATIENT
Start: 2023-09-13

## 2023-09-13 NOTE — TELEPHONE ENCOUNTER
Requested Prescriptions     Signed Prescriptions Disp Refills    spironolactone (ALDACTONE) 25 MG tablet 30 tablet 0     Sig: Take 1 tablet by mouth daily     Authorizing Provider: Luz Aguayo     Ordering User: Angie Arias    Per Dr. Jimenez Payer verbal order.      Future Appointments   Date Time Provider [address]   9/28/2023  3:00 PM MD CARLOS Flores AMB    Made note- should check labs if not done

## 2023-09-25 ENCOUNTER — TELEPHONE (OUTPATIENT)
Age: 43
End: 2023-09-25

## 2023-09-25 DIAGNOSIS — I50.42 CHRONIC COMBINED SYSTOLIC (CONGESTIVE) AND DIASTOLIC (CONGESTIVE) HEART FAILURE (HCC): ICD-10-CM

## 2023-09-25 DIAGNOSIS — I42.8 OTHER CARDIOMYOPATHIES (HCC): Primary | ICD-10-CM

## 2023-09-25 RX ORDER — SACUBITRIL AND VALSARTAN 97; 103 MG/1; MG/1
1 TABLET, FILM COATED ORAL 2 TIMES DAILY
Qty: 60 TABLET | Refills: 5 | Status: SHIPPED | OUTPATIENT
Start: 2023-09-25

## 2023-09-25 NOTE — TELEPHONE ENCOUNTER
Patient called needs refill for Entresto 97-103mg sent to   [facility]# [phone]  Patient is out of medication    Patient # [phone]

## 2023-09-25 NOTE — TELEPHONE ENCOUNTER
Requested Prescriptions     Signed Prescriptions Disp Refills    sacubitril-valsartan (ENTRESTO)  MG per tablet 60 tablet 5     Sig: Take 1 tablet by mouth 2 times daily     Authorizing Provider: Ryland Fernando     Ordering User: Trupti Bocanegra    Per Dr. Ki Parrish verbal order.

## 2023-10-25 ENCOUNTER — APPOINTMENT (OUTPATIENT)
Facility: HOSPITAL | Age: 43
End: 2023-10-25
Payer: MEDICAID

## 2023-10-25 ENCOUNTER — HOSPITAL ENCOUNTER (EMERGENCY)
Facility: HOSPITAL | Age: 43
Discharge: HOME | End: 2023-10-25
Attending: EMERGENCY MEDICINE
Payer: MEDICAID

## 2023-10-25 VITALS
BODY MASS INDEX: 30.61 KG/M2 | TEMPERATURE: 98 F | RESPIRATION RATE: 16 BRPM | WEIGHT: 238.5 LBS | OXYGEN SATURATION: 98 % | SYSTOLIC BLOOD PRESSURE: 139 MMHG | HEIGHT: 74 IN | HEART RATE: 89 BPM | DIASTOLIC BLOOD PRESSURE: 91 MMHG

## 2023-10-25 DIAGNOSIS — Z23 TETANUS-DIPHTHERIA (TD) VACCINATION: Primary | ICD-10-CM

## 2023-10-25 PROCEDURE — 90471 IMMUNIZATION ADMIN: CPT

## 2023-10-25 PROCEDURE — 99284 EMERGENCY DEPT VISIT MOD MDM: CPT

## 2023-10-25 PROCEDURE — 6360000002 HC RX W HCPCS

## 2023-10-25 PROCEDURE — 90714 TD VACC NO PRESV 7 YRS+ IM: CPT

## 2023-10-25 RX ADMIN — CLOSTRIDIUM TETANI TOXOID ANTIGEN (FORMALDEHYDE INACTIVATED) AND CORYNEBACTERIUM DIPHTHERIAE TOXOID ANTIGEN (FORMALDEHYDE INACTIVATED) 0.5 ML: 5; 2 INJECTION, SUSPENSION INTRAMUSCULAR at 12:31

## 2023-10-25 ASSESSMENT — PAIN DESCRIPTION - LOCATION: LOCATION: FOOT

## 2023-10-25 ASSESSMENT — PAIN - FUNCTIONAL ASSESSMENT: PAIN_FUNCTIONAL_ASSESSMENT: NONE - DENIES PAIN

## 2023-10-25 ASSESSMENT — PAIN DESCRIPTION - ORIENTATION: ORIENTATION: RIGHT

## 2023-10-25 NOTE — ED NOTES
Patient (s)  given copy of dc instructions and 0 script(s). Patient (s)  verbalized understanding of instructions and script (s). Patient given a current medication reconciliation form and verbalized understanding of their medications. Patient (s) verbalized understanding of the importance of discussing medications with his or her physician or clinic they will be following up with. Patient alert and oriented and in no acute distress. Patient discharged home ambulatory with a steady gait and vaccine information sheet.       Sage Ferraro RN  10/25/23 [id number]

## 2023-10-25 NOTE — ED TRIAGE NOTES
Pt states he stepped in glass with his right foot  x 2 days ago and was able to remove glass, and desires tetanus shot. Pt is unsure when his last shot was.

## 2023-10-25 NOTE — ED PROVIDER NOTES
breath, denies fever or chills. Physical exam shows a well-appearing 79-year-old male who is afebrile and nontachycardic respirating regularly with an SPO2 100%. When on observing the patient while, there are no gait abnormalities. Patient maintains a sensation and full range of motion of the feet bilaterally, overlying skin is intact without erythema, edema, or drainage. Patient does have dry skin on his right foot, without obvious abrasion, laceration, puncture wound, or other injury. Upon asking the patient where the wound was, he pointed towards the ball of his foot, however there was no easily identifiable wound. Via shared decision making patient will be getting a tetanus shot today. Had considered using imaging to rule out foreign body, however upon examination of the foot, there are no apparent breaks in the skin, which makes foreign body unlikely. In addition patient stepped on glass, which will likely not appear on x-ray. Low clinical suspicion of infection, no clinical indication for antibiotics at this time. Return precautions were discussed with patient, patient understands the plan and is in agreements, patient discharged. FINAL IMPRESSION     1. Tetanus-diphtheria (Td) vaccination          DISPOSITION/PLAN   DISPOSITION Decision To Discharge 10/25/2023 12:34:47 PM        Care plan outlined and precautions discussed. Patient has no new complaints, changes, or physical findings. All of pt's questions and concerns were addressed. Patient was instructed and agrees to follow up with PCP, as well as to return to the ED upon further deterioration. Patient is ready to go home.       PATIENT REFERRED TO:  Rui Lopez, [address]  [phone]    Schedule an appointment as soon as possible for a visit       [facility] EMERGENCY DEPT  [address] [place] [address] Rd  [phone]    If symptoms worsen       DISCHARGE MEDICATIONS:     Medication

## 2023-11-29 ENCOUNTER — OFFICE VISIT (OUTPATIENT)
Age: 43
End: 2023-11-29
Payer: MEDICAID

## 2023-11-29 VITALS
SYSTOLIC BLOOD PRESSURE: 112 MMHG | WEIGHT: 237.8 LBS | OXYGEN SATURATION: 98 % | HEART RATE: 75 BPM | DIASTOLIC BLOOD PRESSURE: 88 MMHG | BODY MASS INDEX: 30.52 KG/M2 | HEIGHT: 74 IN

## 2023-11-29 DIAGNOSIS — I10 ESSENTIAL HYPERTENSION: ICD-10-CM

## 2023-11-29 DIAGNOSIS — I42.8 NICM (NONISCHEMIC CARDIOMYOPATHY) (HCC): Primary | ICD-10-CM

## 2023-11-29 DIAGNOSIS — I42.8 NICM (NONISCHEMIC CARDIOMYOPATHY) (HCC): ICD-10-CM

## 2023-11-29 DIAGNOSIS — F17.200 TOBACCO USE DISORDER: ICD-10-CM

## 2023-11-29 PROCEDURE — 3079F DIAST BP 80-89 MM HG: CPT | Performed by: SPECIALIST

## 2023-11-29 PROCEDURE — 3074F SYST BP LT 130 MM HG: CPT | Performed by: SPECIALIST

## 2023-11-29 PROCEDURE — 99214 OFFICE O/P EST MOD 30 MIN: CPT | Performed by: SPECIALIST

## 2023-11-29 RX ORDER — VENLAFAXINE HYDROCHLORIDE 37.5 MG/1
37.5 CAPSULE, EXTENDED RELEASE ORAL DAILY
COMMUNITY
Start: 2023-11-04

## 2023-11-29 ASSESSMENT — PATIENT HEALTH QUESTIONNAIRE - PHQ9
2. FEELING DOWN, DEPRESSED OR HOPELESS: 0
SUM OF ALL RESPONSES TO PHQ QUESTIONS 1-9: 0
1. LITTLE INTEREST OR PLEASURE IN DOING THINGS: 0
SUM OF ALL RESPONSES TO PHQ QUESTIONS 1-9: 0
SUM OF ALL RESPONSES TO PHQ QUESTIONS 1-9: 0
SUM OF ALL RESPONSES TO PHQ9 QUESTIONS 1 & 2: 0
SUM OF ALL RESPONSES TO PHQ QUESTIONS 1-9: 0

## 2023-11-29 NOTE — PROGRESS NOTES
Tobacco use disorder       Future Appointments   Date Time Provider [address]   5/31/2024  2:40 PM MD Mai Umana MD  11/29/2023  Please note that this dictation was completed with [employer], the computer voice recognition software. Quite often unanticipated grammatical, syntax, homophones, and other interpretive errors are inadvertently transcribed by the computer software. Please disregard these errors. Please excuse any errors that have escaped final proofreading. Thank you.

## 2023-11-30 ENCOUNTER — TELEPHONE (OUTPATIENT)
Age: 43
End: 2023-11-30

## 2023-11-30 LAB
ALBUMIN SERPL-MCNC: 3.8 G/DL (ref 3.5–5)
ALBUMIN/GLOB SERPL: 1.1 (ref 1.1–2.2)
ALP SERPL-CCNC: 103 U/L (ref 45–117)
ALT SERPL-CCNC: 22 U/L (ref 12–78)
ANION GAP SERPL CALC-SCNC: 4 MMOL/L (ref 5–15)
AST SERPL-CCNC: 16 U/L (ref 15–37)
BILIRUB SERPL-MCNC: 0.4 MG/DL (ref 0.2–1)
BUN SERPL-MCNC: 11 MG/DL (ref 6–20)
BUN/CREAT SERPL: 12 (ref 12–20)
CALCIUM SERPL-MCNC: 9.3 MG/DL (ref 8.5–10.1)
CHLORIDE SERPL-SCNC: 110 MMOL/L (ref 97–108)
CO2 SERPL-SCNC: 26 MMOL/L (ref 21–32)
CREAT SERPL-MCNC: 0.95 MG/DL (ref 0.7–1.3)
GLOBULIN SER CALC-MCNC: 3.4 G/DL (ref 2–4)
GLUCOSE SERPL-MCNC: 84 MG/DL (ref 65–100)
POTASSIUM SERPL-SCNC: 4.2 MMOL/L (ref 3.5–5.1)
PROT SERPL-MCNC: 7.2 G/DL (ref 6.4–8.2)
SODIUM SERPL-SCNC: 140 MMOL/L (ref 136–145)

## 2023-12-15 DIAGNOSIS — I42.8 OTHER CARDIOMYOPATHIES (HCC): ICD-10-CM

## 2023-12-15 RX ORDER — SPIRONOLACTONE 25 MG/1
25 TABLET ORAL DAILY
Qty: 30 TABLET | Refills: 5 | Status: SHIPPED | OUTPATIENT
Start: 2023-12-15

## 2023-12-15 NOTE — TELEPHONE ENCOUNTER
Requested Prescriptions     Signed Prescriptions Disp Refills    spironolactone (ALDACTONE) 25 MG tablet 30 tablet 5     Sig: Take 1 tablet by mouth once daily     Authorizing Provider: J Luis Bridges     Ordering User: Bella Wetzel    Per Dr. Velma Valencia verbal order.

## 2024-05-17 ENCOUNTER — APPOINTMENT (OUTPATIENT)
Facility: HOSPITAL | Age: 44
End: 2024-05-17
Payer: MEDICAID

## 2024-05-17 ENCOUNTER — HOSPITAL ENCOUNTER (EMERGENCY)
Facility: HOSPITAL | Age: 44
Discharge: HOME | End: 2024-05-17
Attending: STUDENT IN AN ORGANIZED HEALTH CARE EDUCATION/TRAINING PROGRAM
Payer: MEDICAID

## 2024-05-17 VITALS
WEIGHT: 230 LBS | TEMPERATURE: 98.5 F | BODY MASS INDEX: 29.52 KG/M2 | HEIGHT: 74 IN | DIASTOLIC BLOOD PRESSURE: 86 MMHG | HEART RATE: 81 BPM | RESPIRATION RATE: 16 BRPM | OXYGEN SATURATION: 97 % | SYSTOLIC BLOOD PRESSURE: 140 MMHG

## 2024-05-17 DIAGNOSIS — J18.9 COMMUNITY ACQUIRED PNEUMONIA OF LEFT LOWER LOBE OF LUNG: Primary | ICD-10-CM

## 2024-05-17 LAB
ALBUMIN SERPL-MCNC: 3.4 G/DL (ref 3.5–5)
ALBUMIN/GLOB SERPL: 0.7 (ref 1.1–2.2)
ALP SERPL-CCNC: 108 U/L (ref 45–117)
ALT SERPL-CCNC: 19 U/L (ref 12–78)
ANION GAP SERPL CALC-SCNC: 9 MMOL/L (ref 5–15)
AST SERPL-CCNC: 16 U/L (ref 15–37)
BASOPHILS # BLD: 0.1 K/UL (ref 0–0.1)
BASOPHILS NFR BLD: 1 % (ref 0–1)
BILIRUB SERPL-MCNC: 0.5 MG/DL (ref 0.2–1)
BUN SERPL-MCNC: 13 MG/DL (ref 6–20)
BUN/CREAT SERPL: 12 (ref 12–20)
CALCIUM SERPL-MCNC: 9.5 MG/DL (ref 8.5–10.1)
CHLORIDE SERPL-SCNC: 100 MMOL/L (ref 97–108)
CO2 SERPL-SCNC: 27 MMOL/L (ref 21–32)
CREAT SERPL-MCNC: 1.1 MG/DL (ref 0.7–1.3)
DIFFERENTIAL METHOD BLD: ABNORMAL
EKG ATRIAL RATE: 91 BPM
EKG DIAGNOSIS: NORMAL
EKG P AXIS: 64 DEGREES
EKG P-R INTERVAL: 158 MS
EKG Q-T INTERVAL: 326 MS
EKG QRS DURATION: 100 MS
EKG QTC CALCULATION (BAZETT): 400 MS
EKG R AXIS: 12 DEGREES
EKG T AXIS: 37 DEGREES
EKG VENTRICULAR RATE: 91 BPM
EOSINOPHIL # BLD: 0.1 K/UL (ref 0–0.4)
EOSINOPHIL NFR BLD: 1 % (ref 0–7)
ERYTHROCYTE [DISTWIDTH] IN BLOOD BY AUTOMATED COUNT: 11.7 % (ref 11.5–14.5)
GLOBULIN SER CALC-MCNC: 4.8 G/DL (ref 2–4)
GLUCOSE SERPL-MCNC: 89 MG/DL (ref 65–100)
HCT VFR BLD AUTO: 45.1 % (ref 36.6–50.3)
HGB BLD-MCNC: 14.9 G/DL (ref 12.1–17)
IMM GRANULOCYTES # BLD AUTO: 0.2 K/UL (ref 0–0.04)
IMM GRANULOCYTES NFR BLD AUTO: 1 % (ref 0–0.5)
LYMPHOCYTES # BLD: 3.4 K/UL (ref 0.8–3.5)
LYMPHOCYTES NFR BLD: 20 % (ref 12–49)
MAGNESIUM SERPL-MCNC: 1.8 MG/DL (ref 1.6–2.4)
MCH RBC QN AUTO: 31.4 PG (ref 26–34)
MCHC RBC AUTO-ENTMCNC: 33 G/DL (ref 30–36.5)
MCV RBC AUTO: 94.9 FL (ref 80–99)
MONOCYTES # BLD: 1.2 K/UL (ref 0–1)
MONOCYTES NFR BLD: 7 % (ref 5–13)
NEUTS SEG # BLD: 12.2 K/UL (ref 1.8–8)
NEUTS SEG NFR BLD: 70 % (ref 32–75)
NRBC # BLD: 0 K/UL (ref 0–0.01)
NRBC BLD-RTO: 0 PER 100 WBC
PLATELET # BLD AUTO: 336 K/UL (ref 150–400)
PMV BLD AUTO: 8.7 FL (ref 8.9–12.9)
POTASSIUM SERPL-SCNC: 3.8 MMOL/L (ref 3.5–5.1)
PROT SERPL-MCNC: 8.2 G/DL (ref 6.4–8.2)
RBC # BLD AUTO: 4.75 M/UL (ref 4.1–5.7)
SODIUM SERPL-SCNC: 136 MMOL/L (ref 136–145)
TROPONIN I SERPL HS-MCNC: 5 NG/L (ref 0–76)
WBC # BLD AUTO: 17.1 K/UL (ref 4.1–11.1)

## 2024-05-17 PROCEDURE — 80053 COMPREHEN METABOLIC PANEL: CPT

## 2024-05-17 PROCEDURE — 85025 COMPLETE CBC W/AUTO DIFF WBC: CPT

## 2024-05-17 PROCEDURE — 71046 X-RAY EXAM CHEST 2 VIEWS: CPT

## 2024-05-17 PROCEDURE — 6370000000 HC RX 637 (ALT 250 FOR IP): Performed by: STUDENT IN AN ORGANIZED HEALTH CARE EDUCATION/TRAINING PROGRAM

## 2024-05-17 PROCEDURE — 6360000002 HC RX W HCPCS: Performed by: STUDENT IN AN ORGANIZED HEALTH CARE EDUCATION/TRAINING PROGRAM

## 2024-05-17 PROCEDURE — 84484 ASSAY OF TROPONIN QUANT: CPT

## 2024-05-17 PROCEDURE — 96365 THER/PROPH/DIAG IV INF INIT: CPT

## 2024-05-17 PROCEDURE — 83735 ASSAY OF MAGNESIUM: CPT

## 2024-05-17 PROCEDURE — 99285 EMERGENCY DEPT VISIT HI MDM: CPT

## 2024-05-17 PROCEDURE — 2580000003 HC RX 258: Performed by: STUDENT IN AN ORGANIZED HEALTH CARE EDUCATION/TRAINING PROGRAM

## 2024-05-17 PROCEDURE — 36415 COLL VENOUS BLD VENIPUNCTURE: CPT

## 2024-05-17 RX ORDER — CEFPODOXIME PROXETIL 200 MG/1
200 TABLET, FILM COATED ORAL 2 TIMES DAILY
Qty: 20 TABLET | Refills: 0 | Status: SHIPPED | OUTPATIENT
Start: 2024-05-17 | End: 2024-05-27

## 2024-05-17 RX ORDER — AZITHROMYCIN 250 MG/1
250 TABLET, FILM COATED ORAL DAILY
Qty: 4 TABLET | Refills: 0 | Status: SHIPPED | OUTPATIENT
Start: 2024-05-17 | End: 2024-05-21

## 2024-05-17 RX ORDER — AZITHROMYCIN 500 MG/1
500 TABLET, FILM COATED ORAL ONCE
Status: COMPLETED | OUTPATIENT
Start: 2024-05-17 | End: 2024-05-17

## 2024-05-17 RX ORDER — ACETAMINOPHEN 500 MG
1000 TABLET ORAL 3 TIMES DAILY PRN
Qty: 100 TABLET | Refills: 0 | Status: SHIPPED | OUTPATIENT
Start: 2024-05-17

## 2024-05-17 RX ORDER — ACETAMINOPHEN 500 MG
1000 TABLET ORAL
Status: COMPLETED | OUTPATIENT
Start: 2024-05-17 | End: 2024-05-17

## 2024-05-17 RX ADMIN — CEFTRIAXONE SODIUM 1000 MG: 1 INJECTION, POWDER, FOR SOLUTION INTRAMUSCULAR; INTRAVENOUS at 13:02

## 2024-05-17 RX ADMIN — ACETAMINOPHEN 1000 MG: 500 TABLET ORAL at 12:16

## 2024-05-17 RX ADMIN — AZITHROMYCIN 500 MG: 500 TABLET, FILM COATED ORAL at 13:01

## 2024-05-17 ASSESSMENT — PAIN - FUNCTIONAL ASSESSMENT: PAIN_FUNCTIONAL_ASSESSMENT: NONE - DENIES PAIN

## 2024-05-17 ASSESSMENT — PAIN DESCRIPTION - LOCATION: LOCATION: CHEST;ABDOMEN

## 2024-05-17 ASSESSMENT — PAIN DESCRIPTION - DESCRIPTORS: DESCRIPTORS: ACHING

## 2024-05-17 ASSESSMENT — PAIN SCALES - GENERAL: PAINLEVEL_OUTOF10: 3

## 2024-05-17 ASSESSMENT — PAIN DESCRIPTION - ORIENTATION: ORIENTATION: MID

## 2024-05-17 NOTE — ED PROVIDER NOTES
[facility] EMERGENCY DEPT  EMERGENCY DEPARTMENT ENCOUNTER       Pt Name: Marquis PRASHANTH Alves  MRN: [id number]  Birthdate 1980  Date of evaluation: 5/17/2024  Provider: Raul Hubbard MD   PCP: Guevara South MD  Note Started: 1:04 PM EDT 5/17/24     CHIEF COMPLAINT       Chief Complaint   Patient presents with    Nausea     Pt arrives ambulatory c/o nausea that started 4 days ago.         HISTORY OF PRESENT ILLNESS: 1 or more elements      History From: Patient  HPI Limitations: None     Marquis PRASHANTH Alves is a 43 y.o. male who presents for evaluation of cough, low-grade temperatures, body aches, feeling unwell.  He reports he been feeling unwell for the past week.  Reports coughing up whitish phlegm.  Denies nausea, vomiting, chest pain, shortness of breath, headache.  No modifying factors.  He reports sick contacts with his niece this past week.  Denies leg swelling.  No medications taken prior to arrival.  Patient does have a history of heart failure, hypertension, mood disorder, paroxysmal A-fib.  He reports has been compliant with the rest of his medications.  Eating and drinking well.         Nursing Notes were all reviewed and agreed with or any disagreements were addressed in the HPI.     REVIEW OF SYSTEMS      Review of Systems     Positives and Pertinent negatives as per HPI.    PAST HISTORY     Past Medical History:  Past Medical History:   Diagnosis Date    Anxiety     Class 1 obesity due to excess calories with serious comorbidity and body mass index (BMI) of 32.0 to 32.9 in adult 2/27/2019    Combined systolic and diastolic heart failure (HCC) 8/8/2018    ECHO 7/17/18:mild-mod LVD, EF 10-20%, severe DHK, mild LVH, DD, RVD, mild- mod LAE, mod IZZY, severe MR, mod-severe TR, RVSP 40, dilated RV outflow tract  SOMMER 7/20/18: LVD, EF 10-15%, severe DHK, mild RVD, reduced RVEF, mod EMMIE, no CJ thrombus, no PFO, mod MR, mild TR  RHC 7/20/18: RA:12, RV 34/6/14. PA 30/20/26, PCWP 18, [place] 6.33/2.74 TD:

## 2024-05-17 NOTE — ED NOTES
Discharge instructions were given to the patient by Nasir Chacon RN  .  The patient left the Emergency Department alert and oriented and in no acute distress with 3 prescription(s). The patient was encouraged to call or return to the ED for worsening issues or problems and was encouraged to schedule a follow up appointment for continuing care.  The patient verbalized understanding of discharge instructions and prescriptions; all questions were answered. The patient has no further concerns at this time.

## 2024-05-17 NOTE — ED NOTES
Pt presents to ED ambulatory complaining of nausea, sore throat, and nasal drainage. Pt also reports increase sweating, strong productive cough with mucus coming up. Pt denies vomiting. Pt states last bowel movement this morning, loose stool. Pt states he took Tiffanie seltzer and Nyquil last night. Pt is alert and oriented x 4, RR even and unlabored, skin is warm and dry. Assessment completed and pt updated on plan of care.  Call bell in reach.            Emergency Department Nursing Plan of Care       The Nursing Plan of Care is developed from the Nursing assessment and Emergency Department Attending provider initial evaluation.  The plan of care may be reviewed in the “ED Provider note”.    The Plan of Care was developed with the following considerations:   Patient / Family readiness to learn indicated by:verbalized understanding  Persons(s) to be included in education: patient  Barriers to Learning/Limitations:None    Signed

## 2024-05-17 NOTE — ED TRIAGE NOTES
Pt reports decreased appetite due to fear of vomiting.  Pt also reports increased sweating, strong productive cough with mucus coming up.  Has been taking Nyquil  Has N/D no vomiting.   Last BM was this morning

## 2024-05-20 LAB
EKG ATRIAL RATE: 91 BPM
EKG DIAGNOSIS: NORMAL
EKG P AXIS: 64 DEGREES
EKG P-R INTERVAL: 158 MS
EKG Q-T INTERVAL: 326 MS
EKG QRS DURATION: 100 MS
EKG QTC CALCULATION (BAZETT): 400 MS
EKG R AXIS: 12 DEGREES
EKG T AXIS: 37 DEGREES
EKG VENTRICULAR RATE: 91 BPM

## 2024-07-21 DIAGNOSIS — I42.8 OTHER CARDIOMYOPATHIES (HCC): ICD-10-CM

## 2024-07-22 RX ORDER — SPIRONOLACTONE 25 MG/1
25 TABLET ORAL DAILY
Qty: 30 TABLET | Refills: 0 | OUTPATIENT
Start: 2024-07-22

## 2024-07-22 NOTE — TELEPHONE ENCOUNTER
Requested Prescriptions     Refused Prescriptions Disp Refills    spironolactone (ALDACTONE) 25 MG tablet [Pharmacy Med Name: Spironolactone 25 MG Oral Tablet] 30 tablet 0     Sig: Take 1 tablet by mouth once daily     Refused By: EDMOND WAGONER     Reason for Refusal: Patient needs an appointment

## 2024-07-25 ENCOUNTER — TELEPHONE (OUTPATIENT)
Age: 44
End: 2024-07-25

## 2024-07-25 DIAGNOSIS — I42.8 OTHER CARDIOMYOPATHIES (HCC): ICD-10-CM

## 2024-07-25 DIAGNOSIS — I10 ESSENTIAL HYPERTENSION: Primary | ICD-10-CM

## 2024-07-25 DIAGNOSIS — I50.42 CHRONIC COMBINED SYSTOLIC (CONGESTIVE) AND DIASTOLIC (CONGESTIVE) HEART FAILURE (HCC): ICD-10-CM

## 2024-07-25 RX ORDER — CARVEDILOL 25 MG/1
25 TABLET ORAL 2 TIMES DAILY WITH MEALS
Qty: 60 TABLET | Refills: 0 | Status: SHIPPED | OUTPATIENT
Start: 2024-07-25

## 2024-07-25 RX ORDER — SPIRONOLACTONE 25 MG/1
25 TABLET ORAL DAILY
Qty: 30 TABLET | Refills: 0 | Status: SHIPPED | OUTPATIENT
Start: 2024-07-25

## 2024-07-25 RX ORDER — SACUBITRIL AND VALSARTAN 97; 103 MG/1; MG/1
1 TABLET, FILM COATED ORAL 2 TIMES DAILY
Qty: 60 TABLET | Refills: 0 | Status: SHIPPED | OUTPATIENT
Start: 2024-07-25

## 2024-07-25 NOTE — TELEPHONE ENCOUNTER
Message sent to  to r/s pt's appt    Requested Prescriptions     Signed Prescriptions Disp Refills    carvedilol (COREG) 25 MG tablet 60 tablet 0     Sig: Take 1 tablet by mouth 2 times daily (with meals)     Authorizing Provider: UMANG SHARMA III     Ordering User: CLEO YOUNG    sacubitril-valsartan (ENTRESTO)  MG per tablet 60 tablet 0     Sig: Take 1 tablet by mouth 2 times daily     Authorizing Provider: UMANG SHARMA III     Ordering User: CLEO YOUNG    spironolactone (ALDACTONE) 25 MG tablet 30 tablet 0     Sig: Take 1 tablet by mouth daily     Authorizing Provider: UMANG SHARMA III     Ordering User: CLEO YOUNG    Per Dr. Sharma's verbal order.

## 2024-07-25 NOTE — TELEPHONE ENCOUNTER
Patient is out of town and need refills for entresto, carvedilol and spironlactone.     [facility] in [facility] # [phone]    Patient # [phone]

## 2024-08-15 ENCOUNTER — TELEPHONE (OUTPATIENT)
Age: 44
End: 2024-08-15

## 2024-09-07 DIAGNOSIS — I10 ESSENTIAL HYPERTENSION: ICD-10-CM

## 2024-09-07 DIAGNOSIS — I42.8 OTHER CARDIOMYOPATHIES (HCC): ICD-10-CM

## 2024-09-09 RX ORDER — SPIRONOLACTONE 25 MG/1
25 TABLET ORAL DAILY
Qty: 30 TABLET | Refills: 0 | Status: SHIPPED | OUTPATIENT
Start: 2024-09-09

## 2024-10-07 ENCOUNTER — OFFICE VISIT (OUTPATIENT)
Age: 44
End: 2024-10-07
Payer: MEDICAID

## 2024-10-07 VITALS
OXYGEN SATURATION: 97 % | BODY MASS INDEX: 30.65 KG/M2 | WEIGHT: 238.8 LBS | DIASTOLIC BLOOD PRESSURE: 82 MMHG | HEIGHT: 74 IN | SYSTOLIC BLOOD PRESSURE: 110 MMHG | RESPIRATION RATE: 16 BRPM | HEART RATE: 78 BPM

## 2024-10-07 DIAGNOSIS — I10 ESSENTIAL HYPERTENSION: ICD-10-CM

## 2024-10-07 DIAGNOSIS — I42.8 OTHER CARDIOMYOPATHIES (HCC): Primary | ICD-10-CM

## 2024-10-07 PROCEDURE — 3074F SYST BP LT 130 MM HG: CPT | Performed by: SPECIALIST

## 2024-10-07 PROCEDURE — 99213 OFFICE O/P EST LOW 20 MIN: CPT | Performed by: SPECIALIST

## 2024-10-07 PROCEDURE — 3079F DIAST BP 80-89 MM HG: CPT | Performed by: SPECIALIST

## 2024-10-07 NOTE — PROGRESS NOTES
HISTORY OF PRESENT ILLNESS  Marquis PRASHANTH Alves is a 44 y.o. male     SUMMARY:   Patient Active Problem List   Diagnosis    Class 1 obesity due to excess calories with serious comorbidity and body mass index (BMI) of 32.0 to 32.9 in adult    Tobacco use disorder    Hypertensive heart disease with combined systolic and diastolic congestive heart failure (HCC)    PAF (paroxysmal atrial fibrillation) (Formerly [facility])    Snoring    Viral myocarditis    SOB (shortness of breath)    Non-rheumatic mitral regurgitation    Depression with anxiety    Anticoagulated    Atrial fibrillation with RVR ([facility])    Pleural effusion, right    Acute systolic heart failure (HCC)    Combined systolic and diastolic heart failure (HCC)    NICM (nonischemic cardiomyopathy) (Formerly [facility])    Essential hypertension    Alcohol use disorder, moderate, in sustained remission (Formerly [facility])    Personality disorder ([facility]ly [facility])    Vitamin D deficiency            CARDIOLOGY STUDIES TO DATE:  ECHO 7/17/18:mild-mod LVD, EF 10-20%, severe DHK, mild LVH, DD, RVD, mild- mod LAE, mod IZZY, severe MR, mod-severe TR, RVSP 40, dilated RV outflow tract  ECHO 10/26/2018: mild LVD, EF 45%, G3/4 DD, mod ant leaflet thickening, mild MR, dil RVOT      SOMMER 7/20/18: LVD, EF 10-15%, severe DHK, mild RVD, reduced RVEF, mod EMMIE, no CJ thrombus, no PFO, mod MR, mild TR        CATH 7/20/18: no epicardial disease, LVEP 1      RHC 7/20/18: RA:12, RV 34/6/14. PA 30/20/26, PCWP 18, Shell 6.33/2.74 TD: 5.37/2.32      DCCV 7/20/18     CARDIAC MRI: 7/20/18: mod LVD (LVDD 66), mild LVH (13mm), LVEF 17%, severe GHK, mild RVD, RVEF 20%, mod GHK, mod MR, mild TR. Distinct mid wall stripe enhancement of the basal septal wall along with RV insertion point enhancement sparing the subendocardium. viral myocarditis of HHV-6 viral strain. NO features of giant cell or lymphocytic myocarditis., infiltrative Sarcoidosis, amyloidosis, old MI     11/15/19 echo normal lv size with lvef 51-55%. Mild pul regurg.    4/23 echo

## 2024-10-07 NOTE — PROGRESS NOTES
Marquis PRASHANTH Alves is a 44 y.o. male    Chief Complaint   Patient presents with    Follow-up     6 month follow up       /82   Pulse 78   Resp 16   Ht 1.88 m (6' 2\")   Wt 108.3 kg (238 lb 12.8 oz)   SpO2 97%   BMI 30.66 kg/m²         1. Have you been to the ER, urgent care clinic since your last visit?  Hospitalized since your last visit? No    2. Have you seen or consulted any other health care providers outside of the [facility] since your last visit?  Include any pap smears or colon screening. No    Learning Assessment:       No data to display                Fall Risk Assessment:       No data to display                Abuse Screening:       No data to display                ADL Screening:       No data to display

## 2024-10-10 DIAGNOSIS — I50.42 CHRONIC COMBINED SYSTOLIC (CONGESTIVE) AND DIASTOLIC (CONGESTIVE) HEART FAILURE (HCC): ICD-10-CM

## 2024-10-10 DIAGNOSIS — I42.8 OTHER CARDIOMYOPATHIES (HCC): ICD-10-CM

## 2024-10-10 DIAGNOSIS — I10 ESSENTIAL HYPERTENSION: ICD-10-CM

## 2024-10-10 RX ORDER — SPIRONOLACTONE 25 MG/1
25 TABLET ORAL DAILY
Qty: 30 TABLET | Refills: 11 | Status: SHIPPED | OUTPATIENT
Start: 2024-10-10

## 2024-10-10 RX ORDER — SACUBITRIL AND VALSARTAN 97; 103 MG/1; MG/1
1 TABLET, FILM COATED ORAL 2 TIMES DAILY
Qty: 60 TABLET | Refills: 11 | Status: SHIPPED | OUTPATIENT
Start: 2024-10-10

## 2024-10-10 NOTE — TELEPHONE ENCOUNTER
Requested Prescriptions     Signed Prescriptions Disp Refills    ENTRESTO  MG per tablet 60 tablet 11     Sig: Take 1 tablet by mouth twice daily     Authorizing Provider: UMANG SHARMA III     Ordering User: CLEO YOUNG    spironolactone (ALDACTONE) 25 MG tablet 30 tablet 11     Sig: Take 1 tablet by mouth once daily     Authorizing Provider: UMANG SHARMA III     Ordering User: CLEO YOUNG      Per Dr. Sharma's verbal order.

## 2025-01-23 ENCOUNTER — TELEPHONE (OUTPATIENT)
Age: 45
End: 2025-01-23

## 2025-01-23 NOTE — TELEPHONE ENCOUNTER
Patient advised he would need appointment prior to refill due to last appointment was over 4 years ago. He is scheduled to see Dr South on 2/24/25 and will discuss refill at that time.

## 2025-01-23 NOTE — TELEPHONE ENCOUNTER
Pt is needing anew EpiPen as his has . He would like this sent to [facility] [address] [place] [address] RD - P [phone] - F [phone] [[phone]]. He can be reached at [phone].

## 2025-02-24 ENCOUNTER — OFFICE VISIT (OUTPATIENT)
Age: 45
End: 2025-02-24
Payer: COMMERCIAL

## 2025-02-24 VITALS
HEIGHT: 74 IN | BODY MASS INDEX: 30.29 KG/M2 | RESPIRATION RATE: 18 BRPM | DIASTOLIC BLOOD PRESSURE: 87 MMHG | OXYGEN SATURATION: 95 % | WEIGHT: 236 LBS | TEMPERATURE: 98.6 F | HEART RATE: 81 BPM | SYSTOLIC BLOOD PRESSURE: 119 MMHG

## 2025-02-24 DIAGNOSIS — F41.1 GENERALIZED ANXIETY DISORDER: ICD-10-CM

## 2025-02-24 DIAGNOSIS — I48.91 ATRIAL FIBRILLATION WITH RVR (HCC): ICD-10-CM

## 2025-02-24 DIAGNOSIS — I50.42 HYPERTENSIVE HEART DISEASE WITH CHRONIC COMBINED SYSTOLIC AND DIASTOLIC CONGESTIVE HEART FAILURE (HCC): Primary | ICD-10-CM

## 2025-02-24 DIAGNOSIS — L98.9 SKIN LESION, SUPERFICIAL: ICD-10-CM

## 2025-02-24 DIAGNOSIS — I10 ESSENTIAL HYPERTENSION: ICD-10-CM

## 2025-02-24 DIAGNOSIS — I11.0 HYPERTENSIVE HEART DISEASE WITH CHRONIC COMBINED SYSTOLIC AND DIASTOLIC CONGESTIVE HEART FAILURE (HCC): Primary | ICD-10-CM

## 2025-02-24 DIAGNOSIS — I42.8 OTHER CARDIOMYOPATHIES (HCC): ICD-10-CM

## 2025-02-24 DIAGNOSIS — I50.42 CHRONIC COMBINED SYSTOLIC (CONGESTIVE) AND DIASTOLIC (CONGESTIVE) HEART FAILURE (HCC): ICD-10-CM

## 2025-02-24 DIAGNOSIS — F33.9 RECURRENT DEPRESSION: ICD-10-CM

## 2025-02-24 PROCEDURE — G8427 DOCREV CUR MEDS BY ELIG CLIN: HCPCS | Performed by: FAMILY MEDICINE

## 2025-02-24 PROCEDURE — G8417 CALC BMI ABV UP PARAM F/U: HCPCS | Performed by: FAMILY MEDICINE

## 2025-02-24 PROCEDURE — 4004F PT TOBACCO SCREEN RCVD TLK: CPT | Performed by: FAMILY MEDICINE

## 2025-02-24 PROCEDURE — 3074F SYST BP LT 130 MM HG: CPT | Performed by: FAMILY MEDICINE

## 2025-02-24 PROCEDURE — 3079F DIAST BP 80-89 MM HG: CPT | Performed by: FAMILY MEDICINE

## 2025-02-24 PROCEDURE — 99204 OFFICE O/P NEW MOD 45 MIN: CPT | Performed by: FAMILY MEDICINE

## 2025-02-24 RX ORDER — MUPIROCIN CALCIUM 20 MG/G
CREAM TOPICAL
Qty: 30 G | Refills: 3 | Status: SHIPPED | OUTPATIENT
Start: 2025-02-24 | End: 2025-03-26

## 2025-02-24 RX ORDER — SACUBITRIL AND VALSARTAN 97; 103 MG/1; MG/1
1 TABLET, FILM COATED ORAL 2 TIMES DAILY
Qty: 180 TABLET | Refills: 3 | Status: SHIPPED | OUTPATIENT
Start: 2025-02-24

## 2025-02-24 RX ORDER — SPIRONOLACTONE 25 MG/1
25 TABLET ORAL DAILY
Qty: 90 TABLET | Refills: 3 | Status: SHIPPED | OUTPATIENT
Start: 2025-02-24

## 2025-02-24 RX ORDER — CARVEDILOL 25 MG/1
12.5 TABLET ORAL 2 TIMES DAILY
Qty: 90 TABLET | Refills: 1 | Status: SHIPPED | OUTPATIENT
Start: 2025-02-24

## 2025-02-24 RX ORDER — EPINEPHRINE 0.3 MG/.3ML
0.3 INJECTION SUBCUTANEOUS DAILY
Qty: 0.3 ML | Refills: 4 | Status: SHIPPED | OUTPATIENT
Start: 2025-02-24

## 2025-02-24 RX ORDER — QUETIAPINE FUMARATE 25 MG/1
25 TABLET, FILM COATED ORAL NIGHTLY
COMMUNITY
Start: 2024-12-06

## 2025-02-24 SDOH — ECONOMIC STABILITY: FOOD INSECURITY: WITHIN THE PAST 12 MONTHS, YOU WORRIED THAT YOUR FOOD WOULD RUN OUT BEFORE YOU GOT MONEY TO BUY MORE.: NEVER TRUE

## 2025-02-24 SDOH — ECONOMIC STABILITY: FOOD INSECURITY: WITHIN THE PAST 12 MONTHS, THE FOOD YOU BOUGHT JUST DIDN'T LAST AND YOU DIDN'T HAVE MONEY TO GET MORE.: NEVER TRUE

## 2025-02-24 ASSESSMENT — PATIENT HEALTH QUESTIONNAIRE - PHQ9
4. FEELING TIRED OR HAVING LITTLE ENERGY: NOT AT ALL
10. IF YOU CHECKED OFF ANY PROBLEMS, HOW DIFFICULT HAVE THESE PROBLEMS MADE IT FOR YOU TO DO YOUR WORK, TAKE CARE OF THINGS AT HOME, OR GET ALONG WITH OTHER PEOPLE: SOMEWHAT DIFFICULT
8. MOVING OR SPEAKING SO SLOWLY THAT OTHER PEOPLE COULD HAVE NOTICED. OR THE OPPOSITE, BEING SO FIGETY OR RESTLESS THAT YOU HAVE BEEN MOVING AROUND A LOT MORE THAN USUAL: NOT AT ALL
SUM OF ALL RESPONSES TO PHQ9 QUESTIONS 1 & 2: 2
2. FEELING DOWN, DEPRESSED OR HOPELESS: MORE THAN HALF THE DAYS
9. THOUGHTS THAT YOU WOULD BE BETTER OFF DEAD, OR OF HURTING YOURSELF: NOT AT ALL
5. POOR APPETITE OR OVEREATING: SEVERAL DAYS
SUM OF ALL RESPONSES TO PHQ QUESTIONS 1-9: 4
1. LITTLE INTEREST OR PLEASURE IN DOING THINGS: NOT AT ALL
3. TROUBLE FALLING OR STAYING ASLEEP: SEVERAL DAYS
6. FEELING BAD ABOUT YOURSELF - OR THAT YOU ARE A FAILURE OR HAVE LET YOURSELF OR YOUR FAMILY DOWN: NOT AT ALL
7. TROUBLE CONCENTRATING ON THINGS, SUCH AS READING THE NEWSPAPER OR WATCHING TELEVISION: NOT AT ALL
SUM OF ALL RESPONSES TO PHQ QUESTIONS 1-9: 4

## 2025-02-24 NOTE — ASSESSMENT & PLAN NOTE
Chronic, not at goal (unstable), continue current treatment plan and medication adherence emphasized    Orders:    Lipid Panel; Future    Hemoglobin A1C; Future    CBC with Auto Differential; Future    Comprehensive Metabolic Panel; Future    TSH + Free T4 Panel; Future    PSA Screening; Future    ADDIS - jT Casey MD, Gastroenterology, [place] ([address])    EPINEPHrine (EPIPEN) 0.3 MG/0.3ML SOAJ injection; Inject 0.3 mLs into the muscle daily    CBC; Future    Urinalysis with Reflex to Culture; Future    External Referral To Counseling Services    sacubitril-valsartan (ENTRESTO)  MG per tablet; Take 1 tablet by mouth 2 times daily    spironolactone (ALDACTONE) 25 MG tablet; Take 1 tablet by mouth daily    carvedilol (COREG) 25 MG tablet; Take 0.5 tablets by mouth 2 times daily    nicotine (NICOTROL) 10 MG/ML SOLN nasal spray; 1 spray by Nasal route as needed for Smoking cessation using one or two doses per hour. Each dose is two sprays, one in each nostril. You should not use more than five doses per hour or 40 doses per day (24 hours).    mupirocin (BACTROBAN) 2 % cream; Apply topically 3 times daily.    Urinalysis with Reflex to Culture    CBC    PSA Screening    TSH + Free T4 Panel    Comprehensive Metabolic Panel    CBC with Auto Differential    Hemoglobin A1C    Lipid Panel

## 2025-02-24 NOTE — PROGRESS NOTES
Chief Complaint   Patient presents with    Establish Care     Previous patient        \"Have you been to the ER, urgent care clinic since your last visit?  Hospitalized since your last visit?\"    YES [facility] 10/2024    “Have you seen or consulted any other health care providers outside of [facility] since your last visit?”    NO        “Have you had a colorectal cancer screening such as a colonoscopy/FIT/Cologuard?    NO    No colonoscopy on file  No cologuard on file  No FIT/FOBT on file   No flexible sigmoidoscopy on file         Click Here for Release of Records Request     No results found for this visit on 25.   Vitals:    25 1137   BP: 119/87   Pulse: 81   Resp: 18   Temp: 98.6 °F (37 °C)   TempSrc: Infrared   SpO2: 95%   Weight: 107 kg (236 lb)   Height: 1.88 m (6' 2\")        The patient, Marquis PRASHANTH Alves, identity was verified by name and .   
nicotine (NICOTROL) 10 MG/ML SOLN nasal spray; 1 spray by Nasal route as needed for Smoking cessation using one or two doses per hour. Each dose is two sprays, one in each nostril. You should not use more than five doses per hour or 40 doses per day (24 hours).    mupirocin (BACTROBAN) 2 % cream; Apply topically 3 times daily.    Urinalysis with Reflex to Culture    CBC    PSA Screening    TSH + Free T4 Panel    Comprehensive Metabolic Panel    CBC with Auto Differential    Hemoglobin A1C    Lipid Panel    Skin lesion, superficial   Chronic, not at goal (unstable), continue current treatment plan and medication adherence emphasized    Orders:    EPINEPHrine (EPIPEN) 0.3 MG/0.3ML SOAJ injection; Inject 0.3 mLs into the muscle daily    nicotine (NICOTROL) 10 MG/ML SOLN nasal spray; 1 spray by Nasal route as needed for Smoking cessation using one or two doses per hour. Each dose is two sprays, one in each nostril. You should not use more than five doses per hour or 40 doses per day (24 hours).    mupirocin (BACTROBAN) 2 % cream; Apply topically 3 times daily.    Culture, Anaerobic and Aerobic; Future      Return if symptoms worsen or fail to improve.           --Guevara South MD

## 2025-02-24 NOTE — ASSESSMENT & PLAN NOTE
Chronic, at goal (stable), continue current treatment plan and medication adherence emphasized    Orders:    Lipid Panel; Future    Hemoglobin A1C; Future    CBC with Auto Differential; Future    Comprehensive Metabolic Panel; Future    TSH + Free T4 Panel; Future    PSA Screening; Future    [facility] - [facility] ([address] [facility] [address])    EPINEPHrine (EPIPEN) 0.3 MG/0.3ML SOAJ injection; Inject 0.3 mLs into the muscle daily    CBC; Future    Urinalysis with Reflex to Culture; Future    External Referral To Counseling Services    nicotine (NICOTROL) 10 MG/ML SOLN nasal spray; 1 spray by Nasal route as needed for Smoking cessation using one or two doses per hour. Each dose is two sprays, one in each nostril. You should not use more than five doses per hour or 40 doses per day (24 hours).    mupirocin (BACTROBAN) 2 % cream; Apply topically 3 times daily.    Urinalysis with Reflex to Culture    CBC    PSA Screening    TSH + Free T4 Panel    Comprehensive Metabolic Panel    CBC with Auto Differential    Hemoglobin A1C    Lipid Panel

## 2025-02-24 NOTE — ASSESSMENT & PLAN NOTE
Chronic, at goal (stable), continue current treatment plan and medication adherence emphasized    Orders:    EPINEPHrine (EPIPEN) 0.3 MG/0.3ML SOAJ injection; Inject 0.3 mLs into the muscle daily    nicotine (NICOTROL) 10 MG/ML SOLN nasal spray; 1 spray by Nasal route as needed for Smoking cessation using one or two doses per hour. Each dose is two sprays, one in each nostril. You should not use more than five doses per hour or 40 doses per day (24 hours).    mupirocin (BACTROBAN) 2 % cream; Apply topically 3 times daily.

## 2025-02-25 ENCOUNTER — CLINICAL DOCUMENTATION (OUTPATIENT)
Age: 45
End: 2025-02-25

## 2025-02-25 LAB
T4 FREE SERPL-MCNC: 0.9 NG/DL (ref 0.8–1.5)
TSH SERPL DL<=0.05 MIU/L-ACNC: 1.01 UIU/ML (ref 0.36–3.74)

## 2025-02-26 LAB
ALBUMIN SERPL-MCNC: 4.2 G/DL (ref 3.5–5)
ALBUMIN/GLOB SERPL: 1.2 (ref 1.1–2.2)
ALP SERPL-CCNC: 124 U/L (ref 45–117)
ALT SERPL-CCNC: 36 U/L (ref 12–78)
ANION GAP SERPL CALC-SCNC: 5 MMOL/L (ref 2–12)
APPEARANCE UR: CLEAR
AST SERPL-CCNC: 34 U/L (ref 15–37)
BACTERIA URNS QL MICRO: NEGATIVE /HPF
BILIRUB SERPL-MCNC: 0.5 MG/DL (ref 0.2–1)
BILIRUB UR QL: NEGATIVE
BUN SERPL-MCNC: 12 MG/DL (ref 6–20)
BUN/CREAT SERPL: 13 (ref 12–20)
CALCIUM SERPL-MCNC: 9.5 MG/DL (ref 8.5–10.1)
CHLORIDE SERPL-SCNC: 107 MMOL/L (ref 97–108)
CHOLEST SERPL-MCNC: 190 MG/DL
CO2 SERPL-SCNC: 27 MMOL/L (ref 21–32)
COLOR UR: ABNORMAL
CREAT SERPL-MCNC: 0.96 MG/DL (ref 0.7–1.3)
EPITH CASTS URNS QL MICRO: ABNORMAL /LPF
ERYTHROCYTE [DISTWIDTH] IN BLOOD BY AUTOMATED COUNT: 12.1 % (ref 11.5–14.5)
EST. AVERAGE GLUCOSE BLD GHB EST-MCNC: 108 MG/DL
GLOBULIN SER CALC-MCNC: 3.4 G/DL (ref 2–4)
GLUCOSE SERPL-MCNC: 95 MG/DL (ref 65–100)
GLUCOSE UR STRIP.AUTO-MCNC: NEGATIVE MG/DL
HBA1C MFR BLD: 5.4 % (ref 4–5.6)
HCT VFR BLD AUTO: 47.7 % (ref 36.6–50.3)
HDLC SERPL-MCNC: 56 MG/DL
HDLC SERPL: 3.4 (ref 0–5)
HGB BLD-MCNC: 15.3 G/DL (ref 12.1–17)
HGB UR QL STRIP: NEGATIVE
HYALINE CASTS URNS QL MICRO: ABNORMAL /LPF (ref 0–5)
KETONES UR QL STRIP.AUTO: NEGATIVE MG/DL
LDLC SERPL CALC-MCNC: 94.4 MG/DL (ref 0–100)
LEUKOCYTE ESTERASE UR QL STRIP.AUTO: NEGATIVE
MCH RBC QN AUTO: 31.4 PG (ref 26–34)
MCHC RBC AUTO-ENTMCNC: 32.1 G/DL (ref 30–36.5)
MCV RBC AUTO: 97.9 FL (ref 80–99)
NITRITE UR QL STRIP.AUTO: NEGATIVE
NRBC # BLD: 0 K/UL (ref 0–0.01)
NRBC BLD-RTO: 0 PER 100 WBC
PH UR STRIP: 6.5 (ref 5–8)
PLATELET # BLD AUTO: 270 K/UL (ref 150–400)
PMV BLD AUTO: 9.9 FL (ref 8.9–12.9)
POTASSIUM SERPL-SCNC: 4.3 MMOL/L (ref 3.5–5.1)
PROT SERPL-MCNC: 7.6 G/DL (ref 6.4–8.2)
PROT UR STRIP-MCNC: ABNORMAL MG/DL
PSA SERPL-MCNC: 1 NG/ML (ref 0.01–4)
RBC # BLD AUTO: 4.87 M/UL (ref 4.1–5.7)
RBC #/AREA URNS HPF: ABNORMAL /HPF (ref 0–5)
SODIUM SERPL-SCNC: 139 MMOL/L (ref 136–145)
SP GR UR REFRACTOMETRY: 1.02 (ref 1–1.03)
TRIGL SERPL-MCNC: 198 MG/DL
URINE CULTURE IF INDICATED: ABNORMAL
UROBILINOGEN UR QL STRIP.AUTO: 0.2 EU/DL (ref 0.2–1)
VLDLC SERPL CALC-MCNC: 39.6 MG/DL
WBC # BLD AUTO: 7.1 K/UL (ref 4.1–11.1)
WBC URNS QL MICRO: ABNORMAL /HPF (ref 0–4)

## 2025-02-26 RX ORDER — MUPIROCIN 20 MG/G
OINTMENT TOPICAL
Qty: 22 G | Refills: 1 | Status: SHIPPED | OUTPATIENT
Start: 2025-02-26 | End: 2025-03-04

## 2025-03-11 ENCOUNTER — CLINICAL DOCUMENTATION (OUTPATIENT)
Age: 45
End: 2025-03-11

## 2025-03-11 NOTE — PROGRESS NOTES
PA initiated fro Nicotrol 10mg/ml nasal spray. Denied. You have trial and failed 1 preferred drug. Preferred Drugs are: Chantix, Chantix DS pack, Bupropion sustained release, Nicotine gum, Nicotine lozenge, Verenicline.

## 2025-04-09 ENCOUNTER — TELEPHONE (OUTPATIENT)
Age: 45
End: 2025-04-09

## 2025-05-01 ENCOUNTER — TELEPHONE (OUTPATIENT)
Age: 45
End: 2025-05-01

## 2025-05-01 NOTE — TELEPHONE ENCOUNTER
Spoke with patient he is requesting a letter to resume getting food stamps, but will be calling  his  to find out exactly what information needs to be in the letter. Patient can be reached at [phone].  CORINNE

## 2025-05-01 NOTE — TELEPHONE ENCOUNTER
Patient states that he need a generic letter from  stating that he is temporally disable and waiting on his SSI he can be reached @ [phone]

## 2025-05-14 ENCOUNTER — OFFICE VISIT (OUTPATIENT)
Age: 45
End: 2025-05-14
Payer: COMMERCIAL

## 2025-05-14 VITALS
HEART RATE: 74 BPM | OXYGEN SATURATION: 97 % | TEMPERATURE: 97.5 F | DIASTOLIC BLOOD PRESSURE: 87 MMHG | BODY MASS INDEX: 30.43 KG/M2 | SYSTOLIC BLOOD PRESSURE: 135 MMHG | WEIGHT: 237 LBS | RESPIRATION RATE: 18 BRPM

## 2025-05-14 DIAGNOSIS — Z02.89 ENCOUNTER FOR COMPLETION OF FORM WITH PATIENT: ICD-10-CM

## 2025-05-14 DIAGNOSIS — L98.9 SKIN LESION: ICD-10-CM

## 2025-05-14 DIAGNOSIS — Z00.00 ENCOUNTER FOR WELL ADULT EXAM WITHOUT ABNORMAL FINDINGS: Primary | ICD-10-CM

## 2025-05-14 PROCEDURE — 3079F DIAST BP 80-89 MM HG: CPT | Performed by: FAMILY MEDICINE

## 2025-05-14 PROCEDURE — 99396 PREV VISIT EST AGE 40-64: CPT | Performed by: FAMILY MEDICINE

## 2025-05-14 PROCEDURE — 3075F SYST BP GE 130 - 139MM HG: CPT | Performed by: FAMILY MEDICINE

## 2025-05-14 RX ORDER — TRAZODONE HYDROCHLORIDE 50 MG/1
50 TABLET ORAL NIGHTLY
COMMUNITY
Start: 2025-05-06

## 2025-05-14 RX ORDER — LURASIDONE HYDROCHLORIDE 20 MG/1
TABLET, FILM COATED ORAL
COMMUNITY
Start: 2025-05-06

## 2025-05-14 RX ORDER — CEPHALEXIN 500 MG/1
500 CAPSULE ORAL 3 TIMES DAILY
Qty: 21 CAPSULE | Refills: 0 | Status: SHIPPED | OUTPATIENT
Start: 2025-05-14 | End: 2025-05-21

## 2025-05-14 ASSESSMENT — PATIENT HEALTH QUESTIONNAIRE - PHQ9
SUM OF ALL RESPONSES TO PHQ QUESTIONS 1-9: 0
1. LITTLE INTEREST OR PLEASURE IN DOING THINGS: NOT AT ALL
SUM OF ALL RESPONSES TO PHQ QUESTIONS 1-9: 0
2. FEELING DOWN, DEPRESSED OR HOPELESS: NOT AT ALL

## 2025-05-14 NOTE — PROGRESS NOTES
Chief Complaint   Patient presents with    Annual Exam    documentation     Letter for assistance  food stamps        \"Have you been to the ER, urgent care clinic since your last visit?  Hospitalized since your last visit?\"    NO    “Have you seen or consulted any other health care providers outside of [facility] since your last visit?”    NO        “Have you had a colorectal cancer screening such as a colonoscopy/FIT/Cologuard?    NO    No colonoscopy on file  No cologuard on file  No FIT/FOBT on file   No flexible sigmoidoscopy on file         Click Here for Release of Records Request     No results found for this visit on 25.   Vitals:    25 1544   BP: 135/87   Pulse: 74   Resp: 18   Temp: 97.5 °F (36.4 °C)   TempSrc: Temporal   SpO2: 97%   Weight: 107.5 kg (237 lb)        The patient, Marquis PRASHANTH Alves, identity was verified by name and .

## 2025-05-14 NOTE — PROGRESS NOTES
Well Adult Note  Name: Marquis PRASHANTH Alves Today’s Date: 2025   MRN: [id number] Sex: Male   Age: 44 y.o. Ethnicity:  /    : 1980 Race: Black /       Marquis PRASHANTH Alves is here for a well adult exam.       Assessment & Plan   Encounter for well adult exam without abnormal findings  Encounter for completion of form with patient  Skin lesion  -     AFL - Ridpath, Leesa, DO, Dermatology, [place]  -     cephALEXin (KEFLEX) 500 MG capsule; Take 1 capsule by mouth 3 times daily for 7 days, Disp-21 capsule, R-0Normal      Return in 1 year (on 2026) for CPE (Physical Exam).       Subjective     Last wellness exam was few years ago Not Up todate w/ all vaccination, last tetanus vaccine was >10yrs ago, regardless the pt was offered all the recommended vaccination as preventive measure, patient has no hx of fall secondary  no blood in the stool no tarry stool,    today the depression at this age addressed pt with nl interests and enjoy to do things, not anxious not depressed,  pt at this visit, is physically functional with gait  and nicely independent and walks w/out mobility device, mentaly is very functional,  very Alert and oriented, unfortunately,  BMI for pt's age is into ow state,    the  abnl BMI r/w'd and information given, bp was screened for abnormality        last colonoscopy was never  No family hx of breast cancer in a male  Last psa exam was never  no family hx of colon cancer, patient is stating that the pt is sexaully active and does safe sex,  ++physically active,  Patient currently on no medication,   Needs a letter that indicate he needs his benefit because of his special diet,  have allergic rx,       Review of Systems    Constitutional: no fever, nl energy levels, nl sleep patterns, nl appetite, and nl weight fluctuations,  - exercise habits,  nad     HENT: no ear pain or nosebleeds. No blurred vision  Respiratory: no shortness of breath, wheezing cough